# Patient Record
Sex: MALE | Race: BLACK OR AFRICAN AMERICAN | NOT HISPANIC OR LATINO | Employment: OTHER | URBAN - METROPOLITAN AREA
[De-identification: names, ages, dates, MRNs, and addresses within clinical notes are randomized per-mention and may not be internally consistent; named-entity substitution may affect disease eponyms.]

---

## 2017-01-13 DIAGNOSIS — E11.59 HYPERTENSION ASSOCIATED WITH DIABETES: ICD-10-CM

## 2017-01-13 DIAGNOSIS — I15.2 HYPERTENSION ASSOCIATED WITH DIABETES: ICD-10-CM

## 2017-01-13 RX ORDER — ENALAPRIL MALEATE AND HYDROCHLOROTHIAZIDE 10; 25 MG/1; MG/1
TABLET ORAL
Qty: 30 TABLET | Refills: 4 | Status: SHIPPED | OUTPATIENT
Start: 2017-01-13 | End: 2017-06-10 | Stop reason: SDUPTHER

## 2017-03-23 DIAGNOSIS — E11.9 TYPE 2 DIABETES MELLITUS WITHOUT COMPLICATION: ICD-10-CM

## 2017-04-24 RX ORDER — POTASSIUM CHLORIDE 750 MG/1
TABLET, FILM COATED, EXTENDED RELEASE ORAL
Qty: 40 TABLET | Refills: 3 | Status: SHIPPED | OUTPATIENT
Start: 2017-04-24 | End: 2017-08-18 | Stop reason: SDUPTHER

## 2017-05-24 ENCOUNTER — TELEPHONE (OUTPATIENT)
Dept: INTERNAL MEDICINE | Facility: CLINIC | Age: 77
End: 2017-05-24

## 2017-05-24 DIAGNOSIS — I15.2 HYPERTENSION ASSOCIATED WITH DIABETES: ICD-10-CM

## 2017-05-24 DIAGNOSIS — E11.59 HYPERTENSION ASSOCIATED WITH DIABETES: ICD-10-CM

## 2017-05-24 RX ORDER — METOPROLOL SUCCINATE 25 MG/1
TABLET, EXTENDED RELEASE ORAL
Qty: 30 TABLET | Refills: 1 | Status: SHIPPED | OUTPATIENT
Start: 2017-05-24 | End: 2017-07-22 | Stop reason: SDUPTHER

## 2017-05-24 NOTE — TELEPHONE ENCOUNTER
Spoke with patient regarding his medication sent to his pharmacy, also reminded his f/u appt on the 20th of June @ 11:00. Patient voices understanding.

## 2017-06-10 DIAGNOSIS — I15.2 HYPERTENSION ASSOCIATED WITH DIABETES: ICD-10-CM

## 2017-06-10 DIAGNOSIS — E11.59 HYPERTENSION ASSOCIATED WITH DIABETES: ICD-10-CM

## 2017-06-12 RX ORDER — ENALAPRIL MALEATE AND HYDROCHLOROTHIAZIDE 10; 25 MG/1; MG/1
TABLET ORAL
Qty: 30 TABLET | Refills: 2 | Status: SHIPPED | OUTPATIENT
Start: 2017-06-12 | End: 2017-09-12 | Stop reason: SDUPTHER

## 2017-06-20 ENCOUNTER — LAB VISIT (OUTPATIENT)
Dept: LAB | Facility: HOSPITAL | Age: 77
End: 2017-06-20
Attending: NURSE PRACTITIONER
Payer: MEDICARE

## 2017-06-20 ENCOUNTER — OFFICE VISIT (OUTPATIENT)
Dept: INTERNAL MEDICINE | Facility: CLINIC | Age: 77
End: 2017-06-20
Payer: MEDICARE

## 2017-06-20 ENCOUNTER — OFFICE VISIT (OUTPATIENT)
Dept: FAMILY MEDICINE | Facility: CLINIC | Age: 77
End: 2017-06-20
Payer: MEDICARE

## 2017-06-20 VITALS
SYSTOLIC BLOOD PRESSURE: 120 MMHG | HEART RATE: 64 BPM | BODY MASS INDEX: 23.63 KG/M2 | DIASTOLIC BLOOD PRESSURE: 72 MMHG | OXYGEN SATURATION: 96 % | WEIGHT: 147 LBS | HEIGHT: 66 IN

## 2017-06-20 VITALS
HEIGHT: 66 IN | DIASTOLIC BLOOD PRESSURE: 72 MMHG | OXYGEN SATURATION: 96 % | WEIGHT: 147.5 LBS | SYSTOLIC BLOOD PRESSURE: 120 MMHG | HEART RATE: 64 BPM | BODY MASS INDEX: 23.7 KG/M2

## 2017-06-20 DIAGNOSIS — Z23 NEED FOR VACCINATION AGAINST STREPTOCOCCUS PNEUMONIAE: ICD-10-CM

## 2017-06-20 DIAGNOSIS — Z13.6 SCREENING FOR ISCHEMIC HEART DISEASE: ICD-10-CM

## 2017-06-20 DIAGNOSIS — R41.3 IMPAIRED MEMORY: ICD-10-CM

## 2017-06-20 DIAGNOSIS — E78.1 HYPERTRIGLYCERIDEMIA: ICD-10-CM

## 2017-06-20 DIAGNOSIS — E11.9 TYPE 2 DIABETES MELLITUS WITHOUT COMPLICATION, WITHOUT LONG-TERM CURRENT USE OF INSULIN: Primary | ICD-10-CM

## 2017-06-20 DIAGNOSIS — Z00.00 PREVENTATIVE HEALTH CARE: ICD-10-CM

## 2017-06-20 DIAGNOSIS — Z00.00 ENCOUNTER FOR PREVENTIVE HEALTH EXAMINATION: Primary | ICD-10-CM

## 2017-06-20 DIAGNOSIS — I15.2 HYPERTENSION ASSOCIATED WITH DIABETES: ICD-10-CM

## 2017-06-20 DIAGNOSIS — N18.30 CKD (CHRONIC KIDNEY DISEASE) STAGE 3, GFR 30-59 ML/MIN: ICD-10-CM

## 2017-06-20 DIAGNOSIS — I10 ESSENTIAL HYPERTENSION: ICD-10-CM

## 2017-06-20 DIAGNOSIS — R73.03 PREDIABETES: ICD-10-CM

## 2017-06-20 DIAGNOSIS — R79.9 ABNORMAL FINDING OF BLOOD CHEMISTRY: ICD-10-CM

## 2017-06-20 DIAGNOSIS — Z12.5 SCREENING FOR PROSTATE CANCER: ICD-10-CM

## 2017-06-20 DIAGNOSIS — E11.59 HYPERTENSION ASSOCIATED WITH DIABETES: ICD-10-CM

## 2017-06-20 DIAGNOSIS — Z12.5 PROSTATE CANCER SCREENING: ICD-10-CM

## 2017-06-20 LAB
CHOLEST/HDLC SERPL: 5.4 {RATIO}
COMPLEXED PSA SERPL-MCNC: 2.4 NG/ML
HDL/CHOLESTEROL RATIO: 18.5 %
HDLC SERPL-MCNC: 189 MG/DL
HDLC SERPL-MCNC: 35 MG/DL
LDLC SERPL CALC-MCNC: 116 MG/DL
NONHDLC SERPL-MCNC: 154 MG/DL
TRIGL SERPL-MCNC: 190 MG/DL

## 2017-06-20 PROCEDURE — 99499 UNLISTED E&M SERVICE: CPT | Mod: S$GLB,,, | Performed by: INTERNAL MEDICINE

## 2017-06-20 PROCEDURE — G0009 ADMIN PNEUMOCOCCAL VACCINE: HCPCS | Mod: S$GLB,,, | Performed by: NURSE PRACTITIONER

## 2017-06-20 PROCEDURE — 99499 UNLISTED E&M SERVICE: CPT | Mod: S$GLB,,, | Performed by: NURSE PRACTITIONER

## 2017-06-20 PROCEDURE — 90670 PCV13 VACCINE IM: CPT | Mod: S$GLB,,, | Performed by: NURSE PRACTITIONER

## 2017-06-20 PROCEDURE — 99999 PR PBB SHADOW E&M-EST. PATIENT-LVL III: CPT | Mod: PBBFAC,,, | Performed by: NURSE PRACTITIONER

## 2017-06-20 PROCEDURE — 99397 PER PM REEVAL EST PAT 65+ YR: CPT | Mod: S$GLB,,, | Performed by: INTERNAL MEDICINE

## 2017-06-20 PROCEDURE — G0439 PPPS, SUBSEQ VISIT: HCPCS | Mod: S$GLB,,, | Performed by: NURSE PRACTITIONER

## 2017-06-20 PROCEDURE — 99999 PR PBB SHADOW E&M-EST. PATIENT-LVL III: CPT | Mod: PBBFAC,,, | Performed by: INTERNAL MEDICINE

## 2017-06-20 RX ORDER — ASPIRIN 81 MG/1
81 TABLET ORAL DAILY
COMMUNITY
End: 2020-05-11

## 2017-06-20 RX ORDER — AMOXICILLIN 500 MG
2 CAPSULE ORAL DAILY
COMMUNITY
End: 2023-06-22 | Stop reason: SDUPTHER

## 2017-06-20 NOTE — PROGRESS NOTES
"Dean Hahn presented for a  Medicare AWV and comprehensive Health Risk Assessment today. The following components were reviewed and updated:    · Medical history  · Family History  · Social history  · Allergies and Current Medications  · Health Risk Assessment  · Health Maintenance  · Care Team     ** See Completed Assessments for Annual Wellness Visit within the encounter summary.**       The following assessments were completed:  · Living Situation  · CAGE  · Depression Screening  · Timed Get Up and Go  · Whisper Test  · Cognitive Function Screening  · Nutrition Screening  · ADL Screening  · PAQ Screening    Vitals:    06/20/17 0855   BP: 120/72   Pulse: 64   SpO2: 96%   Weight: 66.9 kg (147 lb 7.8 oz)   Height: 5' 6" (1.676 m)     Body mass index is 23.81 kg/m².     Physical Exam   Constitutional: He is oriented to person, place, and time. He appears well-developed and well-nourished. No distress.   HENT:   Head: Normocephalic and atraumatic.   Eyes: EOM are normal. Pupils are equal, round, and reactive to light.   Neck: Neck supple. No JVD present. No tracheal deviation present.   Cardiovascular: Normal rate, regular rhythm, normal heart sounds and intact distal pulses.    No murmur heard.  Pulmonary/Chest: Effort normal and breath sounds normal. No respiratory distress. He has no wheezes. He has no rales.   Abdominal: Soft. Bowel sounds are normal. He exhibits no distension and no mass. There is no tenderness.   Musculoskeletal: Normal range of motion. He exhibits no edema or tenderness.   Neurological: He is alert and oriented to person, place, and time. Coordination normal.   Skin: Skin is warm and dry. No erythema. No pallor.   Psychiatric: He has a normal mood and affect. His behavior is normal. Judgment and thought content normal. Cognition and memory are normal. He expresses no homicidal and no suicidal ideation.   Nursing note and vitals reviewed.        Diagnoses and health risks identified today " and associated recommendations/orders:    1. Encounter for preventive health examination    2. Essential hypertension  Chronic; stable on medication.  Followed by PCP.    3. Hypertriglyceridemia  Chronic; stable on medication.  Followed by PCP.  - Lipid panel; Future    4. CKD (chronic kidney disease) stage 3, GFR 30-59 ml/min  Chronic; stable.  Followed by PCP.    5. Prediabetes  Chronic; stable.  Followed by PCP.  - Hemoglobin A1c; Future    6. Impaired memory  Memory deficit on screening; Mini Cognitive score: 3    Refused referral to Neurology for dementia evaluation.    7. Screening for ischemic heart disease  - Lipid panel; Future    8. Screening for prostate cancer  - PSA, Screening; Future    9. Need for vaccination against Streptococcus pneumoniae  Prevnar 13 vaccination administered today in clinic.  - PNEUMOCOCCAL CONJUGATE VACCINE 13-VALENT LESS THAN 4YO & GREATER THAN 51YO IM    10. Body mass index (BMI) 23.0-23.9, adult      Provided Dean with a 5-10 year written screening schedule and personal prevention plan. Recommendations were developed using the USPSTF age appropriate recommendations. Education, counseling, and referrals were provided as needed. After Visit Summary printed and given to patient which includes a list of additional screenings\tests needed.    Return in about 6 months (around 12/20/2017) for follow-up with PCP, HRA visit in 1 year.    Luann Sullivan NP

## 2017-06-20 NOTE — PATIENT INSTRUCTIONS
Counseling and Referral of Other Preventative  (Italic type indicates deductible and co-insurance are waived)    Patient Name: Dean Hahn  Today's Date: 6/20/2017      SERVICE LIMITATIONS RECOMMENDATION    Vaccines    · Pneumococcal (once after 65)    · Influenza (annually)    · Hepatitis B (if medium/high risk)    · Prevnar 13      Hepatitis B medium/high risk factors:       - End-stage renal disease       - Hemophiliacs who received Factor VII or         IX concentrates       - Clients of institutions for the mentally             retarded       - Persons who live in the same house as          a HepB carrier       - Homosexual men       - Illicit injectable drug abusers     Pneumococcal: N/A     Influenza: N/A     Hepatitis B: N/A     Prevnar 13: Scheduled - see appointments    Prostate cancer screening (annually to age 75)     Prostate specific antigen (PSA) Shared decision making with Provider. Sometimes a co-pay may be required if the patient decides to have this test. The USPSTF no longer recommends prostate cancer screening routinely in medicine: every 1 year    Colorectal cancer screening (to age 75)    · Fecal occult blood test (annual)  · Flexible sigmoidoscopy (5y)  · Screening colonoscopy (10y)  · Barium enema   Last done 04/11/2013, recommend to repeat every 10  years    Diabetes self-management training (no USPSTF recommendations)  Requires referral by treating physician for patient with diabetes or renal disease. 10 hours of initial DSMT sessions of no less than 30 minutes each in a continuous 12-month period. 2 hours of follow-up DSMT in subsequent years.  N/A    Glaucoma screening (no USPSTF recommendation)  Diabetes mellitus, family history   , age 50 or over    American, age 65 or over  Done this year, repeat every year    Medical nutrition therapy for diabetes or renal disease (no recommended schedule)  Requires referral by treating physician for patient with  diabetes or renal disease or kidney transplant within the past 3 years.  Can be provided in same year as diabetes self-management training (DSMT), and CMS recommends medical nutrition therapy take place after DSMT. Up to 3 hours for initial year and 2 hours in subsequent years.  N/A    Cardiovascular screening blood tests (every 5 years)  · Fasting lipid panel  Order as a panel if possible  Scheduled, see appointments    Diabetes screening tests (at least every 3 years, Medicare covers annually or at 6-month intervals for prediabetic patients)  · Fasting blood sugar (FBS) or glucose tolerance test (GTT)  Patient must be diagnosed with one of the following:       - Hypertension       - Dyslipidemia       - Obesity (BMI 30kg/m2)       - Previous elevated impaired FBS or GTT       ... or any two of the following:       - Overweight (BMI 25 but <30)       - Family history of diabetes       - Age 65 or older       - History of gestational diabetes or birth of baby weighing more than 9 pounds  Scheduled, see appointments    Abdominal aortic aneurysm screening (once)  · Sonogram   Limited to patients who meet one of the following criteria:       - Men who are 65-75 years old and have smoked more than 100 cigarette in their lifetime       - Anyone with a family history of abdominal aortic aneurysm       - Anyone recommended for screening by the USPSTF  N/A    HIV screening (annually for increased risk patients)  · HIV-1 and HIV-2 by EIA, or GUSTAVO, rapid antibody test or oral mucosa transudate  Patients must be at increased risk for HIV infection per USPSTF guidelines or pregnant. Tests covered annually for patient at increased risk or as requested by the patient. Pregnant patients may receive up to 3 tests during pregnancy.  Risks discussed, screening is not recommended    Smoking cessation counseling (up to 8 sessions per year)  Patients must be asymptomatic of tobacco-related conditions to receive as a preventative  service.  Non-smoker    Subsequent annual wellness visit  At least 12 months since last AWV  Return in one year     The following information is provided to all patients.  This information is to help you find resources for any of the problems found today that may be affecting your health:                Living healthy guide: www.[facility].[place].[facility]      Understanding Diabetes: www.diabetes.org      Eating healthy: www.cdc.gov/healthyweight      CDC home safety checklist: www.cdc.gov/steadi/patient.html      Agency on Aging: www.goea.[place].[facility]      Alcoholics anonymous (AA): www.aa.org      Physical Activity: www.erickson.nih.gov/ce6mtiu      Tobacco use: www.quitwithusla.org

## 2017-06-20 NOTE — PROGRESS NOTES
Subjective:       Patient ID: Dean Hahn is a 76 y.o. male.    Chief Complaint: Hypertension (follow up)    HPI Pt. Here for f/u for HTN and DM; he is compliant with meds; he had labs today and are in process; last HGBA1C dated 9/8/16 was 5.3  Review of Systems   Constitutional: Negative for chills, fatigue and fever.   HENT: Negative for congestion, rhinorrhea and sore throat.    Respiratory: Negative for cough, shortness of breath and wheezing.    Cardiovascular: Negative for chest pain.   Gastrointestinal: Negative for abdominal pain, nausea and vomiting.   Genitourinary: Negative for dysuria.   Musculoskeletal: Negative for arthralgias.   Skin: Negative for rash.   Neurological: Negative for dizziness and headaches.   Psychiatric/Behavioral: Negative for sleep disturbance. The patient is not nervous/anxious.        Objective:      Physical Exam   Constitutional: He is oriented to person, place, and time. He appears well-developed.   Eyes: EOM are normal.   Neck: Normal range of motion.   Cardiovascular: Normal rate, regular rhythm and normal heart sounds.    Pulmonary/Chest: Effort normal and breath sounds normal.   Abdominal: Soft. There is no tenderness. There is no rebound and no guarding.   Musculoskeletal: Normal range of motion.   Neurological: He is alert and oriented to person, place, and time.   Skin: No rash noted.       Assessment:       1. Type 2 diabetes mellitus without complication, without long-term current use of insulin Well controlled   2. Hypertension associated with diabetes Well controlled   3. Preventative health care Active   4. Prostate cancer screening Active   5. Abnormal finding of blood chemistry  Active       Plan:         Type 2 diabetes mellitus without complication, without long-term current use of insulin  Comments:  encouraged ADA diet   Orders:  -     CBC auto differential; Future; Expected date: 11/20/2017  -     Comprehensive metabolic panel; Future; Expected  date: 11/20/2017  -     Hemoglobin A1c; Future; Expected date: 11/20/2017  -     Urinalysis; Future; Expected date: 11/20/2017  -     Microalbumin/creatinine urine ratio; Future; Expected date: 11/20/2017    Hypertension associated with diabetes  Comments:  continue current regimen and encouraged low Na diet   Orders:  -     CBC auto differential; Future; Expected date: 11/20/2017  -     Comprehensive metabolic panel; Future; Expected date: 11/20/2017  -     Urinalysis; Future; Expected date: 11/20/2017    Preventative health care  -     CBC auto differential; Future; Expected date: 11/20/2017  -     Comprehensive metabolic panel; Future; Expected date: 11/20/2017  -     Lipid panel; Future; Expected date: 11/20/2017  -     Urinalysis; Future; Expected date: 11/20/2017    Prostate cancer screening  -     PSA, Screening; Future; Expected date: 11/20/2017    Abnormal finding of blood chemistry   -     Lipid panel; Future; Expected date: 11/20/2017

## 2017-06-20 NOTE — Clinical Note
Primary Care Providers: Herman Padilla MD, MD (General)  Your patient was seen today for a HRA visit. Gap(s) in care (HEDIS gaps) have been identified during this visit that require additional testing and possible follow up.  Orders Placed This Encounter     PNEUMOCOCCAL CONJUGATE VACCINE 13-VALENT LESS THAN 4YO & GREATER THAN 51YO IM     Lipid panel         Standing Status: Future         Number of Occurrences: 1         Standing Expiration Date: 8/19/2018     PSA, Screening         Standing Status: Future         Number of Occurrences: 1         Standing Expiration Date: 8/19/2018     Hemoglobin A1c         Standing Status: Future         Number of Occurrences: 1         Standing Expiration Date: 8/19/2018   These orders were placed using [facility] approved protocol and any results will be forwarded to your office for appropriate follow up. I have included a copy of my visit note; please review the note and feel free to contact me with any questions.   Thank you for allowing me to partici

## 2017-06-21 LAB
ESTIMATED AVG GLUCOSE: 123 MG/DL
HBA1C MFR BLD HPLC: 5.9 %

## 2017-07-22 DIAGNOSIS — I15.2 HYPERTENSION ASSOCIATED WITH DIABETES: ICD-10-CM

## 2017-07-22 DIAGNOSIS — E11.59 HYPERTENSION ASSOCIATED WITH DIABETES: ICD-10-CM

## 2017-07-22 RX ORDER — METOPROLOL SUCCINATE 25 MG/1
TABLET, EXTENDED RELEASE ORAL
Qty: 30 TABLET | Refills: 6 | Status: SHIPPED | OUTPATIENT
Start: 2017-07-22 | End: 2017-11-08 | Stop reason: SDUPTHER

## 2017-08-18 RX ORDER — POTASSIUM CHLORIDE 750 MG/1
TABLET, FILM COATED, EXTENDED RELEASE ORAL
Qty: 40 TABLET | Refills: 3 | Status: SHIPPED | OUTPATIENT
Start: 2017-08-18 | End: 2017-11-08 | Stop reason: SDUPTHER

## 2017-09-12 DIAGNOSIS — I15.2 HYPERTENSION ASSOCIATED WITH DIABETES: ICD-10-CM

## 2017-09-12 DIAGNOSIS — E11.59 HYPERTENSION ASSOCIATED WITH DIABETES: ICD-10-CM

## 2017-09-12 RX ORDER — ENALAPRIL MALEATE AND HYDROCHLOROTHIAZIDE 10; 25 MG/1; MG/1
TABLET ORAL
Qty: 30 TABLET | Refills: 2 | Status: SHIPPED | OUTPATIENT
Start: 2017-09-12 | End: 2017-11-08 | Stop reason: SDUPTHER

## 2017-09-25 PROBLEM — Z00.00 PREVENTATIVE HEALTH CARE: Status: RESOLVED | Noted: 2017-06-20 | Resolved: 2017-09-25

## 2017-11-08 DIAGNOSIS — E11.59 HYPERTENSION ASSOCIATED WITH DIABETES: ICD-10-CM

## 2017-11-08 DIAGNOSIS — I15.2 HYPERTENSION ASSOCIATED WITH DIABETES: ICD-10-CM

## 2017-11-08 RX ORDER — ENALAPRIL MALEATE AND HYDROCHLOROTHIAZIDE 10; 25 MG/1; MG/1
1 TABLET ORAL DAILY
Qty: 30 TABLET | Refills: 2 | Status: SHIPPED | OUTPATIENT
Start: 2017-11-08 | End: 2018-02-20 | Stop reason: SDUPTHER

## 2017-11-08 RX ORDER — METOPROLOL SUCCINATE 25 MG/1
25 TABLET, EXTENDED RELEASE ORAL DAILY
Qty: 30 TABLET | Refills: 2 | Status: SHIPPED | OUTPATIENT
Start: 2017-11-08 | End: 2019-03-26 | Stop reason: SDUPTHER

## 2017-11-08 RX ORDER — POTASSIUM CHLORIDE 750 MG/1
TABLET, EXTENDED RELEASE ORAL
Qty: 40 TABLET | Refills: 2 | Status: SHIPPED | OUTPATIENT
Start: 2017-11-08 | End: 2018-02-06 | Stop reason: SDUPTHER

## 2017-11-08 NOTE — TELEPHONE ENCOUNTER
----- Message from Bruno Brown sent at 11/8/2017  1:47 PM CST -----  Contact: [facility]  [phone]  Refill request for   1. enalapril-hydrochlorothiazide (VASERETIC) 10-25 mg per tablet  2. KLOR-CON 10 10 mEq TbSR  3. metoprolol succinate (TOPROL-XL) 25 MG 24 hr tablet  Please advise

## 2017-12-11 ENCOUNTER — LAB VISIT (OUTPATIENT)
Dept: LAB | Facility: HOSPITAL | Age: 77
End: 2017-12-11
Attending: INTERNAL MEDICINE
Payer: MEDICARE

## 2017-12-11 DIAGNOSIS — I15.2 HYPERTENSION ASSOCIATED WITH DIABETES: ICD-10-CM

## 2017-12-11 DIAGNOSIS — E11.9 TYPE 2 DIABETES MELLITUS WITHOUT COMPLICATION, WITHOUT LONG-TERM CURRENT USE OF INSULIN: ICD-10-CM

## 2017-12-11 DIAGNOSIS — E11.59 HYPERTENSION ASSOCIATED WITH DIABETES: ICD-10-CM

## 2017-12-11 DIAGNOSIS — Z00.00 PREVENTATIVE HEALTH CARE: ICD-10-CM

## 2017-12-11 LAB
BILIRUB UR QL STRIP: NEGATIVE
CLARITY UR REFRACT.AUTO: CLEAR
COLOR UR AUTO: YELLOW
GLUCOSE UR QL STRIP: NEGATIVE
HGB UR QL STRIP: NEGATIVE
KETONES UR QL STRIP: NEGATIVE
LEUKOCYTE ESTERASE UR QL STRIP: NEGATIVE
NITRITE UR QL STRIP: NEGATIVE
PH UR STRIP: 5 [PH] (ref 5–8)
PROT UR QL STRIP: NEGATIVE
SP GR UR STRIP: 1.01 (ref 1–1.03)
URN SPEC COLLECT METH UR: NORMAL
UROBILINOGEN UR STRIP-ACNC: 4 EU/DL

## 2017-12-11 PROCEDURE — 81003 URINALYSIS AUTO W/O SCOPE: CPT

## 2017-12-20 ENCOUNTER — OFFICE VISIT (OUTPATIENT)
Dept: INTERNAL MEDICINE | Facility: CLINIC | Age: 77
End: 2017-12-20
Payer: MEDICARE

## 2017-12-20 VITALS
HEIGHT: 66 IN | WEIGHT: 148.38 LBS | SYSTOLIC BLOOD PRESSURE: 124 MMHG | DIASTOLIC BLOOD PRESSURE: 78 MMHG | BODY MASS INDEX: 23.84 KG/M2 | HEART RATE: 68 BPM

## 2017-12-20 DIAGNOSIS — N18.30 CKD (CHRONIC KIDNEY DISEASE) STAGE 3, GFR 30-59 ML/MIN: ICD-10-CM

## 2017-12-20 DIAGNOSIS — R17 TOTAL BILIRUBIN, ELEVATED: ICD-10-CM

## 2017-12-20 DIAGNOSIS — E11.22 TYPE 2 DIABETES MELLITUS WITH STAGE 3 CHRONIC KIDNEY DISEASE, WITHOUT LONG-TERM CURRENT USE OF INSULIN: Primary | ICD-10-CM

## 2017-12-20 DIAGNOSIS — E78.1 HYPERTRIGLYCERIDEMIA: ICD-10-CM

## 2017-12-20 DIAGNOSIS — E11.9 ENCOUNTER FOR DIABETIC FOOT EXAM: ICD-10-CM

## 2017-12-20 DIAGNOSIS — R79.89 LFT ELEVATION: ICD-10-CM

## 2017-12-20 DIAGNOSIS — E11.59 HYPERTENSION ASSOCIATED WITH DIABETES: ICD-10-CM

## 2017-12-20 DIAGNOSIS — R74.8 LOW SERUM HDL: ICD-10-CM

## 2017-12-20 DIAGNOSIS — Z23 NEEDS FLU SHOT: ICD-10-CM

## 2017-12-20 DIAGNOSIS — Z00.00 PREVENTATIVE HEALTH CARE: ICD-10-CM

## 2017-12-20 DIAGNOSIS — N18.30 TYPE 2 DIABETES MELLITUS WITH STAGE 3 CHRONIC KIDNEY DISEASE, WITHOUT LONG-TERM CURRENT USE OF INSULIN: Primary | ICD-10-CM

## 2017-12-20 DIAGNOSIS — I15.2 HYPERTENSION ASSOCIATED WITH DIABETES: ICD-10-CM

## 2017-12-20 PROCEDURE — G0008 ADMIN INFLUENZA VIRUS VAC: HCPCS | Mod: S$GLB,,, | Performed by: INTERNAL MEDICINE

## 2017-12-20 PROCEDURE — 90662 IIV NO PRSV INCREASED AG IM: CPT | Mod: S$GLB,,, | Performed by: INTERNAL MEDICINE

## 2017-12-20 PROCEDURE — 99999 PR PBB SHADOW E&M-EST. PATIENT-LVL III: CPT | Mod: PBBFAC,,, | Performed by: INTERNAL MEDICINE

## 2017-12-20 PROCEDURE — 99499 UNLISTED E&M SERVICE: CPT | Mod: S$GLB,,, | Performed by: INTERNAL MEDICINE

## 2017-12-20 PROCEDURE — 99214 OFFICE O/P EST MOD 30 MIN: CPT | Mod: S$GLB,,, | Performed by: INTERNAL MEDICINE

## 2017-12-20 NOTE — PROGRESS NOTES
Pt. ID: Dean Hahn is a 77 y.o. male      Chief complaint: No chief complaint on file.      HPI: Pt. Here for f/u for DM and HTN; I reviewed labs dated 12/11/17; kidney fxn was elevated but stable; LFT's have normalized; total bilirubin was elevated but stable; HGBA1C was 6.3; triglycerides were elevated and HDL was low; he takes fish oil tabs QD and I advised him to take them BID; he would like flu shot; he would like podiatry referral for nail care; he will get tetanus and shingles vaccine at local pharmacy; Rx given to pt. For both; he takes asa 81 mg QD      Review of Systems   Constitutional: Negative for chills and fever.   Respiratory: Negative for cough and shortness of breath.    Cardiovascular: Negative for chest pain.   Gastrointestinal: Negative for abdominal pain, nausea and vomiting.   Genitourinary: Negative for dysuria.         Objective:    Physical Exam   Constitutional: He is oriented to person, place, and time. He appears well-developed.   Eyes: EOM are normal.   Neck: Normal range of motion.   Cardiovascular: Normal rate, regular rhythm and normal heart sounds.    Pulmonary/Chest: Effort normal and breath sounds normal.   Abdominal: Soft. There is no tenderness. There is no rebound and no guarding.   Musculoskeletal: Normal range of motion.   Neurological: He is alert and oriented to person, place, and time.   Skin: No rash noted.   Diabetic foot exam: sensation intact and no ulcers B/L ; very poor nail care noted B/L toenails         Health Maintenance   Topic Date Due    Hemoglobin A1c  06/11/2018    Pneumococcal (65+) (2 of 2 - PPSV23) 06/20/2018    Eye Exam  08/07/2018    Lipid Panel  12/11/2018    Foot Exam  12/20/2018    Colonoscopy  04/11/2023    TETANUS VACCINE  12/20/2027    Zoster Vaccine  Addressed    Influenza Vaccine  Completed         Assessment:     1. Type 2 diabetes mellitus with stage 3 chronic kidney disease, without long-term current use of insulin  Well controlled   2. Hypertension associated with diabetes Well controlled   3. Hypertriglyceridemia Sub-optimally controlled   4. LFT elevation Well controlled   5. CKD (chronic kidney disease) stage 3, GFR 30-59 ml/min Stable   6. Total bilirubin, elevated Stable   7. Low serum HDL Active   8. Needs flu shot Active   9. Preventative health care Active   10. Encounter for diabetic foot exam Active         Plan: Type 2 diabetes mellitus with stage 3 chronic kidney disease, without long-term current use of insulin  Comments:  encouraged ADA diet   Orders:  -     CBC auto differential; Future; Expected date: 12/20/2017  -     Comprehensive metabolic panel; Future; Expected date: 12/20/2017  -     Hemoglobin A1c; Future; Expected date: 12/20/2017  -     Urinalysis; Future; Expected date: 12/20/2017  -     Microalbumin/creatinine urine ratio; Future; Expected date: 12/20/2017    Hypertension associated with diabetes  Comments:  continue current regimen and encouraged low Na diet   Orders:  -     CBC auto differential; Future; Expected date: 12/20/2017  -     Comprehensive metabolic panel; Future; Expected date: 12/20/2017  -     Urinalysis; Future; Expected date: 12/20/2017    Hypertriglyceridemia  Comments:  encouraged low triglyceride diet and change fish oil tabs to BID  Orders:  -     Lipid panel; Future; Expected date: 12/20/2017    LFT elevation  Comments:  normalized; monitor  Orders:  -     Comprehensive metabolic panel; Future; Expected date: 12/20/2017    CKD (chronic kidney disease) stage 3, GFR 30-59 ml/min  Comments:  monitor; avoid NSAIDs; encouraged fluid intake  Orders:  -     Comprehensive metabolic panel; Future; Expected date: 12/20/2017    Total bilirubin, elevated  Comments:  monitor  Orders:  -     Comprehensive metabolic panel; Future; Expected date: 12/20/2017    Low serum HDL  Comments:  asked pt. to exercise to attempt to improve HDL  Orders:  -     Lipid panel; Future; Expected date:  12/20/2017    Needs flu shot  -     Influenza - High Dose (65+) (PF) (IM)    Preventative health care  -     CBC auto differential; Future; Expected date: 12/20/2017  -     Comprehensive metabolic panel; Future; Expected date: 12/20/2017  -     Lipid panel; Future; Expected date: 12/20/2017  -     Urinalysis; Future; Expected date: 12/20/2017    Encounter for diabetic foot exam  -     Ambulatory Referral to Podiatry        Problem List Items Addressed This Visit        Cardiac/Vascular    Hypertriglyceridemia    Relevant Orders    Lipid panel    Hypertension associated with diabetes    Relevant Orders    CBC auto differential    Comprehensive metabolic panel    Urinalysis       Renal/    CKD (chronic kidney disease) stage 3, GFR 30-59 ml/min    Relevant Orders    Comprehensive metabolic panel       ID    Needs flu shot    Relevant Orders    Influenza - High Dose (65+) (PF) (IM)       Endocrine    Type 2 diabetes mellitus without complication, without long-term current use of insulin - Primary       GI    Total bilirubin, elevated    Relevant Orders    Comprehensive metabolic panel    LFT elevation    Relevant Orders    Comprehensive metabolic panel       Other    Low serum HDL    Relevant Orders    Lipid panel      Other Visit Diagnoses     Preventative health care   (Active)      Relevant Orders    CBC auto differential    Comprehensive metabolic panel    Lipid panel    Urinalysis    Encounter for diabetic foot exam   (Active)      Relevant Orders    Ambulatory Referral to Podiatry

## 2018-02-06 RX ORDER — POTASSIUM CHLORIDE 750 MG/1
TABLET, FILM COATED, EXTENDED RELEASE ORAL
Qty: 40 TABLET | Refills: 2 | Status: SHIPPED | OUTPATIENT
Start: 2018-02-06 | End: 2018-05-02 | Stop reason: SDUPTHER

## 2018-02-20 DIAGNOSIS — I15.2 HYPERTENSION ASSOCIATED WITH DIABETES: ICD-10-CM

## 2018-02-20 DIAGNOSIS — E11.59 HYPERTENSION ASSOCIATED WITH DIABETES: ICD-10-CM

## 2018-02-20 RX ORDER — ENALAPRIL MALEATE AND HYDROCHLOROTHIAZIDE 10; 25 MG/1; MG/1
1 TABLET ORAL DAILY
Qty: 30 TABLET | Refills: 4 | Status: SHIPPED | OUTPATIENT
Start: 2018-02-20 | End: 2018-07-17 | Stop reason: SDUPTHER

## 2018-03-26 ENCOUNTER — PES CALL (OUTPATIENT)
Dept: ADMINISTRATIVE | Facility: CLINIC | Age: 78
End: 2018-03-26

## 2018-05-02 RX ORDER — POTASSIUM CHLORIDE 750 MG/1
TABLET, FILM COATED, EXTENDED RELEASE ORAL
Qty: 40 TABLET | Refills: 3 | Status: SHIPPED | OUTPATIENT
Start: 2018-05-02 | End: 2018-08-21 | Stop reason: SDUPTHER

## 2018-06-11 ENCOUNTER — LAB VISIT (OUTPATIENT)
Dept: LAB | Facility: HOSPITAL | Age: 78
End: 2018-06-11
Attending: INTERNAL MEDICINE
Payer: MEDICARE

## 2018-06-11 ENCOUNTER — TELEPHONE (OUTPATIENT)
Dept: INTERNAL MEDICINE | Facility: CLINIC | Age: 78
End: 2018-06-11

## 2018-06-11 DIAGNOSIS — R79.89 LFT ELEVATION: ICD-10-CM

## 2018-06-11 DIAGNOSIS — E11.59 HYPERTENSION ASSOCIATED WITH DIABETES: ICD-10-CM

## 2018-06-11 DIAGNOSIS — N18.30 TYPE 2 DIABETES MELLITUS WITH STAGE 3 CHRONIC KIDNEY DISEASE, WITHOUT LONG-TERM CURRENT USE OF INSULIN: ICD-10-CM

## 2018-06-11 DIAGNOSIS — N18.30 CKD (CHRONIC KIDNEY DISEASE) STAGE 3, GFR 30-59 ML/MIN: ICD-10-CM

## 2018-06-11 DIAGNOSIS — I15.2 HYPERTENSION ASSOCIATED WITH DIABETES: ICD-10-CM

## 2018-06-11 DIAGNOSIS — Z00.00 PREVENTATIVE HEALTH CARE: ICD-10-CM

## 2018-06-11 DIAGNOSIS — R17 TOTAL BILIRUBIN, ELEVATED: ICD-10-CM

## 2018-06-11 DIAGNOSIS — R74.8 LOW SERUM HDL: ICD-10-CM

## 2018-06-11 DIAGNOSIS — E11.22 TYPE 2 DIABETES MELLITUS WITH STAGE 3 CHRONIC KIDNEY DISEASE, WITHOUT LONG-TERM CURRENT USE OF INSULIN: ICD-10-CM

## 2018-06-11 DIAGNOSIS — E78.1 HYPERTRIGLYCERIDEMIA: ICD-10-CM

## 2018-06-11 LAB
ALBUMIN SERPL BCP-MCNC: 3.2 G/DL
ALP SERPL-CCNC: 96 U/L
ALT SERPL W/O P-5'-P-CCNC: 177 U/L
ANION GAP SERPL CALC-SCNC: 10 MMOL/L
AST SERPL-CCNC: 155 U/L
BASOPHILS # BLD AUTO: 0.06 K/UL
BASOPHILS NFR BLD: 0.6 %
BILIRUB SERPL-MCNC: 2.2 MG/DL
BUN SERPL-MCNC: 13 MG/DL
CALCIUM SERPL-MCNC: 9.7 MG/DL
CHLORIDE SERPL-SCNC: 105 MMOL/L
CHOLEST SERPL-MCNC: 182 MG/DL
CHOLEST/HDLC SERPL: 5.1 {RATIO}
CO2 SERPL-SCNC: 21 MMOL/L
CREAT SERPL-MCNC: 1.1 MG/DL
DIFFERENTIAL METHOD: ABNORMAL
EOSINOPHIL # BLD AUTO: 0.3 K/UL
EOSINOPHIL NFR BLD: 3.4 %
ERYTHROCYTE [DISTWIDTH] IN BLOOD BY AUTOMATED COUNT: 12.5 %
EST. GFR  (AFRICAN AMERICAN): >60 ML/MIN/1.73 M^2
EST. GFR  (NON AFRICAN AMERICAN): >60 ML/MIN/1.73 M^2
ESTIMATED AVG GLUCOSE: 131 MG/DL
GLUCOSE SERPL-MCNC: 113 MG/DL
HBA1C MFR BLD HPLC: 6.2 %
HCT VFR BLD AUTO: 43.9 %
HDLC SERPL-MCNC: 36 MG/DL
HDLC SERPL: 19.8 %
HGB BLD-MCNC: 15 G/DL
IMM GRANULOCYTES # BLD AUTO: 0.02 K/UL
IMM GRANULOCYTES NFR BLD AUTO: 0.2 %
LDLC SERPL CALC-MCNC: 115.4 MG/DL
LYMPHOCYTES # BLD AUTO: 2.9 K/UL
LYMPHOCYTES NFR BLD: 31.2 %
MCH RBC QN AUTO: 32.9 PG
MCHC RBC AUTO-ENTMCNC: 34.2 G/DL
MCV RBC AUTO: 96 FL
MONOCYTES # BLD AUTO: 1.1 K/UL
MONOCYTES NFR BLD: 11.8 %
NEUTROPHILS # BLD AUTO: 4.9 K/UL
NEUTROPHILS NFR BLD: 52.8 %
NONHDLC SERPL-MCNC: 146 MG/DL
NRBC BLD-RTO: 0 /100 WBC
PLATELET # BLD AUTO: 177 K/UL
PMV BLD AUTO: 9.5 FL
POTASSIUM SERPL-SCNC: 4 MMOL/L
PROT SERPL-MCNC: 8 G/DL
RBC # BLD AUTO: 4.56 M/UL
SODIUM SERPL-SCNC: 136 MMOL/L
TRIGL SERPL-MCNC: 153 MG/DL
WBC # BLD AUTO: 9.37 K/UL

## 2018-06-11 PROCEDURE — 36415 COLL VENOUS BLD VENIPUNCTURE: CPT | Mod: PO

## 2018-06-11 PROCEDURE — 83036 HEMOGLOBIN GLYCOSYLATED A1C: CPT

## 2018-06-11 PROCEDURE — 80053 COMPREHEN METABOLIC PANEL: CPT

## 2018-06-11 PROCEDURE — 80061 LIPID PANEL: CPT

## 2018-06-11 PROCEDURE — 85025 COMPLETE CBC W/AUTO DIFF WBC: CPT

## 2018-06-20 ENCOUNTER — OFFICE VISIT (OUTPATIENT)
Dept: INTERNAL MEDICINE | Facility: CLINIC | Age: 78
End: 2018-06-20
Payer: MEDICARE

## 2018-06-20 VITALS
BODY MASS INDEX: 23.7 KG/M2 | DIASTOLIC BLOOD PRESSURE: 72 MMHG | HEIGHT: 66 IN | OXYGEN SATURATION: 97 % | HEART RATE: 82 BPM | SYSTOLIC BLOOD PRESSURE: 110 MMHG | WEIGHT: 147.5 LBS

## 2018-06-20 DIAGNOSIS — R17 TOTAL BILIRUBIN, ELEVATED: ICD-10-CM

## 2018-06-20 DIAGNOSIS — E78.5 HYPERLIPIDEMIA, UNSPECIFIED HYPERLIPIDEMIA TYPE: ICD-10-CM

## 2018-06-20 DIAGNOSIS — E11.9 TYPE 2 DIABETES MELLITUS WITHOUT COMPLICATION, WITHOUT LONG-TERM CURRENT USE OF INSULIN: Primary | ICD-10-CM

## 2018-06-20 DIAGNOSIS — R79.89 LFT ELEVATION: ICD-10-CM

## 2018-06-20 DIAGNOSIS — E11.59 HYPERTENSION ASSOCIATED WITH DIABETES: ICD-10-CM

## 2018-06-20 DIAGNOSIS — I15.2 HYPERTENSION ASSOCIATED WITH DIABETES: ICD-10-CM

## 2018-06-20 DIAGNOSIS — R74.8 LOW SERUM HDL: ICD-10-CM

## 2018-06-20 PROCEDURE — 3044F HG A1C LEVEL LT 7.0%: CPT | Mod: CPTII,S$GLB,, | Performed by: INTERNAL MEDICINE

## 2018-06-20 PROCEDURE — 3074F SYST BP LT 130 MM HG: CPT | Mod: CPTII,S$GLB,, | Performed by: INTERNAL MEDICINE

## 2018-06-20 PROCEDURE — 99499 UNLISTED E&M SERVICE: CPT | Mod: HCNC,S$GLB,, | Performed by: INTERNAL MEDICINE

## 2018-06-20 PROCEDURE — 99999 PR PBB SHADOW E&M-EST. PATIENT-LVL III: CPT | Mod: PBBFAC,,, | Performed by: INTERNAL MEDICINE

## 2018-06-20 PROCEDURE — 99214 OFFICE O/P EST MOD 30 MIN: CPT | Mod: S$GLB,,, | Performed by: INTERNAL MEDICINE

## 2018-06-20 PROCEDURE — 3078F DIAST BP <80 MM HG: CPT | Mod: CPTII,S$GLB,, | Performed by: INTERNAL MEDICINE

## 2018-06-20 RX ORDER — DORZOLAMIDE HYDROCHLORIDE AND TIMOLOL MALEATE 20; 5 MG/ML; MG/ML
1 SOLUTION/ DROPS OPHTHALMIC 2 TIMES DAILY
Refills: 6 | COMMUNITY
Start: 2018-05-09 | End: 2021-11-18 | Stop reason: SDUPTHER

## 2018-06-20 NOTE — PROGRESS NOTES
Pt. ID: Dean Hahn is a 77 y.o. male      Chief complaint:   Chief Complaint   Patient presents with    Follow-up     6 mos    Diabetes    Hypertension       HPI: Pt. Here for f/u for DM and HTN; I reviewed labs dated 6/11/18; LFT's are elevated; he drinks 3-4 beers a week; he denies stomach pain; hepatitis panel in 2016 was negative; HGBA1C is 6.2; cholesterol and triglycerides are mildly elevated; he is on fish oil tab daily; HDL is low; he is compliant with meds      Review of Systems   Constitutional: Negative for chills and fever.   Respiratory: Negative for cough and shortness of breath.    Cardiovascular: Negative for chest pain.   Gastrointestinal: Negative for abdominal pain, nausea and vomiting.   Genitourinary: Negative for dysuria.         Objective:    Physical Exam   Constitutional: He is oriented to person, place, and time. He appears well-developed.   Eyes: EOM are normal.   Neck: Normal range of motion.   Cardiovascular: Normal rate, regular rhythm and normal heart sounds.    Pulmonary/Chest: Effort normal and breath sounds normal. No respiratory distress. He has no wheezes.   Abdominal: Soft. There is no tenderness. There is no rebound and no guarding.   Musculoskeletal: Normal range of motion.   Neurological: He is alert and oriented to person, place, and time.   Skin: No rash noted.         Health Maintenance   Topic Date Due    Pneumococcal (65+) (2 of 2 - PPSV23) 06/20/2018    Influenza Vaccine  08/01/2018    Eye Exam  11/06/2018    Hemoglobin A1c  12/11/2018    Foot Exam  12/20/2018    Lipid Panel  06/11/2019    Colonoscopy  04/11/2023    TETANUS VACCINE  12/20/2027    Zoster Vaccine  Addressed         Assessment:     1. Type 2 diabetes mellitus without complication, without long-term current use of insulin Well controlled   2. Hypertension associated with diabetes Well controlled   3. LFT elevation Active   4. Hyperlipidemia, unspecified hyperlipidemia type  Sub-optimally controlled   5. Low serum HDL Active   6. Total bilirubin, elevated Active         Plan: Type 2 diabetes mellitus without complication, without long-term current use of insulin  Comments:  encouraged ADA diet     Hypertension associated with diabetes  Comments:  continue current regimen and encouraged low Na diet     LFT elevation  Comments:  repeat LFT's in 1 month and get abdominal US; abdominal exam is WNL; avoid tylenol and ETOH use  Orders:  -     Hepatic function panel; Future; Expected date: 06/20/2018  -     Hepatitis panel, acute; Future; Expected date: 06/20/2018  -     US Abdomen Complete; Future; Expected date: 06/20/2018    Hyperlipidemia, unspecified hyperlipidemia type  Comments:  encouraged stricter diet modification and will avoid statin dur to elevated LFT's    Low serum HDL  Comments:  asked pt. to exercise to attempt to improve HDL    Total bilirubin, elevated  Comments:  repeat LFT's in 1 month and get liver US  Orders:  -     US Abdomen Complete; Future; Expected date: 06/20/2018        Problem List Items Addressed This Visit        Cardiac/Vascular    Hypertension associated with diabetes    Hyperlipidemia       Endocrine    Type 2 diabetes mellitus without complication, without long-term current use of insulin - Primary       GI    Total bilirubin, elevated    Relevant Orders    US Abdomen Complete    LFT elevation    Relevant Orders    Hepatic function panel    Hepatitis panel, acute    US Abdomen Complete       Other    Low serum HDL

## 2018-06-22 ENCOUNTER — TELEPHONE (OUTPATIENT)
Dept: RADIOLOGY | Facility: HOSPITAL | Age: 78
End: 2018-06-22

## 2018-06-26 ENCOUNTER — HOSPITAL ENCOUNTER (OUTPATIENT)
Dept: RADIOLOGY | Facility: HOSPITAL | Age: 78
Discharge: HOME | End: 2018-06-26
Attending: INTERNAL MEDICINE
Payer: MEDICARE

## 2018-06-26 DIAGNOSIS — R79.89 LFT ELEVATION: ICD-10-CM

## 2018-06-26 DIAGNOSIS — R17 TOTAL BILIRUBIN, ELEVATED: ICD-10-CM

## 2018-06-26 PROCEDURE — 76700 US EXAM ABDOM COMPLETE: CPT | Mod: TC

## 2018-06-26 PROCEDURE — 76700 US EXAM ABDOM COMPLETE: CPT | Mod: 26,,, | Performed by: RADIOLOGY

## 2018-07-17 DIAGNOSIS — E11.59 HYPERTENSION ASSOCIATED WITH DIABETES: ICD-10-CM

## 2018-07-17 DIAGNOSIS — I15.2 HYPERTENSION ASSOCIATED WITH DIABETES: ICD-10-CM

## 2018-07-17 RX ORDER — ENALAPRIL MALEATE AND HYDROCHLOROTHIAZIDE 10; 25 MG/1; MG/1
1 TABLET ORAL DAILY
Qty: 30 TABLET | Refills: 4 | Status: SHIPPED | OUTPATIENT
Start: 2018-07-17 | End: 2018-12-06 | Stop reason: SDUPTHER

## 2018-08-20 ENCOUNTER — OFFICE VISIT (OUTPATIENT)
Dept: INTERNAL MEDICINE | Facility: CLINIC | Age: 78
End: 2018-08-20
Payer: MEDICARE

## 2018-08-20 VITALS
SYSTOLIC BLOOD PRESSURE: 115 MMHG | DIASTOLIC BLOOD PRESSURE: 64 MMHG | OXYGEN SATURATION: 97 % | WEIGHT: 142 LBS | HEART RATE: 63 BPM | BODY MASS INDEX: 22.82 KG/M2 | HEIGHT: 66 IN

## 2018-08-20 DIAGNOSIS — F10.10 ENGAGES IN BINGE CONSUMPTION OF ALCOHOL: ICD-10-CM

## 2018-08-20 DIAGNOSIS — Z12.5 PROSTATE CANCER SCREENING: ICD-10-CM

## 2018-08-20 DIAGNOSIS — I10 HYPERTENSION, UNSPECIFIED TYPE: Primary | ICD-10-CM

## 2018-08-20 DIAGNOSIS — R79.9 ABNORMAL FINDING OF BLOOD CHEMISTRY: ICD-10-CM

## 2018-08-20 DIAGNOSIS — R79.89 LFT ELEVATION: ICD-10-CM

## 2018-08-20 DIAGNOSIS — R74.8 LOW SERUM HDL: ICD-10-CM

## 2018-08-20 DIAGNOSIS — Z00.00 PREVENTATIVE HEALTH CARE: ICD-10-CM

## 2018-08-20 PROCEDURE — 99499 UNLISTED E&M SERVICE: CPT | Mod: HCNC,S$GLB,, | Performed by: INTERNAL MEDICINE

## 2018-08-20 PROCEDURE — 99214 OFFICE O/P EST MOD 30 MIN: CPT | Mod: S$GLB,,, | Performed by: INTERNAL MEDICINE

## 2018-08-20 PROCEDURE — 3078F DIAST BP <80 MM HG: CPT | Mod: CPTII,S$GLB,, | Performed by: INTERNAL MEDICINE

## 2018-08-20 PROCEDURE — 99999 PR PBB SHADOW E&M-EST. PATIENT-LVL III: CPT | Mod: PBBFAC,,, | Performed by: INTERNAL MEDICINE

## 2018-08-20 PROCEDURE — 3074F SYST BP LT 130 MM HG: CPT | Mod: CPTII,S$GLB,, | Performed by: INTERNAL MEDICINE

## 2018-08-20 NOTE — PROGRESS NOTES
Pt. ID: Dean Hahn is a 77 y.o. male      Chief complaint:   Chief Complaint   Patient presents with    Diabetes     follow up       HPI: Pt. Here for f/u for DM and HTN; I reviewed echo which showed fatty liver; I reviewed labs dated 6/11/18; LFT's went up; he has not repeated LFT's with hepatitis profile as instructed and I explained risks of non-compliance; he was binge drinking 1-2 x week and he states he has cut down; he denies tylenol use; HGBA1C is 6.2; HDL is low      Review of Systems   Constitutional: Negative for chills and fever.   Respiratory: Negative for cough and shortness of breath.    Cardiovascular: Negative for chest pain.   Gastrointestinal: Negative for abdominal pain, nausea and vomiting.   Genitourinary: Negative for dysuria.         Objective:    Physical Exam   Constitutional: He is oriented to person, place, and time.   Eyes: EOM are normal.   Neck: Normal range of motion.   Cardiovascular: Normal rate, regular rhythm and normal heart sounds.   Pulmonary/Chest: Effort normal and breath sounds normal.   Abdominal: Soft. There is no tenderness. There is no rebound and no guarding.   Musculoskeletal: Normal range of motion.   Neurological: He is alert and oriented to person, place, and time.   Skin: No rash noted.   Vitals reviewed.        Health Maintenance   Topic Date Due    Pneumococcal (65+) (2 of 2 - PPSV23) 06/20/2018    Influenza Vaccine  08/01/2018    Hemoglobin A1c  12/11/2018    Foot Exam  12/20/2018    Eye Exam  05/09/2019    Lipid Panel  06/11/2019    Colonoscopy  04/11/2023    TETANUS VACCINE  12/20/2027    Zoster Vaccine  Addressed         Assessment:     1. Hypertension, unspecified type Well controlled   2. LFT elevation Active   3. Engages in binge consumption of alcohol Active   4. Low serum HDL Active   5. Preventative health care Active   6. Prostate cancer screening Active   7. Abnormal finding of blood chemistry  Active         Plan:  Hypertension, unspecified type  Comments:  continue current regimen and encouraged low Na diet  Orders:  -     CBC auto differential; Future; Expected date: 01/20/2019  -     Comprehensive metabolic panel; Future; Expected date: 01/20/2019  -     Urinalysis; Future; Expected date: 01/20/2019    LFT elevation  Comments:  repeat LFT's with hepatitis profile with reduction in ETOH intake  Orders:  -     Comprehensive metabolic panel; Future; Expected date: 01/20/2019    Engages in binge consumption of alcohol  Comments:  pt. states he has cut down significantly     Low serum HDL  Comments:  asked pt. to exercise to attempt to improve HDL  Orders:  -     Comprehensive metabolic panel; Future; Expected date: 01/20/2019    Preventative health care  -     CBC auto differential; Future; Expected date: 01/20/2019  -     Lipid panel; Future; Expected date: 01/20/2019  -     Comprehensive metabolic panel; Future; Expected date: 01/20/2019  -     Urinalysis; Future; Expected date: 01/20/2019    Prostate cancer screening  -     PSA, Screening; Future; Expected date: 01/20/2019    Abnormal finding of blood chemistry   -     Lipid panel; Future; Expected date: 01/20/2019        Problem List Items Addressed This Visit        Psychiatric    Engages in binge consumption of alcohol       Cardiac/Vascular    Hypertension - Primary    Relevant Orders    CBC auto differential    Comprehensive metabolic panel    Urinalysis       Renal/    Prostate cancer screening    Relevant Orders    PSA, Screening       GI    LFT elevation    Relevant Orders    Comprehensive metabolic panel       Other    Abnormal finding of blood chemistry     Relevant Orders    Lipid panel    Low serum HDL    Relevant Orders    Comprehensive metabolic panel    Preventative health care    Relevant Orders    CBC auto differential    Lipid panel    Comprehensive metabolic panel    Urinalysis

## 2018-08-21 RX ORDER — POTASSIUM CHLORIDE 750 MG/1
TABLET, FILM COATED, EXTENDED RELEASE ORAL
Qty: 40 TABLET | Refills: 3 | Status: SHIPPED | OUTPATIENT
Start: 2018-08-21 | End: 2018-12-15 | Stop reason: SDUPTHER

## 2018-11-19 PROBLEM — Z00.00 PREVENTATIVE HEALTH CARE: Status: RESOLVED | Noted: 2017-06-20 | Resolved: 2018-11-19

## 2018-12-06 DIAGNOSIS — E11.59 HYPERTENSION ASSOCIATED WITH DIABETES: ICD-10-CM

## 2018-12-06 DIAGNOSIS — I15.2 HYPERTENSION ASSOCIATED WITH DIABETES: ICD-10-CM

## 2018-12-06 RX ORDER — ENALAPRIL MALEATE AND HYDROCHLOROTHIAZIDE 10; 25 MG/1; MG/1
1 TABLET ORAL DAILY
Qty: 30 TABLET | Refills: 2 | Status: SHIPPED | OUTPATIENT
Start: 2018-12-06 | End: 2019-02-22 | Stop reason: ALTCHOICE

## 2018-12-17 RX ORDER — POTASSIUM CHLORIDE 750 MG/1
TABLET, FILM COATED, EXTENDED RELEASE ORAL
Qty: 40 TABLET | Refills: 2 | Status: SHIPPED | OUTPATIENT
Start: 2018-12-17 | End: 2019-03-08 | Stop reason: SDUPTHER

## 2019-01-21 ENCOUNTER — PES CALL (OUTPATIENT)
Dept: ADMINISTRATIVE | Facility: CLINIC | Age: 79
End: 2019-01-21

## 2019-02-19 ENCOUNTER — LAB VISIT (OUTPATIENT)
Dept: LAB | Facility: HOSPITAL | Age: 79
End: 2019-02-19
Attending: INTERNAL MEDICINE
Payer: MEDICARE

## 2019-02-19 DIAGNOSIS — Z00.00 PREVENTATIVE HEALTH CARE: ICD-10-CM

## 2019-02-19 DIAGNOSIS — R79.89 LFT ELEVATION: ICD-10-CM

## 2019-02-19 DIAGNOSIS — R74.8 LOW SERUM HDL: ICD-10-CM

## 2019-02-19 DIAGNOSIS — R79.9 ABNORMAL FINDING OF BLOOD CHEMISTRY: ICD-10-CM

## 2019-02-19 DIAGNOSIS — I10 HYPERTENSION, UNSPECIFIED TYPE: ICD-10-CM

## 2019-02-19 DIAGNOSIS — Z12.5 PROSTATE CANCER SCREENING: ICD-10-CM

## 2019-02-19 LAB
ALBUMIN SERPL BCP-MCNC: 3.1 G/DL
ALBUMIN SERPL BCP-MCNC: 3.1 G/DL
ALP SERPL-CCNC: 89 U/L
ALP SERPL-CCNC: 89 U/L
ALT SERPL W/O P-5'-P-CCNC: 29 U/L
ALT SERPL W/O P-5'-P-CCNC: 29 U/L
ANION GAP SERPL CALC-SCNC: 7 MMOL/L
AST SERPL-CCNC: 38 U/L
AST SERPL-CCNC: 38 U/L
BASOPHILS # BLD AUTO: 0.04 K/UL
BASOPHILS NFR BLD: 0.4 %
BILIRUB DIRECT SERPL-MCNC: 0.5 MG/DL
BILIRUB SERPL-MCNC: 1.3 MG/DL
BILIRUB SERPL-MCNC: 1.3 MG/DL
BUN SERPL-MCNC: 19 MG/DL
CALCIUM SERPL-MCNC: 9.7 MG/DL
CHLORIDE SERPL-SCNC: 106 MMOL/L
CHOLEST SERPL-MCNC: 179 MG/DL
CHOLEST/HDLC SERPL: 4.8 {RATIO}
CO2 SERPL-SCNC: 25 MMOL/L
COMPLEXED PSA SERPL-MCNC: 2.5 NG/ML
CREAT SERPL-MCNC: 1.2 MG/DL
DIFFERENTIAL METHOD: ABNORMAL
EOSINOPHIL # BLD AUTO: 0.3 K/UL
EOSINOPHIL NFR BLD: 3 %
ERYTHROCYTE [DISTWIDTH] IN BLOOD BY AUTOMATED COUNT: 12.5 %
EST. GFR  (AFRICAN AMERICAN): >60 ML/MIN/1.73 M^2
EST. GFR  (NON AFRICAN AMERICAN): 58.4 ML/MIN/1.73 M^2
GLUCOSE SERPL-MCNC: 144 MG/DL
HCT VFR BLD AUTO: 43.4 %
HDLC SERPL-MCNC: 37 MG/DL
HDLC SERPL: 20.7 %
HGB BLD-MCNC: 14.9 G/DL
IMM GRANULOCYTES # BLD AUTO: 0.04 K/UL
IMM GRANULOCYTES NFR BLD AUTO: 0.4 %
LDLC SERPL CALC-MCNC: 109.4 MG/DL
LYMPHOCYTES # BLD AUTO: 2.9 K/UL
LYMPHOCYTES NFR BLD: 29 %
MCH RBC QN AUTO: 33.3 PG
MCHC RBC AUTO-ENTMCNC: 34.3 G/DL
MCV RBC AUTO: 97 FL
MONOCYTES # BLD AUTO: 1.1 K/UL
MONOCYTES NFR BLD: 10.7 %
NEUTROPHILS # BLD AUTO: 5.6 K/UL
NEUTROPHILS NFR BLD: 56.5 %
NONHDLC SERPL-MCNC: 142 MG/DL
NRBC BLD-RTO: 0 /100 WBC
PLATELET # BLD AUTO: 202 K/UL
PMV BLD AUTO: 9.5 FL
POTASSIUM SERPL-SCNC: 4.1 MMOL/L
PROT SERPL-MCNC: 8 G/DL
PROT SERPL-MCNC: 8 G/DL
RBC # BLD AUTO: 4.47 M/UL
SODIUM SERPL-SCNC: 138 MMOL/L
TRIGL SERPL-MCNC: 163 MG/DL
WBC # BLD AUTO: 9.92 K/UL

## 2019-02-19 PROCEDURE — 84153 ASSAY OF PSA TOTAL: CPT | Mod: HCNC

## 2019-02-19 PROCEDURE — 80074 ACUTE HEPATITIS PANEL: CPT | Mod: HCNC

## 2019-02-19 PROCEDURE — 36415 COLL VENOUS BLD VENIPUNCTURE: CPT | Mod: HCNC,PO

## 2019-02-19 PROCEDURE — 80053 COMPREHEN METABOLIC PANEL: CPT | Mod: HCNC

## 2019-02-19 PROCEDURE — 85025 COMPLETE CBC W/AUTO DIFF WBC: CPT | Mod: HCNC

## 2019-02-19 PROCEDURE — 80061 LIPID PANEL: CPT | Mod: HCNC

## 2019-02-20 LAB
HAV IGM SERPL QL IA: NEGATIVE
HBV CORE IGM SERPL QL IA: NEGATIVE
HBV SURFACE AG SERPL QL IA: NEGATIVE
HCV AB SERPL QL IA: NEGATIVE

## 2019-02-22 ENCOUNTER — OFFICE VISIT (OUTPATIENT)
Dept: INTERNAL MEDICINE | Facility: CLINIC | Age: 79
End: 2019-02-22
Payer: MEDICARE

## 2019-02-22 VITALS
HEART RATE: 68 BPM | OXYGEN SATURATION: 96 % | SYSTOLIC BLOOD PRESSURE: 122 MMHG | DIASTOLIC BLOOD PRESSURE: 68 MMHG | HEIGHT: 66 IN | WEIGHT: 147.69 LBS | BODY MASS INDEX: 23.74 KG/M2

## 2019-02-22 DIAGNOSIS — I10 ESSENTIAL HYPERTENSION: Primary | ICD-10-CM

## 2019-02-22 DIAGNOSIS — Z23 NEED FOR PROPHYLACTIC VACCINATION WITH STREPTOCOCCUS PNEUMONIAE (PNEUMOCOCCUS) AND INFLUENZA VACCINES: ICD-10-CM

## 2019-02-22 DIAGNOSIS — R17 TOTAL BILIRUBIN, ELEVATED: ICD-10-CM

## 2019-02-22 DIAGNOSIS — R74.8 LOW SERUM HDL: ICD-10-CM

## 2019-02-22 DIAGNOSIS — E74.39 GLUCOSE INTOLERANCE: ICD-10-CM

## 2019-02-22 DIAGNOSIS — R79.89 LFT ELEVATION: ICD-10-CM

## 2019-02-22 DIAGNOSIS — E78.1 HYPERTRIGLYCERIDEMIA: ICD-10-CM

## 2019-02-22 DIAGNOSIS — R79.9 ABNORMAL FINDING OF BLOOD CHEMISTRY: ICD-10-CM

## 2019-02-22 PROCEDURE — 3078F DIAST BP <80 MM HG: CPT | Mod: HCNC,CPTII,S$GLB, | Performed by: INTERNAL MEDICINE

## 2019-02-22 PROCEDURE — G0009 PNEUMOCOCCAL POLYSACCHARIDE VACCINE 23-VALENT =>2YO SQ IM: ICD-10-PCS | Mod: HCNC,S$GLB,, | Performed by: INTERNAL MEDICINE

## 2019-02-22 PROCEDURE — 3074F SYST BP LT 130 MM HG: CPT | Mod: HCNC,CPTII,S$GLB, | Performed by: INTERNAL MEDICINE

## 2019-02-22 PROCEDURE — 99999 PR PBB SHADOW E&M-EST. PATIENT-LVL III: CPT | Mod: PBBFAC,HCNC,, | Performed by: INTERNAL MEDICINE

## 2019-02-22 PROCEDURE — G0009 ADMIN PNEUMOCOCCAL VACCINE: HCPCS | Mod: HCNC,S$GLB,, | Performed by: INTERNAL MEDICINE

## 2019-02-22 PROCEDURE — 99999 PR PBB SHADOW E&M-EST. PATIENT-LVL III: ICD-10-PCS | Mod: PBBFAC,HCNC,, | Performed by: INTERNAL MEDICINE

## 2019-02-22 PROCEDURE — 99214 PR OFFICE/OUTPT VISIT, EST, LEVL IV, 30-39 MIN: ICD-10-PCS | Mod: 25,HCNC,S$GLB, | Performed by: INTERNAL MEDICINE

## 2019-02-22 PROCEDURE — 1101F PT FALLS ASSESS-DOCD LE1/YR: CPT | Mod: HCNC,CPTII,S$GLB, | Performed by: INTERNAL MEDICINE

## 2019-02-22 PROCEDURE — 99214 OFFICE O/P EST MOD 30 MIN: CPT | Mod: 25,HCNC,S$GLB, | Performed by: INTERNAL MEDICINE

## 2019-02-22 PROCEDURE — 90732 PPSV23 VACC 2 YRS+ SUBQ/IM: CPT | Mod: HCNC,S$GLB,, | Performed by: INTERNAL MEDICINE

## 2019-02-22 PROCEDURE — 3074F PR MOST RECENT SYSTOLIC BLOOD PRESSURE < 130 MM HG: ICD-10-PCS | Mod: HCNC,CPTII,S$GLB, | Performed by: INTERNAL MEDICINE

## 2019-02-22 PROCEDURE — 1101F PR PT FALLS ASSESS DOC 0-1 FALLS W/OUT INJ PAST YR: ICD-10-PCS | Mod: HCNC,CPTII,S$GLB, | Performed by: INTERNAL MEDICINE

## 2019-02-22 PROCEDURE — 90732 PNEUMOCOCCAL POLYSACCHARIDE VACCINE 23-VALENT =>2YO SQ IM: ICD-10-PCS | Mod: HCNC,S$GLB,, | Performed by: INTERNAL MEDICINE

## 2019-02-22 PROCEDURE — 3078F PR MOST RECENT DIASTOLIC BLOOD PRESSURE < 80 MM HG: ICD-10-PCS | Mod: HCNC,CPTII,S$GLB, | Performed by: INTERNAL MEDICINE

## 2019-02-22 RX ORDER — OLMESARTAN MEDOXOMIL AND HYDROCHLOROTHIAZIDE 40/12.5 40; 12.5 MG/1; MG/1
1 TABLET ORAL DAILY
Qty: 30 TABLET | Refills: 1 | Status: SHIPPED | OUTPATIENT
Start: 2019-02-22 | End: 2019-03-26 | Stop reason: SDUPTHER

## 2019-02-22 NOTE — PROGRESS NOTES
Pt. ID: Dean Hahn is a 78 y.o. male      Chief complaint:   Chief Complaint   Patient presents with    Follow-up     HTN       HPI: Pt. Here for f/u for HTN; he would like to change enalapril HCTZ to benicar HCTZ; I reviewed labs dated 2/19/19; LFT's have normalized and total bilirubin is mildly elevated but stable; triglycerides are mildly elevated and HDL is low; he takes asa 81 mg QD; he had flu shot 10/18; he would like pneumovax; HGBA1C dated 6/11/18 was 6.2 and I will repeat       Review of Systems   Constitutional: Negative for chills and fever.   Respiratory: Negative for cough and shortness of breath.    Cardiovascular: Negative for chest pain.   Gastrointestinal: Negative for abdominal pain, nausea and vomiting.   Genitourinary: Negative for dysuria.         Objective:    Physical Exam   Constitutional: He is oriented to person, place, and time. He appears well-developed.   Eyes: EOM are normal.   Neck: Normal range of motion.   Cardiovascular: Normal rate, regular rhythm and normal heart sounds.   Pulmonary/Chest: Effort normal and breath sounds normal. No respiratory distress. He has no wheezes. He has no rales.   Abdominal: Soft. There is no tenderness. There is no rebound and no guarding.   Musculoskeletal: Normal range of motion.   Neurological: He is alert and oriented to person, place, and time.   Skin: No rash noted.         Health Maintenance   Topic Date Due    Pneumococcal Vaccine (65+ Low/Medium Risk) (2 of 2 - PPSV23) 06/20/2018    Hemoglobin A1c  12/20/2018    Foot Exam  02/19/2020 (Originally 12/20/2018)    Eye Exam  08/16/2019    Lipid Panel  02/19/2020    TETANUS VACCINE  12/20/2027    Influenza Vaccine  Completed    Zoster Vaccine  Addressed         Assessment:     1. Essential hypertension Well controlled   2. Hypertriglyceridemia Sub-optimally controlled   3. Glucose intolerance Well controlled   4. LFT elevation Well controlled   5. Low serum HDL Active   6.  Total bilirubin, elevated Stable   7. Need for prophylactic vaccination with Streptococcus pneumoniae (Pneumococcus) and Influenza vaccines Active   8. Abnormal finding of blood chemistry  Active         Plan: Essential hypertension  Comments:  change enalapril HCTZ to benicar HCTZ and encouraged low Na diet; repeat BP on f/u in 1 month   Orders:  -     olmesartan-hydrochlorothiazide (BENICAR HCT) 40-12.5 mg Tab; Take 1 tablet by mouth once daily.  Dispense: 30 tablet; Refill: 1  -     Comprehensive metabolic panel; Future; Expected date: 03/15/2019    Hypertriglyceridemia  Comments:  encouraged low triglyceride diet     Glucose intolerance  Comments:  encouraged low sugar diet and [id number] prior to 1 month   Orders:  -     Hemoglobin A1c; Future; Expected date: 03/15/2019    LFT elevation  Comments:  normalized   Orders:  -     Comprehensive metabolic panel; Future; Expected date: 03/15/2019    Low serum HDL  Comments:  encouraged exercise to attempt to improve HDL     Total bilirubin, elevated  Comments:  monitor     Need for prophylactic vaccination with Streptococcus pneumoniae (Pneumococcus) and Influenza vaccines  -     Pneumococcal Polysaccharide Vaccine (23 Valent) (SQ/IM)    Abnormal finding of blood chemistry   -     Hemoglobin A1c; Future; Expected date: 03/15/2019        Problem List Items Addressed This Visit        Cardiac/Vascular    Hypertension - Primary    Relevant Medications    olmesartan-hydrochlorothiazide (BENICAR HCT) 40-12.5 mg Tab    Other Relevant Orders    Comprehensive metabolic panel       ID    Need for prophylactic vaccination with Streptococcus pneumoniae (Pneumococcus) and Influenza vaccines    Relevant Orders    Pneumococcal Polysaccharide Vaccine (23 Valent) (SQ/IM)       GI    Total bilirubin, elevated    LFT elevation    Relevant Orders    Comprehensive metabolic panel       Other    Abnormal finding of blood chemistry     Relevant Orders    Hemoglobin A1c    Low serum HDL       Other Visit Diagnoses     Hypertriglyceridemia   (Sub-optimally controlled)      encouraged low triglyceride diet     Glucose intolerance   (Well controlled)      encouraged low sugar diet and [id number] prior to 1 month     Relevant Orders    Hemoglobin A1c

## 2019-03-08 RX ORDER — POTASSIUM CHLORIDE 750 MG/1
TABLET, FILM COATED, EXTENDED RELEASE ORAL
Qty: 40 TABLET | Refills: 0 | Status: SHIPPED | OUTPATIENT
Start: 2019-03-08 | End: 2019-04-04 | Stop reason: SDUPTHER

## 2019-03-15 RX ORDER — ENALAPRIL MALEATE AND HYDROCHLOROTHIAZIDE 10; 25 MG/1; MG/1
TABLET ORAL
Qty: 30 TABLET | Refills: 0 | OUTPATIENT
Start: 2019-03-15

## 2019-03-21 ENCOUNTER — LAB VISIT (OUTPATIENT)
Dept: LAB | Facility: HOSPITAL | Age: 79
End: 2019-03-21
Attending: INTERNAL MEDICINE
Payer: MEDICARE

## 2019-03-21 DIAGNOSIS — R79.9 ABNORMAL FINDING OF BLOOD CHEMISTRY: ICD-10-CM

## 2019-03-21 DIAGNOSIS — E74.39 GLUCOSE INTOLERANCE: ICD-10-CM

## 2019-03-21 DIAGNOSIS — I10 ESSENTIAL HYPERTENSION: ICD-10-CM

## 2019-03-21 DIAGNOSIS — R79.89 LFT ELEVATION: ICD-10-CM

## 2019-03-21 LAB
ALBUMIN SERPL BCP-MCNC: 3.2 G/DL
ALP SERPL-CCNC: 71 U/L
ALT SERPL W/O P-5'-P-CCNC: 28 U/L
ANION GAP SERPL CALC-SCNC: 7 MMOL/L
AST SERPL-CCNC: 39 U/L
BILIRUB SERPL-MCNC: 2.5 MG/DL
BUN SERPL-MCNC: 14 MG/DL
CALCIUM SERPL-MCNC: 10 MG/DL
CHLORIDE SERPL-SCNC: 106 MMOL/L
CO2 SERPL-SCNC: 25 MMOL/L
CREAT SERPL-MCNC: 1.2 MG/DL
EST. GFR  (AFRICAN AMERICAN): >60 ML/MIN/1.73 M^2
EST. GFR  (NON AFRICAN AMERICAN): 58.4 ML/MIN/1.73 M^2
ESTIMATED AVG GLUCOSE: 137 MG/DL
GLUCOSE SERPL-MCNC: 128 MG/DL
HBA1C MFR BLD HPLC: 6.4 %
POTASSIUM SERPL-SCNC: 4.2 MMOL/L
PROT SERPL-MCNC: 8 G/DL
SODIUM SERPL-SCNC: 138 MMOL/L

## 2019-03-21 PROCEDURE — 80053 COMPREHEN METABOLIC PANEL: CPT | Mod: HCNC

## 2019-03-21 PROCEDURE — 83036 HEMOGLOBIN GLYCOSYLATED A1C: CPT | Mod: HCNC

## 2019-03-21 PROCEDURE — 36415 COLL VENOUS BLD VENIPUNCTURE: CPT | Mod: HCNC,PO

## 2019-03-26 ENCOUNTER — OFFICE VISIT (OUTPATIENT)
Dept: INTERNAL MEDICINE | Facility: CLINIC | Age: 79
End: 2019-03-26
Payer: MEDICARE

## 2019-03-26 VITALS
SYSTOLIC BLOOD PRESSURE: 136 MMHG | DIASTOLIC BLOOD PRESSURE: 80 MMHG | HEART RATE: 72 BPM | WEIGHT: 147.5 LBS | BODY MASS INDEX: 23.7 KG/M2 | OXYGEN SATURATION: 97 % | HEIGHT: 66 IN

## 2019-03-26 DIAGNOSIS — R17 TOTAL BILIRUBIN, ELEVATED: ICD-10-CM

## 2019-03-26 DIAGNOSIS — Z00.00 ROUTINE GENERAL MEDICAL EXAMINATION AT A HEALTH CARE FACILITY: ICD-10-CM

## 2019-03-26 DIAGNOSIS — Z00.00 PREVENTATIVE HEALTH CARE: ICD-10-CM

## 2019-03-26 DIAGNOSIS — F10.10 ENGAGES IN BINGE CONSUMPTION OF ALCOHOL: ICD-10-CM

## 2019-03-26 DIAGNOSIS — I10 ESSENTIAL HYPERTENSION: Primary | ICD-10-CM

## 2019-03-26 DIAGNOSIS — R73.02 GLUCOSE INTOLERANCE (IMPAIRED GLUCOSE TOLERANCE): ICD-10-CM

## 2019-03-26 PROCEDURE — 99999 PR PBB SHADOW E&M-EST. PATIENT-LVL III: ICD-10-PCS | Mod: PBBFAC,HCNC,, | Performed by: INTERNAL MEDICINE

## 2019-03-26 PROCEDURE — 99999 PR PBB SHADOW E&M-EST. PATIENT-LVL III: CPT | Mod: PBBFAC,HCNC,, | Performed by: INTERNAL MEDICINE

## 2019-03-26 PROCEDURE — 99499 RISK ADDL DX/OHS AUDIT: ICD-10-PCS | Mod: HCNC,S$GLB,, | Performed by: INTERNAL MEDICINE

## 2019-03-26 PROCEDURE — 99397 PER PM REEVAL EST PAT 65+ YR: CPT | Mod: HCNC,S$GLB,, | Performed by: INTERNAL MEDICINE

## 2019-03-26 PROCEDURE — 3079F PR MOST RECENT DIASTOLIC BLOOD PRESSURE 80-89 MM HG: ICD-10-PCS | Mod: HCNC,CPTII,S$GLB, | Performed by: INTERNAL MEDICINE

## 2019-03-26 PROCEDURE — 3075F PR MOST RECENT SYSTOLIC BLOOD PRESS GE 130-139MM HG: ICD-10-PCS | Mod: HCNC,CPTII,S$GLB, | Performed by: INTERNAL MEDICINE

## 2019-03-26 PROCEDURE — 3079F DIAST BP 80-89 MM HG: CPT | Mod: HCNC,CPTII,S$GLB, | Performed by: INTERNAL MEDICINE

## 2019-03-26 PROCEDURE — 99397 PR PREVENTIVE VISIT,EST,65 & OVER: ICD-10-PCS | Mod: HCNC,S$GLB,, | Performed by: INTERNAL MEDICINE

## 2019-03-26 PROCEDURE — 3075F SYST BP GE 130 - 139MM HG: CPT | Mod: HCNC,CPTII,S$GLB, | Performed by: INTERNAL MEDICINE

## 2019-03-26 PROCEDURE — 99499 UNLISTED E&M SERVICE: CPT | Mod: HCNC,S$GLB,, | Performed by: INTERNAL MEDICINE

## 2019-03-26 RX ORDER — OLMESARTAN MEDOXOMIL AND HYDROCHLOROTHIAZIDE 40/12.5 40; 12.5 MG/1; MG/1
1 TABLET ORAL DAILY
Qty: 90 TABLET | Refills: 1 | Status: SHIPPED | OUTPATIENT
Start: 2019-03-26 | End: 2019-09-24 | Stop reason: SDUPTHER

## 2019-03-26 RX ORDER — METOPROLOL SUCCINATE 25 MG/1
25 TABLET, EXTENDED RELEASE ORAL NIGHTLY
Qty: 90 TABLET | Refills: 1 | Status: SHIPPED | OUTPATIENT
Start: 2019-03-26 | End: 2019-09-24 | Stop reason: SDUPTHER

## 2019-03-26 NOTE — PROGRESS NOTES
Pt. ID: Dean Hahn is a 78 y.o. male      Chief complaint:   Chief Complaint   Patient presents with    Follow-up     HTN       HPI: Pt. Here for well visit and f/u for HTN; pt. Has changed ACE to benicar HCTZ; BP is WNL; I reviewed 3/21/19; total bilirubin was elevated; HGBA1C was 6.4; he states he eats a lot of sweets and I explained risks of non-compliance; he states he is no longer binge drinking; he would like digital HTN program      Review of Systems   Constitutional: Negative for chills and fever.   Respiratory: Negative for cough and shortness of breath.    Cardiovascular: Negative for chest pain.   Gastrointestinal: Negative for abdominal pain, nausea and vomiting.   Genitourinary: Negative for dysuria.         Objective:    Physical Exam   Constitutional: He is oriented to person, place, and time.   Eyes: EOM are normal.   Neck: Normal range of motion.   Cardiovascular: Normal rate, regular rhythm and normal heart sounds.   Pulmonary/Chest: Effort normal and breath sounds normal. No respiratory distress. He has no wheezes. He has no rales.   Abdominal: Soft. There is no tenderness. There is no rebound and no guarding.   Musculoskeletal: Normal range of motion.   Neurological: He is alert and oriented to person, place, and time.   Skin: No rash noted.         Health Maintenance   Topic Date Due    Eye Exam  08/16/2019    Hemoglobin A1c  09/21/2019    Lipid Panel  02/19/2020    TETANUS VACCINE  12/20/2027    Pneumococcal Vaccine (65+ Low/Medium Risk)  Completed    Influenza Vaccine  Completed    Zoster Vaccine  Addressed    Foot Exam  Discontinued         Assessment:     1. Essential hypertension Well controlled   2. Glucose intolerance (impaired glucose tolerance) Active   3. Total bilirubin, elevated Active   4. Engages in binge consumption of alcohol Well controlled   5. Preventative health care Active   6. Routine general medical examination at a health care facility Active          Plan: Essential hypertension  Comments:  continue current regimen and encouraged low sugar diet   Orders:  -     Diabetes Digital Medicine (DDMP) Enrollment Order  -     Diabetes Digital Medicine (DDMP): Assign Onboarding Questionnaires  -     NURSING COMMUNICATION: Create [facility]  -     CBC auto differential; Future; Expected date: 08/26/2019  -     Comprehensive metabolic panel; Future; Expected date: 08/26/2019  -     olmesartan-hydrochlorothiazide (BENICAR HCT) 40-12.5 mg Tab; Take 1 tablet by mouth once daily.  Dispense: 90 tablet; Refill: 1  -     metoprolol succinate (TOPROL-XL) 25 MG 24 hr tablet; Take 1 tablet (25 mg total) by mouth every evening.  Dispense: 90 tablet; Refill: 1    Glucose intolerance (impaired glucose tolerance)  Comments:  encouraged low sugar diet which is an issue and repeat HGBA1C on f/u in 6 months   Orders:  -     Comprehensive metabolic panel; Future; Expected date: 08/26/2019  -     Hemoglobin A1c; Future; Expected date: 08/26/2019    Total bilirubin, elevated  Comments:  monitor   Orders:  -     Comprehensive metabolic panel; Future; Expected date: 08/26/2019    Engages in binge consumption of alcohol  Comments:  pt. has stopped binge drinking     Preventative health care  -     CBC auto differential; Future; Expected date: 08/26/2019  -     Comprehensive metabolic panel; Future; Expected date: 08/26/2019    Routine general medical examination at a health care facility        Problem List Items Addressed This Visit        Psychiatric    Engages in binge consumption of alcohol       Cardiac/Vascular    Hypertension - Primary    Relevant Medications    olmesartan-hydrochlorothiazide (BENICAR HCT) 40-12.5 mg Tab    metoprolol succinate (TOPROL-XL) 25 MG 24 hr tablet    Other Relevant Orders    Diabetes Digital Medicine (DDMP) Enrollment Order (Completed)    Diabetes Digital Medicine (DDMP): Assign Onboarding Questionnaires (Completed)    NURSING COMMUNICATION:  Create [facility] Account    CBC auto differential    Comprehensive metabolic panel       Endocrine    Glucose intolerance (impaired glucose tolerance)    Relevant Orders    Comprehensive metabolic panel    Hemoglobin A1c       GI    Total bilirubin, elevated    Relevant Orders    Comprehensive metabolic panel       Other    Preventative health care    Relevant Orders    CBC auto differential    Comprehensive metabolic panel    Routine general medical examination at a health care facility

## 2019-04-04 RX ORDER — POTASSIUM CHLORIDE 750 MG/1
TABLET, FILM COATED, EXTENDED RELEASE ORAL
Qty: 40 TABLET | Refills: 2 | Status: SHIPPED | OUTPATIENT
Start: 2019-04-04 | End: 2019-05-01 | Stop reason: SDUPTHER

## 2019-04-10 RX ORDER — ENALAPRIL MALEATE AND HYDROCHLOROTHIAZIDE 10; 25 MG/1; MG/1
TABLET ORAL
Qty: 30 TABLET | Refills: 0 | OUTPATIENT
Start: 2019-04-10

## 2019-04-29 ENCOUNTER — OFFICE VISIT (OUTPATIENT)
Dept: INTERNAL MEDICINE | Facility: CLINIC | Age: 79
End: 2019-04-29
Payer: MEDICARE

## 2019-04-29 VITALS
WEIGHT: 142.19 LBS | BODY MASS INDEX: 22.85 KG/M2 | HEART RATE: 73 BPM | DIASTOLIC BLOOD PRESSURE: 70 MMHG | SYSTOLIC BLOOD PRESSURE: 130 MMHG | HEIGHT: 66 IN | OXYGEN SATURATION: 98 %

## 2019-04-29 DIAGNOSIS — I10 ESSENTIAL HYPERTENSION: Primary | ICD-10-CM

## 2019-04-29 DIAGNOSIS — N18.30 CKD (CHRONIC KIDNEY DISEASE) STAGE 3, GFR 30-59 ML/MIN: ICD-10-CM

## 2019-04-29 DIAGNOSIS — R73.02 GLUCOSE INTOLERANCE (IMPAIRED GLUCOSE TOLERANCE): ICD-10-CM

## 2019-04-29 DIAGNOSIS — Z01.818 PRE-OP EXAMINATION: ICD-10-CM

## 2019-04-29 DIAGNOSIS — H26.9 CATARACT, UNSPECIFIED CATARACT TYPE, UNSPECIFIED LATERALITY: ICD-10-CM

## 2019-04-29 DIAGNOSIS — R17 TOTAL BILIRUBIN, ELEVATED: ICD-10-CM

## 2019-04-29 PROCEDURE — 1101F PT FALLS ASSESS-DOCD LE1/YR: CPT | Mod: HCNC,CPTII,S$GLB, | Performed by: INTERNAL MEDICINE

## 2019-04-29 PROCEDURE — 3078F DIAST BP <80 MM HG: CPT | Mod: HCNC,CPTII,S$GLB, | Performed by: INTERNAL MEDICINE

## 2019-04-29 PROCEDURE — 99999 PR PBB SHADOW E&M-EST. PATIENT-LVL III: ICD-10-PCS | Mod: PBBFAC,HCNC,, | Performed by: INTERNAL MEDICINE

## 2019-04-29 PROCEDURE — 3078F PR MOST RECENT DIASTOLIC BLOOD PRESSURE < 80 MM HG: ICD-10-PCS | Mod: HCNC,CPTII,S$GLB, | Performed by: INTERNAL MEDICINE

## 2019-04-29 PROCEDURE — 3075F PR MOST RECENT SYSTOLIC BLOOD PRESS GE 130-139MM HG: ICD-10-PCS | Mod: HCNC,CPTII,S$GLB, | Performed by: INTERNAL MEDICINE

## 2019-04-29 PROCEDURE — 99999 PR PBB SHADOW E&M-EST. PATIENT-LVL III: CPT | Mod: PBBFAC,HCNC,, | Performed by: INTERNAL MEDICINE

## 2019-04-29 PROCEDURE — 93005 EKG 12-LEAD: ICD-10-PCS | Mod: HCNC,S$GLB,, | Performed by: INTERNAL MEDICINE

## 2019-04-29 PROCEDURE — 93005 ELECTROCARDIOGRAM TRACING: CPT | Mod: HCNC,S$GLB,, | Performed by: INTERNAL MEDICINE

## 2019-04-29 PROCEDURE — 3075F SYST BP GE 130 - 139MM HG: CPT | Mod: HCNC,CPTII,S$GLB, | Performed by: INTERNAL MEDICINE

## 2019-04-29 PROCEDURE — 99214 OFFICE O/P EST MOD 30 MIN: CPT | Mod: HCNC,S$GLB,, | Performed by: INTERNAL MEDICINE

## 2019-04-29 PROCEDURE — 93010 ELECTROCARDIOGRAM REPORT: CPT | Mod: HCNC,S$GLB,, | Performed by: INTERNAL MEDICINE

## 2019-04-29 PROCEDURE — 99214 PR OFFICE/OUTPT VISIT, EST, LEVL IV, 30-39 MIN: ICD-10-PCS | Mod: HCNC,S$GLB,, | Performed by: INTERNAL MEDICINE

## 2019-04-29 PROCEDURE — 1101F PR PT FALLS ASSESS DOC 0-1 FALLS W/OUT INJ PAST YR: ICD-10-PCS | Mod: HCNC,CPTII,S$GLB, | Performed by: INTERNAL MEDICINE

## 2019-04-29 PROCEDURE — 93010 EKG 12-LEAD: ICD-10-PCS | Mod: HCNC,S$GLB,, | Performed by: INTERNAL MEDICINE

## 2019-04-29 NOTE — PROGRESS NOTES
Pt. ID: Dean Hahn is a 78 y.o. male      Chief complaint:   Chief Complaint   Patient presents with    Pre-op Exam     medical clearance/catarac sx       HPI: pt. Here for pre-op for cataract; he is compliant with meds and BP is WNL; I reviewed labs dated 3/21/19; total bilirubin is elevated and kidney fxn is mildly elevated but stable; HGBA1C is 6.4; he has cut down the sugar in his diet ; he states he feels fine with normal activity and no chest pain       Review of Systems   Constitutional: Negative for chills and fever.   Eyes:        Cataract   Respiratory: Negative for cough.    Cardiovascular: Negative for chest pain.   Gastrointestinal: Negative for abdominal pain, nausea and vomiting.   Genitourinary: Negative for dysuria.         Objective:    Physical Exam   Constitutional: He is oriented to person, place, and time.   Eyes: EOM are normal.   Neck: Normal range of motion.   Cardiovascular: Normal rate, regular rhythm and normal heart sounds.   Pulmonary/Chest: Effort normal and breath sounds normal. No respiratory distress. He has no wheezes. He has no rales.   Abdominal: Soft. There is no tenderness. There is no rebound and no guarding.   Musculoskeletal: Normal range of motion.   Neurological: He is alert and oriented to person, place, and time.   Skin: No rash noted.   Vitals reviewed.        Health Maintenance   Topic Date Due    Eye Exam  08/16/2019    Hemoglobin A1c  09/21/2019    Lipid Panel  02/19/2020    TETANUS VACCINE  12/20/2027    Pneumococcal Vaccine (65+ Low/Medium Risk)  Completed    Influenza Vaccine  Completed    Zoster Vaccine  Addressed    Foot Exam  Discontinued         Assessment:     1. Essential hypertension Well controlled   2. Pre-op examination Active   3. CKD (chronic kidney disease) stage 3, GFR 30-59 ml/min Stable   4. Cataract, unspecified cataract type, unspecified laterality Active   5. Glucose intolerance (impaired glucose tolerance) Well  controlled   6. Total bilirubin, elevated Active         Plan: Essential hypertension  Comments:  continue current regimen and encouraged low Na diet     Pre-op examination  Comments:  get EKG; pt. is considered low risks for cataract surgery   Orders:  -     IN OFFICE EKG 12-LEAD (to [facility])    CKD (chronic kidney disease) stage 3, GFR 30-59 ml/min  Comments:  monitor and avoid NSAIDs     Cataract, unspecified cataract type, unspecified laterality  Comments:  f/u opthamology for surgery     Glucose intolerance (impaired glucose tolerance)  Comments:  encouraged low sugar diet and repeat HGBA1C on f/u in 6 months     Total bilirubin, elevated  Comments:  monitor         Problem List Items Addressed This Visit        Ophtho    Cataract       Cardiac/Vascular    Hypertension - Primary       Renal/    CKD (chronic kidney disease) stage 3, GFR 30-59 ml/min       Endocrine    Glucose intolerance (impaired glucose tolerance)       GI    Total bilirubin, elevated       Other    Pre-op examination    Relevant Orders    IN OFFICE EKG 12-LEAD (to [place])

## 2019-05-01 RX ORDER — POTASSIUM CHLORIDE 750 MG/1
TABLET, FILM COATED, EXTENDED RELEASE ORAL
Qty: 40 TABLET | Refills: 2 | Status: SHIPPED | OUTPATIENT
Start: 2019-05-01 | End: 2019-06-29 | Stop reason: SDUPTHER

## 2019-05-02 ENCOUNTER — PES CALL (OUTPATIENT)
Dept: ADMINISTRATIVE | Facility: CLINIC | Age: 79
End: 2019-05-02

## 2019-07-01 PROBLEM — Z00.00 ROUTINE GENERAL MEDICAL EXAMINATION AT A HEALTH CARE FACILITY: Status: RESOLVED | Noted: 2017-06-20 | Resolved: 2019-07-01

## 2019-07-01 PROBLEM — Z00.00 PREVENTATIVE HEALTH CARE: Status: RESOLVED | Noted: 2017-06-20 | Resolved: 2019-07-01

## 2019-07-01 RX ORDER — POTASSIUM CHLORIDE 750 MG/1
TABLET, FILM COATED, EXTENDED RELEASE ORAL
Qty: 40 TABLET | Refills: 2 | Status: SHIPPED | OUTPATIENT
Start: 2019-07-01 | End: 2019-07-29 | Stop reason: SDUPTHER

## 2019-07-29 RX ORDER — POTASSIUM CHLORIDE 750 MG/1
TABLET, FILM COATED, EXTENDED RELEASE ORAL
Qty: 40 TABLET | Refills: 2 | Status: SHIPPED | OUTPATIENT
Start: 2019-07-29 | End: 2019-10-28 | Stop reason: SDUPTHER

## 2019-09-10 ENCOUNTER — TELEPHONE (OUTPATIENT)
Dept: ADMINISTRATIVE | Facility: HOSPITAL | Age: 79
End: 2019-09-10

## 2019-09-10 PROBLEM — E11.9 TYPE 2 DIABETES MELLITUS WITHOUT COMPLICATION, WITHOUT LONG-TERM CURRENT USE OF INSULIN: Status: RESOLVED | Noted: 2017-06-20 | Resolved: 2019-09-10

## 2019-09-17 ENCOUNTER — LAB VISIT (OUTPATIENT)
Dept: LAB | Facility: HOSPITAL | Age: 79
End: 2019-09-17
Attending: INTERNAL MEDICINE
Payer: MEDICARE

## 2019-09-17 DIAGNOSIS — Z00.00 PREVENTATIVE HEALTH CARE: ICD-10-CM

## 2019-09-17 DIAGNOSIS — I10 ESSENTIAL HYPERTENSION: ICD-10-CM

## 2019-09-17 DIAGNOSIS — R73.02 GLUCOSE INTOLERANCE (IMPAIRED GLUCOSE TOLERANCE): ICD-10-CM

## 2019-09-17 DIAGNOSIS — R17 TOTAL BILIRUBIN, ELEVATED: ICD-10-CM

## 2019-09-17 LAB
ALBUMIN SERPL BCP-MCNC: 3.3 G/DL (ref 3.5–5.2)
ALP SERPL-CCNC: 75 U/L (ref 55–135)
ALT SERPL W/O P-5'-P-CCNC: 20 U/L (ref 10–44)
ANION GAP SERPL CALC-SCNC: 7 MMOL/L (ref 8–16)
AST SERPL-CCNC: 30 U/L (ref 10–40)
BASOPHILS # BLD AUTO: 0.03 K/UL (ref 0–0.2)
BASOPHILS NFR BLD: 0.3 % (ref 0–1.9)
BILIRUB SERPL-MCNC: 1.9 MG/DL (ref 0.1–1)
BUN SERPL-MCNC: 17 MG/DL (ref 8–23)
CALCIUM SERPL-MCNC: 9.6 MG/DL (ref 8.7–10.5)
CHLORIDE SERPL-SCNC: 109 MMOL/L (ref 95–110)
CO2 SERPL-SCNC: 23 MMOL/L (ref 23–29)
CREAT SERPL-MCNC: 1.2 MG/DL (ref 0.5–1.4)
DIFFERENTIAL METHOD: ABNORMAL
EOSINOPHIL # BLD AUTO: 0.3 K/UL (ref 0–0.5)
EOSINOPHIL NFR BLD: 2.8 % (ref 0–8)
ERYTHROCYTE [DISTWIDTH] IN BLOOD BY AUTOMATED COUNT: 12.6 % (ref 11.5–14.5)
EST. GFR  (AFRICAN AMERICAN): >60 ML/MIN/1.73 M^2
EST. GFR  (NON AFRICAN AMERICAN): 58.4 ML/MIN/1.73 M^2
ESTIMATED AVG GLUCOSE: 111 MG/DL (ref 68–131)
GLUCOSE SERPL-MCNC: 118 MG/DL (ref 70–110)
HBA1C MFR BLD HPLC: 5.5 % (ref 4–5.6)
HCT VFR BLD AUTO: 42.3 % (ref 40–54)
HGB BLD-MCNC: 14.8 G/DL (ref 14–18)
IMM GRANULOCYTES # BLD AUTO: 0.04 K/UL (ref 0–0.04)
IMM GRANULOCYTES NFR BLD AUTO: 0.4 % (ref 0–0.5)
LYMPHOCYTES # BLD AUTO: 3.4 K/UL (ref 1–4.8)
LYMPHOCYTES NFR BLD: 32.4 % (ref 18–48)
MCH RBC QN AUTO: 33.4 PG (ref 27–31)
MCHC RBC AUTO-ENTMCNC: 35 G/DL (ref 32–36)
MCV RBC AUTO: 96 FL (ref 82–98)
MONOCYTES # BLD AUTO: 1.3 K/UL (ref 0.3–1)
MONOCYTES NFR BLD: 12.4 % (ref 4–15)
NEUTROPHILS # BLD AUTO: 5.4 K/UL (ref 1.8–7.7)
NEUTROPHILS NFR BLD: 51.7 % (ref 38–73)
NRBC BLD-RTO: 0 /100 WBC
PLATELET # BLD AUTO: 168 K/UL (ref 150–350)
PMV BLD AUTO: 9.6 FL (ref 9.2–12.9)
POTASSIUM SERPL-SCNC: 4.1 MMOL/L (ref 3.5–5.1)
PROT SERPL-MCNC: 7.7 G/DL (ref 6–8.4)
RBC # BLD AUTO: 4.43 M/UL (ref 4.6–6.2)
SODIUM SERPL-SCNC: 139 MMOL/L (ref 136–145)
WBC # BLD AUTO: 10.5 K/UL (ref 3.9–12.7)

## 2019-09-17 PROCEDURE — 80053 COMPREHEN METABOLIC PANEL: CPT | Mod: HCNC

## 2019-09-17 PROCEDURE — 36415 COLL VENOUS BLD VENIPUNCTURE: CPT | Mod: HCNC,PO

## 2019-09-17 PROCEDURE — 83036 HEMOGLOBIN GLYCOSYLATED A1C: CPT | Mod: HCNC

## 2019-09-17 PROCEDURE — 85025 COMPLETE CBC W/AUTO DIFF WBC: CPT | Mod: HCNC

## 2019-09-24 ENCOUNTER — OFFICE VISIT (OUTPATIENT)
Dept: INTERNAL MEDICINE | Facility: CLINIC | Age: 79
End: 2019-09-24
Payer: MEDICARE

## 2019-09-24 VITALS
BODY MASS INDEX: 22.82 KG/M2 | DIASTOLIC BLOOD PRESSURE: 84 MMHG | WEIGHT: 142 LBS | HEART RATE: 77 BPM | OXYGEN SATURATION: 98 % | HEIGHT: 66 IN | SYSTOLIC BLOOD PRESSURE: 126 MMHG

## 2019-09-24 DIAGNOSIS — I10 ESSENTIAL HYPERTENSION: Primary | ICD-10-CM

## 2019-09-24 DIAGNOSIS — Z12.5 PROSTATE CANCER SCREENING: ICD-10-CM

## 2019-09-24 DIAGNOSIS — R17 TOTAL BILIRUBIN, ELEVATED: ICD-10-CM

## 2019-09-24 DIAGNOSIS — Z23 NEEDS FLU SHOT: ICD-10-CM

## 2019-09-24 DIAGNOSIS — Z00.00 PREVENTATIVE HEALTH CARE: ICD-10-CM

## 2019-09-24 DIAGNOSIS — R73.02 GLUCOSE INTOLERANCE (IMPAIRED GLUCOSE TOLERANCE): ICD-10-CM

## 2019-09-24 DIAGNOSIS — R79.9 ABNORMAL FINDING OF BLOOD CHEMISTRY: ICD-10-CM

## 2019-09-24 PROCEDURE — 99214 OFFICE O/P EST MOD 30 MIN: CPT | Mod: 25,HCNC,S$GLB, | Performed by: INTERNAL MEDICINE

## 2019-09-24 PROCEDURE — 1101F PT FALLS ASSESS-DOCD LE1/YR: CPT | Mod: HCNC,CPTII,S$GLB, | Performed by: INTERNAL MEDICINE

## 2019-09-24 PROCEDURE — 3079F DIAST BP 80-89 MM HG: CPT | Mod: HCNC,CPTII,S$GLB, | Performed by: INTERNAL MEDICINE

## 2019-09-24 PROCEDURE — 99499 RISK ADDL DX/OHS AUDIT: ICD-10-PCS | Mod: S$GLB,,, | Performed by: INTERNAL MEDICINE

## 2019-09-24 PROCEDURE — 99499 UNLISTED E&M SERVICE: CPT | Mod: S$GLB,,, | Performed by: INTERNAL MEDICINE

## 2019-09-24 PROCEDURE — 3074F SYST BP LT 130 MM HG: CPT | Mod: HCNC,CPTII,S$GLB, | Performed by: INTERNAL MEDICINE

## 2019-09-24 PROCEDURE — 90662 FLU VACCINE - HIGH DOSE (65+) PRESERVATIVE FREE IM: ICD-10-PCS | Mod: HCNC,S$GLB,, | Performed by: INTERNAL MEDICINE

## 2019-09-24 PROCEDURE — G0008 FLU VACCINE - HIGH DOSE (65+) PRESERVATIVE FREE IM: ICD-10-PCS | Mod: HCNC,S$GLB,, | Performed by: INTERNAL MEDICINE

## 2019-09-24 PROCEDURE — 3074F PR MOST RECENT SYSTOLIC BLOOD PRESSURE < 130 MM HG: ICD-10-PCS | Mod: HCNC,CPTII,S$GLB, | Performed by: INTERNAL MEDICINE

## 2019-09-24 PROCEDURE — G0008 ADMIN INFLUENZA VIRUS VAC: HCPCS | Mod: HCNC,S$GLB,, | Performed by: INTERNAL MEDICINE

## 2019-09-24 PROCEDURE — 99999 PR PBB SHADOW E&M-EST. PATIENT-LVL III: CPT | Mod: PBBFAC,HCNC,, | Performed by: INTERNAL MEDICINE

## 2019-09-24 PROCEDURE — 99214 PR OFFICE/OUTPT VISIT, EST, LEVL IV, 30-39 MIN: ICD-10-PCS | Mod: 25,HCNC,S$GLB, | Performed by: INTERNAL MEDICINE

## 2019-09-24 PROCEDURE — 3079F PR MOST RECENT DIASTOLIC BLOOD PRESSURE 80-89 MM HG: ICD-10-PCS | Mod: HCNC,CPTII,S$GLB, | Performed by: INTERNAL MEDICINE

## 2019-09-24 PROCEDURE — 90662 IIV NO PRSV INCREASED AG IM: CPT | Mod: HCNC,S$GLB,, | Performed by: INTERNAL MEDICINE

## 2019-09-24 PROCEDURE — 1101F PR PT FALLS ASSESS DOC 0-1 FALLS W/OUT INJ PAST YR: ICD-10-PCS | Mod: HCNC,CPTII,S$GLB, | Performed by: INTERNAL MEDICINE

## 2019-09-24 PROCEDURE — 99999 PR PBB SHADOW E&M-EST. PATIENT-LVL III: ICD-10-PCS | Mod: PBBFAC,HCNC,, | Performed by: INTERNAL MEDICINE

## 2019-09-24 RX ORDER — OLMESARTAN MEDOXOMIL AND HYDROCHLOROTHIAZIDE 40/12.5 40; 12.5 MG/1; MG/1
1 TABLET ORAL DAILY
Qty: 90 TABLET | Refills: 1 | Status: SHIPPED | OUTPATIENT
Start: 2019-09-24 | End: 2020-04-23

## 2019-09-24 RX ORDER — METOPROLOL SUCCINATE 25 MG/1
25 TABLET, EXTENDED RELEASE ORAL NIGHTLY
Qty: 90 TABLET | Refills: 1 | Status: SHIPPED | OUTPATIENT
Start: 2019-09-24 | End: 2020-04-08

## 2019-09-24 NOTE — PROGRESS NOTES
Pt. ID: Dean Hahn is a 78 y.o. male      Chief complaint:   Chief Complaint   Patient presents with    Follow-up       HPI: pt. Here for f/u for HTN; he is compliant with meds and BP is close to goal; I reviewed labs dated 9/17/19; total bilirubin is elevated but stable; HGBA1C is 5.5; he would like a flu shot; he will get shingles and tetanus shot at a local pharmacy; he would like refills on meds      Review of Systems   Constitutional: Negative for chills and fever.   Respiratory: Negative for cough and shortness of breath.    Cardiovascular: Negative for chest pain.   Gastrointestinal: Negative for abdominal pain, nausea and vomiting.   Genitourinary: Negative for dysuria.         Objective:    Physical Exam   Constitutional: He is oriented to person, place, and time. He appears well-developed.   Eyes: EOM are normal.   Neck: Normal range of motion.   Cardiovascular: Normal rate, regular rhythm and normal heart sounds.   Pulmonary/Chest: Effort normal and breath sounds normal.   Abdominal: Soft. There is no tenderness. There is no rebound and no guarding.   Musculoskeletal: Normal range of motion.   Neurological: He is alert and oriented to person, place, and time.   Skin: No rash noted.         Health Maintenance   Topic Date Due    TETANUS VACCINE  09/24/2020 (Originally 10/16/1958)    Eye Exam  01/17/2020    Lipid Panel  02/19/2020    Hemoglobin A1c  03/17/2020    Pneumococcal Vaccine (65+ High/Highest Risk)  Completed    Foot Exam  Discontinued         Assessment:     1. Essential hypertension Well controlled   2. Glucose intolerance (impaired glucose tolerance) Well controlled   3. Total bilirubin, elevated Stable   4. Needs flu shot Active   5. Preventative health care Active   6. Prostate cancer screening Active   7. Abnormal finding of blood chemistry  Active         Plan: Dean was seen today for follow-up.    Diagnoses and all orders for this visit:    Essential  hypertension  Comments:  continue curent regimen and encouraged low Na diet  Orders:  -     metoprolol succinate (TOPROL-XL) 25 MG 24 hr tablet; Take 1 tablet (25 mg total) by mouth every evening.  -     olmesartan-hydrochlorothiazide (BENICAR HCT) 40-12.5 mg Tab; Take 1 tablet by mouth once daily.  -     CBC auto differential; Future  -     Comprehensive metabolic panel; Future  -     Urinalysis; Future    Glucose intolerance (impaired glucose tolerance)  Comments:  encouraged low sugar diet   Orders:  -     Comprehensive metabolic panel; Future  -     Hemoglobin A1c; Future    Total bilirubin, elevated  Comments:  monitor   Orders:  -     Comprehensive metabolic panel; Future    Needs flu shot  -     Influenza - High Dose (65+) (PF) (IM)    Preventative health care  -     CBC auto differential; Future  -     Comprehensive metabolic panel; Future  -     Hemoglobin A1c; Future  -     Lipid panel; Future  -     Urinalysis; Future    Prostate cancer screening  -     PSA, Screening; Future    Abnormal finding of blood chemistry   -     Lipid panel; Future        Problem List Items Addressed This Visit        Cardiac/Vascular    Hypertension - Primary    Relevant Medications    metoprolol succinate (TOPROL-XL) 25 MG 24 hr tablet    olmesartan-hydrochlorothiazide (BENICAR HCT) 40-12.5 mg Tab    Other Relevant Orders    CBC auto differential    Comprehensive metabolic panel    Urinalysis       Renal/    Prostate cancer screening    Relevant Orders    PSA, Screening       ID    Needs flu shot    Relevant Orders    Influenza - High Dose (65+) (PF) (IM)       Endocrine    Glucose intolerance (impaired glucose tolerance)    Relevant Orders    Comprehensive metabolic panel    Hemoglobin A1c       GI    Total bilirubin, elevated    Relevant Orders    Comprehensive metabolic panel       Other    Abnormal finding of blood chemistry     Relevant Orders    Lipid panel    Preventative health care    Relevant Orders    CBC auto  differential    Comprehensive metabolic panel    Hemoglobin A1c    Lipid panel    Urinalysis

## 2019-10-29 RX ORDER — POTASSIUM CHLORIDE 750 MG/1
TABLET, EXTENDED RELEASE ORAL
Qty: 40 TABLET | Refills: 2 | Status: SHIPPED | OUTPATIENT
Start: 2019-10-29 | End: 2020-01-27

## 2019-11-09 ENCOUNTER — TELEPHONE (OUTPATIENT)
Dept: FAMILY MEDICINE | Facility: HOSPITAL | Age: 79
End: 2019-11-09

## 2019-11-20 ENCOUNTER — OFFICE VISIT (OUTPATIENT)
Dept: PODIATRY | Facility: CLINIC | Age: 79
End: 2019-11-20
Payer: MEDICARE

## 2019-11-20 VITALS
DIASTOLIC BLOOD PRESSURE: 83 MMHG | HEIGHT: 66 IN | WEIGHT: 141 LBS | HEART RATE: 67 BPM | SYSTOLIC BLOOD PRESSURE: 135 MMHG | BODY MASS INDEX: 22.66 KG/M2

## 2019-11-20 DIAGNOSIS — B35.1 ONYCHOMYCOSIS DUE TO DERMATOPHYTE: Primary | ICD-10-CM

## 2019-11-20 PROCEDURE — 99203 PR OFFICE/OUTPT VISIT, NEW, LEVL III, 30-44 MIN: ICD-10-PCS | Mod: HCNC,S$GLB,, | Performed by: PODIATRIST

## 2019-11-20 PROCEDURE — 3075F PR MOST RECENT SYSTOLIC BLOOD PRESS GE 130-139MM HG: ICD-10-PCS | Mod: HCNC,CPTII,S$GLB, | Performed by: PODIATRIST

## 2019-11-20 PROCEDURE — 1126F PR PAIN SEVERITY QUANTIFIED, NO PAIN PRESENT: ICD-10-PCS | Mod: HCNC,S$GLB,, | Performed by: PODIATRIST

## 2019-11-20 PROCEDURE — 99203 OFFICE O/P NEW LOW 30 MIN: CPT | Mod: HCNC,S$GLB,, | Performed by: PODIATRIST

## 2019-11-20 PROCEDURE — 1101F PT FALLS ASSESS-DOCD LE1/YR: CPT | Mod: HCNC,CPTII,S$GLB, | Performed by: PODIATRIST

## 2019-11-20 PROCEDURE — 3079F PR MOST RECENT DIASTOLIC BLOOD PRESSURE 80-89 MM HG: ICD-10-PCS | Mod: HCNC,CPTII,S$GLB, | Performed by: PODIATRIST

## 2019-11-20 PROCEDURE — 3075F SYST BP GE 130 - 139MM HG: CPT | Mod: HCNC,CPTII,S$GLB, | Performed by: PODIATRIST

## 2019-11-20 PROCEDURE — 1159F MED LIST DOCD IN RCRD: CPT | Mod: HCNC,S$GLB,, | Performed by: PODIATRIST

## 2019-11-20 PROCEDURE — 99999 PR PBB SHADOW E&M-EST. PATIENT-LVL III: CPT | Mod: PBBFAC,HCNC,, | Performed by: PODIATRIST

## 2019-11-20 PROCEDURE — 99999 PR PBB SHADOW E&M-EST. PATIENT-LVL III: ICD-10-PCS | Mod: PBBFAC,HCNC,, | Performed by: PODIATRIST

## 2019-11-20 PROCEDURE — 1101F PR PT FALLS ASSESS DOC 0-1 FALLS W/OUT INJ PAST YR: ICD-10-PCS | Mod: HCNC,CPTII,S$GLB, | Performed by: PODIATRIST

## 2019-11-20 PROCEDURE — 1159F PR MEDICATION LIST DOCUMENTED IN MEDICAL RECORD: ICD-10-PCS | Mod: HCNC,S$GLB,, | Performed by: PODIATRIST

## 2019-11-20 PROCEDURE — 3079F DIAST BP 80-89 MM HG: CPT | Mod: HCNC,CPTII,S$GLB, | Performed by: PODIATRIST

## 2019-11-20 PROCEDURE — 1126F AMNT PAIN NOTED NONE PRSNT: CPT | Mod: HCNC,S$GLB,, | Performed by: PODIATRIST

## 2019-11-20 RX ORDER — CICLOPIROX 80 MG/ML
SOLUTION TOPICAL NIGHTLY
Qty: 6.6 ML | Refills: 3 | Status: SHIPPED | OUTPATIENT
Start: 2019-11-20 | End: 2020-05-11

## 2019-11-20 NOTE — PROGRESS NOTES
Subjective:      Patient ID: Dean Hahn is a 79 y.o. male.    Chief Complaint: Diabetic Foot Exam (Dr. Padilla 9/24/19) and Nail Care    Dean is a 79 y.o. male who presents to the clinic for evaluation for elongated, discolored and thickened nails times 10. Dean has a past medical history of Hypertension. The patient's chief complaint is long, thick toenails.  No previous treatments for onychomycosis.    PCP: Herman Padilla MD    Date Last Seen by PCP: in epic     Current shoe gear:  Affected Foot: Tennis shoes     Unaffected Foot: Tennis shoes    Hemoglobin A1C   Date Value Ref Range Status   09/17/2019 5.5 4.0 - 5.6 % Final     Comment:     ADA Screening Guidelines:  5.7-6.4%  Consistent with prediabetes  >or=6.5%  Consistent with diabetes  High levels of fetal hemoglobin interfere with the HbA1C  assay. Heterozygous hemoglobin variants (HbS, HgC, etc)do  not significantly interfere with this assay.   However, presence of multiple variants may affect accuracy.     03/21/2019 6.4 (H) 4.0 - 5.6 % Final     Comment:     ADA Screening Guidelines:  5.7-6.4%  Consistent with prediabetes  >or=6.5%  Consistent with diabetes  High levels of fetal hemoglobin interfere with the HbA1C  assay. Heterozygous hemoglobin variants (HbS, HgC, etc)do  not significantly interfere with this assay.   However, presence of multiple variants may affect accuracy.     06/11/2018 6.2 (H) 4.0 - 5.6 % Final     Comment:     ADA Screening Guidelines:  5.7-6.4%  Consistent with prediabetes  >or=6.5%  Consistent with diabetes  High levels of fetal hemoglobin interfere with the HbA1C  assay. Heterozygous hemoglobin variants (HbS, HgC, etc)do  not significantly interfere with this assay.   However, presence of multiple variants may affect accuracy.         Review of Systems   Constitution: Negative for chills, decreased appetite, fever and malaise/fatigue.   HENT: Negative for congestion, ear discharge and sore throat.    Eyes:  Negative for discharge and pain.   Cardiovascular: Negative for chest pain, claudication and leg swelling.   Respiratory: Negative for cough and shortness of breath.    Skin: Positive for color change. Negative for nail changes and rash.   Musculoskeletal: Negative for arthritis, joint pain, joint swelling and muscle weakness.   Gastrointestinal: Negative for bloating, abdominal pain, diarrhea, nausea and vomiting.   Genitourinary: Negative for flank pain and hematuria.   Neurological: Negative for headaches, numbness and weakness.   Psychiatric/Behavioral: Negative for altered mental status.             Past Medical History:   Diagnosis Date    Hypertension        Past Surgical History:   Procedure Laterality Date    CHOLECYSTECTOMY         Family History   Problem Relation Age of Onset    Hypertension Mother     Heart attack Father     No Known Problems Sister     Hypertension Brother     No Known Problems Daughter     No Known Problems Son        Social History     Socioeconomic History    Marital status:      Spouse name: Not on file    Number of children: Not on file    Years of education: Not on file    Highest education level: Not on file   Occupational History    Not on file   Social Needs    Financial resource strain: Not on file    Food insecurity:     Worry: Not on file     Inability: Not on file    Transportation needs:     Medical: Not on file     Non-medical: Not on file   Tobacco Use    Smoking status: Never Smoker    Smokeless tobacco: Never Used   Substance and Sexual Activity    Alcohol use: Yes     Comment: socially; 2-3 beers on some weekends    Drug use: No    Sexual activity: Yes     Partners: Female   Lifestyle    Physical activity:     Days per week: Not on file     Minutes per session: Not on file    Stress: Not on file   Relationships    Social connections:     Talks on phone: Not on file     Gets together: Not on file     Attends [employer] service: Not on  "file     Active member of club or organization: Not on file     Attends meetings of clubs or organizations: Not on file     Relationship status: Not on file   Other Topics Concern    Not on file   Social History Narrative    Not on file       Current Outpatient Medications   Medication Sig Dispense Refill    aspirin (ECOTRIN) 81 MG EC tablet Take 81 mg by mouth once daily.      dorzolamide-timolol 2-0.5% (COSOPT) 22.3-6.8 mg/mL ophthalmic solution Place 1 drop into both eyes 2 (two) times daily.  6    fish oil-omega-3 fatty acids 300-1,000 mg capsule Take 2 g by mouth once daily.      metoprolol succinate (TOPROL-XL) 25 MG 24 hr tablet Take 1 tablet (25 mg total) by mouth every evening. 90 tablet 1    naproxen (NAPROSYN) 500 MG tablet Take 1 tablet (500 mg total) by mouth 2 (two) times daily with meals. AS NEEDED FOR PAIN 60 tablet 0    olmesartan-hydrochlorothiazide (BENICAR HCT) 40-12.5 mg Tab Take 1 tablet by mouth once daily. 90 tablet 1    potassium chloride (KLOR-CON 10) 10 MEQ TbSR TAKE 1 TAB DAILY EXCEPT TAKE 2 TABS ON MON, WED AND FRIDAY 40 tablet 2    ciclopirox (PENLAC) 8 % Soln Apply topically nightly. 6.6 mL 3     No current facility-administered medications for this visit.        Review of patient's allergies indicates:  No Known Allergies    Vitals:    11/20/19 0948   BP: 135/83   Pulse: 67   Weight: 64 kg (141 lb)   Height: 5' 6" (1.676 m)   PainSc: 0-No pain       Objective:      Physical Exam   Constitutional: He is oriented to person, place, and time. He appears well-developed and well-nourished. No distress.   HENT:   Nose: Nose normal.   Eyes: Conjunctivae are normal.   Neck: Normal range of motion.   Pulmonary/Chest: Effort normal. He exhibits no tenderness.   Abdominal: There is no tenderness.   Neurological: He is alert and oriented to person, place, and time.   Psychiatric: He has a normal mood and affect. His behavior is normal.       Vascular: Distal DP/PT pulses palpable 1/4. " CRT < 3 sec to tips of toes. No vericosities noted to LEs. warm to touch LE, No edema noted to LE.    Dermatologic: No open lesions, lacerations or wounds. Interdigital spaces clean, dry and intact. No erythema, rubor, calor noted LE  Toenails 1-5 bilaterally are elongated by 2-3 mm, thickened by 2-3 mm, discolored/yellowed, dystrophic, brittle with subungual debris. No incurvation. Mild xerosis noted, bilat.     Musculoskeletal: MMT 5/5 in DF/PF/Inv/Ev resistance with no reproduction of pain in any direction. Passive range of motion of ankle and pedal joints is painless. No calf tenderness LE, Compartments soft/compressible.     Neurological: Light touch, proprioception, and sharp/dull sensation are all intact. Protective threshold with the [place]-Wienstein monofilament is intact. Vibratory sensation intact.         Assessment:       Encounter Diagnosis   Name Primary?    Onychomycosis due to dermatophyte Yes         Plan:       Dean was seen today for diabetic foot exam and nail care.    Diagnoses and all orders for this visit:    Onychomycosis due to dermatophyte    Other orders  -     ciclopirox (PENLAC) 8 % Soln; Apply topically nightly.      I counseled the patient on his conditions, their implications and medical management.    79 y.o. male with onychomycosis.    -rx. penalc  -nails x 10 sharply debrided with nail nipper. Tolerated well. (courtesy)  -Instructed patient on the importance of keeping feet dry. Patient instructed to use absorbent cotton socks and change them if they become sweaty; or wear an open-toe shoe or sandal. Wash the feet at least once a day with soap and water. Patient instructed to use lysol or over-the-counter antifungal powders or sprays to shoes daily and allow them to air dry, switching shoes from every other day would be optimal. Patient is to avoid barefoot walking in high-risk environments (public showers, gyms and locker rooms) may prevent future infections.   -The nature of  the condition, options for management, as well as potential risks and complications were discussed in detail with patient. Patient was amenable to my recommendations and left my office fully informed and will follow up as instructed or sooner if necessary.    -f/u prn.  -proc B if here for nail trimming.    Note dictated with voice recognition software, please excuse any grammatical errors.

## 2019-12-07 ENCOUNTER — TELEPHONE (OUTPATIENT)
Dept: PRIMARY CARE CLINIC | Facility: CLINIC | Age: 79
End: 2019-12-07

## 2019-12-30 ENCOUNTER — DOCUMENTATION ONLY (OUTPATIENT)
Dept: INTERNAL MEDICINE | Facility: CLINIC | Age: 79
End: 2019-12-30

## 2019-12-30 PROBLEM — Z00.00 PREVENTATIVE HEALTH CARE: Status: RESOLVED | Noted: 2017-06-20 | Resolved: 2019-12-30

## 2020-01-27 RX ORDER — POTASSIUM CHLORIDE 750 MG/1
TABLET, EXTENDED RELEASE ORAL
Qty: 120 TABLET | Refills: 0 | Status: SHIPPED | OUTPATIENT
Start: 2020-01-27 | End: 2020-02-16

## 2020-02-16 RX ORDER — POTASSIUM CHLORIDE 750 MG/1
TABLET, EXTENDED RELEASE ORAL
Qty: 48 TABLET | Refills: 0 | Status: SHIPPED | OUTPATIENT
Start: 2020-02-16 | End: 2020-03-25

## 2020-02-17 ENCOUNTER — PES CALL (OUTPATIENT)
Dept: ADMINISTRATIVE | Facility: CLINIC | Age: 80
End: 2020-02-17

## 2020-03-16 ENCOUNTER — PES CALL (OUTPATIENT)
Dept: ADMINISTRATIVE | Facility: CLINIC | Age: 80
End: 2020-03-16

## 2020-03-25 RX ORDER — POTASSIUM CHLORIDE 750 MG/1
TABLET, EXTENDED RELEASE ORAL
Qty: 48 TABLET | Refills: 0 | Status: SHIPPED | OUTPATIENT
Start: 2020-03-25 | End: 2020-04-20

## 2020-03-31 ENCOUNTER — TELEPHONE (OUTPATIENT)
Dept: INTERNAL MEDICINE | Facility: CLINIC | Age: 80
End: 2020-03-31

## 2020-03-31 NOTE — TELEPHONE ENCOUNTER
----- Message from Carroll Enriquez sent at 3/30/2020  3:24 PM CDT -----  Contact: [phone] / self   Patient would like a call back from your office regarding built up earwax in his ear. Please Advise.

## 2020-04-08 DIAGNOSIS — I10 ESSENTIAL HYPERTENSION: ICD-10-CM

## 2020-04-08 RX ORDER — METOPROLOL SUCCINATE 25 MG/1
TABLET, EXTENDED RELEASE ORAL
Qty: 90 TABLET | Refills: 0 | Status: SHIPPED | OUTPATIENT
Start: 2020-04-08 | End: 2020-06-30

## 2020-04-20 RX ORDER — POTASSIUM CHLORIDE 750 MG/1
TABLET, EXTENDED RELEASE ORAL
Qty: 48 TABLET | Refills: 0 | Status: SHIPPED | OUTPATIENT
Start: 2020-04-20 | End: 2020-06-15 | Stop reason: SDUPTHER

## 2020-04-22 DIAGNOSIS — I10 ESSENTIAL HYPERTENSION: ICD-10-CM

## 2020-04-23 RX ORDER — OLMESARTAN MEDOXOMIL AND HYDROCHLOROTHIAZIDE 40/12.5 40; 12.5 MG/1; MG/1
TABLET ORAL
Qty: 90 TABLET | Refills: 0 | Status: SHIPPED | OUTPATIENT
Start: 2020-04-23 | End: 2020-05-11

## 2020-05-08 RX ORDER — NAPROXEN SODIUM 220 MG/1
81 TABLET, FILM COATED ORAL DAILY
Refills: 0 | Status: CANCELLED | OUTPATIENT
Start: 2020-05-08 | End: 2021-05-08

## 2020-05-08 NOTE — PROGRESS NOTES
"Subjective:       Patient ID: Dean Hahn is a 79 y.o. male.  This patient is new to me     Chief Complaint:   Establish Care      HPI    #Last visit/Previous Provider  This patient is new to me  Previously seen by Jeremy Tobias III, MD  Last visit was 9/2019   Last CPE was >1 year       #Interim Hx  11/2019 - podiatry for onychomycosis    #Concerns Today    none     #Chronic Problems      HTN  Complication: no CVA/CKD/CAD  Last labs : 9/2019 - wnl  UA, EKG, echo, lipids, hba1c  Medication: adherent to   - metoprolol 25mg daily  - olmesartan 40mg daily  - HCTZ12.5 mg daily   Home Bps; eliqibe for digital med  Symptoms: denies CP, SOB, changes in urination, sudden weakness, numbness      HLD - not on statin  PreDm - Last check 9/2019 - 5.5      Health Maintenance   Topic Date Due    TETANUS VACCINE  09/24/2020 (Originally 10/16/1958)    Lipid Panel  02/19/2024    Pneumococcal Vaccine (65+ Low/Medium Risk)  Completed                 Review of Systems   Constitutional: Negative for activity change, appetite change, fatigue and fever.   Respiratory: Negative for shortness of breath.    Cardiovascular: Negative for chest pain.   Gastrointestinal: Negative for abdominal pain.   Genitourinary: Negative for difficulty urinating.   Neurological: Negative for syncope and headaches.       Objective:   /82 (BP Location: Right arm, Patient Position: Sitting, BP Method: Medium (Manual))   Pulse 60   Ht 5' 6" (1.676 m)   Wt 65 kg (143 lb 4.8 oz)   SpO2 98%   BMI 23.13 kg/m²            Physical Exam   Constitutional: He appears well-developed and well-nourished. No distress.   HENT:   Head: Normocephalic.   Mouth/Throat: Oropharynx is clear and moist. No oropharyngeal exudate.   Poor dentition    Eyes: Pupils are equal, round, and reactive to light. Conjunctivae are normal. Right eye exhibits no discharge. Left eye exhibits no discharge.   Cardiovascular: Normal rate, regular rhythm and normal heart sounds. " Exam reveals no gallop and no friction rub.   No murmur heard.  Pulmonary/Chest: Effort normal. No stridor. No respiratory distress. He has no wheezes. He has no rales.   Abdominal: Soft. He exhibits no distension. There is no tenderness. There is no guarding.   Neurological: He is alert.   Skin: Skin is warm. He is not diaphoretic.   Psychiatric: He has a normal mood and affect.   Nursing note and vitals reviewed.          ASCVD  The 10-year ASCVD risk score ([place] CHAMBERS Jr., et al., 2013) is: 34.9%    Values used to calculate the score:      Age: 79 years      Sex: Male      Is Non- : No      Diabetic: No      Tobacco smoker: No      Systolic Blood Pressure: 132 mmHg      Is BP treated: Yes      HDL Cholesterol: 37 mg/dL      Total Cholesterol: 179 mg/dL    Basic Labs  BMP  Lab Results   Component Value Date     09/17/2019    K 4.1 09/17/2019     09/17/2019    CO2 23 09/17/2019    BUN 17 09/17/2019    CREATININE 1.2 09/17/2019    CALCIUM 9.6 09/17/2019    ANIONGAP 7 (L) 09/17/2019    ESTGFRAFRICA >60.0 09/17/2019    EGFRNONAA 58.4 (A) 09/17/2019     Lab Results   Component Value Date    ALT 20 09/17/2019    AST 30 09/17/2019    ALKPHOS 75 09/17/2019    BILITOT 1.9 (H) 09/17/2019         STD    Lab Results   Component Value Date    HEPAIGM Negative 02/19/2019    HEPBIGM Negative 02/19/2019    HEPCAB Negative 02/19/2019         Lipids  Lab Results   Component Value Date    CHOL 179 02/19/2019     Lab Results   Component Value Date    HDL 37 (L) 02/19/2019     Lab Results   Component Value Date    LDLCALC 109.4 02/19/2019     Lab Results   Component Value Date    TRIG 163 (H) 02/19/2019     Lab Results   Component Value Date    CHOLHDL 20.7 02/19/2019       DM  Lab Results   Component Value Date    HGBA1C 5.5 09/17/2019       PSA  PSA, SCREEN (ng/mL)   Date Value   02/19/2019 2.5       Assessment:       1. Essential hypertension    2. Screening for prostate cancer    3. Glucose  intolerance (impaired glucose tolerance)              Plan:             Dean was seen today for establish care.    Diagnoses and all orders for this visit:    Essential hypertension; New: stable  -- Patient is at goal today  -- Labs are reviewed and wnl  -- antihypertensive regimen will cont with   - metoprolol 25mg daily   - olmesartan 40mg daily   - HCTZ12.5 mg daily   --  return for BP follow up 3 months  -     Basic metabolic panel; Future    Screening for prostate cancer  -     PSA, Screening; Future    Glucose intolerance (impaired glucose tolerance); New: stable  -     Hemoglobin A1C; Future    Other orders  -     Cancel: aspirin 81 MG Chew; Take 1 tablet (81 mg total) by mouth once daily.            Medication List with Changes/Refills   Current Medications    DORZOLAMIDE-TIMOLOL 2-0.5% (COSOPT) 22.3-6.8 MG/ML OPHTHALMIC SOLUTION    Place 1 drop into both eyes 2 (two) times daily.    FISH OIL-OMEGA-3 FATTY ACIDS 300-1,000 MG CAPSULE    Take 2 g by mouth once daily.    METOPROLOL SUCCINATE (TOPROL-XL) 25 MG 24 HR TABLET    TAKE 1 TABLET BY MOUTH EVERY EVENING    POTASSIUM CHLORIDE (KLOR-CON 10) 10 MEQ TBSR    TAKE 1 TABLET BY MOUTH DAILY EXCEPT TAKE 2 TABS ON MON, WED AND FRIDAY   Discontinued Medications    ASPIRIN (ECOTRIN) 81 MG EC TABLET    Take 81 mg by mouth once daily.    CICLOPIROX (PENLAC) 8 % SOLN    Apply topically nightly.    NAPROXEN (NAPROSYN) 500 MG TABLET    Take 1 tablet (500 mg total) by mouth 2 (two) times daily with meals. AS NEEDED FOR PAIN    OLMESARTAN-HYDROCHLOROTHIAZIDE (BENICAR HCT) 40-12.5 MG TAB    TAKE 1 TABLET BY MOUTH EVERY DAY     Disclaimer:  This note has been generated using voice-recognition software. There may be grammatical or spelling errors that have been missed during proof-reading

## 2020-05-11 ENCOUNTER — LAB VISIT (OUTPATIENT)
Dept: LAB | Facility: HOSPITAL | Age: 80
End: 2020-05-11
Attending: INTERNAL MEDICINE
Payer: MEDICARE

## 2020-05-11 ENCOUNTER — OFFICE VISIT (OUTPATIENT)
Dept: INTERNAL MEDICINE | Facility: CLINIC | Age: 80
End: 2020-05-11
Payer: MEDICARE

## 2020-05-11 VITALS
SYSTOLIC BLOOD PRESSURE: 132 MMHG | OXYGEN SATURATION: 98 % | BODY MASS INDEX: 23.03 KG/M2 | HEIGHT: 66 IN | DIASTOLIC BLOOD PRESSURE: 82 MMHG | HEART RATE: 60 BPM | WEIGHT: 143.31 LBS

## 2020-05-11 DIAGNOSIS — R73.02 GLUCOSE INTOLERANCE (IMPAIRED GLUCOSE TOLERANCE): ICD-10-CM

## 2020-05-11 DIAGNOSIS — I10 ESSENTIAL HYPERTENSION: ICD-10-CM

## 2020-05-11 DIAGNOSIS — I10 ESSENTIAL HYPERTENSION: Primary | ICD-10-CM

## 2020-05-11 DIAGNOSIS — Z12.5 SCREENING FOR PROSTATE CANCER: ICD-10-CM

## 2020-05-11 LAB
ANION GAP SERPL CALC-SCNC: 7 MMOL/L (ref 8–16)
BUN SERPL-MCNC: 16 MG/DL (ref 8–23)
CALCIUM SERPL-MCNC: 9.6 MG/DL (ref 8.7–10.5)
CHLORIDE SERPL-SCNC: 108 MMOL/L (ref 95–110)
CO2 SERPL-SCNC: 24 MMOL/L (ref 23–29)
COMPLEXED PSA SERPL-MCNC: 2.8 NG/ML (ref 0–4)
CREAT SERPL-MCNC: 1.2 MG/DL (ref 0.5–1.4)
EST. GFR  (AFRICAN AMERICAN): >60 ML/MIN/1.73 M^2
EST. GFR  (NON AFRICAN AMERICAN): 58 ML/MIN/1.73 M^2
ESTIMATED AVG GLUCOSE: 126 MG/DL (ref 68–131)
GLUCOSE SERPL-MCNC: 132 MG/DL (ref 70–110)
HBA1C MFR BLD HPLC: 6 % (ref 4–5.6)
POTASSIUM SERPL-SCNC: 4.2 MMOL/L (ref 3.5–5.1)
SODIUM SERPL-SCNC: 139 MMOL/L (ref 136–145)

## 2020-05-11 PROCEDURE — 99499 UNLISTED E&M SERVICE: CPT | Mod: HCNC,,, | Performed by: INTERNAL MEDICINE

## 2020-05-11 PROCEDURE — 1159F MED LIST DOCD IN RCRD: CPT | Mod: HCNC,S$GLB,, | Performed by: INTERNAL MEDICINE

## 2020-05-11 PROCEDURE — 3075F PR MOST RECENT SYSTOLIC BLOOD PRESS GE 130-139MM HG: ICD-10-PCS | Mod: HCNC,CPTII,S$GLB, | Performed by: INTERNAL MEDICINE

## 2020-05-11 PROCEDURE — 3079F DIAST BP 80-89 MM HG: CPT | Mod: HCNC,CPTII,S$GLB, | Performed by: INTERNAL MEDICINE

## 2020-05-11 PROCEDURE — 1126F AMNT PAIN NOTED NONE PRSNT: CPT | Mod: HCNC,S$GLB,, | Performed by: INTERNAL MEDICINE

## 2020-05-11 PROCEDURE — 83036 HEMOGLOBIN GLYCOSYLATED A1C: CPT | Mod: HCNC

## 2020-05-11 PROCEDURE — 1101F PR PT FALLS ASSESS DOC 0-1 FALLS W/OUT INJ PAST YR: ICD-10-PCS | Mod: HCNC,CPTII,S$GLB, | Performed by: INTERNAL MEDICINE

## 2020-05-11 PROCEDURE — 99999 PR PBB SHADOW E&M-EST. PATIENT-LVL IV: ICD-10-PCS | Mod: PBBFAC,HCNC,, | Performed by: INTERNAL MEDICINE

## 2020-05-11 PROCEDURE — 84153 ASSAY OF PSA TOTAL: CPT | Mod: HCNC

## 2020-05-11 PROCEDURE — 1126F PR PAIN SEVERITY QUANTIFIED, NO PAIN PRESENT: ICD-10-PCS | Mod: HCNC,S$GLB,, | Performed by: INTERNAL MEDICINE

## 2020-05-11 PROCEDURE — 99214 OFFICE O/P EST MOD 30 MIN: CPT | Mod: HCNC,S$GLB,, | Performed by: INTERNAL MEDICINE

## 2020-05-11 PROCEDURE — 99999 PR PBB SHADOW E&M-EST. PATIENT-LVL IV: CPT | Mod: PBBFAC,HCNC,, | Performed by: INTERNAL MEDICINE

## 2020-05-11 PROCEDURE — 1101F PT FALLS ASSESS-DOCD LE1/YR: CPT | Mod: HCNC,CPTII,S$GLB, | Performed by: INTERNAL MEDICINE

## 2020-05-11 PROCEDURE — 80048 BASIC METABOLIC PNL TOTAL CA: CPT | Mod: HCNC

## 2020-05-11 PROCEDURE — 99499 UNLISTED E&M SERVICE: CPT | Mod: HCNC,S$GLB,, | Performed by: INTERNAL MEDICINE

## 2020-05-11 PROCEDURE — 99499 RISK ADDL DX/OHS AUDIT: ICD-10-PCS | Mod: HCNC,,, | Performed by: INTERNAL MEDICINE

## 2020-05-11 PROCEDURE — 99499 RISK ADDL DX/OHS AUDIT: ICD-10-PCS | Mod: HCNC,S$GLB,, | Performed by: INTERNAL MEDICINE

## 2020-05-11 PROCEDURE — 1159F PR MEDICATION LIST DOCUMENTED IN MEDICAL RECORD: ICD-10-PCS | Mod: HCNC,S$GLB,, | Performed by: INTERNAL MEDICINE

## 2020-05-11 PROCEDURE — 3079F PR MOST RECENT DIASTOLIC BLOOD PRESSURE 80-89 MM HG: ICD-10-PCS | Mod: HCNC,CPTII,S$GLB, | Performed by: INTERNAL MEDICINE

## 2020-05-11 PROCEDURE — 99214 PR OFFICE/OUTPT VISIT, EST, LEVL IV, 30-39 MIN: ICD-10-PCS | Mod: HCNC,S$GLB,, | Performed by: INTERNAL MEDICINE

## 2020-05-11 PROCEDURE — 3075F SYST BP GE 130 - 139MM HG: CPT | Mod: HCNC,CPTII,S$GLB, | Performed by: INTERNAL MEDICINE

## 2020-05-11 PROCEDURE — 36415 COLL VENOUS BLD VENIPUNCTURE: CPT | Mod: HCNC,PO

## 2020-05-11 NOTE — PATIENT INSTRUCTIONS
We were happy to see you today    For your Testing  Please have your labs and imaging test done at your earliest convience      For your Medication   You can stop your baby aspirin medicaitons  For more information about side effects please visit medlineplus.gov      Please return to clinic in    3 months

## 2020-05-12 DIAGNOSIS — I10 ESSENTIAL HYPERTENSION: ICD-10-CM

## 2020-05-12 RX ORDER — OLMESARTAN MEDOXOMIL AND HYDROCHLOROTHIAZIDE 40/12.5 40; 12.5 MG/1; MG/1
1 TABLET ORAL DAILY
Qty: 90 TABLET | Refills: 0 | Status: SHIPPED | OUTPATIENT
Start: 2020-05-12 | End: 2020-10-15 | Stop reason: SDUPTHER

## 2020-08-03 RX ORDER — POTASSIUM CHLORIDE 750 MG/1
TABLET, EXTENDED RELEASE ORAL
Qty: 40 TABLET | Refills: 1 | Status: SHIPPED | OUTPATIENT
Start: 2020-08-03 | End: 2020-08-23

## 2020-08-03 NOTE — TELEPHONE ENCOUNTER
Future Appointments   Date Time Provider Department Center   8/10/2020  8:40 AM Angel Luis Tobias III, MD [facility] [place]

## 2020-08-10 ENCOUNTER — OFFICE VISIT (OUTPATIENT)
Dept: INTERNAL MEDICINE | Facility: CLINIC | Age: 80
End: 2020-08-10
Payer: MEDICARE

## 2020-08-10 VITALS
TEMPERATURE: 98 F | WEIGHT: 143.06 LBS | HEART RATE: 58 BPM | SYSTOLIC BLOOD PRESSURE: 132 MMHG | OXYGEN SATURATION: 98 % | DIASTOLIC BLOOD PRESSURE: 74 MMHG | BODY MASS INDEX: 23.09 KG/M2

## 2020-08-10 DIAGNOSIS — R73.03 PREDIABETES: ICD-10-CM

## 2020-08-10 DIAGNOSIS — Z71.89 ADVANCE CARE PLANNING: ICD-10-CM

## 2020-08-10 DIAGNOSIS — R17 TOTAL BILIRUBIN, ELEVATED: ICD-10-CM

## 2020-08-10 DIAGNOSIS — I10 ESSENTIAL HYPERTENSION: Primary | ICD-10-CM

## 2020-08-10 PROCEDURE — 1126F PR PAIN SEVERITY QUANTIFIED, NO PAIN PRESENT: ICD-10-PCS | Mod: HCNC,S$GLB,, | Performed by: INTERNAL MEDICINE

## 2020-08-10 PROCEDURE — 99214 PR OFFICE/OUTPT VISIT, EST, LEVL IV, 30-39 MIN: ICD-10-PCS | Mod: HCNC,S$GLB,, | Performed by: INTERNAL MEDICINE

## 2020-08-10 PROCEDURE — 99499 RISK ADDL DX/OHS AUDIT: ICD-10-PCS | Mod: S$GLB,,, | Performed by: INTERNAL MEDICINE

## 2020-08-10 PROCEDURE — 1159F PR MEDICATION LIST DOCUMENTED IN MEDICAL RECORD: ICD-10-PCS | Mod: HCNC,S$GLB,, | Performed by: INTERNAL MEDICINE

## 2020-08-10 PROCEDURE — 99214 OFFICE O/P EST MOD 30 MIN: CPT | Mod: HCNC,S$GLB,, | Performed by: INTERNAL MEDICINE

## 2020-08-10 PROCEDURE — 1159F MED LIST DOCD IN RCRD: CPT | Mod: HCNC,S$GLB,, | Performed by: INTERNAL MEDICINE

## 2020-08-10 PROCEDURE — 99999 PR PBB SHADOW E&M-EST. PATIENT-LVL IV: ICD-10-PCS | Mod: PBBFAC,HCNC,, | Performed by: INTERNAL MEDICINE

## 2020-08-10 PROCEDURE — 99999 PR PBB SHADOW E&M-EST. PATIENT-LVL IV: CPT | Mod: PBBFAC,HCNC,, | Performed by: INTERNAL MEDICINE

## 2020-08-10 PROCEDURE — 1101F PR PT FALLS ASSESS DOC 0-1 FALLS W/OUT INJ PAST YR: ICD-10-PCS | Mod: HCNC,CPTII,S$GLB, | Performed by: INTERNAL MEDICINE

## 2020-08-10 PROCEDURE — 3078F PR MOST RECENT DIASTOLIC BLOOD PRESSURE < 80 MM HG: ICD-10-PCS | Mod: HCNC,CPTII,S$GLB, | Performed by: INTERNAL MEDICINE

## 2020-08-10 PROCEDURE — 3078F DIAST BP <80 MM HG: CPT | Mod: HCNC,CPTII,S$GLB, | Performed by: INTERNAL MEDICINE

## 2020-08-10 PROCEDURE — 99499 UNLISTED E&M SERVICE: CPT | Mod: S$GLB,,, | Performed by: INTERNAL MEDICINE

## 2020-08-10 PROCEDURE — 3075F PR MOST RECENT SYSTOLIC BLOOD PRESS GE 130-139MM HG: ICD-10-PCS | Mod: HCNC,CPTII,S$GLB, | Performed by: INTERNAL MEDICINE

## 2020-08-10 PROCEDURE — 1126F AMNT PAIN NOTED NONE PRSNT: CPT | Mod: HCNC,S$GLB,, | Performed by: INTERNAL MEDICINE

## 2020-08-10 PROCEDURE — 3075F SYST BP GE 130 - 139MM HG: CPT | Mod: HCNC,CPTII,S$GLB, | Performed by: INTERNAL MEDICINE

## 2020-08-10 PROCEDURE — 1101F PT FALLS ASSESS-DOCD LE1/YR: CPT | Mod: HCNC,CPTII,S$GLB, | Performed by: INTERNAL MEDICINE

## 2020-08-10 NOTE — PROGRESS NOTES
Subjective:       Patient ID: Dean Hahn is a 79 y.o. male.  This patient is new to me     Chief Complaint:   Hypertension      HPI        #Interim Hx  none  #Concerns Today    none     #Chronic Problems      HTN  Complication: no CVA/CKD/CAD  Last labs : 9/2019 - wnl  Medication: adherent to   - metoprolol 25mg daily  - olmesartan 40mg daily  - HCTZ12.5 mg daily   Home Bps; home bp 117/60s  Symptoms: denies CP, SOB, changes in urination, sudden weakness, numbness      HLD - not on statin  PreDm - Last check 9/2019 - 5.5    There are no preventive care reminders to display for this patient.    Health Maintenance Topics with due status: Not Due       Topic Last Completion Date    Lipid Panel 02/19/2019    Influenza Vaccine 09/24/2019                 Review of Systems   Constitutional: Negative for activity change, appetite change, fatigue and fever.   Respiratory: Negative for shortness of breath.    Cardiovascular: Negative for chest pain.   Gastrointestinal: Negative for abdominal pain.   Genitourinary: Negative for difficulty urinating.   Neurological: Negative for syncope and headaches.       Objective:   /74 (BP Location: Right arm, Patient Position: Sitting, BP Method: Small (Manual))   Pulse (!) 58   Temp 98.3 °F (36.8 °C) (Temporal)   Wt 64.9 kg (143 lb 1.3 oz)   SpO2 98%   BMI 23.09 kg/m²            Physical Exam  Vitals signs and nursing note reviewed.   Constitutional:       General: He is not in acute distress.     Appearance: He is well-developed. He is not diaphoretic.      Comments: Elderly thing male   HENT:      Head: Normocephalic.      Mouth/Throat:      Pharynx: No oropharyngeal exudate.   Eyes:      General:         Right eye: No discharge.         Left eye: No discharge.      Conjunctiva/sclera: Conjunctivae normal.      Pupils: Pupils are equal, round, and reactive to light.   Cardiovascular:      Rate and Rhythm: Normal rate and regular rhythm.      Heart sounds:  Normal heart sounds. No murmur. No friction rub. No gallop.    Pulmonary:      Effort: Pulmonary effort is normal. No respiratory distress.      Breath sounds: No stridor. No wheezing or rales.   Abdominal:      General: There is no distension.      Palpations: Abdomen is soft.      Tenderness: There is no abdominal tenderness. There is no guarding.   Musculoskeletal:      Comments: sarcopenia in the LE   Skin:     General: Skin is warm.   Neurological:      Mental Status: He is alert.             ASCVD  The 10-year ASCVD risk score ([place] CHAMBERS Jr., et al., 2013) is: 34.9%    Values used to calculate the score:      Age: 79 years      Sex: Male      Is Non- : No      Diabetic: No      Tobacco smoker: No      Systolic Blood Pressure: 132 mmHg      Is BP treated: Yes      HDL Cholesterol: 37 mg/dL      Total Cholesterol: 179 mg/dL    Basic Labs  BMP  Lab Results   Component Value Date     05/11/2020    K 4.2 05/11/2020     05/11/2020    CO2 24 05/11/2020    BUN 16 05/11/2020    CREATININE 1.2 05/11/2020    CALCIUM 9.6 05/11/2020    ANIONGAP 7 (L) 05/11/2020    ESTGFRAFRICA >60.0 05/11/2020    EGFRNONAA 58.0 (A) 05/11/2020     Lab Results   Component Value Date    ALT 20 09/17/2019    AST 30 09/17/2019    ALKPHOS 75 09/17/2019    BILITOT 1.9 (H) 09/17/2019         STD    Lab Results   Component Value Date    HEPAIGM Negative 02/19/2019    HEPBIGM Negative 02/19/2019    HEPCAB Negative 02/19/2019         Lipids  Lab Results   Component Value Date    CHOL 179 02/19/2019     Lab Results   Component Value Date    HDL 37 (L) 02/19/2019     Lab Results   Component Value Date    LDLCALC 109.4 02/19/2019     Lab Results   Component Value Date    TRIG 163 (H) 02/19/2019     Lab Results   Component Value Date    CHOLHDL 20.7 02/19/2019       DM  Lab Results   Component Value Date    HGBA1C 6.0 (H) 05/11/2020       PSA  PSA, SCREEN (ng/mL)   Date Value   05/11/2020 2.8       Assessment:       1.  Essential hypertension    2. Total bilirubin, elevated    3. Glucose intolerance    4. Advance care planning    5. Prediabetes              Plan:             Dean was seen today for hypertension.    Diagnoses and all orders for this visit:    Essential hypertension  Chronic; controlled  -- Patient is at goal today  -- Labs are reviewed and wnl  -- antihypertensive regimen will cont as belwo  --  return for BP follow up 6 months    -     Basic metabolic panel; Future    Total bilirubin, elevated  -     Hepatic function panel; Future    Advance Care Planning     Advance care planning  I initiated the process of advance care planning today  including the concept of advance directives to the patient as well as designating a Health Care Power of  (HCPOA). The Patient was also instructed to communicate with this person about their wishes for future healthcare, should he become sick and lose decision-making capacity. The patient has  previously appointed a HCPOA his wife Pati Hahn Spouse [phone]    Prediabetes  -     Hemoglobin A1C; Future          Future Appointments   Date Time Provider Department Center   8/10/2020  8:40 AM Angel Luis Tobias III, MD [facility]         Medication List with Changes/Refills   Current Medications    DORZOLAMIDE-TIMOLOL 2-0.5% (COSOPT) 22.3-6.8 MG/ML OPHTHALMIC SOLUTION    Place 1 drop into both eyes 2 (two) times daily.    FISH OIL-OMEGA-3 FATTY ACIDS 300-1,000 MG CAPSULE    Take 2 g by mouth once daily.    METOPROLOL SUCCINATE (TOPROL-XL) 25 MG 24 HR TABLET    TAKE 1 TABLET BY MOUTH EVERY DAY IN THE EVENING    OLMESARTAN-HYDROCHLOROTHIAZIDE (BENICAR HCT) 40-12.5 MG TAB    Take 1 tablet by mouth once daily.    POTASSIUM CHLORIDE (KLOR-CON 10) 10 MEQ TBSR    TAKE 1 TABLET BY MOUTH DAILY EXCEPT TAKE 2 TABS ON MON, WED AND FRIDAY     Disclaimer:  This note has been generated using voice-recognition software. There may be grammatical or spelling errors that have  been missed during proof-reading

## 2020-08-10 NOTE — PATIENT INSTRUCTIONS
We were happy to see you today    For your Testing  No labs today  Please have your labs and imaging test done in 6months prior to your next visit            Please return to clinic in    6months

## 2020-11-09 RX ORDER — POTASSIUM CHLORIDE 750 MG/1
TABLET, EXTENDED RELEASE ORAL
Qty: 120 TABLET | Refills: 1 | Status: SHIPPED | OUTPATIENT
Start: 2020-11-09 | End: 2021-04-29

## 2020-11-09 NOTE — PROGRESS NOTES
Refill Authorization Note   Dean Hahn is requesting a refill authorization.  Brief assessment and rationale for refill: Approve     Medication Therapy Plan: [facility]    Medication reconciliation completed: No   Comments:   Orders Placed This Encounter    potassium chloride (KLOR-CON 10) 10 MEQ TbSR      Requested Prescriptions   Signed Prescriptions Disp Refills    potassium chloride (KLOR-CON 10) 10 MEQ TbSR 120 tablet 1     Sig: TAKE 1 TABLET BY MOUTH DAILY EXCEPT TAKE 2 TABS ON MON, WED AND FRIDAY       Endocrinology:  Minerals - Potassium Supplementation Passed - 11/8/2020  5:40 PM        Passed - Patient is at least 18 years old        Passed - Office visit in past 12 months or future 90 days     Recent Outpatient Visits            3 months ago Essential hypertension    [facility] Angel Luis Tobias III, MD    6 months ago Essential hypertension    [facility] Angel Luis Tobias III, MD    11 months ago Onychomycosis due to dermatophyte    [facility] Marysol Molina DPM    1 year ago Essential hypertension    [facility] Herman Padilla MD    1 year ago Essential hypertension    [facility] Herman Padilla MD          Future Appointments              In 3 months [facility] [place]    In 3 months Angel Luis Tobias III, MD [facility]                Passed - K in normal range and within 360 days     Potassium   Date Value Ref Range Status   05/11/2020 4.2 3.5 - 5.1 mmol/L Final   09/17/2019 4.1 3.5 - 5.1 mmol/L Final   03/21/2019 4.2 3.5 - 5.1 mmol/L Final              Passed - Cr is 1.4 or below and within 360 days     Creatinine   Date Value Ref Range Status   05/11/2020 1.2 0.5 - 1.4 mg/dL Final   09/17/2019 1.2 0.5 - 1.4 mg/dL Final   03/21/2019 1.2 0.5 - 1.4 mg/dL Final              Passed - eGFR within 360 days     eGFR if non    Date Value Ref Range Status    05/11/2020 58.0 (A) >60 mL/min/1.73 m^2 Final     Comment:     Calculation used to obtain the estimated glomerular filtration  rate (eGFR) is the CKD-EPI equation.      09/17/2019 58.4 (A) >60 mL/min/1.73 m^2 Final     Comment:     Calculation used to obtain the estimated glomerular filtration  rate (eGFR) is the CKD-EPI equation.      03/21/2019 58.4 (A) >60 mL/min/1.73 m^2 Final     Comment:     Calculation used to obtain the estimated glomerular filtration  rate (eGFR) is the CKD-EPI equation.        eGFR if    Date Value Ref Range Status   05/11/2020 >60.0 >60 mL/min/1.73 m^2 Final   09/17/2019 >60.0 >60 mL/min/1.73 m^2 Final   03/21/2019 >60.0 >60 mL/min/1.73 m^2 Final                  Appointments  past 12m or future 3m with PCP    Date Provider   Last Visit   8/10/2020 Angel Luis Tobias III, MD   Next Visit   2/11/2021 Angel Luis Tobias III, MD   ED visits in past 90 days: 0     Note composed:11:07 AM 11/09/2020

## 2020-11-18 ENCOUNTER — PES CALL (OUTPATIENT)
Dept: ADMINISTRATIVE | Facility: CLINIC | Age: 80
End: 2020-11-18

## 2020-12-07 ENCOUNTER — OFFICE VISIT (OUTPATIENT)
Dept: FAMILY MEDICINE | Facility: CLINIC | Age: 80
End: 2020-12-07
Payer: MEDICARE

## 2020-12-07 VITALS
HEART RATE: 80 BPM | DIASTOLIC BLOOD PRESSURE: 70 MMHG | HEIGHT: 66 IN | TEMPERATURE: 97 F | WEIGHT: 142.19 LBS | SYSTOLIC BLOOD PRESSURE: 124 MMHG | OXYGEN SATURATION: 98 % | BODY MASS INDEX: 22.85 KG/M2

## 2020-12-07 DIAGNOSIS — R73.03 PREDIABETES: ICD-10-CM

## 2020-12-07 DIAGNOSIS — Z23 NEED FOR INFLUENZA VACCINATION: ICD-10-CM

## 2020-12-07 DIAGNOSIS — I10 ESSENTIAL HYPERTENSION: ICD-10-CM

## 2020-12-07 DIAGNOSIS — E78.5 HYPERLIPIDEMIA, UNSPECIFIED HYPERLIPIDEMIA TYPE: ICD-10-CM

## 2020-12-07 DIAGNOSIS — Z00.00 ENCOUNTER FOR PREVENTIVE HEALTH EXAMINATION: Primary | ICD-10-CM

## 2020-12-07 PROBLEM — Z01.818 PRE-OP EXAMINATION: Status: RESOLVED | Noted: 2019-04-29 | Resolved: 2020-12-07

## 2020-12-07 PROBLEM — R79.9 ABNORMAL FINDING OF BLOOD CHEMISTRY: Status: RESOLVED | Noted: 2017-06-20 | Resolved: 2020-12-07

## 2020-12-07 PROBLEM — N18.30 STAGE 3 CHRONIC KIDNEY DISEASE: Status: RESOLVED | Noted: 2017-06-20 | Resolved: 2020-12-07

## 2020-12-07 PROBLEM — Z12.5 PROSTATE CANCER SCREENING: Status: RESOLVED | Noted: 2017-06-20 | Resolved: 2020-12-07

## 2020-12-07 PROCEDURE — 1126F PR PAIN SEVERITY QUANTIFIED, NO PAIN PRESENT: ICD-10-PCS | Mod: HCNC,S$GLB,, | Performed by: NURSE PRACTITIONER

## 2020-12-07 PROCEDURE — 3074F SYST BP LT 130 MM HG: CPT | Mod: HCNC,CPTII,S$GLB, | Performed by: NURSE PRACTITIONER

## 2020-12-07 PROCEDURE — 1158F ADVNC CARE PLAN TLK DOCD: CPT | Mod: HCNC,S$GLB,, | Performed by: NURSE PRACTITIONER

## 2020-12-07 PROCEDURE — G0439 PR MEDICARE ANNUAL WELLNESS SUBSEQUENT VISIT: ICD-10-PCS | Mod: HCNC,S$GLB,, | Performed by: NURSE PRACTITIONER

## 2020-12-07 PROCEDURE — 99999 PR PBB SHADOW E&M-EST. PATIENT-LVL IV: ICD-10-PCS | Mod: PBBFAC,HCNC,, | Performed by: NURSE PRACTITIONER

## 2020-12-07 PROCEDURE — G0008 FLU VACCINE - QUADRIVALENT - ADJUVANTED: ICD-10-PCS | Mod: HCNC,S$GLB,, | Performed by: NURSE PRACTITIONER

## 2020-12-07 PROCEDURE — 3074F PR MOST RECENT SYSTOLIC BLOOD PRESSURE < 130 MM HG: ICD-10-PCS | Mod: HCNC,CPTII,S$GLB, | Performed by: NURSE PRACTITIONER

## 2020-12-07 PROCEDURE — 99999 PR PBB SHADOW E&M-EST. PATIENT-LVL IV: CPT | Mod: PBBFAC,HCNC,, | Performed by: NURSE PRACTITIONER

## 2020-12-07 PROCEDURE — 90694 FLU VACCINE - QUADRIVALENT - ADJUVANTED: ICD-10-PCS | Mod: HCNC,S$GLB,, | Performed by: NURSE PRACTITIONER

## 2020-12-07 PROCEDURE — 1101F PR PT FALLS ASSESS DOC 0-1 FALLS W/OUT INJ PAST YR: ICD-10-PCS | Mod: HCNC,CPTII,S$GLB, | Performed by: NURSE PRACTITIONER

## 2020-12-07 PROCEDURE — G0008 ADMIN INFLUENZA VIRUS VAC: HCPCS | Mod: HCNC,S$GLB,, | Performed by: NURSE PRACTITIONER

## 2020-12-07 PROCEDURE — 3078F PR MOST RECENT DIASTOLIC BLOOD PRESSURE < 80 MM HG: ICD-10-PCS | Mod: HCNC,CPTII,S$GLB, | Performed by: NURSE PRACTITIONER

## 2020-12-07 PROCEDURE — 1126F AMNT PAIN NOTED NONE PRSNT: CPT | Mod: HCNC,S$GLB,, | Performed by: NURSE PRACTITIONER

## 2020-12-07 PROCEDURE — 3288F FALL RISK ASSESSMENT DOCD: CPT | Mod: HCNC,CPTII,S$GLB, | Performed by: NURSE PRACTITIONER

## 2020-12-07 PROCEDURE — G0439 PPPS, SUBSEQ VISIT: HCPCS | Mod: HCNC,S$GLB,, | Performed by: NURSE PRACTITIONER

## 2020-12-07 PROCEDURE — 90694 VACC AIIV4 NO PRSRV 0.5ML IM: CPT | Mod: HCNC,S$GLB,, | Performed by: NURSE PRACTITIONER

## 2020-12-07 PROCEDURE — 3288F PR FALLS RISK ASSESSMENT DOCUMENTED: ICD-10-PCS | Mod: HCNC,CPTII,S$GLB, | Performed by: NURSE PRACTITIONER

## 2020-12-07 PROCEDURE — 1158F PR ADVANCE CARE PLANNING DISCUSS DOCUMENTED IN MEDICAL RECORD: ICD-10-PCS | Mod: HCNC,S$GLB,, | Performed by: NURSE PRACTITIONER

## 2020-12-07 PROCEDURE — 1101F PT FALLS ASSESS-DOCD LE1/YR: CPT | Mod: HCNC,CPTII,S$GLB, | Performed by: NURSE PRACTITIONER

## 2020-12-07 PROCEDURE — 3078F DIAST BP <80 MM HG: CPT | Mod: HCNC,CPTII,S$GLB, | Performed by: NURSE PRACTITIONER

## 2020-12-07 NOTE — PROGRESS NOTES
"  Dean Hahn presented for a  Medicare AWV and comprehensive Health Risk Assessment today. The following components were reviewed and updated:    · Medical history  · Family History  · Social history  · Allergies and Current Medications  · Health Risk Assessment  · Health Maintenance  · Care Team         ** See Completed Assessments for Annual Wellness Visit within the encounter summary.**         The following assessments were completed:  · Living Situation  · CAGE  · Depression Screening  · Timed Get Up and Go  · Whisper Test  · Cognitive Function Screening      · Nutrition Screening  · ADL Screening  · PAQ Screening        Vitals:    12/07/20 1309   BP: 124/70   BP Location: Right arm   Patient Position: Sitting   BP Method: Medium (Manual)   Pulse: 80   Temp: 97.2 °F (36.2 °C)   TempSrc: Temporal   SpO2: 98%   Weight: 64.5 kg (142 lb 3.2 oz)   Height: 5' 6" (1.676 m)     Body mass index is 22.95 kg/m².     Physical Exam  Constitutional:       General: He is not in acute distress.     Appearance: Normal appearance. He is well-developed, well-groomed and normal weight.   HENT:      Head: Normocephalic.      Right Ear: External ear normal.      Left Ear: External ear normal.   Eyes:      General:         Right eye: No discharge.         Left eye: No discharge.      Extraocular Movements: Extraocular movements intact.      Comments: Wearing glasses   Cardiovascular:      Rate and Rhythm: Normal rate.   Pulmonary:      Effort: Pulmonary effort is normal. No respiratory distress.   Abdominal:      General: There is no distension.   Musculoskeletal:      Right lower leg: No edema.   Skin:     General: Skin is warm and dry.      Coloration: Skin is not pale.   Neurological:      Mental Status: He is alert and oriented to person, place, and time.      Coordination: Coordination normal.      Gait: Gait normal.   Psychiatric:         Attention and Perception: Attention normal.         Mood and Affect: Mood and affect " normal.         Speech: Speech normal.         Behavior: Behavior normal. Behavior is cooperative.         Cognition and Memory: Cognition normal. He exhibits impaired recent memory.             Diagnoses and health risks identified today and associated recommendations/orders:    1. Encounter for preventive health examination    2. Essential hypertension  Chronic; stable on medication. Follow up with PCP.    3. Hyperlipidemia, unspecified hyperlipidemia type  Chronic; stable on medication. Follow up with PCP.    4. Prediabetes  Chronic; stable. Diet-controlled. Follow up with PCP.    5. Need for influenza vaccination  - Influenza (FLUAD) - Quadrivalent (Adjuvanted) *Preferred* (65+) (PF)    6. Body mass index (BMI) of 22.0 to 22.9 in adult  Patient's BMI is WNL. Encouraged patient to continue to eat a low salt/low fat ADA diet and discussed importance of engaging in physical activity at least 5x/week for a minimum of 30 min/day.      Provided Dean with a 5-10 year written screening schedule and personal prevention plan. Recommendations were developed using the USPSTF age appropriate recommendations. Education, counseling, and referrals were provided as needed. After Visit Summary printed and given to patient which includes a list of additional screenings/tests needed.    I offered to discuss end of life issues, including information on how to make advance directives that the patient could use to name someone who would make medical decisions on their behalf if they became too ill to make themselves.    ___Patient declined  _X_Patient is interested, I provided paper work and offered to discuss.      Follow up for your next annual wellness visit.      Tiffany Stone NP

## 2020-12-07 NOTE — Clinical Note
Katie Tobias,    I saw your patient today for his AWV. I initiated the ACP discussion and provided him with paperwork. I advised him to return with his wife and/or children to discuss further and patient agreed.    Thanks,    NAGI

## 2020-12-07 NOTE — PATIENT INSTRUCTIONS
Counseling and Referral of Other Preventative  (Italic type indicates deductible and co-insurance are waived)    Patient Name: Dean Hahn  Today's Date: 12/7/2020    Health Maintenance       Date Due Completion Date    Influenza Vaccine (1) 08/01/2020 9/24/2019    Override on 10/17/2018: Done    Shingles Vaccine (1 of 2) 12/25/2020 (Originally 10/16/1990) ---    TETANUS VACCINE 01/22/2021 (Originally 10/16/1958) ---    Lipid Panel 02/19/2024 2/19/2019        Orders Placed This Encounter   Procedures    Influenza (FLUAD) - Quadrivalent (Adjuvanted) *Preferred* (65+) (PF)     The following information is provided to all patients.  This information is to help you find resources for any of the problems found today that may be affecting your health:                Living healthy guide: www.[facility].[place].gov      Understanding Diabetes: www.diabetes.org      Eating healthy: www.cdc.gov/healthyweight      CDC home safety checklist: www.cdc.gov/steadi/patient.html      Agency on Aging: www.goea.[place].[facility]      Alcoholics anonymous (AA): www.aa.org      Physical Activity: www.erickson.nih.gov/ug6juwd      Tobacco use: www.quitwithusla.org

## 2020-12-07 NOTE — PROGRESS NOTES
Advance Care Planning     Advance Directives:   Living Will: No        Oral Declaration: No    [place]: No    Do Not Resuscitate Status: No    Medical Power of : No        Oral Declaration: No        I offered to discuss Advance Care Planning and provided patient with information on Advanced Directives. Suggested that patient return with wife and/or children to discuss further. Verbalized understanding. No questions at this time.    Tiffany Stone NP

## 2020-12-16 DIAGNOSIS — I10 ESSENTIAL HYPERTENSION: ICD-10-CM

## 2020-12-16 RX ORDER — METOPROLOL SUCCINATE 25 MG/1
TABLET, EXTENDED RELEASE ORAL
Qty: 90 TABLET | Refills: 2 | Status: SHIPPED | OUTPATIENT
Start: 2020-12-16 | End: 2022-12-21 | Stop reason: SDUPTHER

## 2020-12-16 NOTE — TELEPHONE ENCOUNTER
No new care gaps identified.  Powered by [employer]. Reference number: [id number]. 12/16/2020 1:03:07 AM   CST

## 2020-12-17 NOTE — PROGRESS NOTES
Refill Authorization Note   Dean Hahn  is requesting a refill authorization.  Brief Assessment and Rationale for Refill:  Approve     Medication Therapy Plan:  [facility]    Medication Reconciliation Completed: No   Comments:   Orders Placed This Encounter    metoprolol succinate (TOPROL-XL) 25 MG 24 hr tablet      Requested Prescriptions   Signed Prescriptions Disp Refills    metoprolol succinate (TOPROL-XL) 25 MG 24 hr tablet 90 tablet 2     Sig: TAKE 1 TABLET BY MOUTH EVERY DAY IN THE EVENING       Cardiovascular:  Beta Blockers Passed - 12/16/2020  1:02 AM        Passed - Patient is at least 18 years old        Passed - Last BP in normal range within 360 days.     BP Readings from Last 3 Encounters:   12/07/20 124/70   08/10/20 132/74   05/11/20 132/82              Passed - Last Heart Rate in normal range within 360 days.     Pulse Readings from Last 3 Encounters:   12/07/20 80   08/10/20 58   05/11/20 60             Passed - Office visit in past 12 months or future 90 days     Recent Outpatient Visits            1 week ago Encounter for preventive health examination    [facility] Tiffany Stone NP    4 months ago Essential hypertension    [facility] Angel Luis Tobias III, MD    7 months ago Essential hypertension    [facility] Angel Luis Tobias III, MD    1 year ago Onychomycosis due to dermatophyte    [facility] Marysol Molina DPM    1 year ago Essential hypertension    [facility] Herman Padilla MD          Future Appointments              In 1 month LAB, [facility], [place]    In 2 months Angel Luis Tobias III, MD [facility], [place]                    Appointments  past 12m or future 3m with PCP    Date Provider   Last Visit   8/10/2020 Angel Luis Tobias III, MD   Next Visit   2/19/2021 Angel Luis Tobias III, MD   ED visits in past 90 days: 0     Note composed:8:53 PM 12/16/2020

## 2021-01-09 ENCOUNTER — TELEPHONE (OUTPATIENT)
Dept: FAMILY MEDICINE | Facility: CLINIC | Age: 81
End: 2021-01-09

## 2021-01-17 DIAGNOSIS — I10 ESSENTIAL HYPERTENSION: ICD-10-CM

## 2021-01-19 RX ORDER — OLMESARTAN MEDOXOMIL AND HYDROCHLOROTHIAZIDE 40/12.5 40; 12.5 MG/1; MG/1
TABLET ORAL
Qty: 90 TABLET | Refills: 1 | Status: SHIPPED | OUTPATIENT
Start: 2021-01-19 | End: 2021-07-20

## 2021-02-08 ENCOUNTER — TELEPHONE (OUTPATIENT)
Dept: FAMILY MEDICINE | Facility: CLINIC | Age: 81
End: 2021-02-08

## 2021-02-08 ENCOUNTER — LAB VISIT (OUTPATIENT)
Dept: LAB | Facility: HOSPITAL | Age: 81
End: 2021-02-08
Attending: INTERNAL MEDICINE
Payer: MEDICARE

## 2021-02-08 DIAGNOSIS — R17 TOTAL BILIRUBIN, ELEVATED: ICD-10-CM

## 2021-02-08 DIAGNOSIS — I10 ESSENTIAL HYPERTENSION: ICD-10-CM

## 2021-02-08 DIAGNOSIS — R79.89 ABNORMAL LIVER FUNCTION TEST: ICD-10-CM

## 2021-02-08 DIAGNOSIS — R73.03 PREDIABETES: ICD-10-CM

## 2021-02-08 DIAGNOSIS — E80.6 HYPERBILIRUBINEMIA: Primary | ICD-10-CM

## 2021-02-08 LAB
ALBUMIN SERPL BCP-MCNC: 3.3 G/DL (ref 3.5–5.2)
ALP SERPL-CCNC: 79 U/L (ref 55–135)
ALT SERPL W/O P-5'-P-CCNC: 51 U/L (ref 10–44)
ANION GAP SERPL CALC-SCNC: 9 MMOL/L (ref 8–16)
AST SERPL-CCNC: 67 U/L (ref 10–40)
BILIRUB DIRECT SERPL-MCNC: 0.7 MG/DL (ref 0.1–0.3)
BILIRUB SERPL-MCNC: 1.7 MG/DL (ref 0.1–1)
BUN SERPL-MCNC: 17 MG/DL (ref 8–23)
CALCIUM SERPL-MCNC: 9.2 MG/DL (ref 8.7–10.5)
CHLORIDE SERPL-SCNC: 109 MMOL/L (ref 95–110)
CO2 SERPL-SCNC: 21 MMOL/L (ref 23–29)
CREAT SERPL-MCNC: 1.4 MG/DL (ref 0.5–1.4)
EST. GFR  (AFRICAN AMERICAN): 55.2 ML/MIN/1.73 M^2
EST. GFR  (NON AFRICAN AMERICAN): 47.8 ML/MIN/1.73 M^2
ESTIMATED AVG GLUCOSE: 117 MG/DL (ref 68–131)
GLUCOSE SERPL-MCNC: 130 MG/DL (ref 70–110)
HBA1C MFR BLD: 5.7 % (ref 4–5.6)
POTASSIUM SERPL-SCNC: 3.9 MMOL/L (ref 3.5–5.1)
PROT SERPL-MCNC: 7.5 G/DL (ref 6–8.4)
SODIUM SERPL-SCNC: 139 MMOL/L (ref 136–145)

## 2021-02-08 PROCEDURE — 36415 COLL VENOUS BLD VENIPUNCTURE: CPT | Mod: PO

## 2021-02-08 PROCEDURE — 80048 BASIC METABOLIC PNL TOTAL CA: CPT

## 2021-02-08 PROCEDURE — 83036 HEMOGLOBIN GLYCOSYLATED A1C: CPT

## 2021-02-08 PROCEDURE — 80076 HEPATIC FUNCTION PANEL: CPT

## 2021-02-10 ENCOUNTER — TELEPHONE (OUTPATIENT)
Dept: ADMINISTRATIVE | Facility: OTHER | Age: 81
End: 2021-02-10

## 2021-02-19 ENCOUNTER — OFFICE VISIT (OUTPATIENT)
Dept: HEPATOLOGY | Facility: CLINIC | Age: 81
End: 2021-02-19
Payer: COMMERCIAL

## 2021-02-19 ENCOUNTER — PATIENT OUTREACH (OUTPATIENT)
Dept: ADMINISTRATIVE | Facility: OTHER | Age: 81
End: 2021-02-19

## 2021-02-19 ENCOUNTER — OFFICE VISIT (OUTPATIENT)
Dept: INTERNAL MEDICINE | Facility: CLINIC | Age: 81
End: 2021-02-19
Payer: COMMERCIAL

## 2021-02-19 VITALS
HEART RATE: 114 BPM | WEIGHT: 141.13 LBS | OXYGEN SATURATION: 97 % | BODY MASS INDEX: 22.15 KG/M2 | DIASTOLIC BLOOD PRESSURE: 70 MMHG | HEIGHT: 67 IN | SYSTOLIC BLOOD PRESSURE: 128 MMHG | RESPIRATION RATE: 18 BRPM

## 2021-02-19 VITALS
BODY MASS INDEX: 22.15 KG/M2 | HEART RATE: 89 BPM | OXYGEN SATURATION: 98 % | RESPIRATION RATE: 16 BRPM | HEIGHT: 67 IN | WEIGHT: 141.13 LBS | SYSTOLIC BLOOD PRESSURE: 118 MMHG | TEMPERATURE: 97 F | DIASTOLIC BLOOD PRESSURE: 60 MMHG

## 2021-02-19 DIAGNOSIS — Z23 NEED FOR VACCINATION: ICD-10-CM

## 2021-02-19 DIAGNOSIS — I10 ESSENTIAL HYPERTENSION: Primary | ICD-10-CM

## 2021-02-19 DIAGNOSIS — R17 TOTAL BILIRUBIN, ELEVATED: ICD-10-CM

## 2021-02-19 DIAGNOSIS — R73.03 PREDIABETES: ICD-10-CM

## 2021-02-19 DIAGNOSIS — E80.6 HYPERBILIRUBINEMIA: ICD-10-CM

## 2021-02-19 DIAGNOSIS — R79.89 ABNORMAL LIVER FUNCTION TEST: ICD-10-CM

## 2021-02-19 DIAGNOSIS — R74.8 ELEVATED LIVER ENZYMES: Primary | ICD-10-CM

## 2021-02-19 PROCEDURE — 3074F PR MOST RECENT SYSTOLIC BLOOD PRESSURE < 130 MM HG: ICD-10-PCS | Mod: CPTII,S$GLB,, | Performed by: NURSE PRACTITIONER

## 2021-02-19 PROCEDURE — 1101F PR PT FALLS ASSESS DOC 0-1 FALLS W/OUT INJ PAST YR: ICD-10-PCS | Mod: CPTII,S$GLB,, | Performed by: INTERNAL MEDICINE

## 2021-02-19 PROCEDURE — 90715 TDAP VACCINE 7 YRS/> IM: CPT | Mod: S$GLB,,, | Performed by: INTERNAL MEDICINE

## 2021-02-19 PROCEDURE — 3288F PR FALLS RISK ASSESSMENT DOCUMENTED: ICD-10-PCS | Mod: CPTII,S$GLB,, | Performed by: INTERNAL MEDICINE

## 2021-02-19 PROCEDURE — 1159F PR MEDICATION LIST DOCUMENTED IN MEDICAL RECORD: ICD-10-PCS | Mod: S$GLB,,, | Performed by: NURSE PRACTITIONER

## 2021-02-19 PROCEDURE — 99499 RISK ADDL DX/OHS AUDIT: ICD-10-PCS | Mod: S$GLB,,, | Performed by: NURSE PRACTITIONER

## 2021-02-19 PROCEDURE — 1126F AMNT PAIN NOTED NONE PRSNT: CPT | Mod: S$GLB,,, | Performed by: NURSE PRACTITIONER

## 2021-02-19 PROCEDURE — 3078F PR MOST RECENT DIASTOLIC BLOOD PRESSURE < 80 MM HG: ICD-10-PCS | Mod: CPTII,S$GLB,, | Performed by: INTERNAL MEDICINE

## 2021-02-19 PROCEDURE — 3074F SYST BP LT 130 MM HG: CPT | Mod: CPTII,S$GLB,, | Performed by: NURSE PRACTITIONER

## 2021-02-19 PROCEDURE — 99999 PR PBB SHADOW E&M-EST. PATIENT-LVL IV: CPT | Mod: PBBFAC,,, | Performed by: INTERNAL MEDICINE

## 2021-02-19 PROCEDURE — 99999 PR PBB SHADOW E&M-EST. PATIENT-LVL IV: ICD-10-PCS | Mod: PBBFAC,,, | Performed by: INTERNAL MEDICINE

## 2021-02-19 PROCEDURE — 3078F PR MOST RECENT DIASTOLIC BLOOD PRESSURE < 80 MM HG: ICD-10-PCS | Mod: CPTII,S$GLB,, | Performed by: NURSE PRACTITIONER

## 2021-02-19 PROCEDURE — 3074F PR MOST RECENT SYSTOLIC BLOOD PRESSURE < 130 MM HG: ICD-10-PCS | Mod: CPTII,S$GLB,, | Performed by: INTERNAL MEDICINE

## 2021-02-19 PROCEDURE — 99499 UNLISTED E&M SERVICE: CPT | Mod: S$GLB,,, | Performed by: NURSE PRACTITIONER

## 2021-02-19 PROCEDURE — 3078F DIAST BP <80 MM HG: CPT | Mod: CPTII,S$GLB,, | Performed by: INTERNAL MEDICINE

## 2021-02-19 PROCEDURE — 99999 PR PBB SHADOW E&M-EST. PATIENT-LVL V: ICD-10-PCS | Mod: PBBFAC,,, | Performed by: NURSE PRACTITIONER

## 2021-02-19 PROCEDURE — 90715 TDAP VACCINE GREATER THAN OR EQUAL TO 7YO IM: ICD-10-PCS | Mod: S$GLB,,, | Performed by: INTERNAL MEDICINE

## 2021-02-19 PROCEDURE — 1159F PR MEDICATION LIST DOCUMENTED IN MEDICAL RECORD: ICD-10-PCS | Mod: S$GLB,,, | Performed by: INTERNAL MEDICINE

## 2021-02-19 PROCEDURE — 99214 PR OFFICE/OUTPT VISIT, EST, LEVL IV, 30-39 MIN: ICD-10-PCS | Mod: 25,S$GLB,, | Performed by: INTERNAL MEDICINE

## 2021-02-19 PROCEDURE — 1159F MED LIST DOCD IN RCRD: CPT | Mod: S$GLB,,, | Performed by: INTERNAL MEDICINE

## 2021-02-19 PROCEDURE — 90471 IMMUNIZATION ADMIN: CPT | Mod: S$GLB,,, | Performed by: INTERNAL MEDICINE

## 2021-02-19 PROCEDURE — 99204 PR OFFICE/OUTPT VISIT, NEW, LEVL IV, 45-59 MIN: ICD-10-PCS | Mod: S$GLB,,, | Performed by: NURSE PRACTITIONER

## 2021-02-19 PROCEDURE — 90471 TDAP VACCINE GREATER THAN OR EQUAL TO 7YO IM: ICD-10-PCS | Mod: S$GLB,,, | Performed by: INTERNAL MEDICINE

## 2021-02-19 PROCEDURE — 1126F PR PAIN SEVERITY QUANTIFIED, NO PAIN PRESENT: ICD-10-PCS | Mod: S$GLB,,, | Performed by: NURSE PRACTITIONER

## 2021-02-19 PROCEDURE — 3074F SYST BP LT 130 MM HG: CPT | Mod: CPTII,S$GLB,, | Performed by: INTERNAL MEDICINE

## 2021-02-19 PROCEDURE — 3078F DIAST BP <80 MM HG: CPT | Mod: CPTII,S$GLB,, | Performed by: NURSE PRACTITIONER

## 2021-02-19 PROCEDURE — 99204 OFFICE O/P NEW MOD 45 MIN: CPT | Mod: S$GLB,,, | Performed by: NURSE PRACTITIONER

## 2021-02-19 PROCEDURE — 99214 OFFICE O/P EST MOD 30 MIN: CPT | Mod: 25,S$GLB,, | Performed by: INTERNAL MEDICINE

## 2021-02-19 PROCEDURE — 1101F PT FALLS ASSESS-DOCD LE1/YR: CPT | Mod: CPTII,S$GLB,, | Performed by: INTERNAL MEDICINE

## 2021-02-19 PROCEDURE — 1159F MED LIST DOCD IN RCRD: CPT | Mod: S$GLB,,, | Performed by: NURSE PRACTITIONER

## 2021-02-19 PROCEDURE — 99999 PR PBB SHADOW E&M-EST. PATIENT-LVL V: CPT | Mod: PBBFAC,,, | Performed by: NURSE PRACTITIONER

## 2021-02-19 PROCEDURE — 3288F FALL RISK ASSESSMENT DOCD: CPT | Mod: CPTII,S$GLB,, | Performed by: INTERNAL MEDICINE

## 2021-03-05 ENCOUNTER — TELEPHONE (OUTPATIENT)
Dept: HEPATOLOGY | Facility: CLINIC | Age: 81
End: 2021-03-05

## 2021-04-29 RX ORDER — POTASSIUM CHLORIDE 750 MG/1
TABLET, EXTENDED RELEASE ORAL
Qty: 120 TABLET | Refills: 3 | Status: SHIPPED | OUTPATIENT
Start: 2021-04-29 | End: 2021-11-18 | Stop reason: SDUPTHER

## 2021-07-17 DIAGNOSIS — I10 ESSENTIAL HYPERTENSION: ICD-10-CM

## 2021-07-20 RX ORDER — OLMESARTAN MEDOXOMIL AND HYDROCHLOROTHIAZIDE 40/12.5 40; 12.5 MG/1; MG/1
TABLET ORAL
Qty: 90 TABLET | Refills: 2 | Status: SHIPPED | OUTPATIENT
Start: 2021-07-20 | End: 2022-05-27 | Stop reason: SDUPTHER

## 2021-08-16 ENCOUNTER — PES CALL (OUTPATIENT)
Dept: ADMINISTRATIVE | Facility: CLINIC | Age: 81
End: 2021-08-16

## 2021-09-16 ENCOUNTER — DOCUMENTATION ONLY (OUTPATIENT)
Dept: HEMATOLOGY/ONCOLOGY | Facility: CLINIC | Age: 81
End: 2021-09-16

## 2021-11-18 ENCOUNTER — OFFICE VISIT (OUTPATIENT)
Dept: INTERNAL MEDICINE | Facility: CLINIC | Age: 81
End: 2021-11-18
Payer: COMMERCIAL

## 2021-11-18 ENCOUNTER — LAB VISIT (OUTPATIENT)
Dept: LAB | Facility: HOSPITAL | Age: 81
End: 2021-11-18
Attending: INTERNAL MEDICINE
Payer: COMMERCIAL

## 2021-11-18 VITALS
WEIGHT: 136.25 LBS | BODY MASS INDEX: 21.38 KG/M2 | HEIGHT: 67 IN | DIASTOLIC BLOOD PRESSURE: 80 MMHG | HEART RATE: 85 BPM | SYSTOLIC BLOOD PRESSURE: 124 MMHG | OXYGEN SATURATION: 98 %

## 2021-11-18 DIAGNOSIS — H26.9 CATARACT, UNSPECIFIED CATARACT TYPE, UNSPECIFIED LATERALITY: ICD-10-CM

## 2021-11-18 DIAGNOSIS — I10 ESSENTIAL HYPERTENSION: Primary | ICD-10-CM

## 2021-11-18 DIAGNOSIS — H91.90 DECREASED HEARING, UNSPECIFIED LATERALITY: ICD-10-CM

## 2021-11-18 DIAGNOSIS — R73.03 PREDIABETES: ICD-10-CM

## 2021-11-18 DIAGNOSIS — R17 TOTAL BILIRUBIN, ELEVATED: ICD-10-CM

## 2021-11-18 DIAGNOSIS — I10 ESSENTIAL HYPERTENSION: ICD-10-CM

## 2021-11-18 DIAGNOSIS — Z23 NEED FOR VACCINATION: ICD-10-CM

## 2021-11-18 DIAGNOSIS — E87.6 HYPOKALEMIA: ICD-10-CM

## 2021-11-18 DIAGNOSIS — Z13.220 SCREENING FOR HYPERLIPIDEMIA: ICD-10-CM

## 2021-11-18 LAB
ALBUMIN SERPL BCP-MCNC: 3.2 G/DL (ref 3.5–5.2)
ALP SERPL-CCNC: 69 U/L (ref 55–135)
ALT SERPL W/O P-5'-P-CCNC: 19 U/L (ref 10–44)
ANION GAP SERPL CALC-SCNC: 10 MMOL/L (ref 8–16)
AST SERPL-CCNC: 35 U/L (ref 10–40)
BILIRUB DIRECT SERPL-MCNC: 0.4 MG/DL (ref 0.1–0.3)
BILIRUB SERPL-MCNC: 1.4 MG/DL (ref 0.1–1)
BUN SERPL-MCNC: 19 MG/DL (ref 8–23)
CALCIUM SERPL-MCNC: 9.6 MG/DL (ref 8.7–10.5)
CHLORIDE SERPL-SCNC: 108 MMOL/L (ref 95–110)
CHOLEST SERPL-MCNC: 171 MG/DL (ref 120–199)
CHOLEST/HDLC SERPL: 4.8 {RATIO} (ref 2–5)
CO2 SERPL-SCNC: 20 MMOL/L (ref 23–29)
CREAT SERPL-MCNC: 1.3 MG/DL (ref 0.5–1.4)
EST. GFR  (AFRICAN AMERICAN): 60 ML/MIN/1.73 M^2
EST. GFR  (NON AFRICAN AMERICAN): 51.9 ML/MIN/1.73 M^2
ESTIMATED AVG GLUCOSE: 108 MG/DL (ref 68–131)
GLUCOSE SERPL-MCNC: 105 MG/DL (ref 70–110)
HBA1C MFR BLD: 5.4 % (ref 4–5.6)
HDLC SERPL-MCNC: 36 MG/DL (ref 40–75)
HDLC SERPL: 21.1 % (ref 20–50)
LDLC SERPL CALC-MCNC: 101 MG/DL (ref 63–159)
NONHDLC SERPL-MCNC: 135 MG/DL
POTASSIUM SERPL-SCNC: 3.9 MMOL/L (ref 3.5–5.1)
PROT SERPL-MCNC: 7.3 G/DL (ref 6–8.4)
SODIUM SERPL-SCNC: 138 MMOL/L (ref 136–145)
TRIGL SERPL-MCNC: 170 MG/DL (ref 30–150)

## 2021-11-18 PROCEDURE — 90694 VACC AIIV4 NO PRSRV 0.5ML IM: CPT | Mod: S$GLB,,, | Performed by: INTERNAL MEDICINE

## 2021-11-18 PROCEDURE — 1126F AMNT PAIN NOTED NONE PRSNT: CPT | Mod: CPTII,S$GLB,, | Performed by: INTERNAL MEDICINE

## 2021-11-18 PROCEDURE — 36415 COLL VENOUS BLD VENIPUNCTURE: CPT | Mod: PO | Performed by: INTERNAL MEDICINE

## 2021-11-18 PROCEDURE — 1101F PT FALLS ASSESS-DOCD LE1/YR: CPT | Mod: CPTII,S$GLB,, | Performed by: INTERNAL MEDICINE

## 2021-11-18 PROCEDURE — 1159F MED LIST DOCD IN RCRD: CPT | Mod: CPTII,S$GLB,, | Performed by: INTERNAL MEDICINE

## 2021-11-18 PROCEDURE — 1126F PR PAIN SEVERITY QUANTIFIED, NO PAIN PRESENT: ICD-10-PCS | Mod: CPTII,S$GLB,, | Performed by: INTERNAL MEDICINE

## 2021-11-18 PROCEDURE — 3079F PR MOST RECENT DIASTOLIC BLOOD PRESSURE 80-89 MM HG: ICD-10-PCS | Mod: CPTII,S$GLB,, | Performed by: INTERNAL MEDICINE

## 2021-11-18 PROCEDURE — 3079F DIAST BP 80-89 MM HG: CPT | Mod: CPTII,S$GLB,, | Performed by: INTERNAL MEDICINE

## 2021-11-18 PROCEDURE — 1159F PR MEDICATION LIST DOCUMENTED IN MEDICAL RECORD: ICD-10-PCS | Mod: CPTII,S$GLB,, | Performed by: INTERNAL MEDICINE

## 2021-11-18 PROCEDURE — 3074F SYST BP LT 130 MM HG: CPT | Mod: CPTII,S$GLB,, | Performed by: INTERNAL MEDICINE

## 2021-11-18 PROCEDURE — 80076 HEPATIC FUNCTION PANEL: CPT | Performed by: INTERNAL MEDICINE

## 2021-11-18 PROCEDURE — 99999 PR PBB SHADOW E&M-EST. PATIENT-LVL V: ICD-10-PCS | Mod: PBBFAC,,, | Performed by: INTERNAL MEDICINE

## 2021-11-18 PROCEDURE — 3074F PR MOST RECENT SYSTOLIC BLOOD PRESSURE < 130 MM HG: ICD-10-PCS | Mod: CPTII,S$GLB,, | Performed by: INTERNAL MEDICINE

## 2021-11-18 PROCEDURE — 83036 HEMOGLOBIN GLYCOSYLATED A1C: CPT | Performed by: INTERNAL MEDICINE

## 2021-11-18 PROCEDURE — 1160F RVW MEDS BY RX/DR IN RCRD: CPT | Mod: CPTII,S$GLB,, | Performed by: INTERNAL MEDICINE

## 2021-11-18 PROCEDURE — 1160F PR REVIEW ALL MEDS BY PRESCRIBER/CLIN PHARMACIST DOCUMENTED: ICD-10-PCS | Mod: CPTII,S$GLB,, | Performed by: INTERNAL MEDICINE

## 2021-11-18 PROCEDURE — 80048 BASIC METABOLIC PNL TOTAL CA: CPT | Performed by: INTERNAL MEDICINE

## 2021-11-18 PROCEDURE — 3288F PR FALLS RISK ASSESSMENT DOCUMENTED: ICD-10-PCS | Mod: CPTII,S$GLB,, | Performed by: INTERNAL MEDICINE

## 2021-11-18 PROCEDURE — 99999 PR PBB SHADOW E&M-EST. PATIENT-LVL V: CPT | Mod: PBBFAC,,, | Performed by: INTERNAL MEDICINE

## 2021-11-18 PROCEDURE — 99499 RISK ADDL DX/OHS AUDIT: ICD-10-PCS | Mod: S$GLB,,, | Performed by: INTERNAL MEDICINE

## 2021-11-18 PROCEDURE — G0008 ADMIN INFLUENZA VIRUS VAC: HCPCS | Mod: S$GLB,,, | Performed by: INTERNAL MEDICINE

## 2021-11-18 PROCEDURE — 99214 OFFICE O/P EST MOD 30 MIN: CPT | Mod: 25,S$GLB,, | Performed by: INTERNAL MEDICINE

## 2021-11-18 PROCEDURE — 99499 UNLISTED E&M SERVICE: CPT | Mod: S$GLB,,, | Performed by: INTERNAL MEDICINE

## 2021-11-18 PROCEDURE — 90694 FLU VACCINE - QUADRIVALENT - ADJUVANTED: ICD-10-PCS | Mod: S$GLB,,, | Performed by: INTERNAL MEDICINE

## 2021-11-18 PROCEDURE — 3288F FALL RISK ASSESSMENT DOCD: CPT | Mod: CPTII,S$GLB,, | Performed by: INTERNAL MEDICINE

## 2021-11-18 PROCEDURE — 99214 PR OFFICE/OUTPT VISIT, EST, LEVL IV, 30-39 MIN: ICD-10-PCS | Mod: 25,S$GLB,, | Performed by: INTERNAL MEDICINE

## 2021-11-18 PROCEDURE — G0008 FLU VACCINE - QUADRIVALENT - ADJUVANTED: ICD-10-PCS | Mod: S$GLB,,, | Performed by: INTERNAL MEDICINE

## 2021-11-18 PROCEDURE — 80061 LIPID PANEL: CPT | Performed by: INTERNAL MEDICINE

## 2021-11-18 PROCEDURE — 1101F PR PT FALLS ASSESS DOC 0-1 FALLS W/OUT INJ PAST YR: ICD-10-PCS | Mod: CPTII,S$GLB,, | Performed by: INTERNAL MEDICINE

## 2021-11-18 RX ORDER — DORZOLAMIDE HYDROCHLORIDE AND TIMOLOL MALEATE 20; 5 MG/ML; MG/ML
1 SOLUTION/ DROPS OPHTHALMIC 2 TIMES DAILY
Qty: 10 ML | Refills: 1 | Status: SHIPPED | OUTPATIENT
Start: 2021-11-18 | End: 2023-05-05

## 2021-11-18 RX ORDER — POTASSIUM CHLORIDE 750 MG/1
TABLET, EXTENDED RELEASE ORAL
Qty: 120 TABLET | Refills: 3 | Status: SHIPPED | OUTPATIENT
Start: 2021-11-18 | End: 2022-11-23 | Stop reason: SDUPTHER

## 2021-11-22 ENCOUNTER — TELEPHONE (OUTPATIENT)
Dept: INTERNAL MEDICINE | Facility: CLINIC | Age: 81
End: 2021-11-22
Payer: COMMERCIAL

## 2021-12-06 ENCOUNTER — PATIENT OUTREACH (OUTPATIENT)
Dept: ADMINISTRATIVE | Facility: OTHER | Age: 81
End: 2021-12-06
Payer: COMMERCIAL

## 2022-03-17 ENCOUNTER — PATIENT OUTREACH (OUTPATIENT)
Dept: ADMINISTRATIVE | Facility: OTHER | Age: 82
End: 2022-03-17
Payer: COMMERCIAL

## 2022-03-17 NOTE — PROGRESS NOTES
Health Maintenance Due   Topic Date Due    Shingles Vaccine (1 of 2) Never done     Updates were requested from care everywhere.  Chart was reviewed for overdue Proactive [facility] Encounters (MARS) topics (CRS, Breast Cancer Screening, Eye exam)  Health Maintenance has been updated.  LINKS immunization registry triggered.  Immunizations were reconciled.

## 2022-03-21 ENCOUNTER — OFFICE VISIT (OUTPATIENT)
Dept: PODIATRY | Facility: CLINIC | Age: 82
End: 2022-03-21
Payer: COMMERCIAL

## 2022-03-21 VITALS — BODY MASS INDEX: 21.68 KG/M2 | WEIGHT: 138.13 LBS | HEIGHT: 67 IN

## 2022-03-21 DIAGNOSIS — M79.676 PAIN DUE TO ONYCHOMYCOSIS OF TOENAIL: ICD-10-CM

## 2022-03-21 DIAGNOSIS — R26.2 DIFFICULTY IN WALKING, NOT ELSEWHERE CLASSIFIED: Primary | ICD-10-CM

## 2022-03-21 DIAGNOSIS — B35.1 PAIN DUE TO ONYCHOMYCOSIS OF TOENAIL: ICD-10-CM

## 2022-03-21 DIAGNOSIS — B35.1 TINEA UNGUIUM: ICD-10-CM

## 2022-03-21 PROCEDURE — 1126F AMNT PAIN NOTED NONE PRSNT: CPT | Mod: CPTII,S$GLB,, | Performed by: PODIATRIST

## 2022-03-21 PROCEDURE — 3288F FALL RISK ASSESSMENT DOCD: CPT | Mod: CPTII,S$GLB,, | Performed by: PODIATRIST

## 2022-03-21 PROCEDURE — 99999 PR PBB SHADOW E&M-EST. PATIENT-LVL III: CPT | Mod: PBBFAC,,, | Performed by: PODIATRIST

## 2022-03-21 PROCEDURE — 1101F PR PT FALLS ASSESS DOC 0-1 FALLS W/OUT INJ PAST YR: ICD-10-PCS | Mod: CPTII,S$GLB,, | Performed by: PODIATRIST

## 2022-03-21 PROCEDURE — 1101F PT FALLS ASSESS-DOCD LE1/YR: CPT | Mod: CPTII,S$GLB,, | Performed by: PODIATRIST

## 2022-03-21 PROCEDURE — 99999 PR PBB SHADOW E&M-EST. PATIENT-LVL III: ICD-10-PCS | Mod: PBBFAC,,, | Performed by: PODIATRIST

## 2022-03-21 PROCEDURE — 1159F PR MEDICATION LIST DOCUMENTED IN MEDICAL RECORD: ICD-10-PCS | Mod: CPTII,S$GLB,, | Performed by: PODIATRIST

## 2022-03-21 PROCEDURE — 1160F RVW MEDS BY RX/DR IN RCRD: CPT | Mod: CPTII,S$GLB,, | Performed by: PODIATRIST

## 2022-03-21 PROCEDURE — 1159F MED LIST DOCD IN RCRD: CPT | Mod: CPTII,S$GLB,, | Performed by: PODIATRIST

## 2022-03-21 PROCEDURE — 99213 OFFICE O/P EST LOW 20 MIN: CPT | Mod: 25,S$GLB,, | Performed by: PODIATRIST

## 2022-03-21 PROCEDURE — 11721 PR DEBRIDEMENT OF NAILS, 6 OR MORE: ICD-10-PCS | Mod: S$GLB,,, | Performed by: PODIATRIST

## 2022-03-21 PROCEDURE — 11721 DEBRIDE NAIL 6 OR MORE: CPT | Mod: S$GLB,,, | Performed by: PODIATRIST

## 2022-03-21 PROCEDURE — 3288F PR FALLS RISK ASSESSMENT DOCUMENTED: ICD-10-PCS | Mod: CPTII,S$GLB,, | Performed by: PODIATRIST

## 2022-03-21 PROCEDURE — 99213 PR OFFICE/OUTPT VISIT, EST, LEVL III, 20-29 MIN: ICD-10-PCS | Mod: 25,S$GLB,, | Performed by: PODIATRIST

## 2022-03-21 PROCEDURE — 1126F PR PAIN SEVERITY QUANTIFIED, NO PAIN PRESENT: ICD-10-PCS | Mod: CPTII,S$GLB,, | Performed by: PODIATRIST

## 2022-03-21 PROCEDURE — 1160F PR REVIEW ALL MEDS BY PRESCRIBER/CLIN PHARMACIST DOCUMENTED: ICD-10-PCS | Mod: CPTII,S$GLB,, | Performed by: PODIATRIST

## 2022-03-21 NOTE — PROGRESS NOTES
Subjective:      Patient ID: Dean Hahn is a 81 y.o. male.    Chief Complaint: Nail Problem (bilateral) and Foot Swelling (Top of both feet)    Dean is a 81 y.o. male who presents to the clinic for evaluation for elongated, discolored and thickened nails times 10. Dean has a past medical history of Cataract, HLD (hyperlipidemia), and Hypertension. The patient's chief complaint is long, thick toenails.  No previous treatments for onychomycosis. With his daughter. In regular tennis shoes. I prescribed penlac last time but has not actively using it. Complains of toenail around the pain due to chronic thickening.     PCP: Jeremy Tobias III, MD    Date Last Seen by PCP: in epic     Current shoe gear:  Affected Foot: Tennis shoes     Unaffected Foot: Tennis shoes    Hemoglobin A1C   Date Value Ref Range Status   11/18/2021 5.4 4.0 - 5.6 % Final     Comment:     ADA Screening Guidelines:  5.7-6.4%  Consistent with prediabetes  >or=6.5%  Consistent with diabetes    High levels of fetal hemoglobin interfere with the HbA1C  assay. Heterozygous hemoglobin variants (HbS, HgC, etc)do  not significantly interfere with this assay.   However, presence of multiple variants may affect accuracy.     02/08/2021 5.7 (H) 4.0 - 5.6 % Final     Comment:     ADA Screening Guidelines:  5.7-6.4%  Consistent with prediabetes  >or=6.5%  Consistent with diabetes    High levels of fetal hemoglobin interfere with the HbA1C  assay. Heterozygous hemoglobin variants (HbS, HgC, etc)do  not significantly interfere with this assay.   However, presence of multiple variants may affect accuracy.     05/11/2020 6.0 (H) 4.0 - 5.6 % Final     Comment:     ADA Screening Guidelines:  5.7-6.4%  Consistent with prediabetes  >or=6.5%  Consistent with diabetes  High levels of fetal hemoglobin interfere with the HbA1C  assay. Heterozygous hemoglobin variants (HbS, HgC, etc)do  not significantly interfere with this assay.   However, presence of  multiple variants may affect accuracy.         Review of Systems   Constitutional: Negative for chills, decreased appetite, fever and malaise/fatigue.   HENT: Negative for congestion, ear discharge and sore throat.    Eyes: Negative for discharge and pain.   Cardiovascular: Negative for chest pain, claudication and leg swelling.   Respiratory: Negative for cough and shortness of breath.    Skin: Positive for color change. Negative for nail changes and rash.   Musculoskeletal: Negative for arthritis, joint pain, joint swelling and muscle weakness.   Gastrointestinal: Negative for bloating, abdominal pain, diarrhea, nausea and vomiting.   Genitourinary: Negative for flank pain and hematuria.   Neurological: Negative for headaches, numbness and weakness.   Psychiatric/Behavioral: Negative for altered mental status.             Past Medical History:   Diagnosis Date    Cataract     HLD (hyperlipidemia)     Hypertension        Past Surgical History:   Procedure Laterality Date    CHOLECYSTECTOMY      EYE SURGERY Right     cataract removal surgery       Family History   Problem Relation Age of Onset    Hypertension Mother     Heart attack Father     No Known Problems Sister     Hypertension Brother     No Known Problems Daughter     No Known Problems Son     Cirrhosis Neg Hx        Social History     Socioeconomic History    Marital status:     Number of children: 5   Occupational History    Occupation: Former electrican    Tobacco Use    Smoking status: Never Smoker    Smokeless tobacco: Never Used   Substance and Sexual Activity    Alcohol use: Yes    Drug use: No    Sexual activity: Yes     Partners: Female     Comment: wife   Social History Narrative    Orginally from    [facility] electrical - retired [employer]      - [place] - 5 years ,      Children    3 girls , 2 boys - 1 daughter oldest in [place]       Current Outpatient Medications  "  Medication Sig Dispense Refill    dorzolamide-timolol 2-0.5% (COSOPT) 22.3-6.8 mg/mL ophthalmic solution Place 1 drop into both eyes 2 (two) times daily. 10 mL 1    fish oil-omega-3 fatty acids 300-1,000 mg capsule Take 2 g by mouth once daily.      metoprolol succinate (TOPROL-XL) 25 MG 24 hr tablet TAKE 1 TABLET BY MOUTH EVERY DAY IN THE EVENING 90 tablet 2    multivitamin capsule Take 1 capsule by mouth once daily.      olmesartan-hydrochlorothiazide (BENICAR HCT) 40-12.5 mg Tab TAKE 1 TABLET BY MOUTH EVERY DAY 90 tablet 2    potassium chloride (KLOR-CON 10) 10 MEQ TbSR TAKE 1 TABLET BY MOUTH DAILY EXCEPT TAKE 2 TABS ON MON, WED AND FRIDAY 120 tablet 3     No current facility-administered medications for this visit.       Review of patient's allergies indicates:  No Known Allergies    Vitals:    03/21/22 0857   Weight: 62.6 kg (138 lb 1.6 oz)   Height: 5' 7" (1.702 m)   PainSc: 0-No pain       Objective:      Physical Exam  Constitutional:       General: He is not in acute distress.     Appearance: He is well-developed.   HENT:      Nose: Nose normal.   Eyes:      Conjunctiva/sclera: Conjunctivae normal.   Pulmonary:      Effort: Pulmonary effort is normal.   Chest:      Chest wall: No tenderness.   Abdominal:      Tenderness: There is no abdominal tenderness.   Musculoskeletal:      Cervical back: Normal range of motion.   Neurological:      Mental Status: He is alert and oriented to person, place, and time.   Psychiatric:         Behavior: Behavior normal.       Vascular: Distal DP/PT pulses palpable 1/4. CRT < 3 sec to tips of toes. No vericosities noted to LEs. warm to touch LE, No edema noted to LE.    Dermatologic: No open lesions, lacerations or wounds. Interdigital spaces clean, dry and intact. No erythema, rubor, calor noted LE  Toenails 1-5 bilaterally are elongated by 2-3 mm, thickened by 2-3 mm, discolored/yellowed, dystrophic, brittle with subungual debris. No incurvation. Mild xerosis noted, " bilboaz.     Musculoskeletal: MMT 5/5 in DF/PF/Inv/Ev resistance with no reproduction of pain in any direction. Passive range of motion of ankle and pedal joints is painless. No calf tenderness LE, Compartments soft/compressible.     Neurological: Light touch, proprioception, and sharp/dull sensation are all intact. Protective threshold with the [place]-Wienstein monofilament is intact. Vibratory sensation intact.         Assessment:       Encounter Diagnoses   Name Primary?    Difficulty in walking, not elsewhere classified Yes    Tinea unguium     Pain due to onychomycosis of toenail          Plan:       Dean was seen today for nail problem and foot swelling.    Diagnoses and all orders for this visit:    Difficulty in walking, not elsewhere classified    Tinea unguium    Pain due to onychomycosis of toenail      I counseled the patient on his conditions, their implications and medical management.    81 y.o. male with onychomycosis.    -nails x 10 sharply debrided with nail nipper. Tolerated well.     -Instructed patient on the importance of keeping feet dry. Patient instructed to use absorbent cotton socks and change them if they become sweaty; or wear an open-toe shoe or sandal. Wash the feet at least once a day with soap and water. Patient instructed to use lysol or over-the-counter antifungal powders or sprays to shoes daily and allow them to air dry, switching shoes from every other day would be optimal. Patient is to avoid barefoot walking in high-risk environments (public showers, gyms and locker rooms) may prevent future infections.   -The nature of the condition, options for management, as well as potential risks and complications were discussed in detail with patient. Patient was amenable to my recommendations and left my office fully informed and will follow up as instructed or sooner if necessary.    -f/u prn.    Note dictated with voice recognition software, please excuse any grammatical errors.

## 2022-05-19 ENCOUNTER — TELEPHONE (OUTPATIENT)
Dept: INTERNAL MEDICINE | Facility: CLINIC | Age: 82
End: 2022-05-19
Payer: COMMERCIAL

## 2022-05-19 NOTE — TELEPHONE ENCOUNTER
Called to remind the patient of his lab appointment for tomorrow at 10:00 am. The patient's wife voiced understanding.

## 2022-05-24 ENCOUNTER — LAB VISIT (OUTPATIENT)
Dept: LAB | Facility: HOSPITAL | Age: 82
End: 2022-05-24
Attending: INTERNAL MEDICINE
Payer: COMMERCIAL

## 2022-05-24 DIAGNOSIS — I10 ESSENTIAL HYPERTENSION: ICD-10-CM

## 2022-05-24 LAB
ANION GAP SERPL CALC-SCNC: 7 MMOL/L (ref 8–16)
BUN SERPL-MCNC: 14 MG/DL (ref 8–23)
CALCIUM SERPL-MCNC: 9 MG/DL (ref 8.7–10.5)
CHLORIDE SERPL-SCNC: 110 MMOL/L (ref 95–110)
CO2 SERPL-SCNC: 23 MMOL/L (ref 23–29)
CREAT SERPL-MCNC: 1.1 MG/DL (ref 0.5–1.4)
EST. GFR  (AFRICAN AMERICAN): >60 ML/MIN/1.73 M^2
EST. GFR  (NON AFRICAN AMERICAN): >60 ML/MIN/1.73 M^2
GLUCOSE SERPL-MCNC: 110 MG/DL (ref 70–110)
POTASSIUM SERPL-SCNC: 4.1 MMOL/L (ref 3.5–5.1)
SODIUM SERPL-SCNC: 140 MMOL/L (ref 136–145)

## 2022-05-24 PROCEDURE — 36415 COLL VENOUS BLD VENIPUNCTURE: CPT | Mod: PO | Performed by: INTERNAL MEDICINE

## 2022-05-24 PROCEDURE — 80048 BASIC METABOLIC PNL TOTAL CA: CPT | Performed by: INTERNAL MEDICINE

## 2022-05-25 ENCOUNTER — OFFICE VISIT (OUTPATIENT)
Dept: INTERNAL MEDICINE | Facility: CLINIC | Age: 82
End: 2022-05-25
Payer: COMMERCIAL

## 2022-05-25 VITALS
WEIGHT: 140.63 LBS | DIASTOLIC BLOOD PRESSURE: 82 MMHG | SYSTOLIC BLOOD PRESSURE: 150 MMHG | HEART RATE: 79 BPM | BODY MASS INDEX: 22.07 KG/M2 | HEIGHT: 67 IN | OXYGEN SATURATION: 98 %

## 2022-05-25 DIAGNOSIS — I10 ESSENTIAL HYPERTENSION: Primary | ICD-10-CM

## 2022-05-25 DIAGNOSIS — E87.6 HYPOKALEMIA: ICD-10-CM

## 2022-05-25 DIAGNOSIS — R73.03 PREDIABETES: ICD-10-CM

## 2022-05-25 PROCEDURE — 3079F PR MOST RECENT DIASTOLIC BLOOD PRESSURE 80-89 MM HG: ICD-10-PCS | Mod: CPTII,S$GLB,, | Performed by: INTERNAL MEDICINE

## 2022-05-25 PROCEDURE — 3077F PR MOST RECENT SYSTOLIC BLOOD PRESSURE >= 140 MM HG: ICD-10-PCS | Mod: CPTII,S$GLB,, | Performed by: INTERNAL MEDICINE

## 2022-05-25 PROCEDURE — 1126F AMNT PAIN NOTED NONE PRSNT: CPT | Mod: CPTII,S$GLB,, | Performed by: INTERNAL MEDICINE

## 2022-05-25 PROCEDURE — 3077F SYST BP >= 140 MM HG: CPT | Mod: CPTII,S$GLB,, | Performed by: INTERNAL MEDICINE

## 2022-05-25 PROCEDURE — 1101F PR PT FALLS ASSESS DOC 0-1 FALLS W/OUT INJ PAST YR: ICD-10-PCS | Mod: CPTII,S$GLB,, | Performed by: INTERNAL MEDICINE

## 2022-05-25 PROCEDURE — 3288F PR FALLS RISK ASSESSMENT DOCUMENTED: ICD-10-PCS | Mod: CPTII,S$GLB,, | Performed by: INTERNAL MEDICINE

## 2022-05-25 PROCEDURE — 99214 OFFICE O/P EST MOD 30 MIN: CPT | Mod: S$GLB,,, | Performed by: INTERNAL MEDICINE

## 2022-05-25 PROCEDURE — 1101F PT FALLS ASSESS-DOCD LE1/YR: CPT | Mod: CPTII,S$GLB,, | Performed by: INTERNAL MEDICINE

## 2022-05-25 PROCEDURE — 3079F DIAST BP 80-89 MM HG: CPT | Mod: CPTII,S$GLB,, | Performed by: INTERNAL MEDICINE

## 2022-05-25 PROCEDURE — 99214 PR OFFICE/OUTPT VISIT, EST, LEVL IV, 30-39 MIN: ICD-10-PCS | Mod: S$GLB,,, | Performed by: INTERNAL MEDICINE

## 2022-05-25 PROCEDURE — 3288F FALL RISK ASSESSMENT DOCD: CPT | Mod: CPTII,S$GLB,, | Performed by: INTERNAL MEDICINE

## 2022-05-25 PROCEDURE — 99999 PR PBB SHADOW E&M-EST. PATIENT-LVL III: CPT | Mod: PBBFAC,,, | Performed by: INTERNAL MEDICINE

## 2022-05-25 PROCEDURE — 99999 PR PBB SHADOW E&M-EST. PATIENT-LVL III: ICD-10-PCS | Mod: PBBFAC,,, | Performed by: INTERNAL MEDICINE

## 2022-05-25 PROCEDURE — 1126F PR PAIN SEVERITY QUANTIFIED, NO PAIN PRESENT: ICD-10-PCS | Mod: CPTII,S$GLB,, | Performed by: INTERNAL MEDICINE

## 2022-05-25 NOTE — PROGRESS NOTES
"Assessment:       1. Essential hypertension    2. Hypokalemia    3. Prediabetes        Plan:         Dean was seen today for follow-up and hypertension.    Diagnoses and all orders for this visit:    Essential hypertension    Hypokalemia    Prediabetes      Chronic  Uncontrolled  Patient is not at goal today  I have reviewed lifestyle modification to achieve/maintain goals  We will adjust the current medication regimen to  resume medicaiotn  Patient will follow up in 2 weeks    Subjective:       Patient ID: Dean Hahn is a 81 y.o. male.    Chief Complaint: Follow-up and Hypertension    HTN  Complication: no CVA/CKD/CAD  Last labs :   - BMP  Lab Results   Component Value Date     05/24/2022    K 4.1 05/24/2022     05/24/2022    CO2 23 05/24/2022    BUN 14 05/24/2022    CREATININE 1.1 05/24/2022    CALCIUM 9.0 05/24/2022    ANIONGAP 7 (L) 05/24/2022    ESTGFRAFRICA >60.0 05/24/2022    EGFRNONAA >60.0 05/24/2022       Medication: adherent to   - didn't take medication today   Home Bps;   Symptoms: denies CP, SOB, changes in urination, sudden weakness, numbness        HPI  Review of Systems   All other systems reviewed and are negative.        Objective:     BP (!) 150/82 (BP Location: Right arm, Patient Position: Sitting, BP Method: Medium (Manual))   Pulse 79   Ht 5' 7" (1.702 m)   Wt 63.8 kg (140 lb 10.5 oz)   SpO2 98%   BMI 22.03 kg/m²       Wt Readings from Last 1 Encounters:   05/25/22 1133 63.8 kg (140 lb 10.5 oz)       BMI Readings from Last 1 Encounters:   05/25/22 22.03 kg/m²            Physical Exam  Vitals and nursing note reviewed.   Constitutional:       General: He is not in acute distress.     Appearance: He is well-developed. He is not ill-appearing, toxic-appearing or diaphoretic.   HENT:      Head: Normocephalic.   Eyes:      Conjunctiva/sclera: Conjunctivae normal.   Cardiovascular:      Rate and Rhythm: Normal rate and regular rhythm.      Heart sounds: Normal heart " sounds. No murmur heard.    No friction rub. No gallop.   Pulmonary:      Effort: Pulmonary effort is normal. No respiratory distress.   Abdominal:      General: There is no distension.      Palpations: Abdomen is soft.   Neurological:      General: No focal deficit present.      Mental Status: He is alert and oriented to person, place, and time.             Future Appointments   Date Time Provider Department Center   6/8/2022 11:00 AM CAROLYN LACEY [facility] [place]         Medication List with Changes/Refills   Current Medications    DORZOLAMIDE-TIMOLOL 2-0.5% (COSOPT) 22.3-6.8 MG/ML OPHTHALMIC SOLUTION    Place 1 drop into both eyes 2 (two) times daily.    FISH OIL-OMEGA-3 FATTY ACIDS 300-1,000 MG CAPSULE    Take 2 g by mouth once daily.    METOPROLOL SUCCINATE (TOPROL-XL) 25 MG 24 HR TABLET    TAKE 1 TABLET BY MOUTH EVERY DAY IN THE EVENING    MULTIVITAMIN CAPSULE    Take 1 capsule by mouth once daily.    OLMESARTAN-HYDROCHLOROTHIAZIDE (BENICAR HCT) 40-12.5 MG TAB    TAKE 1 TABLET BY MOUTH EVERY DAY    POTASSIUM CHLORIDE (KLOR-CON 10) 10 MEQ TBSR    TAKE 1 TABLET BY MOUTH DAILY EXCEPT TAKE 2 TABS ON MON, WED AND FRIDAY         Disclaimer:  This note has been generated using voice-recognition software. There may be grammatical or spelling errors that have been missed during proof-reading

## 2022-06-08 ENCOUNTER — CLINICAL SUPPORT (OUTPATIENT)
Dept: FAMILY MEDICINE | Facility: CLINIC | Age: 82
End: 2022-06-08
Payer: COMMERCIAL

## 2022-06-08 VITALS — DIASTOLIC BLOOD PRESSURE: 76 MMHG | SYSTOLIC BLOOD PRESSURE: 134 MMHG

## 2022-06-08 PROCEDURE — 99999 PR PBB SHADOW E&M-EST. PATIENT-LVL I: CPT | Mod: PBBFAC,,,

## 2022-06-08 PROCEDURE — 99999 PR PBB SHADOW E&M-EST. PATIENT-LVL I: ICD-10-PCS | Mod: PBBFAC,,,

## 2022-06-08 NOTE — PROGRESS NOTES
I advised patient to continue current medication regimen. 134/76,Message forward to Dr. Tobias.Patient voiced understanding.

## 2022-08-19 ENCOUNTER — TELEPHONE (OUTPATIENT)
Dept: INTERNAL MEDICINE | Facility: CLINIC | Age: 82
End: 2022-08-19
Payer: COMMERCIAL

## 2022-08-19 NOTE — TELEPHONE ENCOUNTER
----- Message from June Rankin sent at 8/19/2022  7:56 AM CDT -----  Regarding: call back  Contact: [phone]  Type:  Same Day Appointment Request    Caller is requesting a same day appointment.  Caller declined first available appointment listed below.    Name of Caller: PT   When is the first available appointment?   Symptoms: cold cough sore throat   Best Call Back Number: [phone]  Additional Information:

## 2022-11-23 ENCOUNTER — HOSPITAL ENCOUNTER (OUTPATIENT)
Dept: RADIOLOGY | Facility: HOSPITAL | Age: 82
Discharge: HOME | End: 2022-11-23
Attending: INTERNAL MEDICINE
Payer: MEDICARE

## 2022-11-23 ENCOUNTER — OFFICE VISIT (OUTPATIENT)
Dept: INTERNAL MEDICINE | Facility: CLINIC | Age: 82
End: 2022-11-23
Payer: COMMERCIAL

## 2022-11-23 VITALS
HEART RATE: 71 BPM | OXYGEN SATURATION: 99 % | DIASTOLIC BLOOD PRESSURE: 94 MMHG | SYSTOLIC BLOOD PRESSURE: 150 MMHG | BODY MASS INDEX: 19.58 KG/M2 | HEIGHT: 67 IN | WEIGHT: 124.75 LBS

## 2022-11-23 DIAGNOSIS — Z23 FLU VACCINE NEED: ICD-10-CM

## 2022-11-23 DIAGNOSIS — I10 ESSENTIAL HYPERTENSION: Primary | ICD-10-CM

## 2022-11-23 DIAGNOSIS — R41.3 MEMORY LOSS: ICD-10-CM

## 2022-11-23 DIAGNOSIS — S39.92XA INJURY OF COCCYX, INITIAL ENCOUNTER: ICD-10-CM

## 2022-11-23 PROCEDURE — 3080F PR MOST RECENT DIASTOLIC BLOOD PRESSURE >= 90 MM HG: ICD-10-PCS | Mod: CPTII,S$GLB,, | Performed by: INTERNAL MEDICINE

## 2022-11-23 PROCEDURE — G0008 FLU VACCINE - QUADRIVALENT - ADJUVANTED: ICD-10-PCS | Mod: S$GLB,,, | Performed by: INTERNAL MEDICINE

## 2022-11-23 PROCEDURE — 72220 X-RAY EXAM SACRUM TAILBONE: CPT | Mod: 26,,, | Performed by: RADIOLOGY

## 2022-11-23 PROCEDURE — 1160F RVW MEDS BY RX/DR IN RCRD: CPT | Mod: CPTII,S$GLB,, | Performed by: INTERNAL MEDICINE

## 2022-11-23 PROCEDURE — 99999 PR PBB SHADOW E&M-EST. PATIENT-LVL IV: CPT | Mod: PBBFAC,,, | Performed by: INTERNAL MEDICINE

## 2022-11-23 PROCEDURE — 90694 FLU VACCINE - QUADRIVALENT - ADJUVANTED: ICD-10-PCS | Mod: S$GLB,,, | Performed by: INTERNAL MEDICINE

## 2022-11-23 PROCEDURE — 1160F PR REVIEW ALL MEDS BY PRESCRIBER/CLIN PHARMACIST DOCUMENTED: ICD-10-PCS | Mod: CPTII,S$GLB,, | Performed by: INTERNAL MEDICINE

## 2022-11-23 PROCEDURE — 72220 XR SACRUM AND COCCYX: ICD-10-PCS | Mod: 26,,, | Performed by: RADIOLOGY

## 2022-11-23 PROCEDURE — 99214 PR OFFICE/OUTPT VISIT, EST, LEVL IV, 30-39 MIN: ICD-10-PCS | Mod: 25,S$GLB,, | Performed by: INTERNAL MEDICINE

## 2022-11-23 PROCEDURE — G0008 ADMIN INFLUENZA VIRUS VAC: HCPCS | Mod: S$GLB,,, | Performed by: INTERNAL MEDICINE

## 2022-11-23 PROCEDURE — 1159F MED LIST DOCD IN RCRD: CPT | Mod: CPTII,S$GLB,, | Performed by: INTERNAL MEDICINE

## 2022-11-23 PROCEDURE — 90694 VACC AIIV4 NO PRSRV 0.5ML IM: CPT | Mod: S$GLB,,, | Performed by: INTERNAL MEDICINE

## 2022-11-23 PROCEDURE — 3077F PR MOST RECENT SYSTOLIC BLOOD PRESSURE >= 140 MM HG: ICD-10-PCS | Mod: CPTII,S$GLB,, | Performed by: INTERNAL MEDICINE

## 2022-11-23 PROCEDURE — 1101F PR PT FALLS ASSESS DOC 0-1 FALLS W/OUT INJ PAST YR: ICD-10-PCS | Mod: CPTII,S$GLB,, | Performed by: INTERNAL MEDICINE

## 2022-11-23 PROCEDURE — 1126F AMNT PAIN NOTED NONE PRSNT: CPT | Mod: CPTII,S$GLB,, | Performed by: INTERNAL MEDICINE

## 2022-11-23 PROCEDURE — 3288F PR FALLS RISK ASSESSMENT DOCUMENTED: ICD-10-PCS | Mod: CPTII,S$GLB,, | Performed by: INTERNAL MEDICINE

## 2022-11-23 PROCEDURE — 3288F FALL RISK ASSESSMENT DOCD: CPT | Mod: CPTII,S$GLB,, | Performed by: INTERNAL MEDICINE

## 2022-11-23 PROCEDURE — 1159F PR MEDICATION LIST DOCUMENTED IN MEDICAL RECORD: ICD-10-PCS | Mod: CPTII,S$GLB,, | Performed by: INTERNAL MEDICINE

## 2022-11-23 PROCEDURE — 3080F DIAST BP >= 90 MM HG: CPT | Mod: CPTII,S$GLB,, | Performed by: INTERNAL MEDICINE

## 2022-11-23 PROCEDURE — 99214 OFFICE O/P EST MOD 30 MIN: CPT | Mod: 25,S$GLB,, | Performed by: INTERNAL MEDICINE

## 2022-11-23 PROCEDURE — 1101F PT FALLS ASSESS-DOCD LE1/YR: CPT | Mod: CPTII,S$GLB,, | Performed by: INTERNAL MEDICINE

## 2022-11-23 PROCEDURE — 99999 PR PBB SHADOW E&M-EST. PATIENT-LVL IV: ICD-10-PCS | Mod: PBBFAC,,, | Performed by: INTERNAL MEDICINE

## 2022-11-23 PROCEDURE — 3077F SYST BP >= 140 MM HG: CPT | Mod: CPTII,S$GLB,, | Performed by: INTERNAL MEDICINE

## 2022-11-23 PROCEDURE — 72220 X-RAY EXAM SACRUM TAILBONE: CPT | Mod: TC,FY,PO

## 2022-11-23 PROCEDURE — 1126F PR PAIN SEVERITY QUANTIFIED, NO PAIN PRESENT: ICD-10-PCS | Mod: CPTII,S$GLB,, | Performed by: INTERNAL MEDICINE

## 2022-11-23 RX ORDER — AMLODIPINE BESYLATE 10 MG/1
10 TABLET ORAL DAILY
Qty: 90 TABLET | Refills: 1 | Status: SHIPPED | OUTPATIENT
Start: 2022-11-23 | End: 2023-05-19

## 2022-11-23 NOTE — PATIENT INSTRUCTIONS
You can start the new medication   Take 1/2 tablet once daily for one week   Then if you don't have any major side effects you can  increase to one full tablet until our follow up

## 2022-11-23 NOTE — PROGRESS NOTES
Assessment:       1. Essential hypertension    2. Flu vaccine need    3. Injury of coccyx, initial encounter    4. Memory loss          Plan:         Dean was seen today for hypertension.    Diagnoses and all orders for this visit:    Essential hypertension  Chronic  Controlled  Patient is at goal today   I have reviewed lifestyle modification to achieve/maintain goals  We will continue the current medication regimen as listed below  Patient will follow up in 6 months   -     amLODIPine (NORVASC) 10 MG tablet; Take 1 tablet (10 mg total) by mouth once daily.  -     Basic Metabolic Panel; Future    Flu vaccine need  -- I reviewed the patient vaccine history and provided the risk benefits and side effects of the vaccine.   -- I provided the patient a VIS sheet on the vaccine.   -     Cancel: Influenza (FLUAD) - Quadrivalent (Adjuvanted) *Preferred* (65+) (PF)  -     Influenza - Quadrivalent (Adjuvanted)    Injury of coccyx, initial encounter  Xray r/o fx  -     X-Ray Sacrum And Coccyx; Future    Memory loss  Check labs  Rtc 4 weeks for full eval   -     Basic Metabolic Panel; Future  -     Ammonia; Future  -     Comprehensive Metabolic Panel; Future  -     HIV 1/2 Ag/Ab (4th Gen); Future  -     RPR; Future  -     Vitamin B12; Future  -     Urinalysis  -     TSH; Future  -     Urinalysis; Future        Subjective:       Patient ID: Dean Hahn is a 82 y.o. male.    Chief Complaint: Hypertension    Interim:      Hypertension  This is a chronic problem. The current episode started more than 1 year ago. The problem has been waxing and waning since onset. The problem is uncontrolled. Past treatments include diuretics, beta blockers and angiotensin blockers. The current treatment provides mild improvement. There are no compliance problems.      Comes today with , wife. She reports forgetfulness with things he typically likes, saints games, cooking. No injuries cooking problems. He aslo had a fall in  "October, was not seen . He has had some pain in the tailbone since them, No change in b/b. No numbness      Review of Systems   All other systems reviewed and are negative.          Health Maintenance Due   Topic Date Due    Shingles Vaccine (1 of 2) Never done    COVID-19 Vaccine (4 - Booster) 02/02/2022       Objective:     BP (!) 150/94 (BP Location: Left arm, Patient Position: Sitting, BP Method: Medium (Automatic))   Pulse 71   Ht 5' 7" (1.702 m)   Wt 56.6 kg (124 lb 12.5 oz)   SpO2 99%   BMI 19.54 kg/m²     Vitals 11/23/2022 5/25/2022 3/21/2022 11/18/2021 2/19/2021   Height 67 67 67 67 67   Weight (lbs) 124.78 140.65 138.1 136.24 141.09   BMI (kg/m2) 19.5 22 21.6 21.3 22.1            Physical Exam  Nursing note reviewed.   Constitutional:       General: He is not in acute distress.     Appearance: Normal appearance. He is not ill-appearing, toxic-appearing or diaphoretic.   HENT:      Head: Normocephalic.   Eyes:      Conjunctiva/sclera: Conjunctivae normal.   Cardiovascular:      Rate and Rhythm: Normal rate.      Heart sounds: No murmur heard.  Pulmonary:      Effort: Pulmonary effort is normal. No respiratory distress.   Abdominal:      General: Abdomen is flat. There is no distension.      Palpations: Abdomen is soft. There is no mass.      Tenderness: There is no abdominal tenderness.      Hernia: No hernia is present.   Musculoskeletal:      Comments: Mild scaral tendernewss    Neurological:      General: No focal deficit present.      Mental Status: He is alert and oriented to person, place, and time.   Psychiatric:         Mood and Affect: Mood normal.         Behavior: Behavior normal.         Thought Content: Thought content normal.         Judgment: Judgment normal.             ASCVD  The ASCVD Risk score (Abhay NOEL, et al., 2019) failed to calculate for the following reasons:    The 2019 ASCVD risk score is only valid for ages 40 to 79    Basic Labs    BMP  Lab Results   Component Value Date "     05/24/2022    K 4.1 05/24/2022     05/24/2022    CO2 23 05/24/2022    BUN 14 05/24/2022    CREATININE 1.1 05/24/2022    CALCIUM 9.0 05/24/2022    ANIONGAP 7 (L) 05/24/2022    ESTGFRAFRICA >60.0 05/24/2022    EGFRNONAA >60.0 05/24/2022     No results found for: EGFRNORACEVR    Lab Results   Component Value Date    ALT 19 11/18/2021    AST 35 11/18/2021    ALKPHOS 69 11/18/2021    BILITOT 1.4 (H) 11/18/2021         No results found for: TSH  Lab Results   Component Value Date    WBC 10.50 09/17/2019    HGB 14.8 09/17/2019    HCT 42.3 09/17/2019    MCV 96 09/17/2019     09/17/2019           STD  No results found for: HIV1X2, [id number]  No results found for: RPR  Lab Results   Component Value Date    HEPAIGM Negative 02/19/2019    HEPBIGM Negative 02/19/2019    HEPBCAB Negative 02/23/2021    HEPCAB Negative 02/19/2019     No results found for: LABNGO, LABCHLA        Lipids  Lab Results   Component Value Date    CHOL 171 11/18/2021     Lab Results   Component Value Date    HDL 36 (L) 11/18/2021     Lab Results   Component Value Date    LDLCALC 101.0 11/18/2021     Lab Results   Component Value Date    TRIG 170 (H) 11/18/2021     Lab Results   Component Value Date    CHOLHDL 21.1 11/18/2021       DM  Lab Results   Component Value Date    HGBA1C 5.4 11/18/2021       PSA  PSA, Screen (ng/mL)   Date Value   05/11/2020 2.8         Future Appointments   Date Time Provider Department Center   12/7/2022  1:00 PM CAROLYN LACEY [facility]   12/21/2022 10:00 AM MD [place]IE [place] III  [place]         Medication List with Changes/Refills   New Medications    AMLODIPINE (NORVASC) 10 MG TABLET    Take 1 tablet (10 mg total) by mouth once daily.   Current Medications    DORZOLAMIDE-TIMOLOL 2-0.5% (COSOPT) 22.3-6.8 MG/ML OPHTHALMIC SOLUTION    Place 1 drop into both eyes 2 (two) times daily.    FISH OIL-OMEGA-3 FATTY ACIDS 300-1,000 MG CAPSULE    Take 2 g by mouth once daily.    METOPROLOL  SUCCINATE (TOPROL-XL) 25 MG 24 HR TABLET    TAKE 1 TABLET BY MOUTH EVERY DAY IN THE EVENING    MULTIVITAMIN CAPSULE    Take 1 capsule by mouth once daily.    OLMESARTAN-HYDROCHLOROTHIAZIDE (BENICAR HCT) 40-12.5 MG TAB    TAKE 1 TABLET BY MOUTH EVERY DAY    POTASSIUM CHLORIDE (KLOR-CON 10) 10 MEQ TBSR    TAKE 1 TABLET BY MOUTH DAILY EXCEPT TAKE 2 TABS ON MON, WED AND FRIDAY         Disclaimer:  This note has been generated using voice-recognition software. There may be grammatical or spelling errors that have been missed during proof-reading

## 2022-11-25 DIAGNOSIS — E53.8 B12 DEFICIENCY: Primary | ICD-10-CM

## 2022-11-25 RX ORDER — PNV NO.95/FERROUS FUM/FOLIC AC 28MG-0.8MG
100 TABLET ORAL DAILY
Qty: 90 TABLET | Refills: 1 | Status: SHIPPED | OUTPATIENT
Start: 2022-11-25 | End: 2023-09-13 | Stop reason: SDUPTHER

## 2022-12-07 ENCOUNTER — CLINICAL SUPPORT (OUTPATIENT)
Dept: FAMILY MEDICINE | Facility: CLINIC | Age: 82
End: 2022-12-07
Payer: COMMERCIAL

## 2022-12-07 VITALS — DIASTOLIC BLOOD PRESSURE: 60 MMHG | SYSTOLIC BLOOD PRESSURE: 118 MMHG

## 2022-12-07 DIAGNOSIS — I10 HYPERTENSION, UNSPECIFIED TYPE: Primary | ICD-10-CM

## 2022-12-07 PROCEDURE — 99999 PR PBB SHADOW E&M-EST. PATIENT-LVL I: ICD-10-PCS | Mod: PBBFAC,,,

## 2022-12-07 PROCEDURE — 99999 PR PBB SHADOW E&M-EST. PATIENT-LVL I: CPT | Mod: PBBFAC,,,

## 2022-12-07 NOTE — PATIENT INSTRUCTIONS
Patient arrived for a blood pressure check. Patients BP is 118/60. Patient stated he had taken all medications in the A.M.

## 2022-12-21 ENCOUNTER — OFFICE VISIT (OUTPATIENT)
Dept: INTERNAL MEDICINE | Facility: CLINIC | Age: 82
End: 2022-12-21
Payer: MEDICARE

## 2022-12-21 VITALS
HEIGHT: 67 IN | OXYGEN SATURATION: 100 % | SYSTOLIC BLOOD PRESSURE: 100 MMHG | HEART RATE: 76 BPM | DIASTOLIC BLOOD PRESSURE: 60 MMHG | WEIGHT: 126.31 LBS | BODY MASS INDEX: 19.82 KG/M2

## 2022-12-21 DIAGNOSIS — R73.03 PREDIABETES: ICD-10-CM

## 2022-12-21 DIAGNOSIS — I10 ESSENTIAL HYPERTENSION: Primary | ICD-10-CM

## 2022-12-21 DIAGNOSIS — R41.3 IMPAIRED MEMORY: ICD-10-CM

## 2022-12-21 DIAGNOSIS — R41.3 OTHER AMNESIA: ICD-10-CM

## 2022-12-21 DIAGNOSIS — R73.02 GLUCOSE INTOLERANCE (IMPAIRED GLUCOSE TOLERANCE): ICD-10-CM

## 2022-12-21 DIAGNOSIS — R79.89 LFT ELEVATION: ICD-10-CM

## 2022-12-21 DIAGNOSIS — E78.5 HYPERLIPIDEMIA, UNSPECIFIED HYPERLIPIDEMIA TYPE: ICD-10-CM

## 2022-12-21 DIAGNOSIS — E53.8 B12 DEFICIENCY: ICD-10-CM

## 2022-12-21 PROCEDURE — 1159F MED LIST DOCD IN RCRD: CPT | Mod: HCNC,CPTII,S$GLB, | Performed by: INTERNAL MEDICINE

## 2022-12-21 PROCEDURE — 3078F PR MOST RECENT DIASTOLIC BLOOD PRESSURE < 80 MM HG: ICD-10-PCS | Mod: HCNC,CPTII,S$GLB, | Performed by: INTERNAL MEDICINE

## 2022-12-21 PROCEDURE — 1126F AMNT PAIN NOTED NONE PRSNT: CPT | Mod: HCNC,CPTII,S$GLB, | Performed by: INTERNAL MEDICINE

## 2022-12-21 PROCEDURE — 99499 UNLISTED E&M SERVICE: CPT | Mod: HCNC,S$GLB,, | Performed by: INTERNAL MEDICINE

## 2022-12-21 PROCEDURE — 1160F RVW MEDS BY RX/DR IN RCRD: CPT | Mod: HCNC,CPTII,S$GLB, | Performed by: INTERNAL MEDICINE

## 2022-12-21 PROCEDURE — 3078F DIAST BP <80 MM HG: CPT | Mod: HCNC,CPTII,S$GLB, | Performed by: INTERNAL MEDICINE

## 2022-12-21 PROCEDURE — 99214 OFFICE O/P EST MOD 30 MIN: CPT | Mod: HCNC,S$GLB,, | Performed by: INTERNAL MEDICINE

## 2022-12-21 PROCEDURE — 99499 RISK ADDL DX/OHS AUDIT: ICD-10-PCS | Mod: HCNC,S$GLB,, | Performed by: INTERNAL MEDICINE

## 2022-12-21 PROCEDURE — 1160F PR REVIEW ALL MEDS BY PRESCRIBER/CLIN PHARMACIST DOCUMENTED: ICD-10-PCS | Mod: HCNC,CPTII,S$GLB, | Performed by: INTERNAL MEDICINE

## 2022-12-21 PROCEDURE — 1126F PR PAIN SEVERITY QUANTIFIED, NO PAIN PRESENT: ICD-10-PCS | Mod: HCNC,CPTII,S$GLB, | Performed by: INTERNAL MEDICINE

## 2022-12-21 PROCEDURE — 99999 PR PBB SHADOW E&M-EST. PATIENT-LVL V: CPT | Mod: PBBFAC,HCNC,, | Performed by: INTERNAL MEDICINE

## 2022-12-21 PROCEDURE — 3074F SYST BP LT 130 MM HG: CPT | Mod: HCNC,CPTII,S$GLB, | Performed by: INTERNAL MEDICINE

## 2022-12-21 PROCEDURE — 1159F PR MEDICATION LIST DOCUMENTED IN MEDICAL RECORD: ICD-10-PCS | Mod: HCNC,CPTII,S$GLB, | Performed by: INTERNAL MEDICINE

## 2022-12-21 PROCEDURE — 99214 PR OFFICE/OUTPT VISIT, EST, LEVL IV, 30-39 MIN: ICD-10-PCS | Mod: HCNC,S$GLB,, | Performed by: INTERNAL MEDICINE

## 2022-12-21 PROCEDURE — 3074F PR MOST RECENT SYSTOLIC BLOOD PRESSURE < 130 MM HG: ICD-10-PCS | Mod: HCNC,CPTII,S$GLB, | Performed by: INTERNAL MEDICINE

## 2022-12-21 PROCEDURE — 99999 PR PBB SHADOW E&M-EST. PATIENT-LVL V: ICD-10-PCS | Mod: PBBFAC,HCNC,, | Performed by: INTERNAL MEDICINE

## 2022-12-21 PROCEDURE — 1101F PR PT FALLS ASSESS DOC 0-1 FALLS W/OUT INJ PAST YR: ICD-10-PCS | Mod: HCNC,CPTII,S$GLB, | Performed by: INTERNAL MEDICINE

## 2022-12-21 PROCEDURE — 1101F PT FALLS ASSESS-DOCD LE1/YR: CPT | Mod: HCNC,CPTII,S$GLB, | Performed by: INTERNAL MEDICINE

## 2022-12-21 PROCEDURE — 3288F PR FALLS RISK ASSESSMENT DOCUMENTED: ICD-10-PCS | Mod: HCNC,CPTII,S$GLB, | Performed by: INTERNAL MEDICINE

## 2022-12-21 PROCEDURE — 3288F FALL RISK ASSESSMENT DOCD: CPT | Mod: HCNC,CPTII,S$GLB, | Performed by: INTERNAL MEDICINE

## 2022-12-21 RX ORDER — METOPROLOL SUCCINATE 25 MG/1
25 TABLET, EXTENDED RELEASE ORAL NIGHTLY
Qty: 90 TABLET | Refills: 1 | Status: SHIPPED | OUTPATIENT
Start: 2022-12-21 | End: 2023-06-16

## 2023-01-04 ENCOUNTER — HOSPITAL ENCOUNTER (OUTPATIENT)
Dept: RADIOLOGY | Facility: HOSPITAL | Age: 83
Discharge: HOME | End: 2023-01-04
Attending: INTERNAL MEDICINE
Payer: MEDICARE

## 2023-01-04 DIAGNOSIS — R41.3 OTHER AMNESIA: ICD-10-CM

## 2023-01-04 PROCEDURE — 70551 MRI BRAIN STEM W/O DYE: CPT | Mod: 26,,, | Performed by: STUDENT IN AN ORGANIZED HEALTH CARE EDUCATION/TRAINING PROGRAM

## 2023-01-04 PROCEDURE — 70551 MRI BRAIN STEM W/O DYE: CPT | Mod: TC

## 2023-01-04 PROCEDURE — 70551 MRI BRAIN WITHOUT CONTRAST: ICD-10-PCS | Mod: 26,,, | Performed by: STUDENT IN AN ORGANIZED HEALTH CARE EDUCATION/TRAINING PROGRAM

## 2023-01-06 ENCOUNTER — PATIENT MESSAGE (OUTPATIENT)
Dept: INTERNAL MEDICINE | Facility: CLINIC | Age: 83
End: 2023-01-06
Payer: COMMERCIAL

## 2023-01-06 DIAGNOSIS — I63.81 LACUNAR INFARCTION: ICD-10-CM

## 2023-01-06 DIAGNOSIS — I63.9 STROKE WITH CEREBRAL ISCHEMIA: Primary | ICD-10-CM

## 2023-01-06 RX ORDER — ATORVASTATIN CALCIUM 20 MG/1
20 TABLET, FILM COATED ORAL DAILY
Qty: 90 TABLET | Refills: 3 | Status: SHIPPED | OUTPATIENT
Start: 2023-01-06 | End: 2024-01-08

## 2023-01-06 RX ORDER — NAPROXEN SODIUM 220 MG/1
81 TABLET, FILM COATED ORAL DAILY
Qty: 90 TABLET | Refills: 1
Start: 2023-01-06 | End: 2023-06-22 | Stop reason: SDUPTHER

## 2023-01-06 NOTE — TELEPHONE ENCOUNTER
MRI Brain Without Contrast  Narrative: EXAMINATION:  MRI BRAIN WITHOUT CONTRAST    CLINICAL HISTORY:  Memory loss;.  Other amnesia    TECHNIQUE:  Multiplanar multisequence MR imaging of the brain was performed without contrast.    COMPARISON:  None    FINDINGS:  Intracranial compartment:    Generalized cerebral volume loss with compensatory widening of the sulci and ventricular system.  No hydrocephalus.  No extra-axial blood or fluid collections.    Few scattered punctate foci of DWI signal hyperintensity within the left corona radiata (series 4, 15-60), left temporal lobe (series 4, image 13), and right splenium corpus callosum (series 4, image 16).  Foci demonstrate corresponding intermediate signal on ADC and hyperintensity on T2/FLAIR sequences.  Findings are concerning for subacute lacunar type infarcts however cannot entirely exclude artifactual T2 shine through.    Patchy and confluent T2/FLAIR signal hyperintensity throughout the supratentorial white matter consistent with moderate degree chronic microvascular ischemic change.    No acute intracranial hemorrhage or parenchymal mass.  No midline shift.    Normal vascular flow voids are preserved.    Skull/extracranial contents (limited evaluation): Bone marrow signal intensity is normal.  Impression: 1. Few punctate foci of DWI signal hyperintensity within the bilateral cerebral hemispheres.  Findings are concerning for subacute lacunar type infarcts however cannot entirely exclude artifact.  Recommend clinical correlation.  2. Moderate degree chronic microvascular ischemic change.  3. Generalized cerebral volume loss.  This report was flagged in Epic as abnormal.    Electronically signed by: Bj Colindres  Date:    01/04/2023  Time:    15:50        Diagnoses and all orders for this visit:    Stroke with cerebral ischemia  -     aspirin 81 MG Chew; Take 1 tablet (81 mg total) by mouth once daily.  -     atorvastatin (LIPITOR) 20 MG tablet; Take 1 tablet (20  mg total) by mouth once daily.  -     Ambulatory referral/consult to Vascular Neurology; Future    Lacunar infarction        Future Appointments   Date Time Provider Department Center   6/19/2023  9:30 AM LAB, CAROLYN KENH [facility] [place]   6/22/2023  9:40 AM Angel Luis Tobias III, MD [facility]

## 2023-01-24 ENCOUNTER — OFFICE VISIT (OUTPATIENT)
Dept: NEUROLOGY | Facility: CLINIC | Age: 83
End: 2023-01-24
Payer: COMMERCIAL

## 2023-01-24 VITALS
BODY MASS INDEX: 20.16 KG/M2 | HEIGHT: 67 IN | WEIGHT: 128.44 LBS | DIASTOLIC BLOOD PRESSURE: 69 MMHG | SYSTOLIC BLOOD PRESSURE: 106 MMHG | HEART RATE: 60 BPM

## 2023-01-24 DIAGNOSIS — I10 ESSENTIAL HYPERTENSION: ICD-10-CM

## 2023-01-24 DIAGNOSIS — R73.03 PREDIABETES: ICD-10-CM

## 2023-01-24 DIAGNOSIS — E78.2 MIXED HYPERLIPIDEMIA: ICD-10-CM

## 2023-01-24 DIAGNOSIS — I63.9 STROKE WITH CEREBRAL ISCHEMIA: ICD-10-CM

## 2023-01-24 DIAGNOSIS — I63.413 CEREBROVASCULAR ACCIDENT (CVA) DUE TO BILATERAL EMBOLISM OF MIDDLE CEREBRAL ARTERIES: Primary | ICD-10-CM

## 2023-01-24 PROCEDURE — 3074F PR MOST RECENT SYSTOLIC BLOOD PRESSURE < 130 MM HG: ICD-10-PCS | Mod: CPTII,S$GLB,, | Performed by: STUDENT IN AN ORGANIZED HEALTH CARE EDUCATION/TRAINING PROGRAM

## 2023-01-24 PROCEDURE — 3288F PR FALLS RISK ASSESSMENT DOCUMENTED: ICD-10-PCS | Mod: CPTII,S$GLB,, | Performed by: STUDENT IN AN ORGANIZED HEALTH CARE EDUCATION/TRAINING PROGRAM

## 2023-01-24 PROCEDURE — 3078F PR MOST RECENT DIASTOLIC BLOOD PRESSURE < 80 MM HG: ICD-10-PCS | Mod: CPTII,S$GLB,, | Performed by: STUDENT IN AN ORGANIZED HEALTH CARE EDUCATION/TRAINING PROGRAM

## 2023-01-24 PROCEDURE — 3074F SYST BP LT 130 MM HG: CPT | Mod: CPTII,S$GLB,, | Performed by: STUDENT IN AN ORGANIZED HEALTH CARE EDUCATION/TRAINING PROGRAM

## 2023-01-24 PROCEDURE — 1101F PT FALLS ASSESS-DOCD LE1/YR: CPT | Mod: CPTII,S$GLB,, | Performed by: STUDENT IN AN ORGANIZED HEALTH CARE EDUCATION/TRAINING PROGRAM

## 2023-01-24 PROCEDURE — 1160F RVW MEDS BY RX/DR IN RCRD: CPT | Mod: CPTII,S$GLB,, | Performed by: STUDENT IN AN ORGANIZED HEALTH CARE EDUCATION/TRAINING PROGRAM

## 2023-01-24 PROCEDURE — 1159F PR MEDICATION LIST DOCUMENTED IN MEDICAL RECORD: ICD-10-PCS | Mod: CPTII,S$GLB,, | Performed by: STUDENT IN AN ORGANIZED HEALTH CARE EDUCATION/TRAINING PROGRAM

## 2023-01-24 PROCEDURE — 99999 PR PBB SHADOW E&M-EST. PATIENT-LVL V: ICD-10-PCS | Mod: PBBFAC,,, | Performed by: STUDENT IN AN ORGANIZED HEALTH CARE EDUCATION/TRAINING PROGRAM

## 2023-01-24 PROCEDURE — 1101F PR PT FALLS ASSESS DOC 0-1 FALLS W/OUT INJ PAST YR: ICD-10-PCS | Mod: CPTII,S$GLB,, | Performed by: STUDENT IN AN ORGANIZED HEALTH CARE EDUCATION/TRAINING PROGRAM

## 2023-01-24 PROCEDURE — 1159F MED LIST DOCD IN RCRD: CPT | Mod: CPTII,S$GLB,, | Performed by: STUDENT IN AN ORGANIZED HEALTH CARE EDUCATION/TRAINING PROGRAM

## 2023-01-24 PROCEDURE — 99999 PR PBB SHADOW E&M-EST. PATIENT-LVL V: CPT | Mod: PBBFAC,,, | Performed by: STUDENT IN AN ORGANIZED HEALTH CARE EDUCATION/TRAINING PROGRAM

## 2023-01-24 PROCEDURE — 1160F PR REVIEW ALL MEDS BY PRESCRIBER/CLIN PHARMACIST DOCUMENTED: ICD-10-PCS | Mod: CPTII,S$GLB,, | Performed by: STUDENT IN AN ORGANIZED HEALTH CARE EDUCATION/TRAINING PROGRAM

## 2023-01-24 PROCEDURE — 1126F AMNT PAIN NOTED NONE PRSNT: CPT | Mod: CPTII,S$GLB,, | Performed by: STUDENT IN AN ORGANIZED HEALTH CARE EDUCATION/TRAINING PROGRAM

## 2023-01-24 PROCEDURE — 3288F FALL RISK ASSESSMENT DOCD: CPT | Mod: CPTII,S$GLB,, | Performed by: STUDENT IN AN ORGANIZED HEALTH CARE EDUCATION/TRAINING PROGRAM

## 2023-01-24 PROCEDURE — 99205 PR OFFICE/OUTPT VISIT, NEW, LEVL V, 60-74 MIN: ICD-10-PCS | Mod: S$GLB,,, | Performed by: STUDENT IN AN ORGANIZED HEALTH CARE EDUCATION/TRAINING PROGRAM

## 2023-01-24 PROCEDURE — 99205 OFFICE O/P NEW HI 60 MIN: CPT | Mod: S$GLB,,, | Performed by: STUDENT IN AN ORGANIZED HEALTH CARE EDUCATION/TRAINING PROGRAM

## 2023-01-24 PROCEDURE — 1126F PR PAIN SEVERITY QUANTIFIED, NO PAIN PRESENT: ICD-10-PCS | Mod: CPTII,S$GLB,, | Performed by: STUDENT IN AN ORGANIZED HEALTH CARE EDUCATION/TRAINING PROGRAM

## 2023-01-24 PROCEDURE — 3078F DIAST BP <80 MM HG: CPT | Mod: CPTII,S$GLB,, | Performed by: STUDENT IN AN ORGANIZED HEALTH CARE EDUCATION/TRAINING PROGRAM

## 2023-01-24 NOTE — PATIENT INSTRUCTIONS
- Continue taking Aspirin 81 mg   - Continue taking Atorvastatin 20 mg   - We will get the following tests    - Lab work - Lipid panel (fasting),A1c, Renal function     - CTA head and neck to look at your vessels     - Echocardiogram to look at your heart     - 30 day event monitor  - will set up with an appointment   - Follow up in 2-3 months

## 2023-01-24 NOTE — PROGRESS NOTES
Vascular Neurology Clinic  Initial Consult    Patient Name: Dean Hahn  MRN: [id number]    CC: Abnormal MRI   HPI: Dean Hahn is a 82 y.o. R-handed male w/ PMH significant for HTN, Pre-Dm, HLD  presenting as referral  from Dr. Tobias status-post abnormal MRI.   MRI was obtained due to reported memory deficits, and it was noted that he had several multifocal areas of diffusion restriction consistent w/ acute/subacute infarcts.   Patient denies any focal neurologic deficits, denies any numbness or tingling.   Partner at beside also denies any episodes consistent w/ stroke symptoms.   She does inquire if these could have been brought on by stress.   Currently on ASA 81 mg and Atorvastatin 20 mg QHS.   He does report that he has had about a 20 lbs weight loss, unintentional.   Denies any B-symptoms  SO reports good appetite,   Recommend close monitoring and bringing this up to his PCP.       Brain Imaging  2023        1. Few punctate foci of DWI signal hyperintensity within the bilateral cerebral hemispheres.  Findings are concerning for subacute lacunar type infarcts however cannot entirely exclude artifact.  Recommend clinical correlation.  2. Moderate degree chronic microvascular ischemic change.  3. Generalized cerebral volume loss.  Vessel Imaging  N/A  Cardiac Evaluation  N/A    Relevant Lab work   Recent Labs   Lab 21  1422   Hemoglobin A1C 5.4   LDL Cholesterol 101.0   HDL 36 L   Triglycerides 170 H   Cholesterol 171         NIH Stroke Scale:  Interval: baseline (upon arrival/admit)  Level of Consciousness: 0 - alert  LOC Questions: 0 - answers both correctly  LOC Commands: 0 - performs both correctly  Best Gaze: 0 - normal  Visual: 0 - no visual loss  Facial Palsy: 0 - normal  Motor Left Arm: 0 - no drift  Motor Right Arm: 0 - no drift  Motor Left Le - no drift  Motor Right Le - no drift  Limb Ataxia: 0 - absent  Sensory: 0 - normal  Best Language: 0 - no  aphasia  Dysarthria: 0 - normal articulation  Extinction and Inattention: 0 - no neglect  NIH Stroke Scale Total: 0       Review of Systems:  General: No fevers, chills  Eyes: No changes in vision  ENT: No changes in hearing  Respiratory: No SOB  CV: No chest pain, palpitations  GI: No diarrhea, blood in stool  Urinary: No dysuria, hematuria  Skin: No rashes  Neurological: No weakness, confusion  Psychiatric: No auditory nor visual hallucinations      Past Medical History  Past Medical History:   Diagnosis Date    Cataract     HLD (hyperlipidemia)     Hypertension        Medications    Current Outpatient Medications:     amLODIPine (NORVASC) 10 MG tablet, Take 1 tablet (10 mg total) by mouth once daily., Disp: 90 tablet, Rfl: 1    aspirin 81 MG Chew, Take 1 tablet (81 mg total) by mouth once daily., Disp: 90 tablet, Rfl: 1    atorvastatin (LIPITOR) 20 MG tablet, Take 1 tablet (20 mg total) by mouth once daily., Disp: 90 tablet, Rfl: 3    cyanocobalamin (VITAMIN B-12) 100 MCG tablet, Take 1 tablet (100 mcg total) by mouth once daily., Disp: 90 tablet, Rfl: 1    dorzolamide-timolol 2-0.5% (COSOPT) 22.3-6.8 mg/mL ophthalmic solution, Place 1 drop into both eyes 2 (two) times daily., Disp: 10 mL, Rfl: 1    fish oil-omega-3 fatty acids 300-1,000 mg capsule, Take 2 g by mouth once daily., Disp: , Rfl:     metoprolol succinate (TOPROL-XL) 25 MG 24 hr tablet, Take 1 tablet (25 mg total) by mouth every evening., Disp: 90 tablet, Rfl: 1    multivitamin capsule, Take 1 capsule by mouth once daily., Disp: , Rfl:     olmesartan-hydrochlorothiazide (BENICAR HCT) 40-12.5 mg Tab, TAKE 1 TABLET BY MOUTH EVERY DAY, Disp: 90 tablet, Rfl: 2    potassium chloride (KLOR-CON 10) 10 MEQ TbSR, TAKE 1 TABLET BY MOUTH DAILY EXCEPT TAKE 2 TABS ON MON, WED AND FRIDAY, Disp: 120 tablet, Rfl: 3  Any other notable medications as documented in HPI    Allergies  Review of patient's allergies indicates:  No Known Allergies    Social History  Social  "History     Socioeconomic History    Marital status:     Number of children: 5   Occupational History    Occupation: Former electrican    Tobacco Use    Smoking status: Never    Smokeless tobacco: Never   Substance and Sexual Activity    Alcohol use: Yes    Drug use: No    Sexual activity: Yes     Partners: Female     Comment: wife   Social History Narrative    Orginally from    [facility], [facility] electrical - retired [employer]      - [employer] - 5 years ,      Children    3 girls , 2 boys - 1 daughter oldest in [place]     Any other notable Social History as documented in HPI.    Family History  Family History   Problem Relation Age of Onset    Hypertension Mother     Heart attack Father     No Known Problems Sister     Hypertension Brother     No Known Problems Daughter     No Known Problems Son     Cirrhosis Neg Hx      Any other notable FMH as documented in HPI.    Physical Exam  /69   Pulse 60   Ht 5' 7" (1.702 m)   Wt 58.3 kg (128 lb 6.7 oz)   BMI 20.11 kg/m²     General: Well-developed, well-groomed. No apparent distress. Thin gentleman.   HENT: Normocephalic, atraumatic.    Cardiovascular: Regular rate and rhythm  Chest: No audible wheezes, stridor, ronchi appreciated.  Musculoskeletal: No peripheral edema    Neurologic Exam: The patient is awake, alert and oriented to person, place, time and situation. Attentive, vigilant during exam. Language is fluent. .    Cranial nerves:   CN II: Visual fields are full to confrontation. Pupils are 4 mm and briskly reactive to light.  CN III, IV, VI: EOMI, no nystagmus, no ptosis  CN V: Facial sensation is intact in all 3 divisions bilaterally.  CN VII: Face is symmetric with normal eye closure and smile.  CN VIII: Hearing is normal bilaterally  CN IX, X: Palate elevates symmetrically. Phonation is normal.  CN XI: Head turning and shoulder shrug are intact  CN XII: Tongue is midline with normal movements and no " atrophy.    Motor examination of all extremities demonstrates normal bulk and tone in all four limbs. There are no atrophy or fasciculations.      Left Right   Left Right   Deltoid 5/5 5/5  Hip Flexion 5/5 5/5   Biceps 5/5 5/5  Hip Extension 5/5 5/5   Triceps 5/5 5/5  Knee Flexion 5/5 5/5   Wrist Ext 5/5 5/5  Knee Extension 5/5 5/5   Finger Abd 5/5 5/5  Ankle dorsiflex 5/5 5/5       Ankle plantar flex 5/5 5/5     Sensory examination is normal light touch in BUE and BLE.  Romberg is negative.    Deep tendon reflexes are 2+ and symmetric in the upper and lower extremities bilaterally.      Gait: Normal tandem, and casual gait.     Coordination: No dysmetria with finger-to-nose. Rapid alternating movements and fine finger movements are intact.     Assessment and Plan    Diagnosis/Etiology:Multifocal Ischemic Strokes  Stroke Risk Factors:HTN, Pre-Dm, HLD   - Continue taking Aspirin 81 mg   - Continue taking Atorvastatin 20 mg   - We will get the following tests    - Lab work - Lipid panel (fasting),A1c, Renal function     - CTA head and neck to look at your vessels     - Echocardiogram to look at your heart     - 30 day event monitor  - will set up with an appointment   - Follow up in 2-3 months     Recommendations:   Antiplatelet/Anticoagulation:ASA 81 mg QD  Lipid Management: Atorvastatin 20 mg QHS (long-term goal LDL < 70).   - Monitoring for liver dysfunction and myopathy is suggested for statins. To be addressed by PCP  Diabetes: Target hemoglobin A1c <7%, measured 2X/year or quarterly if not meeting goals  Hypertension: Long term goal is normotension w/ target BP of less than 130/80 mmHg  Sleep: Denies any symptoms of GERARD. Consider Sleep Medicine follow up in the future if suspicion for GERARD occurs.   Diet: Discussed Mediterranean Diet recommendations (Adopted from Clemente et al, [facility], 2018.)  - Eat primarily plant-based foods, such as fruits and vegetables, whole grains, legumes (beans) and nuts  - Limit refined  carbohydrates (white pasta, bread, rice).  - Replace butter with healthy fats such as olive oil.  - Use herbs and spices instead of salt to flavor foods.  - Limit red meat and processed meats to no more than a few times a month.  - Avoid sugary sodas, bakery goods, and sweets.  - Eat fish and poultry at least twice a week.  - Get plenty of exercise (150 minutes per week).    Problem List Items Addressed This Visit          Neuro    Stroke with cerebral ischemia    Relevant Orders    CTA Head and Neck (xpd) (Completed)    RENAL FUNCTION PANEL    Echo Saline Bubble? No    Cardiac event monitor       Cardiac/Vascular    Essential hypertension    Mixed hyperlipidemia       Endocrine    Prediabetes    Overview     04/28/16 HgA1c 6.3          Other Visit Diagnoses       Cerebrovascular accident (CVA) due to bilateral embolism of middle cerebral arteries    -  Primary    Relevant Orders    CTA Head and Neck (xpd) (Completed)    RENAL FUNCTION PANEL    Echo Saline Bubble? No    Cardiac event monitor    LIPID PANEL    HEMOGLOBIN A1C                Sommer Fleming MD  Vascular Neurology  [facility]  [address] [place] [id number]

## 2023-02-02 ENCOUNTER — CLINICAL SUPPORT (OUTPATIENT)
Dept: CARDIOLOGY | Facility: HOSPITAL | Age: 83
End: 2023-02-02
Attending: STUDENT IN AN ORGANIZED HEALTH CARE EDUCATION/TRAINING PROGRAM
Payer: MEDICARE

## 2023-02-02 DIAGNOSIS — I63.9 STROKE WITH CEREBRAL ISCHEMIA: ICD-10-CM

## 2023-02-02 DIAGNOSIS — I63.413 CEREBROVASCULAR ACCIDENT (CVA) DUE TO BILATERAL EMBOLISM OF MIDDLE CEREBRAL ARTERIES: ICD-10-CM

## 2023-02-02 PROCEDURE — 93272 ECG/REVIEW INTERPRET ONLY: CPT | Mod: ,,, | Performed by: INTERNAL MEDICINE

## 2023-02-02 PROCEDURE — 93270 REMOTE 30 DAY ECG REV/REPORT: CPT

## 2023-02-02 PROCEDURE — 93272 CARDIAC EVENT MONITOR (CUPID ONLY): ICD-10-PCS | Mod: ,,, | Performed by: INTERNAL MEDICINE

## 2023-02-03 ENCOUNTER — HOSPITAL ENCOUNTER (OUTPATIENT)
Dept: RADIOLOGY | Facility: HOSPITAL | Age: 83
Discharge: HOME | End: 2023-02-03
Attending: STUDENT IN AN ORGANIZED HEALTH CARE EDUCATION/TRAINING PROGRAM
Payer: MEDICARE

## 2023-02-03 DIAGNOSIS — I63.9 STROKE WITH CEREBRAL ISCHEMIA: ICD-10-CM

## 2023-02-03 DIAGNOSIS — I63.413 CEREBROVASCULAR ACCIDENT (CVA) DUE TO BILATERAL EMBOLISM OF MIDDLE CEREBRAL ARTERIES: ICD-10-CM

## 2023-02-03 LAB
CREAT SERPL-MCNC: 1.6 MG/DL (ref 0.5–1.4)
SAMPLE: ABNORMAL

## 2023-02-03 PROCEDURE — 70496 CT ANGIOGRAPHY HEAD: CPT | Mod: 26,HCNC,, | Performed by: RADIOLOGY

## 2023-02-03 PROCEDURE — 70496 CT ANGIOGRAPHY HEAD: CPT | Mod: TC,HCNC

## 2023-02-03 PROCEDURE — 25500020 PHARM REV CODE 255: Mod: HCNC | Performed by: STUDENT IN AN ORGANIZED HEALTH CARE EDUCATION/TRAINING PROGRAM

## 2023-02-03 PROCEDURE — 70498 CTA HEAD AND NECK (XPD): ICD-10-PCS | Mod: 26,HCNC,, | Performed by: RADIOLOGY

## 2023-02-03 PROCEDURE — 70496 CTA HEAD AND NECK (XPD): ICD-10-PCS | Mod: 26,HCNC,, | Performed by: RADIOLOGY

## 2023-02-03 PROCEDURE — 70498 CT ANGIOGRAPHY NECK: CPT | Mod: 26,HCNC,, | Performed by: RADIOLOGY

## 2023-02-03 RX ADMIN — IOHEXOL 100 ML: 350 INJECTION, SOLUTION INTRAVENOUS at 09:02

## 2023-02-07 DIAGNOSIS — Z00.00 ENCOUNTER FOR MEDICARE ANNUAL WELLNESS EXAM: ICD-10-CM

## 2023-02-09 DIAGNOSIS — Z00.00 ENCOUNTER FOR MEDICARE ANNUAL WELLNESS EXAM: ICD-10-CM

## 2023-02-14 ENCOUNTER — HOSPITAL ENCOUNTER (OUTPATIENT)
Dept: CARDIOLOGY | Facility: HOSPITAL | Age: 83
Discharge: HOME | End: 2023-02-14
Attending: STUDENT IN AN ORGANIZED HEALTH CARE EDUCATION/TRAINING PROGRAM
Payer: MEDICARE

## 2023-02-14 VITALS
SYSTOLIC BLOOD PRESSURE: 110 MMHG | HEART RATE: 74 BPM | HEIGHT: 67 IN | WEIGHT: 128 LBS | DIASTOLIC BLOOD PRESSURE: 64 MMHG | BODY MASS INDEX: 20.09 KG/M2

## 2023-02-14 DIAGNOSIS — I63.9 STROKE WITH CEREBRAL ISCHEMIA: ICD-10-CM

## 2023-02-14 DIAGNOSIS — I63.413 CEREBROVASCULAR ACCIDENT (CVA) DUE TO BILATERAL EMBOLISM OF MIDDLE CEREBRAL ARTERIES: ICD-10-CM

## 2023-02-14 LAB
ASCENDING AORTA: 2.45 CM
AV INDEX (PROSTH): 0.81
AV MEAN GRADIENT: 3 MMHG
AV PEAK GRADIENT: 7 MMHG
AV VALVE AREA: 2.52 CM2
AV VELOCITY RATIO: 0.83
BSA FOR ECHO PROCEDURE: 1.66 M2
CV ECHO LV RWT: 0.34 CM
DOP CALC AO PEAK VEL: 1.32 M/S
DOP CALC AO VTI: 27.13 CM
DOP CALC LVOT AREA: 3.1 CM2
DOP CALC LVOT DIAMETER: 1.99 CM
DOP CALC LVOT PEAK VEL: 1.1 M/S
DOP CALC LVOT STROKE VOLUME: 68.33 CM3
DOP CALCLVOT PEAK VEL VTI: 21.98 CM
E WAVE DECELERATION TIME: 194.49 MSEC
E/A RATIO: 0.77
E/E' RATIO: 10.71 M/S
ECHO LV POSTERIOR WALL: 0.64 CM (ref 0.6–1.1)
EJECTION FRACTION: 60 %
FRACTIONAL SHORTENING: 29 % (ref 28–44)
INTERVENTRICULAR SEPTUM: 0.71 CM (ref 0.6–1.1)
IVRT: 91.34 MSEC
LA MAJOR: 4.62 CM
LA MINOR: 4.57 CM
LA WIDTH: 3.46 CM
LEFT ATRIUM SIZE: 2.93 CM
LEFT ATRIUM VOLUME INDEX MOD: 23.3 ML/M2
LEFT ATRIUM VOLUME INDEX: 23.7 ML/M2
LEFT ATRIUM VOLUME MOD: 38.99 CM3
LEFT ATRIUM VOLUME: 39.59 CM3
LEFT INTERNAL DIMENSION IN SYSTOLE: 2.63 CM (ref 2.1–4)
LEFT VENTRICLE DIASTOLIC VOLUME INDEX: 35.25 ML/M2
LEFT VENTRICLE DIASTOLIC VOLUME: 58.86 ML
LEFT VENTRICLE MASS INDEX: 40 G/M2
LEFT VENTRICLE SYSTOLIC VOLUME INDEX: 15.2 ML/M2
LEFT VENTRICLE SYSTOLIC VOLUME: 25.42 ML
LEFT VENTRICULAR INTERNAL DIMENSION IN DIASTOLE: 3.72 CM (ref 3.5–6)
LEFT VENTRICULAR MASS: 66.2 G
LV LATERAL E/E' RATIO: 9.38 M/S
LV SEPTAL E/E' RATIO: 12.5 M/S
MV A" WAVE DURATION": 9.13 MSEC
MV PEAK A VEL: 0.97 M/S
MV PEAK E VEL: 0.75 M/S
MV STENOSIS PRESSURE HALF TIME: 56.4 MS
MV VALVE AREA P 1/2 METHOD: 3.9 CM2
PISA TR MAX VEL: 2.09 M/S
PULM VEIN S/D RATIO: 2.05
PV PEAK D VEL: 0.43 M/S
PV PEAK S VEL: 0.88 M/S
RA MAJOR: 4.03 CM
RA PRESSURE: 3 MMHG
RA WIDTH: 3.06 CM
RIGHT VENTRICULAR END-DIASTOLIC DIMENSION: 3.38 CM
RV TISSUE DOPPLER FREE WALL SYSTOLIC VELOCITY 1 (APICAL 4 CHAMBER VIEW): 13.52 CM/S
SINUS: 3.21 CM
STJ: 2.41 CM
TDI LATERAL: 0.08 M/S
TDI SEPTAL: 0.06 M/S
TDI: 0.07 M/S
TR MAX PG: 17 MMHG
TRICUSPID ANNULAR PLANE SYSTOLIC EXCURSION: 2.36 CM
TV REST PULMONARY ARTERY PRESSURE: 20 MMHG

## 2023-02-14 PROCEDURE — 93306 TTE W/DOPPLER COMPLETE: CPT

## 2023-02-14 PROCEDURE — 93306 ECHO (CUPID ONLY): ICD-10-PCS | Mod: 26,,, | Performed by: INTERNAL MEDICINE

## 2023-02-14 PROCEDURE — 93306 TTE W/DOPPLER COMPLETE: CPT | Mod: 26,,, | Performed by: INTERNAL MEDICINE

## 2023-04-03 ENCOUNTER — TELEPHONE (OUTPATIENT)
Dept: FAMILY MEDICINE | Facility: CLINIC | Age: 83
End: 2023-04-03
Payer: COMMERCIAL

## 2023-04-10 ENCOUNTER — OFFICE VISIT (OUTPATIENT)
Dept: FAMILY MEDICINE | Facility: CLINIC | Age: 83
End: 2023-04-10
Payer: MEDICARE

## 2023-04-10 VITALS
DIASTOLIC BLOOD PRESSURE: 70 MMHG | HEIGHT: 67 IN | WEIGHT: 125.69 LBS | BODY MASS INDEX: 19.73 KG/M2 | HEART RATE: 62 BPM | OXYGEN SATURATION: 99 % | SYSTOLIC BLOOD PRESSURE: 104 MMHG

## 2023-04-10 DIAGNOSIS — Z74.09 OTHER REDUCED MOBILITY: ICD-10-CM

## 2023-04-10 DIAGNOSIS — E78.2 MIXED HYPERLIPIDEMIA: ICD-10-CM

## 2023-04-10 DIAGNOSIS — Z00.00 ENCOUNTER FOR PREVENTIVE HEALTH EXAMINATION: Primary | ICD-10-CM

## 2023-04-10 DIAGNOSIS — D22.30 CHANGE IN FACIAL MOLE: ICD-10-CM

## 2023-04-10 DIAGNOSIS — I77.9 BILATERAL CAROTID ARTERY DISEASE, UNSPECIFIED TYPE: ICD-10-CM

## 2023-04-10 DIAGNOSIS — I10 ESSENTIAL HYPERTENSION: ICD-10-CM

## 2023-04-10 DIAGNOSIS — Z00.00 ENCOUNTER FOR MEDICARE ANNUAL WELLNESS EXAM: ICD-10-CM

## 2023-04-10 PROBLEM — I63.9 STROKE WITH CEREBRAL ISCHEMIA: Status: RESOLVED | Noted: 2023-01-06 | Resolved: 2023-04-10

## 2023-04-10 PROBLEM — F10.10 ENGAGES IN BINGE CONSUMPTION OF ALCOHOL: Status: RESOLVED | Noted: 2018-08-20 | Resolved: 2023-04-10

## 2023-04-10 PROBLEM — R91.1 PULMONARY NODULE: Status: ACTIVE | Noted: 2023-04-10

## 2023-04-10 PROCEDURE — 1170F PR FUNCTIONAL STATUS ASSESSED: ICD-10-PCS | Mod: CPTII,S$GLB,, | Performed by: NURSE PRACTITIONER

## 2023-04-10 PROCEDURE — 3288F FALL RISK ASSESSMENT DOCD: CPT | Mod: CPTII,S$GLB,, | Performed by: NURSE PRACTITIONER

## 2023-04-10 PROCEDURE — 3288F PR FALLS RISK ASSESSMENT DOCUMENTED: ICD-10-PCS | Mod: CPTII,S$GLB,, | Performed by: NURSE PRACTITIONER

## 2023-04-10 PROCEDURE — 99999 PR PBB SHADOW E&M-EST. PATIENT-LVL V: ICD-10-PCS | Mod: PBBFAC,,, | Performed by: NURSE PRACTITIONER

## 2023-04-10 PROCEDURE — 1170F FXNL STATUS ASSESSED: CPT | Mod: CPTII,S$GLB,, | Performed by: NURSE PRACTITIONER

## 2023-04-10 PROCEDURE — 1126F AMNT PAIN NOTED NONE PRSNT: CPT | Mod: CPTII,S$GLB,, | Performed by: NURSE PRACTITIONER

## 2023-04-10 PROCEDURE — 1160F RVW MEDS BY RX/DR IN RCRD: CPT | Mod: CPTII,S$GLB,, | Performed by: NURSE PRACTITIONER

## 2023-04-10 PROCEDURE — 3074F PR MOST RECENT SYSTOLIC BLOOD PRESSURE < 130 MM HG: ICD-10-PCS | Mod: CPTII,S$GLB,, | Performed by: NURSE PRACTITIONER

## 2023-04-10 PROCEDURE — G0439 PPPS, SUBSEQ VISIT: HCPCS | Mod: S$GLB,,, | Performed by: NURSE PRACTITIONER

## 2023-04-10 PROCEDURE — 99999 PR PBB SHADOW E&M-EST. PATIENT-LVL V: CPT | Mod: PBBFAC,,, | Performed by: NURSE PRACTITIONER

## 2023-04-10 PROCEDURE — 1159F PR MEDICATION LIST DOCUMENTED IN MEDICAL RECORD: ICD-10-PCS | Mod: CPTII,S$GLB,, | Performed by: NURSE PRACTITIONER

## 2023-04-10 PROCEDURE — G0439 PR MEDICARE ANNUAL WELLNESS SUBSEQUENT VISIT: ICD-10-PCS | Mod: S$GLB,,, | Performed by: NURSE PRACTITIONER

## 2023-04-10 PROCEDURE — 1126F PR PAIN SEVERITY QUANTIFIED, NO PAIN PRESENT: ICD-10-PCS | Mod: CPTII,S$GLB,, | Performed by: NURSE PRACTITIONER

## 2023-04-10 PROCEDURE — 3078F DIAST BP <80 MM HG: CPT | Mod: CPTII,S$GLB,, | Performed by: NURSE PRACTITIONER

## 2023-04-10 PROCEDURE — 1101F PT FALLS ASSESS-DOCD LE1/YR: CPT | Mod: CPTII,S$GLB,, | Performed by: NURSE PRACTITIONER

## 2023-04-10 PROCEDURE — 3078F PR MOST RECENT DIASTOLIC BLOOD PRESSURE < 80 MM HG: ICD-10-PCS | Mod: CPTII,S$GLB,, | Performed by: NURSE PRACTITIONER

## 2023-04-10 PROCEDURE — 1160F PR REVIEW ALL MEDS BY PRESCRIBER/CLIN PHARMACIST DOCUMENTED: ICD-10-PCS | Mod: CPTII,S$GLB,, | Performed by: NURSE PRACTITIONER

## 2023-04-10 PROCEDURE — 1159F MED LIST DOCD IN RCRD: CPT | Mod: CPTII,S$GLB,, | Performed by: NURSE PRACTITIONER

## 2023-04-10 PROCEDURE — 1101F PR PT FALLS ASSESS DOC 0-1 FALLS W/OUT INJ PAST YR: ICD-10-PCS | Mod: CPTII,S$GLB,, | Performed by: NURSE PRACTITIONER

## 2023-04-10 PROCEDURE — 3074F SYST BP LT 130 MM HG: CPT | Mod: CPTII,S$GLB,, | Performed by: NURSE PRACTITIONER

## 2023-04-10 NOTE — PROGRESS NOTES
"  Dean Hahn presented for a  Medicare AWV and comprehensive Health Risk Assessment today. The following components were reviewed and updated:    Medical history  Family History  Social history  Allergies and Current Medications  Health Risk Assessment  Health Maintenance  Care Team         ** See Completed Assessments for Annual Wellness Visit within the encounter summary.**         The following assessments were completed:  Living Situation  CAGE  Depression Screening  Timed Get Up and Go  Whisper Test  Cognitive Function Screening      Nutrition Screening  ADL Screening  PAQ Screening        Vitals:    04/10/23 0806   BP: 104/70   BP Location: Right arm   Patient Position: Sitting   BP Method: Medium (Manual)   Pulse: 62   SpO2: 99%   Weight: 57 kg (125 lb 10.6 oz)   Height: 5' 7" (1.702 m)     Body mass index is 19.68 kg/m².    Physical Exam  Vitals reviewed.   Constitutional:       General: He is not in acute distress.     Appearance: Normal appearance. He is well-developed, well-groomed and normal weight.   HENT:      Head: Normocephalic.        Right Ear: External ear normal.      Left Ear: External ear normal.   Eyes:      General:         Right eye: No discharge.         Left eye: No discharge.   Cardiovascular:      Rate and Rhythm: Normal rate.   Pulmonary:      Effort: Pulmonary effort is normal. No respiratory distress.   Abdominal:      General: There is no distension.   Skin:     General: Skin is warm and dry.      Coloration: Skin is not pale.      Findings: Lesion present.             Comments: Mole to area marked in red, black in color; no drainage, no surrounding erythema; denies pain, TTP or itching, reports increasing size   Neurological:      Mental Status: He is alert and oriented to person, place, and time.      Coordination: Coordination normal.      Gait: Gait normal.   Psychiatric:         Attention and Perception: Attention normal.         Mood and Affect: Mood and affect normal.    "      Speech: Speech normal.         Behavior: Behavior normal. Behavior is cooperative.         Thought Content: Thought content normal.           Diagnoses and health risks identified today and associated recommendations/orders:    1. Encounter for preventive health examination    2. Bilateral carotid artery disease, unspecified type  As seen on recent CTA imaging; stable. Follow up with PCP.    3. Mixed hyperlipidemia  Chronic; stable on medication. Follow up with PCP.    4. Essential hypertension  Chronic; stable on medication. Follow up with PCP.    5. Other reduced mobility  Ambulates independently; slightly slowed but stable gait. Follow up with PCP.    6. Change in facial mole  Mole to right cheek; wife and patient report it has grown in size in the past year; recommended Derm evaluation to which patient agrees. Referral placed today.  - Ambulatory referral/consult to Dermatology; Future    7. Body mass index (BMI) of 19.0 to 19.9 in adult  Patient's BMI is WNL. Continue to eat a low salt/low fat diet and discussed importance of engaging in physical activity at least 5x/week for a minimum of 30 min/day.    8. Encounter for Medicare annual wellness exam  - Ambulatory Referral/Consult to Enhanced Annual Wellness Visit (eAWV)      Provided Dean with a 5-10 year written screening schedule and personal prevention plan. Recommendations were developed using the USPSTF age appropriate recommendations. Education, counseling, and referrals were provided as needed. After Visit Summary printed and given to patient which includes a list of additional screenings/tests needed.    Follow up for your next annual wellness visit.    Tiffany Stone NP      Advance Care Planning     I offered to discuss advanced care planning, including how to pick a person who would make decisions for you if you were unable to make them for yourself, called a health care power of , and what kind of decisions you might make such as  use of life sustaining treatments such as ventilators and tube feeding when faced with a life limiting illness recorded on a living will that they will need to know. (How you want to be cared for as you near the end of your natural life)     X Patient is interested in learning more about how to make advanced directives. Had spouse listed as HCPOA but wishes to complete Living Will form as well, also wants to add 2nd choice HCPOA. I provided them paperwork and offered to discuss this with them.

## 2023-04-10 NOTE — PATIENT INSTRUCTIONS
Counseling and Referral of Other Preventative  (Italic type indicates deductible and co-insurance are waived)    Patient Name: Dean Hahn  Today's Date: 4/10/2023    Health Maintenance       Date Due Completion Date    Hemoglobin A1c (Prediabetes) 11/18/2022 11/18/2021    Shingles Vaccine (1 of 2) 05/05/2023 (Originally 10/16/1990) ---    COVID-19 Vaccine (5 - Booster) 05/05/2023 (Originally 2/2/2022) 12/8/2021    Lipid Panel 11/18/2026 11/18/2021    TETANUS VACCINE 02/19/2031 2/19/2021        No orders of the defined types were placed in this encounter.      The following information is provided to all patients.  This information is to help you find resources for any of the problems found today that may be affecting your health:                Living healthy guide: www.[facility].[place].gov      Understanding Diabetes: www.diabetes.org      Eating healthy: www.cdc.gov/healthyweight      [facility] home safety checklist: www.cdc.gov/steadi/patient.html      Agency on Aging: www.goea.[place].[facility]      Alcoholics anonymous (AA): www.aa.org      Physical Activity: www.erickson.nih.gov/cr0llqw      Tobacco use: www.quitwithusla.org

## 2023-05-19 DIAGNOSIS — I10 ESSENTIAL HYPERTENSION: ICD-10-CM

## 2023-05-19 RX ORDER — AMLODIPINE BESYLATE 10 MG/1
TABLET ORAL
Qty: 90 TABLET | Refills: 2 | Status: SHIPPED | OUTPATIENT
Start: 2023-05-19 | End: 2024-02-21

## 2023-05-19 NOTE — TELEPHONE ENCOUNTER
Refill Routing Note   Medication(s) are not appropriate for processing by [facility] for the following reason(s):      Drug-disease interaction    [facility] action(s):  Defer None identified   Medication Therapy Plan: Drug-Disease: amLODIPine and Total bilirubin, elevated; FLOS 06/19/23      Appointments  past 12m or future 3m with PCP    Date Provider   Last Visit   12/21/2022 Angel Luis Tobias III, MD   Next Visit   6/22/2023 Angel Luis Tobias III, MD   ED visits in past 90 days: 0        Note composed:7:54 AM 05/19/2023

## 2023-05-19 NOTE — TELEPHONE ENCOUNTER
Care Due:                  Date            Visit Type   Department     Provider  --------------------------------------------------------------------------------                                EP -                              PRIMARY      [facility] INTERNAL  Last Visit: 12-      CARE ([facility])   MEDICINE       Angel Luis Tobias                               -                              PRIMARY      [facility] INTERNAL  Next Visit: 06-      CARE ([facility])   MEDICINE       Angel Luis Tobias                                                            Last  Test          Frequency    Reason                     Performed    Due Date  --------------------------------------------------------------------------------    Lipid Panel.  12 months..  atorvastatin.............  11- 11-    [facility] Embedded Care Due Messages. Reference number: [id number].   5/19/2023 12:18:57 AM CDT

## 2023-06-16 DIAGNOSIS — I10 ESSENTIAL HYPERTENSION: ICD-10-CM

## 2023-06-16 RX ORDER — METOPROLOL SUCCINATE 25 MG/1
TABLET, EXTENDED RELEASE ORAL
Qty: 90 TABLET | Refills: 1 | Status: SHIPPED | OUTPATIENT
Start: 2023-06-16 | End: 2023-12-13

## 2023-06-16 NOTE — TELEPHONE ENCOUNTER
Refill Decision Note   Deanjurgen GallagherSelma  is requesting a refill authorization.  Brief Assessment and Rationale for Refill:  Approve     Medication Therapy Plan:       Medication Reconciliation Completed: No   Comments:     No Care Gaps recommended.     Note composed:3:53 AM 06/16/2023

## 2023-06-16 NOTE — TELEPHONE ENCOUNTER
No care due was identified.  [facility] Embedded Care Due Messages. Reference number: [id number].   6/16/2023 12:17:30 AM CDT

## 2023-06-19 ENCOUNTER — LAB VISIT (OUTPATIENT)
Dept: LAB | Facility: HOSPITAL | Age: 83
End: 2023-06-19
Attending: INTERNAL MEDICINE
Payer: MEDICARE

## 2023-06-19 DIAGNOSIS — R79.89 LFT ELEVATION: ICD-10-CM

## 2023-06-19 DIAGNOSIS — E78.5 HYPERLIPIDEMIA, UNSPECIFIED HYPERLIPIDEMIA TYPE: ICD-10-CM

## 2023-06-19 DIAGNOSIS — R73.03 PREDIABETES: ICD-10-CM

## 2023-06-19 DIAGNOSIS — R73.02 GLUCOSE INTOLERANCE (IMPAIRED GLUCOSE TOLERANCE): ICD-10-CM

## 2023-06-19 PROCEDURE — 80048 BASIC METABOLIC PNL TOTAL CA: CPT | Performed by: INTERNAL MEDICINE

## 2023-06-19 PROCEDURE — 80061 LIPID PANEL: CPT | Performed by: INTERNAL MEDICINE

## 2023-06-19 PROCEDURE — 80076 HEPATIC FUNCTION PANEL: CPT | Performed by: INTERNAL MEDICINE

## 2023-06-19 PROCEDURE — 36415 COLL VENOUS BLD VENIPUNCTURE: CPT | Mod: PO | Performed by: INTERNAL MEDICINE

## 2023-06-19 PROCEDURE — 83036 HEMOGLOBIN GLYCOSYLATED A1C: CPT | Performed by: INTERNAL MEDICINE

## 2023-06-20 LAB
ALBUMIN SERPL BCP-MCNC: 2.9 G/DL (ref 3.5–5.2)
ALP SERPL-CCNC: 79 U/L (ref 55–135)
ALT SERPL W/O P-5'-P-CCNC: 30 U/L (ref 10–44)
ANION GAP SERPL CALC-SCNC: 10 MMOL/L (ref 8–16)
AST SERPL-CCNC: 37 U/L (ref 10–40)
BILIRUB DIRECT SERPL-MCNC: 0.8 MG/DL (ref 0.1–0.3)
BILIRUB SERPL-MCNC: 1.8 MG/DL (ref 0.1–1)
BUN SERPL-MCNC: 22 MG/DL (ref 8–23)
CALCIUM SERPL-MCNC: 8.8 MG/DL (ref 8.7–10.5)
CHLORIDE SERPL-SCNC: 113 MMOL/L (ref 95–110)
CHOLEST SERPL-MCNC: 102 MG/DL (ref 120–199)
CHOLEST/HDLC SERPL: 2.7 {RATIO} (ref 2–5)
CO2 SERPL-SCNC: 18 MMOL/L (ref 23–29)
CREAT SERPL-MCNC: 1.4 MG/DL (ref 0.5–1.4)
EST. GFR  (NO RACE VARIABLE): 50.8 ML/MIN/1.73 M^2
ESTIMATED AVG GLUCOSE: 88 MG/DL (ref 68–131)
GLUCOSE SERPL-MCNC: 95 MG/DL (ref 70–110)
HBA1C MFR BLD: 4.7 % (ref 4–5.6)
HDLC SERPL-MCNC: 38 MG/DL (ref 40–75)
HDLC SERPL: 37.3 % (ref 20–50)
LDLC SERPL CALC-MCNC: 52.2 MG/DL (ref 63–159)
NONHDLC SERPL-MCNC: 64 MG/DL
POTASSIUM SERPL-SCNC: 4.2 MMOL/L (ref 3.5–5.1)
PROT SERPL-MCNC: 6.1 G/DL (ref 6–8.4)
SODIUM SERPL-SCNC: 141 MMOL/L (ref 136–145)
TRIGL SERPL-MCNC: 59 MG/DL (ref 30–150)

## 2023-06-22 ENCOUNTER — OFFICE VISIT (OUTPATIENT)
Dept: INTERNAL MEDICINE | Facility: CLINIC | Age: 83
End: 2023-06-22
Payer: COMMERCIAL

## 2023-06-22 VITALS
HEIGHT: 68 IN | OXYGEN SATURATION: 99 % | HEART RATE: 66 BPM | DIASTOLIC BLOOD PRESSURE: 62 MMHG | SYSTOLIC BLOOD PRESSURE: 100 MMHG | WEIGHT: 120.56 LBS | BODY MASS INDEX: 18.27 KG/M2

## 2023-06-22 DIAGNOSIS — I63.9 STROKE WITH CEREBRAL ISCHEMIA: ICD-10-CM

## 2023-06-22 DIAGNOSIS — R73.03 PREDIABETES: ICD-10-CM

## 2023-06-22 DIAGNOSIS — N17.9 AKI (ACUTE KIDNEY INJURY): ICD-10-CM

## 2023-06-22 DIAGNOSIS — N28.9 RENAL INSUFFICIENCY: Primary | ICD-10-CM

## 2023-06-22 PROCEDURE — 99214 OFFICE O/P EST MOD 30 MIN: CPT | Mod: S$GLB,,, | Performed by: INTERNAL MEDICINE

## 2023-06-22 PROCEDURE — 1126F PR PAIN SEVERITY QUANTIFIED, NO PAIN PRESENT: ICD-10-PCS | Mod: CPTII,S$GLB,, | Performed by: INTERNAL MEDICINE

## 2023-06-22 PROCEDURE — 1126F AMNT PAIN NOTED NONE PRSNT: CPT | Mod: CPTII,S$GLB,, | Performed by: INTERNAL MEDICINE

## 2023-06-22 PROCEDURE — 1159F PR MEDICATION LIST DOCUMENTED IN MEDICAL RECORD: ICD-10-PCS | Mod: CPTII,S$GLB,, | Performed by: INTERNAL MEDICINE

## 2023-06-22 PROCEDURE — 99999 PR PBB SHADOW E&M-EST. PATIENT-LVL IV: CPT | Mod: PBBFAC,,, | Performed by: INTERNAL MEDICINE

## 2023-06-22 PROCEDURE — 99214 PR OFFICE/OUTPT VISIT, EST, LEVL IV, 30-39 MIN: ICD-10-PCS | Mod: S$GLB,,, | Performed by: INTERNAL MEDICINE

## 2023-06-22 PROCEDURE — 1159F MED LIST DOCD IN RCRD: CPT | Mod: CPTII,S$GLB,, | Performed by: INTERNAL MEDICINE

## 2023-06-22 PROCEDURE — 1101F PT FALLS ASSESS-DOCD LE1/YR: CPT | Mod: CPTII,S$GLB,, | Performed by: INTERNAL MEDICINE

## 2023-06-22 PROCEDURE — 3288F PR FALLS RISK ASSESSMENT DOCUMENTED: ICD-10-PCS | Mod: CPTII,S$GLB,, | Performed by: INTERNAL MEDICINE

## 2023-06-22 PROCEDURE — 99999 PR PBB SHADOW E&M-EST. PATIENT-LVL IV: ICD-10-PCS | Mod: PBBFAC,,, | Performed by: INTERNAL MEDICINE

## 2023-06-22 PROCEDURE — 3078F PR MOST RECENT DIASTOLIC BLOOD PRESSURE < 80 MM HG: ICD-10-PCS | Mod: CPTII,S$GLB,, | Performed by: INTERNAL MEDICINE

## 2023-06-22 PROCEDURE — 3288F FALL RISK ASSESSMENT DOCD: CPT | Mod: CPTII,S$GLB,, | Performed by: INTERNAL MEDICINE

## 2023-06-22 PROCEDURE — 3074F SYST BP LT 130 MM HG: CPT | Mod: CPTII,S$GLB,, | Performed by: INTERNAL MEDICINE

## 2023-06-22 PROCEDURE — 1101F PR PT FALLS ASSESS DOC 0-1 FALLS W/OUT INJ PAST YR: ICD-10-PCS | Mod: CPTII,S$GLB,, | Performed by: INTERNAL MEDICINE

## 2023-06-22 PROCEDURE — 3074F PR MOST RECENT SYSTOLIC BLOOD PRESSURE < 130 MM HG: ICD-10-PCS | Mod: CPTII,S$GLB,, | Performed by: INTERNAL MEDICINE

## 2023-06-22 PROCEDURE — 3078F DIAST BP <80 MM HG: CPT | Mod: CPTII,S$GLB,, | Performed by: INTERNAL MEDICINE

## 2023-06-22 RX ORDER — NAPROXEN SODIUM 220 MG/1
81 TABLET, FILM COATED ORAL DAILY
Qty: 90 TABLET | Refills: 1 | Status: SHIPPED | OUTPATIENT
Start: 2023-06-22 | End: 2023-12-21 | Stop reason: SDUPTHER

## 2023-06-22 RX ORDER — AMOXICILLIN 500 MG
1 CAPSULE ORAL DAILY
Qty: 90 CAPSULE | Refills: 1 | Status: SHIPPED | OUTPATIENT
Start: 2023-06-22 | End: 2023-09-26 | Stop reason: SDUPTHER

## 2023-06-22 NOTE — Clinical Note
"Not sure about follow up for this - seeing him today  "Heavy burden of plaque at the origin of the left vertebral artery concerning for severe stenosis.  The more distal vertebral artery is patent.""

## 2023-06-22 NOTE — PROGRESS NOTES
Assessment:       1. Renal insufficiency    2. Prediabetes    3. HOMER (acute kidney injury)    4. Stroke with cerebral ischemia          Plan:         Dean was seen today for hypertension.    Diagnoses and all orders for this visit:    Renal insufficiency    New   Uncontrolled  Signs, symptoms consistent with unclear etiology   history or pyhsical exam do not suggest rpgn  I provided instruction on supportive care measures   Prior tesing reviewed and new testing was was givenwas given   reviewed signs and symptoms that should prompt return to provider or evaluation in the ED  -     Basic Metabolic Panel; Standing  -     CBC Auto Differential; Standing  -     Ferritin; Standing  -     Hepatic Function Panel; Standing  -     Immunofixation Electrophoresis; Standing  -     Immunofixation, Urine  Random; Standing  -     Iron and TIBC; Standing  -     Phosphorus; Standing  -     Protein Electrophoresis, Random Urine; Standing  -     Protein Electrophoresis, Serum; Standing  -     Protein/Creatinine Ratio, Urine; Standing  -     PTH, Intact; Standing  -     Urinalysis; Standing  -     Vitamin D; Standing    Prediabetes    HOMER (acute kidney injury)  -     Basic Metabolic Panel; Standing  -     CBC Auto Differential; Standing  -     Ferritin; Standing  -     Hepatic Function Panel; Standing  -     Immunofixation Electrophoresis; Standing  -     Immunofixation, Urine  Random; Standing  -     Iron and TIBC; Standing  -     Phosphorus; Standing  -     Protein Electrophoresis, Random Urine; Standing  -     Protein Electrophoresis, Serum; Standing  -     Protein/Creatinine Ratio, Urine; Standing  -     PTH, Intact; Standing  -     Urinalysis; Standing  -     Vitamin D; Standing    Stroke with cerebral ischemia  -     omega-3 fatty acids/fish oil (FISH OIL-OMEGA-3 FATTY ACIDS) 300-1,000 mg capsule; Take 1 capsule by mouth once daily.  -     aspirin 81 MG Chew; Take 1 tablet (81 mg total) by mouth once  "daily.          Subjective:       Patient ID: Dean Hahn is a 82 y.o. male.    Chief Complaint: Hypertension      Interim Hx  Awv completed  CTA head + for stenosis   Echo and holter normal  Last fu was in December       Concerns today    Otheriwse labs wnl    Chronic problems       Tobacco Use: Low Risk     Smoking Tobacco Use: Never    Smokeless Tobacco Use: Never    Passive Exposure: Not on file       Hypertension  This is a chronic problem. The current episode started more than 1 year ago. The problem has been waxing and waning since onset. The problem is controlled. Past treatments include diuretics, angiotensin blockers and beta blockers. The current treatment provides significant improvement. There are no compliance problems.        Review of Systems   All other systems reviewed and are negative.          Health Maintenance Due   Topic Date Due    Shingles Vaccine (1 of 2) Never done    COVID-19 Vaccine (5 - Mixed Product series) 02/02/2022         Objective:     /62 (BP Location: Right arm, Patient Position: Sitting, BP Method: Medium (Manual))   Pulse 66   Ht 5' 7.5" (1.715 m)   Wt 54.7 kg (120 lb 9.5 oz)   SpO2 99%   BMI 18.61 kg/m²     Vitals 6/22/2023 4/10/2023 2/14/2023 1/24/2023 12/21/2022   Height 67.5 67 67 67 67   Weight (lbs) 120.59 125.66 128 128.42 126.32   BMI (kg/m2) 18.6 19.7 20 20.1 19.8         No hydronephrosis or renal masses identified.  The spleen measures 10.8 x 4.0 cm.     Impression:     1. Findings suggesting chronic liver disease.  No focal liver lesions.  2. Patient status post cholecystectomy.  No biliary ductal dilation suggested.  .        Electronically signed by: Latrell Sousa,   Date:                                            02/23/2021     Physical Exam  Vitals and nursing note reviewed.   Constitutional:       General: He is not in acute distress.     Appearance: He is well-developed. He is not ill-appearing, toxic-appearing or diaphoretic. "   HENT:      Head: Normocephalic.   Eyes:      Conjunctiva/sclera: Conjunctivae normal.   Cardiovascular:      Rate and Rhythm: Normal rate and regular rhythm.      Heart sounds: Normal heart sounds. No murmur heard.    No friction rub. No gallop.   Pulmonary:      Effort: Pulmonary effort is normal. No respiratory distress.   Abdominal:      General: There is no distension.      Palpations: Abdomen is soft.   Neurological:      General: No focal deficit present.      Mental Status: He is alert and oriented to person, place, and time.         Recent Results (from the past 336 hour(s))   Basic Metabolic Panel    Collection Time: 06/19/23 12:17 PM   Result Value Ref Range    Sodium 141 136 - 145 mmol/L    Potassium 4.2 3.5 - 5.1 mmol/L    Chloride 113 (H) 95 - 110 mmol/L    CO2 18 (L) 23 - 29 mmol/L    Glucose 95 70 - 110 mg/dL    BUN 22 8 - 23 mg/dL    Creatinine 1.4 0.5 - 1.4 mg/dL    Calcium 8.8 8.7 - 10.5 mg/dL    Anion Gap 10 8 - 16 mmol/L    eGFR 50.8 (A) >60 mL/min/1.73 m^2   Hemoglobin A1C    Collection Time: 06/19/23 12:17 PM   Result Value Ref Range    Hemoglobin A1C 4.7 4.0 - 5.6 %    Estimated Avg Glucose 88 68 - 131 mg/dL   Hepatic Function Panel    Collection Time: 06/19/23 12:17 PM   Result Value Ref Range    Total Protein 6.1 6.0 - 8.4 g/dL    Albumin 2.9 (L) 3.5 - 5.2 g/dL    Total Bilirubin 1.8 (H) 0.1 - 1.0 mg/dL    Bilirubin, Direct 0.8 (H) 0.1 - 0.3 mg/dL    AST 37 10 - 40 U/L    ALT 30 10 - 44 U/L    Alkaline Phosphatase 79 55 - 135 U/L   Lipid Panel    Collection Time: 06/19/23 12:17 PM   Result Value Ref Range    Cholesterol 102 (L) 120 - 199 mg/dL    Triglycerides 59 30 - 150 mg/dL    HDL 38 (L) 40 - 75 mg/dL    LDL Cholesterol 52.2 (L) 63.0 - 159.0 mg/dL    HDL/Cholesterol Ratio 37.3 20.0 - 50.0 %    Total Cholesterol/HDL Ratio 2.7 2.0 - 5.0    Non-HDL Cholesterol 64 mg/dL         Future Appointments   Date Time Provider Department Center   8/11/2023 10:45 AM [facility]  [place]   9/22/2023  9:30 AM [facility] [place]   9/22/2023  9:45 AM LAB CAROL[facility] KATE LAB [place]   9/26/2023 10:40 AM Angel Luis Tobias III, MD [facility]         Medication List with Changes/Refills   Current Medications    AMLODIPINE (NORVASC) 10 MG TABLET    TAKE 1 TABLET BY MOUTH EVERY DAY    ATORVASTATIN (LIPITOR) 20 MG TABLET    Take 1 tablet (20 mg total) by mouth once daily.    CYANOCOBALAMIN (VITAMIN B-12) 100 MCG TABLET    Take 1 tablet (100 mcg total) by mouth once daily.    DORZOLAMIDE-TIMOLOL 2-0.5% (COSOPT) 22.3-6.8 MG/ML OPHTHALMIC SOLUTION    INSTILL 1 DROP INTO BOTH EYES TWICE A DAY    METOPROLOL SUCCINATE (TOPROL-XL) 25 MG 24 HR TABLET    TAKE 1 TABLET BY MOUTH EVERY EVENING    MULTIVITAMIN CAPSULE    Take 1 capsule by mouth once daily.    OLMESARTAN-HYDROCHLOROTHIAZIDE (BENICAR HCT) 40-12.5 MG TAB    TAKE 1 TABLET BY MOUTH EVERY DAY    POTASSIUM CHLORIDE (KLOR-CON 10) 10 MEQ TBSR    TAKE 1 TABLET BY MOUTH DAILY EXCEPT TAKE 2 TABS ON MON, WED AND FRIDAY   Changed and/or Refilled Medications    Modified Medication Previous Medication    ASPIRIN 81 MG CHEW aspirin 81 MG Chew       Take 1 tablet (81 mg total) by mouth once daily.    Take 1 tablet (81 mg total) by mouth once daily.    OMEGA-3 FATTY ACIDS/FISH OIL (FISH OIL-OMEGA-3 FATTY ACIDS) 300-1,000 MG CAPSULE fish oil-omega-3 fatty acids 300-1,000 mg capsule       Take 1 capsule by mouth once daily.    Take 2 g by mouth once daily.         Disclaimer:  This note has been generated using voice-recognition software. There may be grammatical or spelling errors that have been missed during proof-reading

## 2023-06-29 DIAGNOSIS — I10 ESSENTIAL HYPERTENSION: ICD-10-CM

## 2023-06-29 RX ORDER — OLMESARTAN MEDOXOMIL AND HYDROCHLOROTHIAZIDE 40/12.5 40; 12.5 MG/1; MG/1
TABLET ORAL
Qty: 90 TABLET | Refills: 3 | Status: SHIPPED | OUTPATIENT
Start: 2023-06-29 | End: 2023-12-12

## 2023-06-29 NOTE — TELEPHONE ENCOUNTER
Refill Decision Note   Dean Hahn  is requesting a refill authorization.  Brief Assessment and Rationale for Refill:  Approve     Medication Therapy Plan:         Comments:     Note composed:3:45 AM 06/29/2023

## 2023-06-29 NOTE — TELEPHONE ENCOUNTER
No care due was identified.  [facility] Embedded Care Due Messages. Reference number: [id number].   6/29/2023 12:17:31 AM CDT

## 2023-07-24 ENCOUNTER — PATIENT MESSAGE (OUTPATIENT)
Dept: DERMATOLOGY | Facility: CLINIC | Age: 83
End: 2023-07-24
Payer: COMMERCIAL

## 2023-08-14 DIAGNOSIS — H26.9 CATARACT, UNSPECIFIED CATARACT TYPE, UNSPECIFIED LATERALITY: ICD-10-CM

## 2023-08-14 RX ORDER — DORZOLAMIDE HYDROCHLORIDE AND TIMOLOL MALEATE 20; 5 MG/ML; MG/ML
SOLUTION/ DROPS OPHTHALMIC
Qty: 10 ML | Refills: 1 | Status: SHIPPED | OUTPATIENT
Start: 2023-08-14 | End: 2023-09-15 | Stop reason: SDUPTHER

## 2023-08-14 NOTE — TELEPHONE ENCOUNTER
Refill Routing Note   Medication(s) are not appropriate for processing by [facility] for the following reason(s):      Medication outside of protocol    [facility] action(s):  Route Care Due:  None identified            Appointments  past 12m or future 3m with PCP    Date Provider   Last Visit   6/22/2023 Angel Luis Tobias III, MD   Next Visit   9/26/2023 Angel Luis Tobias III, MD   ED visits in past 90 days: 0        Note composed:10:11 AM 08/14/2023

## 2023-09-12 DIAGNOSIS — E53.8 B12 DEFICIENCY: ICD-10-CM

## 2023-09-12 NOTE — TELEPHONE ENCOUNTER
Refill Routing Note   Medication(s) are not appropriate for processing by [facility] for the following reason(s):      Medication outside of protocol    [facility] action(s):  Route Care Due:  None identified              Appointments  past 12m or future 3m with PCP    Date Provider   Last Visit   6/22/2023 Angel Luis Tobias III, MD   Next Visit   9/26/2023 Angel Luis Tobias III, MD   ED visits in past 90 days: 0        Note composed:6:22 PM 09/12/2023

## 2023-09-13 RX ORDER — PNV NO.95/FERROUS FUM/FOLIC AC 28MG-0.8MG
100 TABLET ORAL
Qty: 90 TABLET | Refills: 1 | Status: SHIPPED | OUTPATIENT
Start: 2023-09-13

## 2023-09-14 DIAGNOSIS — H26.9 CATARACT, UNSPECIFIED CATARACT TYPE, UNSPECIFIED LATERALITY: ICD-10-CM

## 2023-09-15 RX ORDER — DORZOLAMIDE HYDROCHLORIDE AND TIMOLOL MALEATE 20; 5 MG/ML; MG/ML
SOLUTION/ DROPS OPHTHALMIC
Qty: 10 ML | Refills: 1 | Status: SHIPPED | OUTPATIENT
Start: 2023-09-15 | End: 2023-12-26

## 2023-09-15 NOTE — TELEPHONE ENCOUNTER
Refill Routing Note   Medication(s) are not appropriate for processing by [facility] for the following reason(s):      Medication outside of protocol    [facility] action(s):  Route Care Due:  None identified              Appointments  past 12m or future 3m with PCP    Date Provider   Last Visit   6/22/2023 Angel Luis Tobias III, MD   Next Visit   9/26/2023 Angel Luis Tobias III, MD   ED visits in past 90 days: 0        Note composed:8:39 AM 09/15/2023

## 2023-09-22 ENCOUNTER — LAB VISIT (OUTPATIENT)
Dept: LAB | Facility: HOSPITAL | Age: 83
End: 2023-09-22
Attending: INTERNAL MEDICINE
Payer: COMMERCIAL

## 2023-09-22 DIAGNOSIS — N17.9 AKI (ACUTE KIDNEY INJURY): ICD-10-CM

## 2023-09-22 DIAGNOSIS — N28.9 RENAL INSUFFICIENCY: ICD-10-CM

## 2023-09-22 LAB
25(OH)D3+25(OH)D2 SERPL-MCNC: 14 NG/ML (ref 30–96)
BASOPHILS # BLD AUTO: 0.01 K/UL (ref 0–0.2)
BASOPHILS NFR BLD: 0.2 % (ref 0–1.9)
DIFFERENTIAL METHOD: ABNORMAL
EOSINOPHIL # BLD AUTO: 0 K/UL (ref 0–0.5)
EOSINOPHIL NFR BLD: 0.6 % (ref 0–8)
ERYTHROCYTE [DISTWIDTH] IN BLOOD BY AUTOMATED COUNT: 12.6 % (ref 11.5–14.5)
FERRITIN SERPL-MCNC: 285 NG/ML (ref 20–300)
HCT VFR BLD AUTO: 34.6 % (ref 40–54)
HGB BLD-MCNC: 11.9 G/DL (ref 14–18)
IMM GRANULOCYTES # BLD AUTO: 0.02 K/UL (ref 0–0.04)
IMM GRANULOCYTES NFR BLD AUTO: 0.3 % (ref 0–0.5)
LYMPHOCYTES # BLD AUTO: 1.8 K/UL (ref 1–4.8)
LYMPHOCYTES NFR BLD: 27.3 % (ref 18–48)
MCH RBC QN AUTO: 32.7 PG (ref 27–31)
MCHC RBC AUTO-ENTMCNC: 34.4 G/DL (ref 32–36)
MCV RBC AUTO: 95 FL (ref 82–98)
MONOCYTES # BLD AUTO: 0.9 K/UL (ref 0.3–1)
MONOCYTES NFR BLD: 13.8 % (ref 4–15)
NEUTROPHILS # BLD AUTO: 3.7 K/UL (ref 1.8–7.7)
NEUTROPHILS NFR BLD: 57.8 % (ref 38–73)
NRBC BLD-RTO: 0 /100 WBC
PHOSPHATE SERPL-MCNC: 2.9 MG/DL (ref 2.7–4.5)
PLATELET # BLD AUTO: 153 K/UL (ref 150–450)
PMV BLD AUTO: 9.2 FL (ref 9.2–12.9)
RBC # BLD AUTO: 3.64 M/UL (ref 4.6–6.2)
WBC # BLD AUTO: 6.4 K/UL (ref 3.9–12.7)

## 2023-09-22 PROCEDURE — 82306 VITAMIN D 25 HYDROXY: CPT | Performed by: INTERNAL MEDICINE

## 2023-09-22 PROCEDURE — 84100 ASSAY OF PHOSPHORUS: CPT | Performed by: INTERNAL MEDICINE

## 2023-09-22 PROCEDURE — 36415 COLL VENOUS BLD VENIPUNCTURE: CPT | Mod: PO | Performed by: INTERNAL MEDICINE

## 2023-09-22 PROCEDURE — 85025 COMPLETE CBC W/AUTO DIFF WBC: CPT | Performed by: INTERNAL MEDICINE

## 2023-09-22 PROCEDURE — 82728 ASSAY OF FERRITIN: CPT | Performed by: INTERNAL MEDICINE

## 2023-09-26 ENCOUNTER — OFFICE VISIT (OUTPATIENT)
Dept: INTERNAL MEDICINE | Facility: CLINIC | Age: 83
End: 2023-09-26
Payer: COMMERCIAL

## 2023-09-26 ENCOUNTER — HOSPITAL ENCOUNTER (OUTPATIENT)
Dept: RADIOLOGY | Facility: HOSPITAL | Age: 83
Discharge: HOME | End: 2023-09-26
Attending: INTERNAL MEDICINE
Payer: MEDICARE

## 2023-09-26 ENCOUNTER — LAB VISIT (OUTPATIENT)
Dept: LAB | Facility: HOSPITAL | Age: 83
End: 2023-09-26
Attending: INTERNAL MEDICINE
Payer: MEDICARE

## 2023-09-26 VITALS
OXYGEN SATURATION: 99 % | WEIGHT: 112.19 LBS | DIASTOLIC BLOOD PRESSURE: 80 MMHG | BODY MASS INDEX: 17 KG/M2 | SYSTOLIC BLOOD PRESSURE: 118 MMHG | HEART RATE: 66 BPM | TEMPERATURE: 97 F | HEIGHT: 68 IN

## 2023-09-26 DIAGNOSIS — R80.9 PROTEINURIA, UNSPECIFIED TYPE: ICD-10-CM

## 2023-09-26 DIAGNOSIS — R26.2 DIFFICULTY IN WALKING: ICD-10-CM

## 2023-09-26 DIAGNOSIS — N17.9 AKI (ACUTE KIDNEY INJURY): ICD-10-CM

## 2023-09-26 DIAGNOSIS — E55.9 VITAMIN D DEFICIENCY: ICD-10-CM

## 2023-09-26 DIAGNOSIS — D64.9 NORMOCHROMIC ANEMIA: ICD-10-CM

## 2023-09-26 DIAGNOSIS — J06.9 VIRAL URI WITH COUGH: ICD-10-CM

## 2023-09-26 DIAGNOSIS — Z99.89 USES ROLLER WALKER: ICD-10-CM

## 2023-09-26 DIAGNOSIS — I63.9 STROKE WITH CEREBRAL ISCHEMIA: ICD-10-CM

## 2023-09-26 DIAGNOSIS — I10 ESSENTIAL HYPERTENSION: Primary | ICD-10-CM

## 2023-09-26 DIAGNOSIS — H54.7 DECREASED VISION: ICD-10-CM

## 2023-09-26 DIAGNOSIS — H91.90 HEARING LOSS, UNSPECIFIED HEARING LOSS TYPE, UNSPECIFIED LATERALITY: ICD-10-CM

## 2023-09-26 DIAGNOSIS — R73.03 PREDIABETES: ICD-10-CM

## 2023-09-26 DIAGNOSIS — B35.1 NAIL FUNGUS: ICD-10-CM

## 2023-09-26 DIAGNOSIS — E78.2 MIXED HYPERLIPIDEMIA: ICD-10-CM

## 2023-09-26 DIAGNOSIS — N28.9 RENAL INSUFFICIENCY: ICD-10-CM

## 2023-09-26 LAB
CTP QC/QA: YES
CTP QC/QA: YES
FLUAV AG NPH QL: NEGATIVE
FLUBV AG NPH QL: NEGATIVE
SARS-COV-2 RDRP RESP QL NAA+PROBE: POSITIVE

## 2023-09-26 PROCEDURE — 36415 COLL VENOUS BLD VENIPUNCTURE: CPT | Mod: PO | Performed by: INTERNAL MEDICINE

## 2023-09-26 PROCEDURE — 71046 X-RAY EXAM CHEST 2 VIEWS: CPT | Mod: 26,,, | Performed by: RADIOLOGY

## 2023-09-26 PROCEDURE — 84466 ASSAY OF TRANSFERRIN: CPT | Performed by: INTERNAL MEDICINE

## 2023-09-26 PROCEDURE — 3074F PR MOST RECENT SYSTOLIC BLOOD PRESSURE < 130 MM HG: ICD-10-PCS | Mod: CPTII,S$GLB,, | Performed by: INTERNAL MEDICINE

## 2023-09-26 PROCEDURE — 3079F DIAST BP 80-89 MM HG: CPT | Mod: CPTII,S$GLB,, | Performed by: INTERNAL MEDICINE

## 2023-09-26 PROCEDURE — 84165 PROTEIN E-PHORESIS SERUM: CPT | Performed by: INTERNAL MEDICINE

## 2023-09-26 PROCEDURE — 3074F SYST BP LT 130 MM HG: CPT | Mod: CPTII,S$GLB,, | Performed by: INTERNAL MEDICINE

## 2023-09-26 PROCEDURE — 86334 IMMUNOFIX E-PHORESIS SERUM: CPT | Performed by: INTERNAL MEDICINE

## 2023-09-26 PROCEDURE — 1159F PR MEDICATION LIST DOCUMENTED IN MEDICAL RECORD: ICD-10-PCS | Mod: CPTII,S$GLB,, | Performed by: INTERNAL MEDICINE

## 2023-09-26 PROCEDURE — 1126F AMNT PAIN NOTED NONE PRSNT: CPT | Mod: CPTII,S$GLB,, | Performed by: INTERNAL MEDICINE

## 2023-09-26 PROCEDURE — 83970 ASSAY OF PARATHORMONE: CPT | Performed by: INTERNAL MEDICINE

## 2023-09-26 PROCEDURE — 1101F PR PT FALLS ASSESS DOC 0-1 FALLS W/OUT INJ PAST YR: ICD-10-PCS | Mod: CPTII,S$GLB,, | Performed by: INTERNAL MEDICINE

## 2023-09-26 PROCEDURE — 1126F PR PAIN SEVERITY QUANTIFIED, NO PAIN PRESENT: ICD-10-PCS | Mod: CPTII,S$GLB,, | Performed by: INTERNAL MEDICINE

## 2023-09-26 PROCEDURE — 84165 PROTEIN E-PHORESIS SERUM: CPT | Mod: 26,,, | Performed by: PATHOLOGY

## 2023-09-26 PROCEDURE — 99999 PR PBB SHADOW E&M-EST. PATIENT-LVL V: CPT | Mod: PBBFAC,,, | Performed by: INTERNAL MEDICINE

## 2023-09-26 PROCEDURE — 86334 PATHOLOGIST INTERPRETATION IFE: ICD-10-PCS | Mod: 26,,, | Performed by: PATHOLOGY

## 2023-09-26 PROCEDURE — 99215 PR OFFICE/OUTPT VISIT, EST, LEVL V, 40-54 MIN: ICD-10-PCS | Mod: S$GLB,,, | Performed by: INTERNAL MEDICINE

## 2023-09-26 PROCEDURE — 3288F PR FALLS RISK ASSESSMENT DOCUMENTED: ICD-10-PCS | Mod: CPTII,S$GLB,, | Performed by: INTERNAL MEDICINE

## 2023-09-26 PROCEDURE — 87804 INFLUENZA ASSAY W/OPTIC: CPT | Mod: QW,S$GLB,, | Performed by: INTERNAL MEDICINE

## 2023-09-26 PROCEDURE — 71046 XR CHEST PA AND LATERAL: ICD-10-PCS | Mod: 26,,, | Performed by: RADIOLOGY

## 2023-09-26 PROCEDURE — 3079F PR MOST RECENT DIASTOLIC BLOOD PRESSURE 80-89 MM HG: ICD-10-PCS | Mod: CPTII,S$GLB,, | Performed by: INTERNAL MEDICINE

## 2023-09-26 PROCEDURE — 84165 PATHOLOGIST INTERPRETATION SPE: ICD-10-PCS | Mod: 26,,, | Performed by: PATHOLOGY

## 2023-09-26 PROCEDURE — 71046 X-RAY EXAM CHEST 2 VIEWS: CPT | Mod: TC,FY

## 2023-09-26 PROCEDURE — 87635 SARS-COV-2 COVID-19 AMP PRB: CPT | Mod: QW,S$GLB,, | Performed by: INTERNAL MEDICINE

## 2023-09-26 PROCEDURE — 99999 PR PBB SHADOW E&M-EST. PATIENT-LVL V: ICD-10-PCS | Mod: PBBFAC,,, | Performed by: INTERNAL MEDICINE

## 2023-09-26 PROCEDURE — 83540 ASSAY OF IRON: CPT | Performed by: INTERNAL MEDICINE

## 2023-09-26 PROCEDURE — 1159F MED LIST DOCD IN RCRD: CPT | Mod: CPTII,S$GLB,, | Performed by: INTERNAL MEDICINE

## 2023-09-26 PROCEDURE — 87804 POCT INFLUENZA A/B: ICD-10-PCS | Mod: QW,S$GLB,, | Performed by: INTERNAL MEDICINE

## 2023-09-26 PROCEDURE — 1101F PT FALLS ASSESS-DOCD LE1/YR: CPT | Mod: CPTII,S$GLB,, | Performed by: INTERNAL MEDICINE

## 2023-09-26 PROCEDURE — 80076 HEPATIC FUNCTION PANEL: CPT | Performed by: INTERNAL MEDICINE

## 2023-09-26 PROCEDURE — 87635: ICD-10-PCS | Mod: QW,S$GLB,, | Performed by: INTERNAL MEDICINE

## 2023-09-26 PROCEDURE — 99215 OFFICE O/P EST HI 40 MIN: CPT | Mod: S$GLB,,, | Performed by: INTERNAL MEDICINE

## 2023-09-26 PROCEDURE — 80048 BASIC METABOLIC PNL TOTAL CA: CPT | Performed by: INTERNAL MEDICINE

## 2023-09-26 PROCEDURE — 86334 IMMUNOFIX E-PHORESIS SERUM: CPT | Mod: 26,,, | Performed by: PATHOLOGY

## 2023-09-26 PROCEDURE — 3288F FALL RISK ASSESSMENT DOCD: CPT | Mod: CPTII,S$GLB,, | Performed by: INTERNAL MEDICINE

## 2023-09-26 RX ORDER — ERGOCALCIFEROL 1.25 MG/1
50000 CAPSULE ORAL
Qty: 12 CAPSULE | Refills: 1 | Status: SHIPPED | OUTPATIENT
Start: 2023-09-26 | End: 2024-01-24 | Stop reason: SDUPTHER

## 2023-09-26 RX ORDER — AMOXICILLIN 500 MG
1 CAPSULE ORAL DAILY
Qty: 90 CAPSULE | Refills: 1 | Status: SHIPPED | OUTPATIENT
Start: 2023-09-26

## 2023-09-26 NOTE — PATIENT INSTRUCTIONS
We were happy to see you today    For your Testing  Please have your labs and/or imaging test done    Flu and covid  Chest xray  Labs today    Labs in 3 months         For your Medication   Please start vitamin D supplement   We will send rx for the Rollator   For more information about side effects please visit medlineplus.gov    Referrals  Hearing testing  Eye doctor   Foot doctor       For your Vaccinations  We gave your the following vaccines  Flu shot  Please obtain the following vaccines at the pharmacy  Covid  Rsv         Please return to clinic in    Follow up for 2 Week Virtual Visit audio .        Extra resources     We can do flu, covid and xray today

## 2023-09-26 NOTE — PROGRESS NOTES
Assessment:         1. Essential hypertension    2. Stroke with cerebral ischemia    3. Viral URI with cough    4. Vitamin D deficiency    5. Uses roller walker    6. Difficulty in walking    7. Decreased vision    8. Hearing loss, unspecified hearing loss type, unspecified laterality    9. Nail fungus    10. Mixed hyperlipidemia    11. Prediabetes    12. Proteinuria, unspecified type    13. Normochromic anemia          Plan:           Dean was seen today for follow-up and cough.    Diagnoses and all orders for this visit:    Essential hypertension  Chronic  Controlled  Patient is at goal today   I have reviewed lifestyle modification to achieve/maintain goals  We will continue the current medication regimen as listed below  Patient will follow up in 3 months   Stroke with cerebral ischemia  -     omega-3 fatty acids/fish oil (FISH OIL-OMEGA-3 FATTY ACIDS) 300-1,000 mg capsule; Take 1 capsule by mouth once daily.    Viral URI with cough  -     POCT COVID-19 Rapid Screening  -     POCT Influenza A/B  -     X-Ray Chest PA And Lateral; Future    Vitamin D deficiency  -     ergocalciferol (ERGOCALCIFEROL) 50,000 unit Cap; Take 1 capsule (50,000 Units total) by mouth every 7 days.    Uses roller walker  -     WALKER FOR HOME USE    Difficulty in walking  -     WALKER FOR HOME USE    Decreased vision  -     Ambulatory referral/consult to Ophthalmology; Future    Hearing loss, unspecified hearing loss type, unspecified laterality  -     Ambulatory referral/consult to Audiology; Future  -     Ambulatory referral/consult to ENT; Future    Nail fungus  -     Ambulatory referral/consult to Podiatry; Future    Mixed hyperlipidemia    Prediabetes    Proteinuria, unspecified type    Normochromic anemia        I spent at least 40 mins with preparing to see the patient, obtaining and/or revieweing separately obtained history, preforming a examination and evaluation, counseling, communicating with other health care professional,  "documenting clinical information in the electronic health record,  and/or coordinating care.             Subjective:       Patient ID: Dean Hahn is a 82 y.o. male.    Chief Complaint: Follow-up and Cough (Cough and cold for about three weeks)          Interim Hx  Didn't draw all the labs ?    Concerns today    Hearing has decreased, wondering if he needs hearing aids  Using an old walker, hoping to get rollator       URI   Patient reports that symptoms started   3 weeks   prior to presentation .   Symptoms are show no change   Symptoms include cough  Patient denies fever, chills, night sweats, hemoptysis, dyspnea, wheezing or change taste/smell  They have tried OTC  for the symptoms   Associated symptoms inlude wet cough   Daughter is   sick contact  Recent travel;none  Recent visits; did home covid testing that was negative        Tobacco Use: Low Risk  (9/26/2023)    Patient History     Smoking Tobacco Use: Never     Smokeless Tobacco Use: Never     Passive Exposure: Not on file                   Chronic problems     Hypertension  This is a chronic problem. The current episode started more than 1 year ago. The problem has been waxing and waning since onset. The problem is controlled. Past treatments include angiotensin blockers, diuretics and calcium channel blockers. The current treatment provides significant improvement. There are no compliance problems.        Review of Systems   All other systems reviewed and are negative.            Health Maintenance Due   Topic Date Due    Shingles Vaccine (1 of 2) Never done    COVID-19 Vaccine (5 - Mixed Product series) 02/02/2022    Influenza Vaccine (1) 09/01/2023         Objective:     /80 (BP Location: Right arm, Patient Position: Sitting, BP Method: Large (Manual))   Pulse 66   Temp 97.4 °F (36.3 °C) (Oral)   Ht 5' 7.5" (1.715 m)   Wt 50.9 kg (112 lb 3.4 oz)   SpO2 99%   BMI 17.32 kg/m²         9/26/2023    10:51 AM 6/22/2023     9:58 AM " "4/10/2023     8:06 AM 2/14/2023     7:50 AM 1/24/2023     2:54 PM   Vitals   Height 5' 7.5" (1.715 m) 5' 7.5" (1.715 m) 5' 7" (1.702 m) 5' 7" (1.702 m) 5' 7" (1.702 m)   Weight (lbs) 112.21 120.59 125.66 128 128.42   BMI (kg/m2) 17.3 18.6 19.7 20 20.1              Physical Exam  Nursing note reviewed.   Constitutional:       General: He is not in acute distress.     Appearance: Normal appearance. He is not ill-appearing, toxic-appearing or diaphoretic.   HENT:      Head: Normocephalic.   Eyes:      Conjunctiva/sclera: Conjunctivae normal.   Cardiovascular:      Rate and Rhythm: Normal rate.   Pulmonary:      Effort: Pulmonary effort is normal. No respiratory distress.   Neurological:      General: No focal deficit present.      Mental Status: He is alert and oriented to person, place, and time.   Psychiatric:         Mood and Affect: Mood normal.         Behavior: Behavior normal.         Thought Content: Thought content normal.         Judgment: Judgment normal.           Recent Results (from the past 336 hour(s))   Immunofixation, Urine  Random    Collection Time: 09/22/23  9:02 AM   Result Value Ref Range    CELESTINO, UR SEE COMMENT    Protein/Creatinine Ratio, Urine    Collection Time: 09/22/23  9:02 AM   Result Value Ref Range    Protein, Urine Random 47 (H) 0 - 15 mg/dL    Creatinine, Urine 189.0 23.0 - 375.0 mg/dL    Prot/Creat Ratio, Urine 0.25 (H) 0.00 - 0.20   Urinalysis    Collection Time: 09/22/23  9:02 AM   Result Value Ref Range    Specimen UA Urine, Clean Catch     Color, UA Yellow Yellow, Straw, Kaitlin    Appearance, UA Clear Clear    pH, UA 5.0 5.0 - 8.0    Specific Gravity, UA 1.020 1.005 - 1.030    Protein, UA 1+ (A) Negative    Glucose, UA Negative Negative    Ketones, UA Negative Negative    Bilirubin (UA) Negative Negative    Occult Blood UA Negative Negative    Nitrite, UA Negative Negative    Leukocytes, UA Negative Negative   Urinalysis Microscopic    Collection Time: 09/22/23  9:02 AM   Result " Value Ref Range    RBC, UA 2 0 - 4 /hpf    WBC, UA 7 (H) 0 - 5 /hpf    Bacteria None None-Occ /hpf    Squam Epithel, UA 6 /hpf    Hyaline Casts, UA 3 (A) 0-1/lpf /lpf    Microscopic Comment SEE COMMENT    Pathologist Interpretation UIFE    Collection Time: 09/22/23  9:02 AM   Result Value Ref Range    Pathologist Interpretation UIFE REVIEWED    CBC Auto Differential    Collection Time: 09/22/23  9:12 AM   Result Value Ref Range    WBC 6.40 3.90 - 12.70 K/uL    RBC 3.64 (L) 4.60 - 6.20 M/uL    Hemoglobin 11.9 (L) 14.0 - 18.0 g/dL    Hematocrit 34.6 (L) 40.0 - 54.0 %    MCV 95 82 - 98 fL    MCH 32.7 (H) 27.0 - 31.0 pg    MCHC 34.4 32.0 - 36.0 g/dL    RDW 12.6 11.5 - 14.5 %    Platelets 153 150 - 450 K/uL    MPV 9.2 9.2 - 12.9 fL    Immature Granulocytes 0.3 0.0 - 0.5 %    Gran # (ANC) 3.7 1.8 - 7.7 K/uL    Immature Grans (Abs) 0.02 0.00 - 0.04 K/uL    Lymph # 1.8 1.0 - 4.8 K/uL    Mono # 0.9 0.3 - 1.0 K/uL    Eos # 0.0 0.0 - 0.5 K/uL    Baso # 0.01 0.00 - 0.20 K/uL    nRBC 0 0 /100 WBC    Gran % 57.8 38.0 - 73.0 %    Lymph % 27.3 18.0 - 48.0 %    Mono % 13.8 4.0 - 15.0 %    Eosinophil % 0.6 0.0 - 8.0 %    Basophil % 0.2 0.0 - 1.9 %    Differential Method Automated    Ferritin    Collection Time: 09/22/23  9:12 AM   Result Value Ref Range    Ferritin 285 20.0 - 300.0 ng/mL   Phosphorus    Collection Time: 09/22/23  9:12 AM   Result Value Ref Range    Phosphorus 2.9 2.7 - 4.5 mg/dL   Vitamin D    Collection Time: 09/22/23  9:12 AM   Result Value Ref Range    Vit D, 25-Hydroxy 14 (L) 30 - 96 ng/mL   Protein Electrophoresis, Random Urine    Collection Time: 09/22/23  9:36 AM   Result Value Ref Range    Protein Electrophoresis, Ur Random SEE COMMENT    Protein, Random Urine    Collection Time: 09/22/23  9:36 AM   Result Value Ref Range    Protein, Urine Random 45 (H) 0 - 15 mg/dL   Pathologist Interpretation UPE    Collection Time: 09/22/23  9:36 AM   Result Value Ref Range    Pathologist Interpretation UPE REVIEWED         Future Appointments   Date Time Provider Department Center   9/26/2023 12:30 PM LAB[facility] LAB [place]   9/26/2023  2:00 PM [facility] ODC XR-A LIMIT 350 LBS [facility] XRAY OP [facility]   10/3/2023 11:30 AM Scott Lee Jr., DEANNE [facility] [place]   10/5/2023  9:30 AM Ester Mcrae, BRYNN, CCC-A DES[facility] ENT [place]   10/5/2023 10:00 AM Allie Aguilar NP DESC ENT [place]   10/11/2023  4:20 PM Angel Luis Tobias III, MD [facility] IM [place]   5/14/2024  8:00 AM Jeff Bolanos, ALEX [facility] OPTOMTY [place]           Medication List with Changes/Refills   New Medications    ERGOCALCIFEROL (ERGOCALCIFEROL) 50,000 UNIT CAP    Take 1 capsule (50,000 Units total) by mouth every 7 days.   Current Medications    AMLODIPINE (NORVASC) 10 MG TABLET    TAKE 1 TABLET BY MOUTH EVERY DAY    ASPIRIN 81 MG CHEW    Take 1 tablet (81 mg total) by mouth once daily.    ATORVASTATIN (LIPITOR) 20 MG TABLET    Take 1 tablet (20 mg total) by mouth once daily.    CYANOCOBALAMIN (VITAMIN B-12) 100 MCG TABLET    TAKE 1 TABLET BY MOUTH EVERY DAY    DORZOLAMIDE-TIMOLOL 2-0.5% (COSOPT) 22.3-6.8 MG/ML OPHTHALMIC SOLUTION    INSTILL 1 DROP INTO BOTH EYES TWICE A DAY    METOPROLOL SUCCINATE (TOPROL-XL) 25 MG 24 HR TABLET    TAKE 1 TABLET BY MOUTH EVERY EVENING    MULTIVITAMIN CAPSULE    Take 1 capsule by mouth once daily.    OLMESARTAN-HYDROCHLOROTHIAZIDE (BENICAR HCT) 40-12.5 MG TAB    TAKE 1 TABLET BY MOUTH EVERY DAY    POTASSIUM CHLORIDE (KLOR-CON 10) 10 MEQ TBSR    TAKE 1 TABLET BY MOUTH DAILY EXCEPT TAKE 2 TABS ON MON, WED AND FRIDAY   Changed and/or Refilled Medications    Modified Medication Previous Medication    OMEGA-3 FATTY ACIDS/FISH OIL (FISH OIL-OMEGA-3 FATTY ACIDS) 300-1,000 MG CAPSULE omega-3 fatty acids/fish oil (FISH OIL-OMEGA-3 FATTY ACIDS) 300-1,000 mg capsule       Take 1 capsule by mouth once daily.    Take 1 capsule by mouth once daily.         Disclaimer:  This note has been generated using voice-recognition software.  There may be grammatical or spelling errors that have been missed during proof-reading

## 2023-09-27 LAB
ALBUMIN SERPL BCP-MCNC: 2.9 G/DL (ref 3.5–5.2)
ALBUMIN SERPL ELPH-MCNC: 3.01 G/DL (ref 3.35–5.55)
ALP SERPL-CCNC: 101 U/L (ref 55–135)
ALPHA1 GLOB SERPL ELPH-MCNC: 0.28 G/DL (ref 0.17–0.41)
ALPHA2 GLOB SERPL ELPH-MCNC: 0.71 G/DL (ref 0.43–0.99)
ALT SERPL W/O P-5'-P-CCNC: 40 U/L (ref 10–44)
ANION GAP SERPL CALC-SCNC: 9 MMOL/L (ref 8–16)
AST SERPL-CCNC: 49 U/L (ref 10–40)
B-GLOBULIN SERPL ELPH-MCNC: 0.8 G/DL (ref 0.5–1.1)
BILIRUB DIRECT SERPL-MCNC: 0.7 MG/DL (ref 0.1–0.3)
BILIRUB SERPL-MCNC: 1.5 MG/DL (ref 0.1–1)
BUN SERPL-MCNC: 29 MG/DL (ref 8–23)
CALCIUM SERPL-MCNC: 9 MG/DL (ref 8.7–10.5)
CHLORIDE SERPL-SCNC: 112 MMOL/L (ref 95–110)
CO2 SERPL-SCNC: 20 MMOL/L (ref 23–29)
CREAT SERPL-MCNC: 1.8 MG/DL (ref 0.5–1.4)
EST. GFR  (NO RACE VARIABLE): 37.6 ML/MIN/1.73 M^2
GAMMA GLOB SERPL ELPH-MCNC: 1.8 G/DL (ref 0.67–1.58)
GLUCOSE SERPL-MCNC: 91 MG/DL (ref 70–110)
INTERPRETATION SERPL IFE-IMP: NORMAL
IRON SERPL-MCNC: 134 UG/DL (ref 45–160)
POTASSIUM SERPL-SCNC: 4.5 MMOL/L (ref 3.5–5.1)
PROT SERPL-MCNC: 6.6 G/DL (ref 6–8.4)
PROT SERPL-MCNC: 7 G/DL (ref 6–8.4)
PTH-INTACT SERPL-MCNC: 68.4 PG/ML (ref 9–77)
SATURATED IRON: 55 % (ref 20–50)
SODIUM SERPL-SCNC: 141 MMOL/L (ref 136–145)
TOTAL IRON BINDING CAPACITY: 243 UG/DL (ref 250–450)
TRANSFERRIN SERPL-MCNC: 164 MG/DL (ref 200–375)

## 2023-09-28 LAB
PATHOLOGIST INTERPRETATION IFE: NORMAL
PATHOLOGIST INTERPRETATION SPE: NORMAL

## 2023-10-05 ENCOUNTER — CLINICAL SUPPORT (OUTPATIENT)
Dept: OTOLARYNGOLOGY | Facility: CLINIC | Age: 83
End: 2023-10-05
Payer: COMMERCIAL

## 2023-10-05 ENCOUNTER — OFFICE VISIT (OUTPATIENT)
Dept: OTOLARYNGOLOGY | Facility: CLINIC | Age: 83
End: 2023-10-05
Payer: COMMERCIAL

## 2023-10-05 VITALS — BODY MASS INDEX: 18.32 KG/M2 | WEIGHT: 118.69 LBS

## 2023-10-05 DIAGNOSIS — H91.90 HEARING LOSS, UNSPECIFIED HEARING LOSS TYPE, UNSPECIFIED LATERALITY: ICD-10-CM

## 2023-10-05 DIAGNOSIS — H90.3 SENSORINEURAL HEARING LOSS (SNHL) OF BOTH EARS: Primary | ICD-10-CM

## 2023-10-05 DIAGNOSIS — H61.20 IMPACTED CERUMEN, UNSPECIFIED LATERALITY: ICD-10-CM

## 2023-10-05 DIAGNOSIS — H61.23 BILATERAL IMPACTED CERUMEN: ICD-10-CM

## 2023-10-05 PROCEDURE — 99999 PR PBB SHADOW E&M-EST. PATIENT-LVL III: ICD-10-PCS | Mod: PBBFAC,,, | Performed by: NURSE PRACTITIONER

## 2023-10-05 PROCEDURE — 92557 PR COMPREHENSIVE HEARING TEST: ICD-10-PCS | Mod: S$GLB,,,

## 2023-10-05 PROCEDURE — 1126F PR PAIN SEVERITY QUANTIFIED, NO PAIN PRESENT: ICD-10-PCS | Mod: CPTII,S$GLB,, | Performed by: NURSE PRACTITIONER

## 2023-10-05 PROCEDURE — 3288F PR FALLS RISK ASSESSMENT DOCUMENTED: ICD-10-PCS | Mod: CPTII,S$GLB,, | Performed by: NURSE PRACTITIONER

## 2023-10-05 PROCEDURE — 1101F PT FALLS ASSESS-DOCD LE1/YR: CPT | Mod: CPTII,S$GLB,, | Performed by: NURSE PRACTITIONER

## 2023-10-05 PROCEDURE — 1160F RVW MEDS BY RX/DR IN RCRD: CPT | Mod: CPTII,S$GLB,, | Performed by: NURSE PRACTITIONER

## 2023-10-05 PROCEDURE — 3288F FALL RISK ASSESSMENT DOCD: CPT | Mod: CPTII,S$GLB,, | Performed by: NURSE PRACTITIONER

## 2023-10-05 PROCEDURE — 1160F PR REVIEW ALL MEDS BY PRESCRIBER/CLIN PHARMACIST DOCUMENTED: ICD-10-PCS | Mod: CPTII,S$GLB,, | Performed by: NURSE PRACTITIONER

## 2023-10-05 PROCEDURE — 92567 TYMPANOMETRY: CPT | Mod: S$GLB,,,

## 2023-10-05 PROCEDURE — 69210 EAR CERUMEN REMOVAL: ICD-10-PCS | Mod: S$GLB,,, | Performed by: NURSE PRACTITIONER

## 2023-10-05 PROCEDURE — 1101F PR PT FALLS ASSESS DOC 0-1 FALLS W/OUT INJ PAST YR: ICD-10-PCS | Mod: CPTII,S$GLB,, | Performed by: NURSE PRACTITIONER

## 2023-10-05 PROCEDURE — 1126F AMNT PAIN NOTED NONE PRSNT: CPT | Mod: CPTII,S$GLB,, | Performed by: NURSE PRACTITIONER

## 2023-10-05 PROCEDURE — 69210 REMOVE IMPACTED EAR WAX UNI: CPT | Mod: S$GLB,,, | Performed by: NURSE PRACTITIONER

## 2023-10-05 PROCEDURE — 99999 PR PBB SHADOW E&M-EST. PATIENT-LVL III: CPT | Mod: PBBFAC,,, | Performed by: NURSE PRACTITIONER

## 2023-10-05 PROCEDURE — 92557 COMPREHENSIVE HEARING TEST: CPT | Mod: S$GLB,,,

## 2023-10-05 PROCEDURE — 1159F PR MEDICATION LIST DOCUMENTED IN MEDICAL RECORD: ICD-10-PCS | Mod: CPTII,S$GLB,, | Performed by: NURSE PRACTITIONER

## 2023-10-05 PROCEDURE — 99213 OFFICE O/P EST LOW 20 MIN: CPT | Mod: 25,S$GLB,, | Performed by: NURSE PRACTITIONER

## 2023-10-05 PROCEDURE — 1159F MED LIST DOCD IN RCRD: CPT | Mod: CPTII,S$GLB,, | Performed by: NURSE PRACTITIONER

## 2023-10-05 PROCEDURE — 99999 PR PBB SHADOW E&M-EST. PATIENT-LVL II: CPT | Mod: PBBFAC,,,

## 2023-10-05 PROCEDURE — 99213 PR OFFICE/OUTPT VISIT, EST, LEVL III, 20-29 MIN: ICD-10-PCS | Mod: 25,S$GLB,, | Performed by: NURSE PRACTITIONER

## 2023-10-05 PROCEDURE — 99999 PR PBB SHADOW E&M-EST. PATIENT-LVL II: ICD-10-PCS | Mod: PBBFAC,,,

## 2023-10-05 PROCEDURE — 92567 PR TYMPA2METRY: ICD-10-PCS | Mod: S$GLB,,,

## 2023-10-05 NOTE — PROGRESS NOTES
Patient ID: Dean Hahn is a 82 y.o. y.o. male    Chief Complaint:   Chief Complaint   Patient presents with    Hearing Loss    Cerumen Impaction       Patient is self-referred for evaluation of a possible wax impaction in bilateral ears.  he has complaints of hearing loss in the affected ears, but denies pain or drainage.  This has been an issue in the past.  The patient has not been using any sort of ear drop to soften the wax.    Review of Systems   Constitutional: Negative for fever, chills, fatigue and unexpected weight change.   HENT: Positive for ear blockage. Negative for hearing loss, nosebleeds, congestion, sore throat, facial swelling, rhinorrhea, sneezing, trouble swallowing, dental problem, voice change, postnasal drip, sinus pressure, tinnitus and ear discharge.    Eyes: Negative for redness, itching and visual disturbance.   Respiratory: Negative for cough, choking and wheezing.    Cardiovascular: Negative for chest pain and palpitations.   Gastrointestinal: Negative for abdominal pain.        No reflux.   Musculoskeletal: Negative for gait problem.   Skin: Negative for rash.   Neurological: Negative for dizziness, light-headedness and headaches.     Past Medical History:   Diagnosis Date    Cataract     HLD (hyperlipidemia)     Hypertension      Past Surgical History:   Procedure Laterality Date    CHOLECYSTECTOMY      EYE SURGERY Right     cataract removal surgery     Social History     Socioeconomic History    Marital status:     Number of children: 5   Occupational History    Occupation: Former electrican    Tobacco Use    Smoking status: Never    Smokeless tobacco: Never   Substance and Sexual Activity    Alcohol use: Yes    Drug use: Never    Sexual activity: Not Currently     Partners: Female     Birth control/protection: None     Comment: wife   Social History Narrative    Orginally from    [employer] electrical - retired [employer] 32      -  [place] - 5 years ,      Children    3 girls , 2 boys - 1 daughter oldest in [place]     Social Determinants of Health     Financial Resource Strain: Low Risk  (4/10/2023)    Overall Financial Resource Strain (CARDIA)     Difficulty of Paying Living Expenses: Not very hard   Food Insecurity: No Food Insecurity (4/10/2023)    Hunger Vital Sign     Worried About Running Out of Food in the Last Year: Never true     Ran Out of Food in the Last Year: Never true   Transportation Needs: No Transportation Needs (4/10/2023)    PRAPARE - Transportation     Lack of Transportation (Medical): No     Lack of Transportation (Non-Medical): No   Physical Activity: Insufficiently Active (4/10/2023)    Exercise Vital Sign     Days of Exercise per Week: 7 days     Minutes of Exercise per Session: 20 min   Stress: No Stress Concern Present (4/10/2023)    [place] [employer] - Occupational Stress Questionnaire     Feeling of Stress : Not at all   Social Connections: Moderately Integrated (4/10/2023)    Social Connection and Isolation Panel [NHANES]     Frequency of Communication with Friends and Family: More than three times a week     Frequency of Social Gatherings with Friends and Family: Once a week     Attends Congregation Services: Never     Active Member of Clubs or Organizations: Yes     Attends Club or Organization Meetings: More than 4 times per year     Marital Status:    Housing Stability: Low Risk  (4/10/2023)    Housing Stability Vital Sign     Unable to Pay for Housing in the Last Year: No     Number of Places Lived in the Last Year: 1     Unstable Housing in the Last Year: No     Family History   Problem Relation Age of Onset    Hypertension Mother     Heart attack Father     No Known Problems Sister     Hypertension Brother     No Known Problems Daughter     No Known Problems Son     Cirrhosis Neg Hx        Objective:      There were no vitals filed for this  visit.    Constitutional: Well appearing / communicating without difficutly.  NAD.  Eyes: EOM I Bilaterally  Head/Face: Normocephalic. Negative paranasal sinus pressure/tenderness.  Salivary glands WNL.  House Brackmann I Bilaterally.    Right Ear: Auricle normal appearance. External Auditory Canal with cerumen impaction. EAC with no lesions or masses,TM w/o masses/lesions/perforations. TM mobility noted.   Left Ear: Auricle normal appearance. External Auditory Canal with cerumen impaction. EAC with no lesions or masses,TM w/o masses/lesions/perforations. TM mobility noted.  Nose: No gross nasal septal deviation. Inferior Turbinates 3+ bilaterally. No septal perforation. No masses/lesions. External nasal skin appears normal without masses/lesions.   Oral Cavity: Gingiva/lips within normal limits.  Dentition/gingiva healthy appearing. Mucus membranes moist. Floor of mouth soft, no masses palpated. Oral Tongue mobile. Hard Palate appears normal.    Oropharynx: Base of tongue appears normal. No masses/lesions noted. Tonsillar fossa/pharyngeal wall without lesions. Posterior oropharynx WNL.  Soft palate without masses. Midline uvula.   Neck/Lymphatic: No LAD I-VI bilaterally.  No thyromegaly.  No masses noted on exam.    Mirror laryngoscopy/nasopharyngoscopy: Active gag reflex.  Unable to perform.    Neuro/Psychiatric: AOx3.  Normal mood and affect.   Cardiovascular: Normal carotid pulses bilaterally, no increasing jugular venous distention noted at cervical region bilaterally.    Respiratory: Normal respiratory effort, no stridor, no retractions noted.          Ear Cerumen Removal    Date/Time: 10/5/2023 10:00 AM    Performed by: Allie Aguilar NP  Authorized by: Allie Aguilar NP    Consent Done?:  Yes (Verbal)  Location details:  Both ears  Procedure type: curette    Procedure type comment:  Suction and forceps  Cerumen  Removal Results:  Cerumen completely removed  Patient tolerance:  Patient tolerated the  procedure well with no immediate complications    Audiogram interpreted personally by me and discussed in detail with the patient today.   Pure tone testing revealed moderate sloping to severe sensorineural hearing loss, bilaterally. Speech reception thresholds were obtained at 40 dBHL in the right ear and 50 dBHL in the left ear. Speech discrimination scores were 52% in the right ear and 68% in the left ear. Tympanometry revealed Type As tympanogram in both ears (compliance AD 1.9 ml  AS 1.2 ml).        Assessment:         ICD-10-CM ICD-9-CM    1. Sensorineural hearing loss (SNHL) of both ears  H90.3 389.18       2. Bilateral impacted cerumen  H61.23 380.4 Ear Cerumen Removal           Plan:       1.   Cerumen impaction:  Removed today without difficulty.  I would recommend the use of a wax softening drop, either over the counter Debrox or mineral oil, on a weekly basis.  I also instructed the patient to avoid Qtips.    2. We reviewed the patient's recent audiogram and hearing loss in detail.  We also discussed that he is a good candidate for hearing aids, if and when he the patient is motivated.  He was given handouts with information and pricing of hearing aids, and will contact audiology when ready to proceed.  We also discussed the use hearing protection when exposed to loud noise, including lawn equipment.      3. F/U 1 year or sooner as needed      Allie Aguilar NP  Answers submitted by the patient for this visit:  Review of Symptoms Questionnaire  (Submitted on 10/4/2023)  None of these: Yes  None of these : Yes  cough: Yes  None of these : Yes  None of these: Yes  None of these: Yes  None of these: Yes  None of these : Yes  None of these: Yes  None of these : Yes  None of these: Yes  None of these: Yes  None of these: Yes

## 2023-10-05 NOTE — PROGRESS NOTES
Dean Tressameer Hahn, a 82 y.o. male was seen today in the clinic for an audiologic evaluation after ear cleaning. The patient's primary complaint was reduced hearing perception over the past year.  Mr. Hahn denies tinntinus, dizziness, ear pain and drainage. He report having noise exposure from working as an  and from hunting.     Otoscopy revealed cerumen impaction, worse in the right ear. Testing was conducted following ear cleaning. Pure tone testing revealed moderate sloping to severe sensorineural hearing loss, bilaterally. Speech reception thresholds were obtained at 40 dBHL in the right ear and 50 dBHL in the left ear. Speech discrimination scores were 52% in the right ear and 68% in the left ear. Tympanometry revealed Type As tympanogram in both ears (compliance AD 1.9 ml  AS 1.2 ml).    Results were reviewed with the patient and the recommendation of a hearing aid consultation was discussed.    Recommendations:  Otologic evaluation  Annual audiologic evaluation  Hearing protection in noise  Hearing aid consultation

## 2023-10-05 NOTE — PROCEDURES
Ear Cerumen Removal    Date/Time: 10/5/2023 10:00 AM    Performed by: Allie Aguilar NP  Authorized by: Allie Aguilar NP    Consent Done?:  Yes (Verbal)  Location details:  Both ears  Procedure type: curette    Procedure type comment:  Suction and forceps  Cerumen  Removal Results:  Cerumen completely removed  Patient tolerance:  Patient tolerated the procedure well with no immediate complications

## 2023-10-07 DIAGNOSIS — I63.9 STROKE WITH CEREBRAL ISCHEMIA: ICD-10-CM

## 2023-10-08 RX ORDER — AMOXICILLIN 500 MG
1 CAPSULE ORAL DAILY
Qty: 90 CAPSULE | Refills: 1 | OUTPATIENT
Start: 2023-10-08

## 2023-10-08 NOTE — TELEPHONE ENCOUNTER
Refill Decision Note   Dean Hahn  is requesting a refill authorization.  Brief Assessment and Rationale for Refill:  Quick Discontinue     Medication Therapy Plan:         Comments:     Note composed:12:17 PM 10/08/2023

## 2023-10-11 ENCOUNTER — OFFICE VISIT (OUTPATIENT)
Dept: INTERNAL MEDICINE | Facility: CLINIC | Age: 83
End: 2023-10-11
Payer: COMMERCIAL

## 2023-10-11 DIAGNOSIS — E78.2 MIXED HYPERLIPIDEMIA: ICD-10-CM

## 2023-10-11 DIAGNOSIS — I10 ESSENTIAL HYPERTENSION: ICD-10-CM

## 2023-10-11 DIAGNOSIS — E87.6 HYPOKALEMIA: ICD-10-CM

## 2023-10-11 DIAGNOSIS — R73.03 PREDIABETES: Primary | ICD-10-CM

## 2023-10-11 PROCEDURE — 1160F RVW MEDS BY RX/DR IN RCRD: CPT | Mod: CPTII,93,, | Performed by: INTERNAL MEDICINE

## 2023-10-11 PROCEDURE — 99441 PR PHYSICIAN TELEPHONE EVALUATION 5-10 MIN: ICD-10-PCS | Mod: 93,,, | Performed by: INTERNAL MEDICINE

## 2023-10-11 PROCEDURE — 1159F MED LIST DOCD IN RCRD: CPT | Mod: CPTII,93,, | Performed by: INTERNAL MEDICINE

## 2023-10-11 PROCEDURE — 1159F PR MEDICATION LIST DOCUMENTED IN MEDICAL RECORD: ICD-10-PCS | Mod: CPTII,93,, | Performed by: INTERNAL MEDICINE

## 2023-10-11 PROCEDURE — 1160F PR REVIEW ALL MEDS BY PRESCRIBER/CLIN PHARMACIST DOCUMENTED: ICD-10-PCS | Mod: CPTII,93,, | Performed by: INTERNAL MEDICINE

## 2023-10-11 PROCEDURE — 99441 PR PHYSICIAN TELEPHONE EVALUATION 5-10 MIN: CPT | Mod: 93,,, | Performed by: INTERNAL MEDICINE

## 2023-10-11 RX ORDER — POTASSIUM CHLORIDE 750 MG/1
TABLET, EXTENDED RELEASE ORAL
Qty: 135 TABLET | Refills: 3 | Status: SHIPPED | OUTPATIENT
Start: 2023-10-11

## 2023-10-11 NOTE — TELEPHONE ENCOUNTER
No care due was identified.  [facility] Embedded Care Due Messages. Reference number: [id number].   10/11/2023 12:16:15 AM CDT

## 2023-10-11 NOTE — TELEPHONE ENCOUNTER
Refill Routing Note   Medication(s) are not appropriate for processing by [facility] for the following reason(s):      Required labs abnormal    [facility] action(s):  Defer Care Due:  None identified          Appointments  past 12m or future 3m with PCP    Date Provider   Last Visit   9/26/2023 Angel Luis Tobias III, MD   Next Visit   10/11/2023 Angel Luis Tobias III, MD   ED visits in past 90 days: 0        Note composed:4:21 AM 10/11/2023

## 2023-10-11 NOTE — PATIENT INSTRUCTIONS
We were happy to see you today    For your Testing  Please have your labs and/or imaging test done  3 motnhs         For your Medication     For more information about side effects please visit medlineplus.gov        For your Referrals        For your Vaccinations  We gave your the following vaccines    Please obtain the following vaccines at the pharmacy  Flu   Covid  rsv        Please return to clinic in    No follow-ups on file.        Extra resources

## 2023-10-11 NOTE — PROGRESS NOTES
Established Patient - Audio Only Telehealth Visit     The patient location is: [place]  The chief complaint leading to consultation is: labs   Visit type: Virtual visit with audio only (telephone)  Total time spent with patient: 8        The reason for the audio only service rather than synchronous audio and video virtual visit was related to technical difficulties or patient preference/necessity.     Each patient to whom I provide medical services by telemedicine is:  (1) informed of the relationship between the physician and patient and the respective role of any other health care provider with respect to management of the patient; and (2) notified that they may decline to receive medical services by telemedicine and may withdraw from such care at any time. Patient verbally consented to receive this service via voice-only telephone call.       HPI:     No results found for this or any previous visit (from the past 336 hour(s)).         Health Maintenance Due   Topic Date Due    Shingles Vaccine (1 of 2) Never done    Influenza Vaccine (1) 09/01/2023    COVID-19 Vaccine (6 - 2023-24 season) 09/01/2023         Assessment and plan:          Future Appointments   Date Time Provider Department Center   10/19/2023  1:00 PM Scott Lee Jr., DPBIJU [facility] [place]   5/14/2024  8:00 AM Jeff Bolanos OD [facility] [place]         Medication List with Changes/Refills   Current Medications    AMLODIPINE (NORVASC) 10 MG TABLET    TAKE 1 TABLET BY MOUTH EVERY DAY    ASPIRIN 81 MG CHEW    Take 1 tablet (81 mg total) by mouth once daily.    ATORVASTATIN (LIPITOR) 20 MG TABLET    Take 1 tablet (20 mg total) by mouth once daily.    CYANOCOBALAMIN (VITAMIN B-12) 100 MCG TABLET    TAKE 1 TABLET BY MOUTH EVERY DAY    DORZOLAMIDE-TIMOLOL 2-0.5% (COSOPT) 22.3-6.8 MG/ML OPHTHALMIC SOLUTION    INSTILL 1 DROP INTO BOTH EYES TWICE A DAY    ERGOCALCIFEROL (ERGOCALCIFEROL) 50,000 UNIT CAP    Take 1 capsule (50,000 Units  total) by mouth every 7 days.    METOPROLOL SUCCINATE (TOPROL-XL) 25 MG 24 HR TABLET    TAKE 1 TABLET BY MOUTH EVERY EVENING    MULTIVITAMIN CAPSULE    Take 1 capsule by mouth once daily.    OLMESARTAN-HYDROCHLOROTHIAZIDE (BENICAR HCT) 40-12.5 MG TAB    TAKE 1 TABLET BY MOUTH EVERY DAY    OMEGA-3 FATTY ACIDS/FISH OIL (FISH OIL-OMEGA-3 FATTY ACIDS) 300-1,000 MG CAPSULE    Take 1 capsule by mouth once daily.    POTASSIUM CHLORIDE (KLOR-CON 10) 10 MEQ TBSR    TAKE 1 TABLET BY MOUTH DAILY EXCEPT TAKE 2 TABS ON MON, WED AND FRIDAY         Disclaimer:  This note has been generated using voice-recognition software. There may be grammatical or spelling errors that have been missed during proof-reading                         This service was not originating from a related E/M service provided within the previous 7 days nor will  to an E/M service or procedure within the next 24 hours or my soonest available appointment.  Prevailing standard of care was able to be met in this audio-only visit.

## 2023-10-19 ENCOUNTER — OFFICE VISIT (OUTPATIENT)
Dept: PODIATRY | Facility: CLINIC | Age: 83
End: 2023-10-19
Payer: COMMERCIAL

## 2023-10-19 VITALS — WEIGHT: 116.88 LBS | BODY MASS INDEX: 17.72 KG/M2 | HEIGHT: 68 IN

## 2023-10-19 DIAGNOSIS — B35.1 NAIL FUNGUS: Primary | ICD-10-CM

## 2023-10-19 DIAGNOSIS — L60.3 NAIL DYSTROPHY: ICD-10-CM

## 2023-10-19 PROCEDURE — 3288F FALL RISK ASSESSMENT DOCD: CPT | Mod: CPTII,S$GLB,, | Performed by: PODIATRIST

## 2023-10-19 PROCEDURE — 1101F PR PT FALLS ASSESS DOC 0-1 FALLS W/OUT INJ PAST YR: ICD-10-PCS | Mod: CPTII,S$GLB,, | Performed by: PODIATRIST

## 2023-10-19 PROCEDURE — 1160F RVW MEDS BY RX/DR IN RCRD: CPT | Mod: CPTII,S$GLB,, | Performed by: PODIATRIST

## 2023-10-19 PROCEDURE — 99213 OFFICE O/P EST LOW 20 MIN: CPT | Mod: S$GLB,,, | Performed by: PODIATRIST

## 2023-10-19 PROCEDURE — 1159F MED LIST DOCD IN RCRD: CPT | Mod: CPTII,S$GLB,, | Performed by: PODIATRIST

## 2023-10-19 PROCEDURE — 99999 PR PBB SHADOW E&M-EST. PATIENT-LVL III: CPT | Mod: PBBFAC,,, | Performed by: PODIATRIST

## 2023-10-19 PROCEDURE — 1101F PT FALLS ASSESS-DOCD LE1/YR: CPT | Mod: CPTII,S$GLB,, | Performed by: PODIATRIST

## 2023-10-19 PROCEDURE — 1160F PR REVIEW ALL MEDS BY PRESCRIBER/CLIN PHARMACIST DOCUMENTED: ICD-10-PCS | Mod: CPTII,S$GLB,, | Performed by: PODIATRIST

## 2023-10-19 PROCEDURE — 3288F PR FALLS RISK ASSESSMENT DOCUMENTED: ICD-10-PCS | Mod: CPTII,S$GLB,, | Performed by: PODIATRIST

## 2023-10-19 PROCEDURE — 99999 PR PBB SHADOW E&M-EST. PATIENT-LVL III: ICD-10-PCS | Mod: PBBFAC,,, | Performed by: PODIATRIST

## 2023-10-19 PROCEDURE — 99213 PR OFFICE/OUTPT VISIT, EST, LEVL III, 20-29 MIN: ICD-10-PCS | Mod: S$GLB,,, | Performed by: PODIATRIST

## 2023-10-19 PROCEDURE — 1159F PR MEDICATION LIST DOCUMENTED IN MEDICAL RECORD: ICD-10-PCS | Mod: CPTII,S$GLB,, | Performed by: PODIATRIST

## 2023-10-19 RX ORDER — CICLOPIROX 80 MG/ML
SOLUTION TOPICAL NIGHTLY
Qty: 6.6 ML | Refills: 6 | Status: SHIPPED | OUTPATIENT
Start: 2023-10-19 | End: 2024-01-24 | Stop reason: SDUPTHER

## 2023-10-19 NOTE — PROGRESS NOTES
[facility]  [place] [facility]  [address]  [address]  [place] [address]  Dept: [phone]  Dept Fax: [phone]    Scott eLe Jr., DPM     Assessment:   MDM    Coding  1. Nail fungus  Ambulatory referral/consult to Podiatry    ciclopirox (PENLAC) 8 % Soln      2. Nail dystrophy            Plan:     Procedures  1. Nail fungus  -     Ambulatory referral/consult to Podiatry  -     ciclopirox (PENLAC) 8 % Soln; Apply topically nightly.  Dispense: 6.6 mL; Refill: 6    2. Nail dystrophy      Diagnoses and all orders for this visit:    Nail fungus  -     Ambulatory referral/consult to Podiatry  -     ciclopirox (PENLAC) 8 % Soln; Apply topically nightly.    Nail dystrophy      -pt seen, evaluated, and managed  -dx discussed in detail. All questions/concerns addressed  -all tx options discussed. All alternatives, risks, benefits of all txs discussed  -will try topical rx first  -rxs dispensed: penlac  -referrals: none  -WB: wbat  - educated on antifungal footcare at home  - rec'd otc antifungal foot powder and spray. Use prn  - discussed nail bx in future in nonresponsive to rxd medication  -Discussed importance of keeping feet dry and changing socks and shoes on a regular basis to prevent spread of fungus      Follow up if symptoms worsen or fail to improve.    Subjective:      Patient ID: Dean Hahn is a 83 y.o. male.    Chief Complaint: No chief complaint on file.      CC - fungal nails: Patient presents to the clinic complaining of thick and discolored toenails on both feet. Patient is inquiring about treatment options.    HPI    Last Podiatry Enc: Visit date not found  Last Enc w/ Me: Visit date not found    Outside reports reviewed: historical medical records.  Family hx: as below  Past Medical History:   Diagnosis Date    Cataract     HLD (hyperlipidemia)     Hypertension      Past Surgical History:   Procedure Laterality Date    CHOLECYSTECTOMY      EYE SURGERY Right      cataract removal surgery     Family History   Problem Relation Age of Onset    Hypertension Mother     Heart attack Father     No Known Problems Sister     Hypertension Brother     No Known Problems Daughter     No Known Problems Son     Cirrhosis Neg Hx      Current Outpatient Medications   Medication Sig Dispense Refill    amLODIPine (NORVASC) 10 MG tablet TAKE 1 TABLET BY MOUTH EVERY DAY 90 tablet 2    aspirin 81 MG Chew Take 1 tablet (81 mg total) by mouth once daily. 90 tablet 1    atorvastatin (LIPITOR) 20 MG tablet Take 1 tablet (20 mg total) by mouth once daily. 90 tablet 3    cyanocobalamin (VITAMIN B-12) 100 MCG tablet TAKE 1 TABLET BY MOUTH EVERY DAY 90 tablet 1    dorzolamide-timolol 2-0.5% (COSOPT) 22.3-6.8 mg/mL ophthalmic solution INSTILL 1 DROP INTO BOTH EYES TWICE A DAY 10 mL 1    ergocalciferol (ERGOCALCIFEROL) 50,000 unit Cap Take 1 capsule (50,000 Units total) by mouth every 7 days. 12 capsule 1    metoprolol succinate (TOPROL-XL) 25 MG 24 hr tablet TAKE 1 TABLET BY MOUTH EVERY EVENING 90 tablet 1    multivitamin capsule Take 1 capsule by mouth once daily.      olmesartan-hydrochlorothiazide (BENICAR HCT) 40-12.5 mg Tab TAKE 1 TABLET BY MOUTH EVERY DAY 90 tablet 3    omega-3 fatty acids/fish oil (FISH OIL-OMEGA-3 FATTY ACIDS) 300-1,000 mg capsule Take 1 capsule by mouth once daily. 90 capsule 1    potassium chloride (KLOR-CON 10) 10 MEQ TbSR TAKE 1 TABLET BY MOUTH DAILY EXCEPT TAKE 2 TABS ON MON, WED AND FRIDAY 135 tablet 3    ciclopirox (PENLAC) 8 % Soln Apply topically nightly. 6.6 mL 6     No current facility-administered medications for this visit.     Review of patient's allergies indicates:  No Known Allergies  Social History     Socioeconomic History    Marital status:     Number of children: 5   Occupational History    Occupation: Former electrican    Tobacco Use    Smoking status: Never    Smokeless tobacco: Never   Substance and Sexual Activity     Alcohol use: Yes    Drug use: Never    Sexual activity: Not Currently     Partners: Female     Birth control/protection: None     Comment: wife   Social History Narrative    Orginally from     [employer] - retired [employer] 32      - [place] - 5 years ,      Children    3 girls , 2 boys - 1 daughter oldest in [place]     Social Determinants of Health     Financial Resource Strain: Low Risk  (4/10/2023)    Overall Financial Resource Strain (CARDIA)     Difficulty of Paying Living Expenses: Not very hard   Food Insecurity: No Food Insecurity (4/10/2023)    Hunger Vital Sign     Worried About Running Out of Food in the Last Year: Never true     Ran Out of Food in the Last Year: Never true   Transportation Needs: No Transportation Needs (4/10/2023)    PRAPARE - Transportation     Lack of Transportation (Medical): No     Lack of Transportation (Non-Medical): No   Physical Activity: Insufficiently Active (4/10/2023)    Exercise Vital Sign     Days of Exercise per Week: 7 days     Minutes of Exercise per Session: 20 min   Stress: No Stress Concern Present (4/10/2023)    [place] [employer] - Occupational Stress Questionnaire     Feeling of Stress : Not at all   Social Connections: Moderately Integrated (4/10/2023)    Social Connection and Isolation Panel [NHANES]     Frequency of Communication with Friends and Family: More than three times a week     Frequency of Social Gatherings with Friends and Family: Once a week     Attends Mormonism Services: Never     Active Member of Clubs or Organizations: Yes     Attends Club or Organization Meetings: More than 4 times per year     Marital Status:    Housing Stability: Low Risk  (4/10/2023)    Housing Stability Vital Sign     Unable to Pay for Housing in the Last Year: No     Number of Places Lived in the Last Year: 1     Unstable Housing in the Last Year: No       ROS    REVIEW  "OF SYSTEMS: Negative as documented below as well as positive findings in bold.       Constitutional  Respiratory  Gastrointestinal  Skin   - Fever - Cough - Heartburn - Rash   - Chills - Spit blood - Nausea - Itching   - Weight Loss - Shortness of breath - Vomiting - Nail pain   - Malaise/Fatigue - Wheezing - Abdominal Pain  Wound/Ulcer   - Weight Gain   - Blood in Stool  Poor wound healing       - Diarrhea          Cardiovascular  Genitourinary  Neurological  HEENT   - Chest Pain - Dysuria - Burning Sensation of feet - Headache   - Palpitations - Hematuria - Tingling / Paresthesia - Congestion   - Pain at night in legs - Flank Pain - Dizziness - Sore Throat   - Cramping   - Tremor - Blurred Vision   - Leg Swelling   - Sensory Change - Double Vision   - Dizzy when standing   - Speech Change - Eye Redness       - Focal Weakness - Dry Eyes       - Loss of Consciousness          Endocrine  Musculoskeletal  Psychiatric   - Cold intolerance - Muscle Pain - Depression   - Heat intolerance - Neck Pain - Insomnia   - Anemia - Joint Pain - Memory Loss   -  Easy bruising, bleeding - Heel pain - Anxiety      Toe Pain        Leg/Ankle/Foot Pain         Objective:     Ht 5' 7.5" (1.715 m)   Wt 53 kg (116 lb 13.5 oz)   BMI 18.03 kg/m²   Vitals:    10/19/23 1310   Weight: 53 kg (116 lb 13.5 oz)   Height: 5' 7.5" (1.715 m)       Physical Exam    General Appearance:   Patient appears well developed, well nourished  Patient appears stated age    Psychiatric:   Patient is oriented to time, place, and person.  Patient has appropriate mood and affect    Neck:  Trachea Midline  No visible masses    Respiratory/Ears:  No distress or labored breathing.  Able to differentiate between normal talking voice and whisper.  Able to follow commands    Eyes:  Visual Acuity intact  Lids and conjunctivae normal. No discoloration noted.    Foot Exam  Physical Exam  Ortho Exam  Ortho/SPM Exam  Physical Exam  Neurologic Exam    B/l LE exam con't:  V: "  DP 2/4, PT 2/4   CRT< 3s to all digits tested   Tibial and popliteal lymph nodes are w/o abnormality    edema absent, varicosities absent    N:  Patient displays normal ankle reflexes   SILT in SP/DP/T/Rasheeda/Saph distributions    Ortho: +Motor EHL/FHL/TA/GA   No TTP  Compartments soft/compressible. No pain on passive stretch of big toe. No calf  Pain.    Derm:  skin intact, skin warm and dry, skin without ulcers or lesions, skin without induration, nails abnormal, nails discolored and nails dystrophic, mycotic x all nails    Imaging / Labs:    none    Note: This was dictated using a computer transcription program. Although proofread, it may contain computer transcription errors and phonetic errors. Other human proofreading errors may also exist. Corrections may be performed at a later time. Please contact us for any clarification if needed.    Scott Lee DPM  [facility]

## 2023-12-11 NOTE — PROGRESS NOTES
Assessment:         1. Essential hypertension    2. Need for vaccination    3. Visit for wound check          Plan:           Dean was seen today for suture / staple removal.    Diagnoses and all orders for this visit:    Essential hypertension  Chronic  Uncontrolled  Patient is not at goal today  I have reviewed lifestyle modification to achieve/maintain goals  We will adjust the current medication regimen to   Patient will follow up in 4 weeks  -     Hypertension Digital Medicine (HDMP) Enrollment Order  -     Hypertension Digital Medicine (HDMP): Assign Onboarding Questionnaires  -     olmesartan (BENICAR) 20 MG tablet; Take 1 tablet (20 mg total) by mouth once daily.    Need for vaccination    -- I reviewed the patient vaccine history and provided the risk benefits and side effects of the vaccine.   -- I provided the patient a VIS sheet on the vaccine.   -     Influenza (FLUAD) - Quadrivalent (Adjuvanted) *Preferred* (65+) (PF)    Visit for wound check          5 stiches remved  BP is low , last labs with renal insufficency  - Switch to olmesartan alone  Start digital meidcine  Fu as scheduled in January       Subjective:       Patient ID: Dean Hahn is a 83 y.o. male.    Chief Complaint: Suture / Staple Removal          Interim Hx  Last seen by me in October   Seen by podiatr  WENT to ED for fall   Number of sutures:  5     Concerns today  Wound check    Chronic problems     Hypertension  This is a chronic problem. The current episode started more than 1 year ago. The problem has been waxing and waning since onset. Condition status: LOW BP. Past treatments include calcium channel blockers, beta blockers, diuretics and angiotensin blockers. The current treatment provides significant improvement. There are no compliance problems.              Review of Systems   All other systems reviewed and are negative.            Health Maintenance Due   Topic Date Due    Shingles Vaccine (1 of 2) Never  "done    RSV Vaccine (Age 60+ and Pregnant patients) (1 - 1-dose 60+ series) Never done    COVID-19 Vaccine (6 - 2023-24 season) 09/01/2023         Objective:     /62   Pulse 66   Wt 54.6 kg (120 lb 5.9 oz)   SpO2 98%   BMI 18.57 kg/m²         12/12/2023     9:36 AM 12/5/2023    12:41 PM 10/19/2023     1:10 PM 10/5/2023     9:13 AM 9/26/2023    10:51 AM   Vitals   Height   5' 7.5" (1.715 m)  5' 7.5" (1.715 m)   Weight (lbs) 120.37 116.84 116.84 118.72 112.21   BMI (kg/m2)   18  17.3                Physical Exam  Vitals and nursing note reviewed.   Constitutional:       General: He is not in acute distress.     Appearance: He is well-developed. He is not ill-appearing, toxic-appearing or diaphoretic.   HENT:      Head: Normocephalic.        Comments: Well approximated sound with 5 stiches   Eyes:      Conjunctiva/sclera: Conjunctivae normal.   Cardiovascular:      Rate and Rhythm: Normal rate and regular rhythm.      Heart sounds: Normal heart sounds. No murmur heard.     No friction rub. No gallop.   Pulmonary:      Effort: Pulmonary effort is normal. No respiratory distress.   Abdominal:      General: There is no distension.      Palpations: Abdomen is soft.   Neurological:      General: No focal deficit present.      Mental Status: He is alert and oriented to person, place, and time.                 No results found for this or any previous visit (from the past 336 hour(s)).  CT Maxillofacial Without Contrast  Narrative: EXAMINATION:  CT MAXILLOFACIAL WITHOUT CONTRAST    CLINICAL HISTORY:  Facial trauma, blunt;    TECHNIQUE:  Low dose axial images, sagittal and coronal reformations were obtained through the face.  Contrast was not administered.    FINDINGS:  The visualized intracranial content is significant for involutional change.  The visualized extracranial soft tissues and vascular structures including the orbits and orbital content are unremarkable.  There is left-sided maxillary mucous membrane " thickening.  Note is made of bilateral middle nasal turbinate clemente bullosa.  There is leftward nasal septal deviation.  Impression: No evidence of fracture.    Electronically signed by: Marcelo Lopez MD  Date:    12/05/2023  Time:    14:02  CT Cervical Spine Without Contrast  Narrative: EXAMINATION:  CT CERVICAL SPINE WITHOUT CONTRAST    CLINICAL HISTORY:  Neck trauma (Age >= 65y);    TECHNIQUE:  Low dose axial images, sagittal and coronal reformations were performed though the cervical spine.  Contrast was not administered.    FINDINGS:  The visualized intracranial content is unremarkable.  The visualized extracranial soft tissues and vascular structures from the base of the skull to the visualized portion of the superior mediastinum are significant for extensive bilateral carotid calcification.  The osseous structures demonstrate degenerative change.  The vertebral body heights are satisfactorily preserved.  There is mild straightening of the normal cervical lordosis.  There is no prevertebral soft tissue swelling.  There is no fracture, dislocation, or bony erosion.  Impression: No evidence of fracture.    Electronically signed by: Marcelo Lopez MD  Date:    12/05/2023  Time:    14:00  CT Head Without Contrast  Narrative: EXAMINATION:  CT HEAD WITHOUT CONTRAST    CLINICAL HISTORY:  Head trauma, minor (Age >= 65y);    TECHNIQUE:  Low dose axial images were obtained through the head.  Coronal and sagittal reformations were also performed. Contrast was not administered.    FINDINGS:  The brain is significant for periventricular and subcortical hypoattenuation consistent with microvascular ischemic change.  The ventricular system is prominent consistent with involutional change.  There is no intra or extra-axial mass or hemorrhage identified.  The visualized extracranial structures are unremarkable.  Impression: No acute intracranial abnormality.    Electronically signed by: Marcelo Lopez  MD  Date:    12/05/2023  Time:    13:58        Future Appointments   Date Time Provider Department Center   1/8/2024  8:00 AM SPECIMEN, [facility] [place]   1/8/2024 10:15 AM LAB, [facility] [place]   1/11/2024  1:00 PM Angel Luis Tobias III, MD [facility]   5/14/2024  8:00 AM Jeff Bolanos, [facility] [place]         Medication List with Changes/Refills   New Medications    OLMESARTAN (BENICAR) 20 MG TABLET    Take 1 tablet (20 mg total) by mouth once daily.   Current Medications    AMLODIPINE (NORVASC) 10 MG TABLET    TAKE 1 TABLET BY MOUTH EVERY DAY    ASPIRIN 81 MG CHEW    Take 1 tablet (81 mg total) by mouth once daily.    ATORVASTATIN (LIPITOR) 20 MG TABLET    Take 1 tablet (20 mg total) by mouth once daily.    CICLOPIROX (PENLAC) 8 % SOLN    Apply topically nightly.    CYANOCOBALAMIN (VITAMIN B-12) 100 MCG TABLET    TAKE 1 TABLET BY MOUTH EVERY DAY    DORZOLAMIDE-TIMOLOL 2-0.5% (COSOPT) 22.3-6.8 MG/ML OPHTHALMIC SOLUTION    INSTILL 1 DROP INTO BOTH EYES TWICE A DAY    ERGOCALCIFEROL (ERGOCALCIFEROL) 50,000 UNIT CAP    Take 1 capsule (50,000 Units total) by mouth every 7 days.    METOPROLOL SUCCINATE (TOPROL-XL) 25 MG 24 HR TABLET    TAKE 1 TABLET BY MOUTH EVERY EVENING    MULTIVITAMIN CAPSULE    Take 1 capsule by mouth once daily.    OMEGA-3 FATTY ACIDS/FISH OIL (FISH OIL-OMEGA-3 FATTY ACIDS) 300-1,000 MG CAPSULE    Take 1 capsule by mouth once daily.    POTASSIUM CHLORIDE (KLOR-CON 10) 10 MEQ TBSR    TAKE 1 TABLET BY MOUTH DAILY EXCEPT TAKE 2 TABS ON MON, WED AND FRIDAY   Discontinued Medications    OLMESARTAN-HYDROCHLOROTHIAZIDE (BENICAR HCT) 40-12.5 MG TAB    TAKE 1 TABLET BY MOUTH EVERY DAY         Disclaimer:  This note has been generated using voice-recognition software. There may be grammatical or spelling errors that have been missed during proof-reading

## 2023-12-12 ENCOUNTER — OFFICE VISIT (OUTPATIENT)
Dept: INTERNAL MEDICINE | Facility: CLINIC | Age: 83
End: 2023-12-12
Payer: MEDICARE

## 2023-12-12 VITALS
WEIGHT: 120.38 LBS | DIASTOLIC BLOOD PRESSURE: 62 MMHG | OXYGEN SATURATION: 98 % | SYSTOLIC BLOOD PRESSURE: 102 MMHG | BODY MASS INDEX: 18.57 KG/M2 | HEART RATE: 66 BPM

## 2023-12-12 DIAGNOSIS — Z51.89 VISIT FOR WOUND CHECK: ICD-10-CM

## 2023-12-12 DIAGNOSIS — Z23 NEED FOR VACCINATION: ICD-10-CM

## 2023-12-12 DIAGNOSIS — I10 ESSENTIAL HYPERTENSION: Primary | ICD-10-CM

## 2023-12-12 PROCEDURE — 3288F FALL RISK ASSESSMENT DOCD: CPT | Mod: CPTII,S$GLB,, | Performed by: INTERNAL MEDICINE

## 2023-12-12 PROCEDURE — 1126F AMNT PAIN NOTED NONE PRSNT: CPT | Mod: CPTII,S$GLB,, | Performed by: INTERNAL MEDICINE

## 2023-12-12 PROCEDURE — 1101F PT FALLS ASSESS-DOCD LE1/YR: CPT | Mod: CPTII,S$GLB,, | Performed by: INTERNAL MEDICINE

## 2023-12-12 PROCEDURE — 1101F PR PT FALLS ASSESS DOC 0-1 FALLS W/OUT INJ PAST YR: ICD-10-PCS | Mod: CPTII,S$GLB,, | Performed by: INTERNAL MEDICINE

## 2023-12-12 PROCEDURE — 3074F PR MOST RECENT SYSTOLIC BLOOD PRESSURE < 130 MM HG: ICD-10-PCS | Mod: CPTII,S$GLB,, | Performed by: INTERNAL MEDICINE

## 2023-12-12 PROCEDURE — G0008 FLU VACCINE - QUADRIVALENT - ADJUVANTED: ICD-10-PCS | Mod: S$GLB,,, | Performed by: INTERNAL MEDICINE

## 2023-12-12 PROCEDURE — 1160F RVW MEDS BY RX/DR IN RCRD: CPT | Mod: CPTII,S$GLB,, | Performed by: INTERNAL MEDICINE

## 2023-12-12 PROCEDURE — G0008 ADMIN INFLUENZA VIRUS VAC: HCPCS | Mod: S$GLB,,, | Performed by: INTERNAL MEDICINE

## 2023-12-12 PROCEDURE — 3288F PR FALLS RISK ASSESSMENT DOCUMENTED: ICD-10-PCS | Mod: CPTII,S$GLB,, | Performed by: INTERNAL MEDICINE

## 2023-12-12 PROCEDURE — 99214 PR OFFICE/OUTPT VISIT, EST, LEVL IV, 30-39 MIN: ICD-10-PCS | Mod: 25,S$GLB,, | Performed by: INTERNAL MEDICINE

## 2023-12-12 PROCEDURE — 3078F PR MOST RECENT DIASTOLIC BLOOD PRESSURE < 80 MM HG: ICD-10-PCS | Mod: CPTII,S$GLB,, | Performed by: INTERNAL MEDICINE

## 2023-12-12 PROCEDURE — 1159F PR MEDICATION LIST DOCUMENTED IN MEDICAL RECORD: ICD-10-PCS | Mod: CPTII,S$GLB,, | Performed by: INTERNAL MEDICINE

## 2023-12-12 PROCEDURE — 1126F PR PAIN SEVERITY QUANTIFIED, NO PAIN PRESENT: ICD-10-PCS | Mod: CPTII,S$GLB,, | Performed by: INTERNAL MEDICINE

## 2023-12-12 PROCEDURE — 99999 PR PBB SHADOW E&M-EST. PATIENT-LVL IV: CPT | Mod: PBBFAC,,, | Performed by: INTERNAL MEDICINE

## 2023-12-12 PROCEDURE — 99999 PR PBB SHADOW E&M-EST. PATIENT-LVL IV: ICD-10-PCS | Mod: PBBFAC,,, | Performed by: INTERNAL MEDICINE

## 2023-12-12 PROCEDURE — 90694 FLU VACCINE - QUADRIVALENT - ADJUVANTED: ICD-10-PCS | Mod: S$GLB,,, | Performed by: INTERNAL MEDICINE

## 2023-12-12 PROCEDURE — 1159F MED LIST DOCD IN RCRD: CPT | Mod: CPTII,S$GLB,, | Performed by: INTERNAL MEDICINE

## 2023-12-12 PROCEDURE — 99214 OFFICE O/P EST MOD 30 MIN: CPT | Mod: 25,S$GLB,, | Performed by: INTERNAL MEDICINE

## 2023-12-12 PROCEDURE — 3078F DIAST BP <80 MM HG: CPT | Mod: CPTII,S$GLB,, | Performed by: INTERNAL MEDICINE

## 2023-12-12 PROCEDURE — 1160F PR REVIEW ALL MEDS BY PRESCRIBER/CLIN PHARMACIST DOCUMENTED: ICD-10-PCS | Mod: CPTII,S$GLB,, | Performed by: INTERNAL MEDICINE

## 2023-12-12 PROCEDURE — 3074F SYST BP LT 130 MM HG: CPT | Mod: CPTII,S$GLB,, | Performed by: INTERNAL MEDICINE

## 2023-12-12 PROCEDURE — 90694 VACC AIIV4 NO PRSRV 0.5ML IM: CPT | Mod: S$GLB,,, | Performed by: INTERNAL MEDICINE

## 2023-12-12 RX ORDER — OLMESARTAN MEDOXOMIL 20 MG/1
20 TABLET ORAL DAILY
Qty: 90 TABLET | Refills: 3 | Status: SHIPPED | OUTPATIENT
Start: 2023-12-12 | End: 2024-12-11

## 2023-12-13 DIAGNOSIS — I10 ESSENTIAL HYPERTENSION: ICD-10-CM

## 2023-12-13 RX ORDER — METOPROLOL SUCCINATE 25 MG/1
25 TABLET, EXTENDED RELEASE ORAL NIGHTLY
Qty: 90 TABLET | Refills: 3 | Status: SHIPPED | OUTPATIENT
Start: 2023-12-13

## 2023-12-13 NOTE — TELEPHONE ENCOUNTER
No care due was identified.  Health [facility] Embedded Care Due Messages. Reference number: [id number].   12/13/2023 12:16:15 AM CST

## 2023-12-13 NOTE — TELEPHONE ENCOUNTER
Refill Routing Note   Medication(s) are not appropriate for processing by [facility] for the following reason(s):        Drug-disease interaction: : metoprolol succinate and Total bilirubin, elevated     [facility] action(s):  Defer           Pharmacist review requested: Yes     Appointments  past 12m or future 3m with PCP    Date Provider   Last Visit   12/12/2023 Angel Luis Tobias III, MD   Next Visit   1/11/2024 Angel Luis Tobias III, MD   ED visits in past 90 days: 1        Note composed:2:03 AM 12/13/2023

## 2023-12-13 NOTE — TELEPHONE ENCOUNTER
Refill Decision Note   Dean Hahn  is requesting a refill authorization.  Brief Assessment and Rationale for Refill:  Approve     Medication Therapy Plan:         Pharmacist review requested: Yes   Extended chart review required: Yes   Comments:     Note composed:3:12 AM 12/13/2023

## 2023-12-15 ENCOUNTER — PATIENT MESSAGE (OUTPATIENT)
Dept: ADMINISTRATIVE | Facility: OTHER | Age: 83
End: 2023-12-15
Payer: COMMERCIAL

## 2023-12-21 DIAGNOSIS — I63.9 STROKE WITH CEREBRAL ISCHEMIA: ICD-10-CM

## 2023-12-21 RX ORDER — NAPROXEN SODIUM 220 MG/1
81 TABLET, FILM COATED ORAL
Qty: 90 TABLET | Refills: 1 | Status: SHIPPED | OUTPATIENT
Start: 2023-12-21 | End: 2024-03-23 | Stop reason: SDUPTHER

## 2023-12-21 NOTE — TELEPHONE ENCOUNTER
Refill Routing Note   Medication(s) are not appropriate for processing by [facility] for the following reason(s):        Outside of protocol: non-delegated    [facility] action(s):  Route      Medication Therapy Plan:         Appointments  past 12m or future 3m with PCP    Date Provider   Last Visit   12/12/2023 Angel Luis Tobias III, MD   Next Visit   1/11/2024 Angel Luis Tobias III, MD   ED visits in past 90 days: 1        Note composed:2:16 PM 12/21/2023

## 2023-12-25 DIAGNOSIS — H26.9 CATARACT, UNSPECIFIED CATARACT TYPE, UNSPECIFIED LATERALITY: ICD-10-CM

## 2023-12-26 RX ORDER — DORZOLAMIDE HYDROCHLORIDE AND TIMOLOL MALEATE 20; 5 MG/ML; MG/ML
SOLUTION/ DROPS OPHTHALMIC
Qty: 10 ML | Refills: 1 | Status: SHIPPED | OUTPATIENT
Start: 2023-12-26

## 2023-12-26 NOTE — TELEPHONE ENCOUNTER
Refill Routing Note   Medication(s) are not appropriate for processing by [facility] for the following reason(s):        Outside of protocol    [facility] action(s):  Route               Appointments  past 12m or future 3m with PCP    Date Provider   Last Visit   12/12/2023 Angel Luis Tobias III, MD   Next Visit   1/11/2024 Angel Luis Tobias III, MD   ED visits in past 90 days: 1        Note composed:11:52 AM 12/26/2023

## 2024-01-06 DIAGNOSIS — I63.9 STROKE WITH CEREBRAL ISCHEMIA: ICD-10-CM

## 2024-01-06 NOTE — TELEPHONE ENCOUNTER
No care due was identified.  Health [facility] Embedded Care Due Messages. Reference number: [id number].   1/06/2024 12:25:57 AM CST

## 2024-01-08 ENCOUNTER — LAB VISIT (OUTPATIENT)
Dept: LAB | Facility: HOSPITAL | Age: 84
End: 2024-01-08
Attending: INTERNAL MEDICINE
Payer: COMMERCIAL

## 2024-01-08 DIAGNOSIS — E78.2 MIXED HYPERLIPIDEMIA: ICD-10-CM

## 2024-01-08 DIAGNOSIS — I10 ESSENTIAL HYPERTENSION: ICD-10-CM

## 2024-01-08 DIAGNOSIS — R73.03 PREDIABETES: ICD-10-CM

## 2024-01-08 LAB
BILIRUB UR QL STRIP: NEGATIVE
CLARITY UR REFRACT.AUTO: CLEAR
COLOR UR AUTO: YELLOW
GLUCOSE UR QL STRIP: NEGATIVE
HGB UR QL STRIP: NEGATIVE
KETONES UR QL STRIP: NEGATIVE
LEUKOCYTE ESTERASE UR QL STRIP: NEGATIVE
NITRITE UR QL STRIP: NEGATIVE
PH UR STRIP: 5 [PH] (ref 5–8)
PROT UR QL STRIP: ABNORMAL
SP GR UR STRIP: 1.01 (ref 1–1.03)
URN SPEC COLLECT METH UR: ABNORMAL

## 2024-01-08 PROCEDURE — 81003 URINALYSIS AUTO W/O SCOPE: CPT | Performed by: INTERNAL MEDICINE

## 2024-01-08 RX ORDER — ATORVASTATIN CALCIUM 20 MG/1
20 TABLET, FILM COATED ORAL DAILY
Qty: 90 TABLET | Refills: 1 | Status: SHIPPED | OUTPATIENT
Start: 2024-01-08 | End: 2024-02-22 | Stop reason: SDUPTHER

## 2024-01-08 NOTE — TELEPHONE ENCOUNTER
Refill Decision Note   Dean Hahn  is requesting a refill authorization.  Brief Assessment and Rationale for Refill:  Approve     Medication Therapy Plan:         Comments:     Note composed:12:16 AM 01/08/2024

## 2024-01-11 ENCOUNTER — E-CONSULT (OUTPATIENT)
Dept: HEMATOLOGY/ONCOLOGY | Facility: CLINIC | Age: 84
End: 2024-01-11
Payer: MEDICARE

## 2024-01-11 ENCOUNTER — OFFICE VISIT (OUTPATIENT)
Dept: INTERNAL MEDICINE | Facility: CLINIC | Age: 84
End: 2024-01-11
Payer: MEDICARE

## 2024-01-11 VITALS
BODY MASS INDEX: 18.82 KG/M2 | HEIGHT: 67 IN | WEIGHT: 119.94 LBS | DIASTOLIC BLOOD PRESSURE: 60 MMHG | HEART RATE: 72 BPM | OXYGEN SATURATION: 99 % | SYSTOLIC BLOOD PRESSURE: 120 MMHG

## 2024-01-11 DIAGNOSIS — N18.31 STAGE 3A CHRONIC KIDNEY DISEASE: Primary | ICD-10-CM

## 2024-01-11 DIAGNOSIS — K76.9 LIVER DISEASE: ICD-10-CM

## 2024-01-11 DIAGNOSIS — D69.6 THROMBOCYTOPENIC: ICD-10-CM

## 2024-01-11 DIAGNOSIS — R17 TOTAL BILIRUBIN, ELEVATED: ICD-10-CM

## 2024-01-11 DIAGNOSIS — D64.9 NORMOCYTIC ANEMIA: ICD-10-CM

## 2024-01-11 DIAGNOSIS — I12.9 HYPERTENSIVE KIDNEY DISEASE: ICD-10-CM

## 2024-01-11 DIAGNOSIS — I77.9 BILATERAL CAROTID ARTERY DISEASE, UNSPECIFIED TYPE: ICD-10-CM

## 2024-01-11 PROCEDURE — 1126F AMNT PAIN NOTED NONE PRSNT: CPT | Mod: CPTII,S$GLB,, | Performed by: INTERNAL MEDICINE

## 2024-01-11 PROCEDURE — 99214 OFFICE O/P EST MOD 30 MIN: CPT | Mod: S$GLB,,, | Performed by: INTERNAL MEDICINE

## 2024-01-11 PROCEDURE — 3078F DIAST BP <80 MM HG: CPT | Mod: CPTII,S$GLB,, | Performed by: INTERNAL MEDICINE

## 2024-01-11 PROCEDURE — 1159F MED LIST DOCD IN RCRD: CPT | Mod: CPTII,S$GLB,, | Performed by: INTERNAL MEDICINE

## 2024-01-11 PROCEDURE — 99999 PR PBB SHADOW E&M-EST. PATIENT-LVL V: CPT | Mod: PBBFAC,,, | Performed by: INTERNAL MEDICINE

## 2024-01-11 PROCEDURE — 3288F FALL RISK ASSESSMENT DOCD: CPT | Mod: CPTII,S$GLB,, | Performed by: INTERNAL MEDICINE

## 2024-01-11 PROCEDURE — 99451 NTRPROF PH1/NTRNET/EHR 5/>: CPT | Mod: S$GLB,,, | Performed by: INTERNAL MEDICINE

## 2024-01-11 PROCEDURE — 1101F PT FALLS ASSESS-DOCD LE1/YR: CPT | Mod: CPTII,S$GLB,, | Performed by: INTERNAL MEDICINE

## 2024-01-11 PROCEDURE — 1160F RVW MEDS BY RX/DR IN RCRD: CPT | Mod: CPTII,S$GLB,, | Performed by: INTERNAL MEDICINE

## 2024-01-11 PROCEDURE — 3074F SYST BP LT 130 MM HG: CPT | Mod: CPTII,S$GLB,, | Performed by: INTERNAL MEDICINE

## 2024-01-11 NOTE — Clinical Note
Hi The said there needing help with onboarding and setting up machine if you can have someone call them:) thanks!

## 2024-01-11 NOTE — PROGRESS NOTES
Assessment:         1. Stage 3a chronic kidney disease    2. Bilateral carotid artery disease, unspecified type    3. Total bilirubin, elevated    4. Liver disease    5. Normocytic anemia    6. Thrombocytopenic          Plan:           Dean was seen today for follow-up.    Diagnoses and all orders for this visit:    Stage 3a chronic kidney disease  Chronic  Controlled  Patient is at goal today   I have reviewed lifestyle modification to achieve/maintain goals  We will continue the current medication regimen as listed below  Patient will follow up in 3 months   -     US Retroperitoneal Complete; Future    Bilateral carotid artery disease, unspecified type  Chronic  Controlled  Patient is at goal today   I have reviewed lifestyle modification to achieve/maintain goals  We will continue the current medication regimen as listed below  Patient will follow up in 3 months     Total bilirubin, elevated    Liver disease  Chronic  Uncontrolled  Patient is not at goal today  I have reviewed lifestyle modification to achieve/maintain goals  We will continue the current medication regimen as listed below  Patient will follow up in 2 weekswith hepatology  -     Ambulatory referral/consult to Hepatology; Future  -     US Abdomen Limited_Liver; Future    Normocytic anemia  Thrombocytopenic  Chronic  Uncontrolled  Patient is not at goal today  I have reviewed lifestyle modification to achieve/maintain goals  We will continue the current medication regimen as listed below  Patient will follow up in 2 weeks  -     E-Consult to [facility]        Hi  The said there needing help with onboarding and setting up machine if you can have someone call them:) thanks!      Subjective:       Patient ID: Dean Hahn is a 83 y.o. male.    Chief Complaint: Follow-up        Interim Hx        Concerns today  Having in issues with digital medciine     Chronic problems     Hypertension  This is a chronic problem. The current episode  "started more than 1 year ago. The problem has been waxing and waning since onset. Past treatments include calcium channel blockers, angiotensin blockers and beta blockers.       Review of Systems   All other systems reviewed and are negative.            Health Maintenance Due   Topic Date Due    Shingles Vaccine (1 of 2) Never done    RSV Vaccine (Age 60+ and Pregnant patients) (1 - 1-dose 60+ series) Never done    COVID-19 Vaccine (6 - 2023-24 season) 09/01/2023         Objective:     /60 (BP Location: Right arm, Patient Position: Sitting, BP Method: Large (Manual))   Pulse 72   Ht 5' 7" (1.702 m)   Wt 54.4 kg (119 lb 14.9 oz)   SpO2 99%   BMI 18.78 kg/m²         1/11/2024    12:52 PM 12/12/2023     9:36 AM 12/5/2023    12:41 PM 10/19/2023     1:10 PM 10/5/2023     9:13 AM   Vitals   Height 5' 7" (1.702 m)   5' 7.5" (1.715 m)    Weight (lbs) 119.93 120.37 116.84 116.84 118.72   BMI (kg/m2) 18.8   18                 Physical Exam  Vitals and nursing note reviewed.   Constitutional:       General: He is not in acute distress.     Appearance: He is well-developed. He is not ill-appearing, toxic-appearing or diaphoretic.   HENT:      Head: Normocephalic.   Eyes:      Conjunctiva/sclera: Conjunctivae normal.   Cardiovascular:      Rate and Rhythm: Normal rate and regular rhythm.      Heart sounds: Normal heart sounds. No murmur heard.     No friction rub. No gallop.   Pulmonary:      Effort: Pulmonary effort is normal. No respiratory distress.   Abdominal:      General: There is no distension.      Palpations: Abdomen is soft.   Neurological:      General: No focal deficit present.      Mental Status: He is alert and oriented to person, place, and time.           Recent Results (from the past 336 hour(s))   Urinalysis    Collection Time: 01/08/24  7:53 AM   Result Value Ref Range    Specimen UA Urine, Clean Catch     Color, UA Yellow Yellow, Straw, Kaitlin    Appearance, UA Clear Clear    pH, UA 5.0 5.0 - 8.0 "    Specific Gravity, UA 1.015 1.005 - 1.030    Protein, UA Trace (A) Negative    Glucose, UA Negative Negative    Ketones, UA Negative Negative    Bilirubin (UA) Negative Negative    Occult Blood UA Negative Negative    Nitrite, UA Negative Negative    Leukocytes, UA Negative Negative   Basic Metabolic Panel    Collection Time: 01/08/24  8:07 AM   Result Value Ref Range    Sodium 140 136 - 145 mmol/L    Potassium 4.7 3.5 - 5.1 mmol/L    Chloride 116 (H) 95 - 110 mmol/L    CO2 18 (L) 23 - 29 mmol/L    Glucose 101 70 - 110 mg/dL    BUN 19 8 - 23 mg/dL    Creatinine 1.6 (H) 0.5 - 1.4 mg/dL    Calcium 8.8 8.7 - 10.5 mg/dL    Anion Gap 6 (L) 8 - 16 mmol/L    eGFR 43.0 (A) >60 mL/min/1.73 m^2   CBC Auto Differential    Collection Time: 01/08/24  8:07 AM   Result Value Ref Range    WBC 6.04 3.90 - 12.70 K/uL    RBC 3.22 (L) 4.60 - 6.20 M/uL    Hemoglobin 10.8 (L) 14.0 - 18.0 g/dL    Hematocrit 31.5 (L) 40.0 - 54.0 %    MCV 98 82 - 98 fL    MCH 33.5 (H) 27.0 - 31.0 pg    MCHC 34.3 32.0 - 36.0 g/dL    RDW 12.4 11.5 - 14.5 %    Platelets 110 (L) 150 - 450 K/uL    MPV 9.7 9.2 - 12.9 fL    Immature Granulocytes 0.2 0.0 - 0.5 %    Gran # (ANC) 3.1 1.8 - 7.7 K/uL    Immature Grans (Abs) 0.01 0.00 - 0.04 K/uL    Lymph # 1.7 1.0 - 4.8 K/uL    Mono # 0.8 0.3 - 1.0 K/uL    Eos # 0.4 0.0 - 0.5 K/uL    Baso # 0.02 0.00 - 0.20 K/uL    nRBC 0 0 /100 WBC    Gran % 51.9 38.0 - 73.0 %    Lymph % 28.0 18.0 - 48.0 %    Mono % 13.1 4.0 - 15.0 %    Eosinophil % 6.5 0.0 - 8.0 %    Basophil % 0.3 0.0 - 1.9 %    Differential Method Automated    Ferritin    Collection Time: 01/08/24  8:07 AM   Result Value Ref Range    Ferritin 43 20.0 - 300.0 ng/mL   Hepatic Function Panel    Collection Time: 01/08/24  8:07 AM   Result Value Ref Range    Total Protein 6.2 6.0 - 8.4 g/dL    Albumin 2.7 (L) 3.5 - 5.2 g/dL    Total Bilirubin 1.5 (H) 0.1 - 1.0 mg/dL    Bilirubin, Direct 0.6 (H) 0.1 - 0.3 mg/dL    AST 63 (H) 10 - 40 U/L    ALT 60 (H) 10 - 44 U/L     Alkaline Phosphatase 110 55 - 135 U/L   Phosphorus    Collection Time: 01/08/24  8:07 AM   Result Value Ref Range    Phosphorus 3.4 2.7 - 4.5 mg/dL   Vitamin D    Collection Time: 01/08/24  8:07 AM   Result Value Ref Range    Vit D, 25-Hydroxy 24 (L) 30 - 96 ng/mL         Future Appointments   Date Time Provider Department Center   1/17/2024  9:30 AM DES[facility] OIC US1 [facility] ULTRS[facility] [place]   1/17/2024 10:15 AM DES[facility] OIC US1 [facility] ULTRSND [place]   3/11/2024 11:30 AM [facility] [place] [facility]   4/29/2024  7:00 AM LAB, [facility] LAB [place]   5/3/2024  2:20 PM Angel Luis Tobias III, MD [facility] IM [place]   5/14/2024  8:00 AM Jeff Bolanos OD [facility] OPTOMTY [place]         Medication List with Changes/Refills   Current Medications    AMLODIPINE (NORVASC) 10 MG TABLET    TAKE 1 TABLET BY MOUTH EVERY DAY    ASPIRIN 81 MG CHEW    TAKE 1 TABLET BY MOUTH EVERY DAY    ATORVASTATIN (LIPITOR) 20 MG TABLET    Take 1 tablet (20 mg total) by mouth once daily.    CICLOPIROX (PENLAC) 8 % SOLN    Apply topically nightly.    CYANOCOBALAMIN (VITAMIN B-12) 100 MCG TABLET    TAKE 1 TABLET BY MOUTH EVERY DAY    DORZOLAMIDE-TIMOLOL 2-0.5% (COSOPT) 22.3-6.8 MG/ML OPHTHALMIC SOLUTION    INSTILL 1 DROP INTO BOTH EYES TWICE A DAY    ERGOCALCIFEROL (ERGOCALCIFEROL) 50,000 UNIT CAP    Take 1 capsule (50,000 Units total) by mouth every 7 days.    METOPROLOL SUCCINATE (TOPROL-XL) 25 MG 24 HR TABLET    TAKE 1 TABLET BY MOUTH EVERY DAY IN THE EVENING    MULTIVITAMIN CAPSULE    Take 1 capsule by mouth once daily.    OLMESARTAN (BENICAR) 20 MG TABLET    Take 1 tablet (20 mg total) by mouth once daily.    OMEGA-3 FATTY ACIDS/FISH OIL (FISH OIL-OMEGA-3 FATTY ACIDS) 300-1,000 MG CAPSULE    Take 1 capsule by mouth once daily.    POTASSIUM CHLORIDE (KLOR-CON 10) 10 MEQ TBSR    TAKE 1 TABLET BY MOUTH DAILY EXCEPT TAKE 2 TABS ON MON, WED AND FRIDAY         Disclaimer:  This note has been generated using voice-recognition software. There may  be grammatical or spelling errors that have been missed during proof-reading

## 2024-01-11 NOTE — PATIENT INSTRUCTIONS
We were happy to see you today    For your Testing  Please have your labs and/or imaging test done  in [address] [place] [address]         For your Medication   No chagnes   For more information about side effects please visit medlineplus.gov        For your Referrals  Hematology  Hepatology  Digital medicine       For your Vaccinations  We gave your the following vaccines    Please obtain the following vaccines at the pharmacy          Please return to clinic in    Follow up for 3 months Office Visit with prelabs, Ultrasound (US).        Extra resources

## 2024-01-12 NOTE — CONSULTS
[facility]  Response for E-Consult     Patient Name: Dean Hahn  MRN: [id number]  Primary Care Provider: Angel Luis Tobias III, MD   Requesting Provider: Angel Luis Tobias III, MD  E-Consult to [facility]emon[facility]  Consult performed by: Bryanna Palomo MD  Consult ordered by: Angel Luis Tobias III, MD  Reason for consult: normocytic anemia          Recommendation: Please check a b12, methylmalonic acid, and epo level. CR is bumped and with his age, this may be a lack of signal to the bone marrow to make more blood.       Total time of Consultation: 5 minute    I did not speak to the requesting provider verbally about this.     *This eConsult is based on the clinical data available to me and is furnished without benefit of a physical examination. The eConsult will need to be interpreted in light of any clinical issues or changes in patient status not available to me at the time of filing this eConsults. Significant changes in patient condition or level of acuity should result in immediate formal consultation and reevaluation. Please alert me if you have further questions.    Thank you for this eConsult referral.     Bryanna Palomo MD  [facility]

## 2024-01-16 DIAGNOSIS — D69.6 THROMBOCYTOPENIC: ICD-10-CM

## 2024-01-16 DIAGNOSIS — E53.8 B12 DEFICIENCY: ICD-10-CM

## 2024-01-16 DIAGNOSIS — N18.31 STAGE 3A CHRONIC KIDNEY DISEASE: Primary | ICD-10-CM

## 2024-01-16 NOTE — PROGRESS NOTES
a b12, methylmalonic acid, and epo level. CR is bumped and with his age, this may be a lack of signal to the bone marrow to make more blood.    Lab Results   Component Value Date    [id number] 11/23/2022     Diagnoses and all orders for this visit:    Stage 3a chronic kidney disease  -     Vitamin B12; Standing  -     Methylmalonic Acid, Serum; Standing  -     ERYTHROPOIETIN; Standing    Thrombocytopenic  -     Vitamin B12; Standing  -     Methylmalonic Acid, Serum; Standing  -     ERYTHROPOIETIN; Standing    B12 deficiency  -     Vitamin B12; Standing  -     Methylmalonic Acid, Serum; Standing  -     ERYTHROPOIETIN; Standing        Future Appointments   Date Time Provider Department Center   1/17/2024  9:30 AM DES[facility] OIC US1 [facility] ULTRSND [place]   1/17/2024 10:15 AM DES[facility] OIC US1 DESH ULTRSND [place]   3/11/2024 11:30 AM Karen Sanford NP [facility] [place]   4/29/2024  7:00 AM LAB, [facility] LAB [place]   5/3/2024  2:20 PM [facility] [place]   5/14/2024  8:00 AM Jeff Bolanos OD [facility] OPTOMTY [place]

## 2024-01-24 ENCOUNTER — PATIENT MESSAGE (OUTPATIENT)
Dept: ADMINISTRATIVE | Facility: OTHER | Age: 84
End: 2024-01-24
Payer: COMMERCIAL

## 2024-01-24 DIAGNOSIS — B35.1 NAIL FUNGUS: ICD-10-CM

## 2024-01-24 DIAGNOSIS — E55.9 VITAMIN D DEFICIENCY: ICD-10-CM

## 2024-01-24 RX ORDER — ERGOCALCIFEROL 1.25 MG/1
50000 CAPSULE ORAL
Qty: 12 CAPSULE | Refills: 1 | Status: SHIPPED | OUTPATIENT
Start: 2024-01-24 | End: 2024-04-04 | Stop reason: SDUPTHER

## 2024-01-24 NOTE — TELEPHONE ENCOUNTER
Refill Routing Note   Medication(s) are not appropriate for processing by [facility] for the following reason(s):        Outside of protocol    [facility] action(s):  Route               Appointments  past 12m or future 3m with PCP    Date Provider   Last Visit   1/11/2024 Angel Luis Tobias III, MD   Next Visit   5/3/2024 Angel Luis Tobias III, MD   ED visits in past 90 days: 1        Note composed:4:20 PM 01/24/2024

## 2024-01-25 RX ORDER — CICLOPIROX 80 MG/ML
SOLUTION TOPICAL NIGHTLY
Qty: 6.6 ML | Refills: 6 | Status: SHIPPED | OUTPATIENT
Start: 2024-01-25 | End: 2024-04-04 | Stop reason: SDUPTHER

## 2024-02-08 PROBLEM — D69.6 THROMBOCYTOPENIA: Status: ACTIVE | Noted: 2024-02-08

## 2024-02-08 NOTE — PROGRESS NOTES
Dean Hahn presented for a  Medicare AWV and comprehensive Health Risk Assessment today. The following components were reviewed and updated:    Medical history  Family History  Social history  Allergies and Current Medications  Health Risk Assessment  Health Maintenance  Care Team         ** See Completed Assessments for Annual Wellness Visit within the encounter summary.**         The following assessments were completed:  Living Situation  CAGE  Depression Screening  Timed Get Up and Go  Whisper Test  Cognitive Function Screening - deferred due to known memory impairment  Nutrition Screening  ADL Screening  PAQ Screening      Review for Opioid Screening: Pt does not have Rx for Opioids      Review for Substance Use Disorders: Patient does not use substance        Current Outpatient Medications:     amLODIPine (NORVASC) 10 MG tablet, TAKE 1 TABLET BY MOUTH EVERY DAY, Disp: 90 tablet, Rfl: 2    aspirin 81 MG Chew, TAKE 1 TABLET BY MOUTH EVERY DAY, Disp: 90 tablet, Rfl: 1    atorvastatin (LIPITOR) 20 MG tablet, Take 1 tablet (20 mg total) by mouth once daily., Disp: 90 tablet, Rfl: 1    ciclopirox (PENLAC) 8 % Soln, Apply topically nightly., Disp: 6.6 mL, Rfl: 6    cyanocobalamin (VITAMIN B-12) 100 MCG tablet, TAKE 1 TABLET BY MOUTH EVERY DAY, Disp: 90 tablet, Rfl: 1    dorzolamide-timolol 2-0.5% (COSOPT) 22.3-6.8 mg/mL ophthalmic solution, INSTILL 1 DROP INTO BOTH EYES TWICE A DAY, Disp: 10 mL, Rfl: 1    ergocalciferol (ERGOCALCIFEROL) 50,000 unit Cap, Take 1 capsule (50,000 Units total) by mouth every 7 days., Disp: 12 capsule, Rfl: 1    metoprolol succinate (TOPROL-XL) 25 MG 24 hr tablet, TAKE 1 TABLET BY MOUTH EVERY DAY IN THE EVENING, Disp: 90 tablet, Rfl: 3    multivitamin capsule, Take 1 capsule by mouth once daily., Disp: , Rfl:     olmesartan (BENICAR) 20 MG tablet, Take 1 tablet (20 mg total) by mouth once daily., Disp: 90 tablet, Rfl: 3    omega-3 fatty acids/fish oil (FISH OIL-OMEGA-3 FATTY ACIDS)  "300-1,000 mg capsule, Take 1 capsule by mouth once daily., Disp: 90 capsule, Rfl: 1    potassium chloride (KLOR-CON 10) 10 MEQ TbSR, TAKE 1 TABLET BY MOUTH DAILY EXCEPT TAKE 2 TABS ON MON, WED AND FRIDAY, Disp: 135 tablet, Rfl: 3       Vitals:    02/12/24 0801   BP: (!) 104/58   Pulse: 61   SpO2: 99%   Weight: 55.2 kg (121 lb 11.1 oz)   Height: 5' 7" (1.702 m)   PainSc: 0-No pain      Physical Exam  Vitals and nursing note reviewed.   Constitutional:       General: He is not in acute distress.     Appearance: Normal appearance. He is not ill-appearing.   HENT:      Head: Normocephalic and atraumatic.      Mouth/Throat:      Mouth: Mucous membranes are moist.   Eyes:      General: No scleral icterus.        Right eye: No discharge.         Left eye: No discharge.      Extraocular Movements: Extraocular movements intact.      Conjunctiva/sclera: Conjunctivae normal.   Cardiovascular:      Rate and Rhythm: Normal rate.   Pulmonary:      Effort: Pulmonary effort is normal. No respiratory distress.   Musculoskeletal:      Cervical back: Normal range of motion.   Skin:     General: Skin is warm and dry.      Findings: No rash.   Neurological:      Mental Status: He is alert and oriented to person, place, and time.   Psychiatric:         Mood and Affect: Mood normal.         Behavior: Behavior normal. Behavior is cooperative.         Cognition and Memory: Cognition normal. Memory is impaired.               Diagnoses and health risks identified today and associated recommendations/orders:    1. Encounter for preventive health examination  - Chart reviewed. Problem list updated. Discussed current medical diagnosis, current medications, medical/surgical/family/social history; updated provider list; documented vital signs; identified any cognitive impairment; and updated risk factor list. Addressed any outstanding health maintenance. Provided patient with personalized health advice. Continue to follow up with PCP and any " specialists.     2. Bilateral carotid artery disease, unspecified type  Chronic; stable on current treatment plan; follow up with PCP  - taking statin     3. Stage 3a chronic kidney disease  Chronic; stable on current treatment plan; follow up with PCP  - taking olmesartan  - recommend avoiding NSAIDS and nephrotoxins    4. Thrombocytopenia  Chronic; stable on current treatment plan; follow up with PCP  - monitor labs     5. Essential hypertension  Chronic; stable on current treatment plan; follow up with PCP  - taking olmesartan, metoprolol, and norvasc    6. Pure hypercholesterolemia  Chronic; stable on current treatment plan; follow up with PCP  - taking statin     7. Hypertensive kidney disease  Chronic; stable on current treatment plan; follow up with PCP  - taking olmesartan    8. Impaired memory  Chronic; stable on current treatment plan; follow up with PCP schro  - declined referral to neuropsych    9. Prediabetes  Chronic; stable on current treatment plan; follow up with PCP    10. Dependence on other enabling machines and devices  Chronic; stable on current treatment plan; follow up with PCP  - uses rollator walker for ambulation     11. Abnormality of gait and mobility  Chronic; stable on current treatment plan; follow up with PCP   -- uses rollator walker for ambulation     12. Body mass index (BMI) 19.9 or less, adult  - Recommendation for healthy diet       Provided Dean with a 5-10 year written screening schedule and personal prevention plan. Recommendations were developed using the USPSTF age appropriate recommendations. Education, counseling, and referrals were provided as needed. After Visit Summary printed and given to patient which includes a list of additional screenings\tests needed.    Follow up in about 1 year (around 2/12/2025) for your next annual wellness visit.    BLAINE Langston    Advance Care Planning     I offered to discuss advanced care planning, including how to pick a  person who would make decisions for you if you were unable to make them for yourself, called a health care power of , and what kind of decisions you might make such as use of life sustaining treatments such as ventilators and tube feeding when faced with a life limiting illness recorded on a living will that they will need to know. (How you want to be cared for as you near the end of your natural life)     X  Patient has advanced directives written and agrees to provide copies to the institution.

## 2024-02-09 ENCOUNTER — PATIENT OUTREACH (OUTPATIENT)
Dept: ADMINISTRATIVE | Facility: HOSPITAL | Age: 84
End: 2024-02-09
Payer: COMMERCIAL

## 2024-02-09 NOTE — PROGRESS NOTES
02/09/2024 Gap report audit performed. Care Everywhere updates requested and reviewed  Overdue HM topic chart audit and/or requested. LINKS triggered and reconciled. Media reviewed : media upload Eye exam

## 2024-02-12 ENCOUNTER — OFFICE VISIT (OUTPATIENT)
Dept: FAMILY MEDICINE | Facility: CLINIC | Age: 84
End: 2024-02-12
Payer: COMMERCIAL

## 2024-02-12 VITALS
DIASTOLIC BLOOD PRESSURE: 58 MMHG | WEIGHT: 121.69 LBS | HEART RATE: 61 BPM | SYSTOLIC BLOOD PRESSURE: 104 MMHG | BODY MASS INDEX: 19.1 KG/M2 | OXYGEN SATURATION: 99 % | HEIGHT: 67 IN

## 2024-02-12 DIAGNOSIS — Z00.00 ENCOUNTER FOR PREVENTIVE HEALTH EXAMINATION: Primary | ICD-10-CM

## 2024-02-12 DIAGNOSIS — R41.3 IMPAIRED MEMORY: ICD-10-CM

## 2024-02-12 DIAGNOSIS — I10 ESSENTIAL HYPERTENSION: ICD-10-CM

## 2024-02-12 DIAGNOSIS — Z99.89 DEPENDENCE ON OTHER ENABLING MACHINES AND DEVICES: ICD-10-CM

## 2024-02-12 DIAGNOSIS — R73.03 PREDIABETES: ICD-10-CM

## 2024-02-12 DIAGNOSIS — R26.9 ABNORMALITY OF GAIT AND MOBILITY: ICD-10-CM

## 2024-02-12 DIAGNOSIS — E78.00 PURE HYPERCHOLESTEROLEMIA: ICD-10-CM

## 2024-02-12 DIAGNOSIS — N18.31 STAGE 3A CHRONIC KIDNEY DISEASE: ICD-10-CM

## 2024-02-12 DIAGNOSIS — D69.6 THROMBOCYTOPENIA: ICD-10-CM

## 2024-02-12 DIAGNOSIS — I12.9 HYPERTENSIVE KIDNEY DISEASE: ICD-10-CM

## 2024-02-12 DIAGNOSIS — I77.9 BILATERAL CAROTID ARTERY DISEASE, UNSPECIFIED TYPE: ICD-10-CM

## 2024-02-12 PROCEDURE — 1100F PTFALLS ASSESS-DOCD GE2>/YR: CPT | Mod: CPTII,S$GLB,, | Performed by: NURSE PRACTITIONER

## 2024-02-12 PROCEDURE — 1170F FXNL STATUS ASSESSED: CPT | Mod: CPTII,S$GLB,, | Performed by: NURSE PRACTITIONER

## 2024-02-12 PROCEDURE — 1160F RVW MEDS BY RX/DR IN RCRD: CPT | Mod: CPTII,S$GLB,, | Performed by: NURSE PRACTITIONER

## 2024-02-12 PROCEDURE — 1159F MED LIST DOCD IN RCRD: CPT | Mod: CPTII,S$GLB,, | Performed by: NURSE PRACTITIONER

## 2024-02-12 PROCEDURE — 3074F SYST BP LT 130 MM HG: CPT | Mod: CPTII,S$GLB,, | Performed by: NURSE PRACTITIONER

## 2024-02-12 PROCEDURE — 3078F DIAST BP <80 MM HG: CPT | Mod: CPTII,S$GLB,, | Performed by: NURSE PRACTITIONER

## 2024-02-12 PROCEDURE — G0439 PPPS, SUBSEQ VISIT: HCPCS | Mod: S$GLB,,, | Performed by: NURSE PRACTITIONER

## 2024-02-12 PROCEDURE — 3288F FALL RISK ASSESSMENT DOCD: CPT | Mod: CPTII,S$GLB,, | Performed by: NURSE PRACTITIONER

## 2024-02-12 PROCEDURE — 99999 PR PBB SHADOW E&M-EST. PATIENT-LVL IV: CPT | Mod: PBBFAC,,, | Performed by: NURSE PRACTITIONER

## 2024-02-12 PROCEDURE — 1126F AMNT PAIN NOTED NONE PRSNT: CPT | Mod: CPTII,S$GLB,, | Performed by: NURSE PRACTITIONER

## 2024-02-12 NOTE — PATIENT INSTRUCTIONS
Counseling and Referral of Other Preventative  (Italic type indicates deductible and co-insurance are waived)    Patient Name: Dean Hahn  Today's Date: 2/12/2024    Health Maintenance       Date Due Completion Date    COVID-19 Vaccine (6 - 2023-24 season) 02/16/2024 (Originally 9/1/2023) 12/8/2021    RSV Vaccine (Age 60+ and Pregnant patients) (1 - 1-dose 60+ series) 02/16/2024 (Originally 10/16/2002) ---    Shingles Vaccine (1 of 2) 02/08/2025 (Originally 10/16/1992) ---    Hemoglobin A1c (Prediabetes) 06/19/2024 6/19/2023    Lipid Panel 06/19/2028 6/19/2023    TETANUS VACCINE 02/19/2031 2/19/2021        No orders of the defined types were placed in this encounter.      The following information is provided to all patients.  This information is to help you find resources for any of the problems found today that may be affecting your health:                  Living healthy guide: www.[facility].gov      Understanding Diabetes: www.diabetes.org      Eating healthy: www.cdc.gov/healthyweight      CDC home safety checklist: www.cdc.gov/steadi/patient.html      Agency on Aging: www.goea.[place].gov      Alcoholics anonymous (AA): www.aa.org      Physical Activity: www.erickson.nih.gov/yg3kyld      Tobacco use: www.quitwithusla.org

## 2024-02-12 NOTE — Clinical Note
Patient has appt with you in May for back pain. He was hoping to push appt up sooner. I messaged your staff to try to get him in sooner than May. Just uvaldo

## 2024-02-21 DIAGNOSIS — I10 ESSENTIAL HYPERTENSION: ICD-10-CM

## 2024-02-21 RX ORDER — AMLODIPINE BESYLATE 10 MG/1
TABLET ORAL
Qty: 90 TABLET | Refills: 3 | Status: SHIPPED | OUTPATIENT
Start: 2024-02-21 | End: 2024-04-08

## 2024-02-21 NOTE — TELEPHONE ENCOUNTER
Refill Decision Note   Dean Hahn  is requesting a refill authorization.  Brief Assessment and Rationale for Refill:  Approve     Medication Therapy Plan:         Comments:     Note composed:3:21 AM 02/21/2024

## 2024-02-21 NOTE — TELEPHONE ENCOUNTER
No care due was identified.  Health [facility] Embedded Care Due Messages. Reference number: [id number].   2/21/2024 12:15:02 AM CST

## 2024-02-22 DIAGNOSIS — I63.9 STROKE WITH CEREBRAL ISCHEMIA: ICD-10-CM

## 2024-02-22 RX ORDER — ATORVASTATIN CALCIUM 20 MG/1
20 TABLET, FILM COATED ORAL DAILY
Qty: 90 TABLET | Refills: 1 | Status: ON HOLD | OUTPATIENT
Start: 2024-02-22

## 2024-02-22 NOTE — TELEPHONE ENCOUNTER
Refill Decision Note   Dean Hahn  is requesting a refill authorization.  Brief Assessment and Rationale for Refill:  Approve     Medication Therapy Plan:        Comments:     Note composed:10:28 AM 02/22/2024

## 2024-02-22 NOTE — TELEPHONE ENCOUNTER
No care due was identified.  Health [facility] Embedded Care Due Messages. Reference number: [id number].   2/22/2024 9:39:49 AM CST

## 2024-03-06 ENCOUNTER — OFFICE VISIT (OUTPATIENT)
Dept: HEPATOLOGY | Facility: CLINIC | Age: 84
End: 2024-03-06
Payer: MEDICARE

## 2024-03-06 DIAGNOSIS — N18.31 STAGE 3A CHRONIC KIDNEY DISEASE: Primary | ICD-10-CM

## 2024-03-06 DIAGNOSIS — K76.9 LIVER DISEASE: ICD-10-CM

## 2024-03-06 DIAGNOSIS — D69.6 THROMBOCYTOPENIA: ICD-10-CM

## 2024-03-06 DIAGNOSIS — K74.60 CIRRHOSIS OF LIVER WITHOUT ASCITES, UNSPECIFIED HEPATIC CIRRHOSIS TYPE: ICD-10-CM

## 2024-03-06 PROCEDURE — 99205 OFFICE O/P NEW HI 60 MIN: CPT | Mod: 95,,, | Performed by: INTERNAL MEDICINE

## 2024-03-06 NOTE — PROGRESS NOTES
Subjective:       Patient ID: Dean Hahn is a 83 y.o. male.    Chief Complaint: No chief complaint on file.  The patient location is: Home  The chief complaint leading to consultation is: Home    Visit type: audiovisual    Face to Face time with patient: 20 minutes of total time spent on the encounter, which includes face to face time and non-face to face time preparing to see the patient (eg, review of tests), Obtaining and/or reviewing separately obtained history, Documenting clinical information in the electronic or other health record, Independently interpreting results (not separately reported) and communicating results to the patient/family/caregiver, or Care coordination (not separately reported).       Each patient to whom he or she provides medical services by telemedicine is:  (1) informed of the relationship between the physician and patient and the respective role of any other health care provider with respect to management of the patient; and (2) notified that he or she may decline to receive medical services by telemedicine and may withdraw from such care at any time.    Notes:    HPI  I saw this 83 y.o. man who was previously seen in Feb 2021 by Rosa Castro in our liver clinic.    - Intermittent LFT elevation since 2016  - risk factors for MASLD= dyslipidemia/Hypertension/DM    - recent development of thrombocytopenia    Abdo US: 1/17/2024  1. Cirrhotic morphology of the liver with no focal lesions visualized.  2. Prior cholecystectomy  3. 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   HENT:  Negative for hearing loss.    Eyes:  Negative for discharge and visual disturbance.   Respiratory:  Negative for cough, chest tightness, shortness of breath and wheezing.    Cardiovascular:  Negative for chest pain, palpitations and leg swelling.   Gastrointestinal:  Negative for abdominal distention, abdominal pain, constipation, diarrhea and nausea.   Genitourinary:  Negative for dysuria and frequency.   Musculoskeletal:  Negative for arthralgias and back pain.   Skin:  Negative for pallor and rash.   Neurological:  Negative for dizziness, tremors, speech difficulty and headaches.   Hematological:  Negative for adenopathy.   Psychiatric/Behavioral:  Negative for agitation and confusion.            Lab Results   Component Value Date    ALT 60 (H) 01/08/2024    AST 63 (H) 01/08/2024    ALKPHOS 110 01/08/2024    BILITOT 1.5 (H) 01/08/2024     Past Medical History:   Diagnosis Date    Cataract     HLD (hyperlipidemia)     Hypertension      Past Surgical History:   Procedure Laterality Date    CHOLECYSTECTOMY      EYE SURGERY Right     cataract removal surgery     Current Outpatient Medications   Medication Sig    amLODIPine (NORVASC) 10 MG tablet TAKE 1 TABLET BY MOUTH EVERY DAY    aspirin 81 MG Chew TAKE 1 TABLET BY MOUTH EVERY DAY    atorvastatin (LIPITOR) 20 MG tablet Take 1 tablet (20 mg total) by mouth once daily.    ciclopirox (PENLAC) 8 % Soln Apply topically nightly.    cyanocobalamin (VITAMIN B-12) 100 MCG tablet TAKE 1 TABLET BY MOUTH EVERY DAY    dorzolamide-timolol 2-0.5% (COSOPT) 22.3-6.8 mg/mL ophthalmic solution INSTILL 1 DROP INTO BOTH EYES TWICE A DAY    ergocalciferol (ERGOCALCIFEROL) 50,000 unit Cap Take 1 capsule (50,000 Units total) by mouth every 7 days.    metoprolol succinate (TOPROL-XL) 25 MG 24 hr tablet TAKE 1 TABLET BY MOUTH EVERY DAY IN THE EVENING    multivitamin capsule Take 1 capsule by mouth once daily.    olmesartan (BENICAR) 20 MG tablet  Take 1 tablet (20 mg total) by mouth once daily.    omega-3 fatty acids/fish oil (FISH OIL-OMEGA-3 FATTY ACIDS) 300-1,000 mg capsule Take 1 capsule by mouth once daily.    potassium chloride (KLOR-CON 10) 10 MEQ TbSR TAKE 1 TABLET BY MOUTH DAILY EXCEPT TAKE 2 TABS ON MON, WED AND FRIDAY     No current facility-administered medications for this visit.       Objective:      Physical Exam    VIDEO VISIT- EXAM NOT DONE  Assessment:       1. Stage 3a chronic kidney disease    2. Liver disease    3. Thrombocytopenia    4. Cirrhosis of liver without ascites, unspecified hepatic cirrhosis type        Plan:   Mr Hahn was rather quiet today and most of the talking was done by his wife who was present during the consultation.    He feels well and remains active in his yard.    On review of his investigations. I see taht he has recently developed thrombocytopenia and has an US that suggests cirrhosis. The tests that were initiated in 2021, showed that he had a high IgG and a positive CHRIS with a signifiacnt titer. Although his AST/ALT in 2021 were only mildly elevated, I note that in 2016 his AST/ALT were around 150-200.    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    - discussed the new diagnosis of cirrhosis with him and his wife and emphasized the need for HCC sscreening.    - advised to follow a low salt diet  - advised not to eat raw oysters    1) Labs ordered  2) EGD after next visit  3) Clinic in 3 months.    I don't think he would benefit from steroids at this point given his advanced age and low LFTs. I was also not sure of his functional status since this was a video visit. I will see him in person at his next visit.

## 2024-03-20 ENCOUNTER — TELEPHONE (OUTPATIENT)
Dept: HEPATOLOGY | Facility: CLINIC | Age: 84
End: 2024-03-20
Payer: COMMERCIAL

## 2024-03-20 NOTE — TELEPHONE ENCOUNTER
----- Message from Uziel Andrea MD sent at 3/6/2024 10:15 AM CST -----  Please set up labs soon and in person visit in 3 months   Form was filled out and was supposed to be faxed.  Please check on that and assist the patient with this.  I marked on the phone to return date to work as of 1/2/2024 without restrictions.  He did not have any recent visit with me, his last video appointment was I believe in 7/2023.  Thank you.

## 2024-03-20 NOTE — TELEPHONE ENCOUNTER
Spoke with patient wife regarding scheduling blood work. Labs scheduled for 3/21/24. Also informed patient wife of follow up appointment with Dr. Andrea on 6/10/24. Patient wife verbalized understanding. Appointment letter mailed to address on file.

## 2024-03-23 DIAGNOSIS — I63.9 STROKE WITH CEREBRAL ISCHEMIA: ICD-10-CM

## 2024-03-24 RX ORDER — NAPROXEN SODIUM 220 MG/1
81 TABLET, FILM COATED ORAL DAILY
Qty: 90 TABLET | Refills: 1 | Status: ON HOLD | OUTPATIENT
Start: 2024-03-24

## 2024-04-04 ENCOUNTER — PATIENT MESSAGE (OUTPATIENT)
Dept: FAMILY MEDICINE | Facility: CLINIC | Age: 84
End: 2024-04-04
Payer: COMMERCIAL

## 2024-04-04 DIAGNOSIS — E55.9 VITAMIN D DEFICIENCY: ICD-10-CM

## 2024-04-04 DIAGNOSIS — E87.6 HYPOKALEMIA: ICD-10-CM

## 2024-04-04 DIAGNOSIS — E53.8 B12 DEFICIENCY: ICD-10-CM

## 2024-04-04 DIAGNOSIS — B35.1 NAIL FUNGUS: ICD-10-CM

## 2024-04-04 RX ORDER — CICLOPIROX 80 MG/ML
SOLUTION TOPICAL NIGHTLY
Qty: 6.6 ML | Refills: 6 | Status: ON HOLD | OUTPATIENT
Start: 2024-04-04

## 2024-04-04 NOTE — TELEPHONE ENCOUNTER
Care Due:                  Date            Visit Type   Department     Provider  --------------------------------------------------------------------------------                                EP -                              PRIMARY      [facility] INTERNAL  Last Visit: 01-      CARE ([facility])   MEDICINE       Angel Luis Tobias                               -                              PRIMARY      [facility] INTERNAL  Next Visit: 05-      CARE ([facility])   MEDICINE       Angel Luis Tobias                                                            Last  Test          Frequency    Reason                     Performed    Due Date  --------------------------------------------------------------------------------    Lipid Panel.  12 months..  atorvastatin.............  06- 06-    [facility] Embedded Care Due Messages. Reference number: [id number].   4/04/2024 8:13:03 AM CDT

## 2024-04-05 RX ORDER — PNV NO.95/FERROUS FUM/FOLIC AC 28MG-0.8MG
100 TABLET ORAL DAILY
Qty: 90 TABLET | Refills: 1 | Status: ON HOLD | OUTPATIENT
Start: 2024-04-05

## 2024-04-05 RX ORDER — ERGOCALCIFEROL 1.25 MG/1
50000 CAPSULE ORAL
Qty: 12 CAPSULE | Refills: 1 | Status: SHIPPED | OUTPATIENT
Start: 2024-04-05 | End: 2024-05-28 | Stop reason: SDUPTHER

## 2024-04-05 RX ORDER — POTASSIUM CHLORIDE 750 MG/1
TABLET, EXTENDED RELEASE ORAL
Qty: 135 TABLET | Refills: 3 | Status: ON HOLD | OUTPATIENT
Start: 2024-04-05 | End: 2024-06-17 | Stop reason: HOSPADM

## 2024-04-05 NOTE — TELEPHONE ENCOUNTER
Refill Routing Note   Medication(s) are not appropriate for processing by [facility] for the following reason(s):        Outside of protocol    [facility] action(s):  Route  Approve     Requires labs : Yes      Medication Therapy Plan: Labs (Lipid Panel) due 6/14/24; may require staff scheduling assistance      Appointments  past 12m or future 3m with PCP    Date Provider   Last Visit   1/11/2024 Angel Luis Tobias III, MD   Next Visit   5/27/2024 Angel Luis Tobias III, MD   ED visits in past 90 days: 0        Note composed:11:34 AM 04/05/2024

## 2024-04-05 NOTE — TELEPHONE ENCOUNTER
Refill Routing Note   Medication(s) are not appropriate for processing by [facility] for the following reason(s):        Required labs abnormal: Potassium Chloride (Cr)  Outside of protocol: Ergocalciferol, Cyanocobalamin    [facility] action(s):  Defer  Route     Requires labs : Yes      Medication Therapy Plan: Labs (Lipid Panel) due 6/14/24; may require staff scheduling assistance      Appointments  past 12m or future 3m with PCP    Date Provider   Last Visit   1/11/2024 Angel Luis Tobias III, MD   Next Visit   5/27/2024 Angel Luis Tobias III, MD   ED visits in past 90 days: 0        Note composed:10:18 AM 04/05/2024

## 2024-05-14 ENCOUNTER — OFFICE VISIT (OUTPATIENT)
Dept: OPTOMETRY | Facility: CLINIC | Age: 84
End: 2024-05-14
Payer: COMMERCIAL

## 2024-05-14 DIAGNOSIS — H47.20 OPTIC DISC ATROPHY, BILATERAL: ICD-10-CM

## 2024-05-14 DIAGNOSIS — H25.12 AGE-RELATED NUCLEAR CATARACT OF LEFT EYE: Primary | ICD-10-CM

## 2024-05-14 DIAGNOSIS — H40.1131 PRIMARY OPEN ANGLE GLAUCOMA (POAG) OF BOTH EYES, MILD STAGE: ICD-10-CM

## 2024-05-14 DIAGNOSIS — H54.7 DECREASED VISION: ICD-10-CM

## 2024-05-14 DIAGNOSIS — H26.9 CATARACT, UNSPECIFIED CATARACT TYPE, UNSPECIFIED LATERALITY: ICD-10-CM

## 2024-05-14 PROCEDURE — 3288F FALL RISK ASSESSMENT DOCD: CPT | Mod: CPTII,S$GLB,, | Performed by: OPTOMETRIST

## 2024-05-14 PROCEDURE — 99999 PR PBB SHADOW E&M-EST. PATIENT-LVL III: CPT | Mod: PBBFAC,,, | Performed by: OPTOMETRIST

## 2024-05-14 PROCEDURE — 92004 COMPRE OPH EXAM NEW PT 1/>: CPT | Mod: S$GLB,,, | Performed by: OPTOMETRIST

## 2024-05-14 PROCEDURE — 1100F PTFALLS ASSESS-DOCD GE2>/YR: CPT | Mod: CPTII,S$GLB,, | Performed by: OPTOMETRIST

## 2024-05-14 PROCEDURE — 1159F MED LIST DOCD IN RCRD: CPT | Mod: CPTII,S$GLB,, | Performed by: OPTOMETRIST

## 2024-05-14 PROCEDURE — 1160F RVW MEDS BY RX/DR IN RCRD: CPT | Mod: CPTII,S$GLB,, | Performed by: OPTOMETRIST

## 2024-05-14 RX ORDER — DORZOLAMIDE HYDROCHLORIDE AND TIMOLOL MALEATE 20; 5 MG/ML; MG/ML
1 SOLUTION/ DROPS OPHTHALMIC 2 TIMES DAILY
Qty: 10 ML | Refills: 1 | Status: ON HOLD | OUTPATIENT
Start: 2024-05-14

## 2024-05-14 NOTE — PROGRESS NOTES
HPI     Concerns About Ocular Health     Additional comments: Patient Dean Hahn is an 83 year old   male.           Comments    Pt referred by Dr. Angel Luis Tobias (PCP) for new patient eye exam. Pt's   daughter, Pati, is present for visit today. Pt states that he had red   eyes on occasion. Pt's daughter states that patient had a fall 2 days ago   and suffered from a red eye OS since incidient. Patient denies diplopia,   headaches, flashes/floaters, and pain.    Pt previously followed by Dr. Jennifer Gutierrez, last visit was 01/23/2024   (patient records scanned into media). Pt states that he was diagnosed with   glaucoma one year ago. Pt states that he sees well in MRX glasses from 6   months ago. Pt states that he was being followed by Dr. Gutierrez every 6   months for glaucoma condition.    JESE: 01/23/2024 with Dr. Jennifer Gutierrez    Meds: Dorzolamide-Timolol BID OU    POHx (per patient records):  1. Optic neuropathy, right eye  2. Blind right eye  3. Pseudophakia, right eye      S/p glaucoma and cataract surgery OD: 04/30/2019 by Dr. Calderon (record   scanned into media)  4. Cataract, left eye  5. Glaucoma          Last edited by Inés Swenson on 5/14/2024  8:06 AM.            Assessment /Plan     For exam results, see Encounter Report.    Age-related nuclear cataract of left eye    Decreased vision  -     Ambulatory referral/consult to Ophthalmology    Optic disc atrophy, bilateral    Primary open angle glaucoma (POAG) of both eyes, mild stage      Educated pt on presence of cataracts and effects on vision. No surgery at this time. Recheck in one year.  2. Vision stable from [facility] notes 1/2024  3. Cont appts with Dr Tobias for bp and cholesterol control to prevent another ischemic episode  4. Cont Cosopt bid OU, gave refills, Pt wants to stay under care of dr Gutierrez, due July 2024

## 2024-05-24 ENCOUNTER — LAB VISIT (OUTPATIENT)
Dept: LAB | Facility: HOSPITAL | Age: 84
End: 2024-05-24
Attending: INTERNAL MEDICINE
Payer: COMMERCIAL

## 2024-05-24 DIAGNOSIS — N18.31 STAGE 3A CHRONIC KIDNEY DISEASE: ICD-10-CM

## 2024-05-24 DIAGNOSIS — E53.8 B12 DEFICIENCY: ICD-10-CM

## 2024-05-24 DIAGNOSIS — D69.6 THROMBOCYTOPENIC: ICD-10-CM

## 2024-05-24 LAB — VIT B12 SERPL-MCNC: 1353 PG/ML (ref 210–950)

## 2024-05-24 PROCEDURE — 82607 VITAMIN B-12: CPT | Performed by: INTERNAL MEDICINE

## 2024-05-24 PROCEDURE — 83921 ORGANIC ACID SINGLE QUANT: CPT | Performed by: INTERNAL MEDICINE

## 2024-05-24 PROCEDURE — 82668 ASSAY OF ERYTHROPOIETIN: CPT | Performed by: INTERNAL MEDICINE

## 2024-05-27 ENCOUNTER — OFFICE VISIT (OUTPATIENT)
Dept: INTERNAL MEDICINE | Facility: CLINIC | Age: 84
End: 2024-05-27
Payer: COMMERCIAL

## 2024-05-27 ENCOUNTER — TELEPHONE (OUTPATIENT)
Dept: FAMILY MEDICINE | Facility: CLINIC | Age: 84
End: 2024-05-27
Payer: COMMERCIAL

## 2024-05-27 ENCOUNTER — LAB VISIT (OUTPATIENT)
Dept: LAB | Facility: HOSPITAL | Age: 84
End: 2024-05-27
Attending: INTERNAL MEDICINE
Payer: COMMERCIAL

## 2024-05-27 VITALS
BODY MASS INDEX: 19 KG/M2 | HEIGHT: 67 IN | OXYGEN SATURATION: 100 % | SYSTOLIC BLOOD PRESSURE: 108 MMHG | DIASTOLIC BLOOD PRESSURE: 54 MMHG | HEART RATE: 58 BPM | WEIGHT: 121.06 LBS

## 2024-05-27 DIAGNOSIS — D69.6 THROMBOCYTOPENIA: ICD-10-CM

## 2024-05-27 DIAGNOSIS — N18.31 STAGE 3A CHRONIC KIDNEY DISEASE: ICD-10-CM

## 2024-05-27 DIAGNOSIS — K74.69 OTHER CIRRHOSIS OF LIVER: Primary | ICD-10-CM

## 2024-05-27 DIAGNOSIS — Z86.73 CHRONIC ISCHEMIC MULTIFOCAL MULTIPLE VASCULAR TERRITORIES STROKE: ICD-10-CM

## 2024-05-27 DIAGNOSIS — K74.69 OTHER CIRRHOSIS OF LIVER: ICD-10-CM

## 2024-05-27 DIAGNOSIS — G31.84 MCI (MILD COGNITIVE IMPAIRMENT): ICD-10-CM

## 2024-05-27 DIAGNOSIS — R41.0 CONFUSION: ICD-10-CM

## 2024-05-27 DIAGNOSIS — D69.6 THROMBOCYTOPENIA: Primary | ICD-10-CM

## 2024-05-27 LAB
ALBUMIN SERPL BCP-MCNC: 2.5 G/DL (ref 3.5–5.2)
ALP SERPL-CCNC: 156 U/L (ref 55–135)
ALT SERPL W/O P-5'-P-CCNC: 77 U/L (ref 10–44)
ANION GAP SERPL CALC-SCNC: 6 MMOL/L (ref 8–16)
AST SERPL-CCNC: 91 U/L (ref 10–40)
BASOPHILS # BLD AUTO: 0.04 K/UL (ref 0–0.2)
BASOPHILS NFR BLD: 0.6 % (ref 0–1.9)
BILIRUB SERPL-MCNC: 1.6 MG/DL (ref 0.1–1)
BUN SERPL-MCNC: 21 MG/DL (ref 8–23)
CALCIUM SERPL-MCNC: 8.7 MG/DL (ref 8.7–10.5)
CHLORIDE SERPL-SCNC: 122 MMOL/L (ref 95–110)
CO2 SERPL-SCNC: 14 MMOL/L (ref 23–29)
CREAT SERPL-MCNC: 1.4 MG/DL (ref 0.5–1.4)
DIFFERENTIAL METHOD BLD: ABNORMAL
EOSINOPHIL # BLD AUTO: 0.4 K/UL (ref 0–0.5)
EOSINOPHIL NFR BLD: 5.5 % (ref 0–8)
ERYTHROCYTE [DISTWIDTH] IN BLOOD BY AUTOMATED COUNT: 13.1 % (ref 11.5–14.5)
EST. GFR  (NO RACE VARIABLE): 50.5 ML/MIN/1.73 M^2
GLUCOSE SERPL-MCNC: 94 MG/DL (ref 70–110)
HCT VFR BLD AUTO: 29.7 % (ref 40–54)
HGB BLD-MCNC: 9.6 G/DL (ref 14–18)
IMM GRANULOCYTES # BLD AUTO: 0.01 K/UL (ref 0–0.04)
IMM GRANULOCYTES NFR BLD AUTO: 0.1 % (ref 0–0.5)
INR PPP: 1.2 (ref 0.8–1.2)
LYMPHOCYTES # BLD AUTO: 1.9 K/UL (ref 1–4.8)
LYMPHOCYTES NFR BLD: 28.7 % (ref 18–48)
MCH RBC QN AUTO: 32.7 PG (ref 27–31)
MCHC RBC AUTO-ENTMCNC: 32.3 G/DL (ref 32–36)
MCV RBC AUTO: 101 FL (ref 82–98)
MONOCYTES # BLD AUTO: 1 K/UL (ref 0.3–1)
MONOCYTES NFR BLD: 14.8 % (ref 4–15)
NEUTROPHILS # BLD AUTO: 3.4 K/UL (ref 1.8–7.7)
NEUTROPHILS NFR BLD: 50.3 % (ref 38–73)
NRBC BLD-RTO: 0 /100 WBC
PLATELET # BLD AUTO: 132 K/UL (ref 150–450)
PMV BLD AUTO: 10.1 FL (ref 9.2–12.9)
POTASSIUM SERPL-SCNC: 4.4 MMOL/L (ref 3.5–5.1)
PROT SERPL-MCNC: 6.6 G/DL (ref 6–8.4)
PROTHROMBIN TIME: 13.4 SEC (ref 9–12.5)
RBC # BLD AUTO: 2.94 M/UL (ref 4.6–6.2)
SODIUM SERPL-SCNC: 142 MMOL/L (ref 136–145)
WBC # BLD AUTO: 6.75 K/UL (ref 3.9–12.7)

## 2024-05-27 PROCEDURE — 3074F SYST BP LT 130 MM HG: CPT | Mod: CPTII,S$GLB,, | Performed by: INTERNAL MEDICINE

## 2024-05-27 PROCEDURE — 85610 PROTHROMBIN TIME: CPT | Performed by: INTERNAL MEDICINE

## 2024-05-27 PROCEDURE — 85025 COMPLETE CBC W/AUTO DIFF WBC: CPT | Performed by: INTERNAL MEDICINE

## 2024-05-27 PROCEDURE — 80053 COMPREHEN METABOLIC PANEL: CPT | Performed by: INTERNAL MEDICINE

## 2024-05-27 PROCEDURE — 1100F PTFALLS ASSESS-DOCD GE2>/YR: CPT | Mod: CPTII,S$GLB,, | Performed by: INTERNAL MEDICINE

## 2024-05-27 PROCEDURE — 3078F DIAST BP <80 MM HG: CPT | Mod: CPTII,S$GLB,, | Performed by: INTERNAL MEDICINE

## 2024-05-27 PROCEDURE — 99215 OFFICE O/P EST HI 40 MIN: CPT | Mod: S$GLB,,, | Performed by: INTERNAL MEDICINE

## 2024-05-27 PROCEDURE — 1159F MED LIST DOCD IN RCRD: CPT | Mod: CPTII,S$GLB,, | Performed by: INTERNAL MEDICINE

## 2024-05-27 PROCEDURE — G2211 COMPLEX E/M VISIT ADD ON: HCPCS | Mod: S$GLB,,, | Performed by: INTERNAL MEDICINE

## 2024-05-27 PROCEDURE — 3288F FALL RISK ASSESSMENT DOCD: CPT | Mod: CPTII,S$GLB,, | Performed by: INTERNAL MEDICINE

## 2024-05-27 PROCEDURE — 99999 PR PBB SHADOW E&M-EST. PATIENT-LVL IV: CPT | Mod: PBBFAC,,, | Performed by: INTERNAL MEDICINE

## 2024-05-27 PROCEDURE — 1126F AMNT PAIN NOTED NONE PRSNT: CPT | Mod: CPTII,S$GLB,, | Performed by: INTERNAL MEDICINE

## 2024-05-27 PROCEDURE — 36415 COLL VENOUS BLD VENIPUNCTURE: CPT | Mod: PO | Performed by: INTERNAL MEDICINE

## 2024-05-27 PROCEDURE — 1160F RVW MEDS BY RX/DR IN RCRD: CPT | Mod: CPTII,S$GLB,, | Performed by: INTERNAL MEDICINE

## 2024-05-27 RX ORDER — LACTULOSE 10 G/15ML
10 SOLUTION ORAL DAILY
Qty: 300 ML | Refills: 1 | Status: ON HOLD | OUTPATIENT
Start: 2024-05-27

## 2024-05-27 RX ORDER — DONEPEZIL HYDROCHLORIDE 5 MG/1
5 TABLET, FILM COATED ORAL NIGHTLY
Qty: 90 TABLET | Refills: 1 | Status: ON HOLD | OUTPATIENT
Start: 2024-05-27 | End: 2025-05-27

## 2024-05-27 RX ORDER — SPIRONOLACTONE 25 MG/1
25 TABLET ORAL DAILY
Qty: 90 TABLET | Refills: 1 | Status: ON HOLD | OUTPATIENT
Start: 2024-05-27 | End: 2025-05-27

## 2024-05-27 NOTE — PROGRESS NOTES
Assessment:         1. Thrombocytopenia    2. Other cirrhosis of liver    3. Chronic ischemic multifocal multiple vascular territories stroke    4. MCI (mild cognitive impairment)    5. Confusion          Plan:           Dean was seen today for altered mental status and fall.    Diagnoses and all orders for this visit:    Thrombocytopenia  -     CBC Auto Differential; Future    Other cirrhosis of liver  -     Comprehensive Metabolic Panel; Future  -     Protime-INR; Future  -     Ammonia; Future  -     spironolactone (ALDACTONE) 25 MG tablet; Take 1 tablet (25 mg total) by mouth once daily.  -     lactulose (CHRONULAC) 20 gram/30 mL Soln; Take 15 mLs (10 g total) by mouth once daily.  -     CBC Auto Differential; Future    Chronic ischemic multifocal multiple vascular territories stroke  -     Ambulatory referral/consult to Adult Neuropsychology; Future  -     Ambulatory referral/consult to Neurology; Future  -     donepeziL (ARICEPT) 5 MG tablet; Take 1 tablet (5 mg total) by mouth every evening.    MCI (mild cognitive impairment)  -     Ambulatory referral/consult to Adult Neuropsychology; Future  -     Ambulatory referral/consult to Neurology; Future    Confusion  -     Urine culture; Future  -     Urinalysis; Future      Memory, confusion and falls  Maybe vascular dementia  Neuropsych and neurology referall  Try donepizil  Maybe HE  Check labs   Start lacutlose  Leg swelling  Maybe amldoipine --> swithc to aldactone  Discuss with hepatology       I spent at least 40 mins with preparing to see the patient, obtaining and/or revieweing separately obtained history, preforming a examination and evaluation, counseling, communicating with other health care professional, documenting clinical information in the electronic health record,  and/or coordinating care.             Subjective:       Patient ID: Dean Hahn is a 83 y.o. male.    Chief Complaint: Altered Mental Status (/Caregiver states pt has  shown confusion for the last 2 weeks and frequent falls ) and Fall      Epo, mma still pending      Interim Hx  Last seen by me in in januar'y  Seen by hepatology  Seen by optho    Concerns today  Starting the last few months  months, ? Confusion, Falls , normal appetite, swelling legs , no abdominal pain or fevers or chlls . Denies and pain with urination. No focal neurologic signs . He is using a rollator for ambualtion. Unable to get from sitting to standing without assitance      Chronic problems      Hypertension  Pertinent negatives include no chest pain, headaches, neck pain or palpitations. Past treatments include beta blockers, calcium channel blockers and angiotensin blockers.       MELD 3.0: 16 at 3/21/2024  8:29 AM  MELD-Na: 16 at 3/21/2024  8:29 AM  Calculated from:  Serum Creatinine: 1.57 mg/dL at 3/21/2024  8:29 AM  Serum Sodium: 144 mmol/L (Using max of 137 mmol/L) at 3/21/2024  8:29 AM  Total Bilirubin: 2.6 mg/dL at 3/21/2024  8:29 AM  Serum Albumin: 3.3 g/dL at 3/21/2024  8:29 AM  INR(ratio): 1.1 at 3/21/2024  8:29 AM  Age at listing (hypothetical): 83 years  Sex: Male at 3/21/2024  8:29 AM      Review of Systems   Constitutional:  Negative for activity change and unexpected weight change.   HENT:  Positive for rhinorrhea. Negative for hearing loss and trouble swallowing.    Eyes:  Positive for discharge. Negative for visual disturbance.   Respiratory:  Negative for chest tightness and wheezing.    Cardiovascular:  Negative for chest pain and palpitations.   Gastrointestinal:  Positive for constipation. Negative for blood in stool, diarrhea and vomiting.   Endocrine: Negative for polydipsia and polyuria.   Genitourinary:  Negative for difficulty urinating, hematuria and urgency.   Musculoskeletal:  Positive for joint swelling. Negative for arthralgias and neck pain.   Neurological:  Negative for weakness and headaches.   Psychiatric/Behavioral:  Positive for confusion. Negative for dysphoric mood.   "            Health Maintenance Due   Topic Date Due    COVID-19 Vaccine (6 - 2023-24 season) 09/01/2023         Objective:     BP (!) 108/54 (BP Location: Right arm, Patient Position: Sitting, BP Method: Small (Manual))   Pulse (!) 58   Ht 5' 7" (1.702 m)   Wt 54.9 kg (121 lb 0.5 oz)   SpO2 100%   BMI 18.96 kg/m²         5/27/2024     9:31 AM 2/12/2024     8:01 AM 1/11/2024    12:52 PM 12/12/2023     9:36 AM 12/5/2023    12:41 PM   Vitals   Height 5' 7" (1.702 m) 5' 7" (1.702 m) 5' 7" (1.702 m)     Weight (lbs) 121.03 121.69 119.93 120.37 116.84   BMI (kg/m2) 19 19.1 18.8                  Physical Exam  Nursing note reviewed.   Constitutional:       General: He is not in acute distress.     Appearance: Normal appearance. He is not ill-appearing, toxic-appearing or diaphoretic.   HENT:      Head: Normocephalic.   Eyes:      Conjunctiva/sclera: Conjunctivae normal.   Pulmonary:      Effort: Pulmonary effort is normal. No respiratory distress.   Neurological:      General: No focal deficit present.      Mental Status: He is alert.      Comments: Glow gait, asterixis            Recent Results (from the past 336 hour(s))   Vitamin B12    Collection Time: 05/24/24 11:10 AM   Result Value Ref Range    Vitamin B-12 1353 (H) 210 - 950 pg/mL     Basic Labs    BMP  Lab Results   Component Value Date     03/21/2024    K 4.7 03/21/2024     (H) 03/21/2024    CO2 18 (L) 03/21/2024    BUN 21 (H) 03/21/2024    CREATININE 1.57 (H) 03/21/2024    CALCIUM 9.3 03/21/2024    ANIONGAP 8 03/21/2024    ESTGFRAFRICA >60.0 05/24/2022    EGFRNONAA >60.0 05/24/2022     Lab Results   Component Value Date    EGFRNORACEVR 44.0 (A) 03/21/2024       Lab Results   Component Value Date    ALT 94 (H) 03/21/2024     (H) 03/21/2024    ALKPHOS 164 (H) 03/21/2024    BILITOT 2.6 (H) 03/21/2024         Lab Results   Component Value Date    TSH 1.988 11/23/2022     Lab Results   Component Value Date    WBC 7.24 03/21/2024    HGB 11.5 " (L) 03/21/2024    HCT 32.9 (L) 03/21/2024    MCV 94 03/21/2024     (L) 03/21/2024           STD  Lab Results   Component Value Date    NBY76SRHZ Non-reactive 11/23/2022     Lab Results   Component Value Date    RPR Non-reactive 11/23/2022     Lab Results   Component Value Date    HEPAIGM Negative 02/19/2019    HEPBIGM Negative 02/19/2019    HEPBCAB Negative 02/23/2021    HEPCAB Negative 02/19/2019       Lipids  Lab Results   Component Value Date    CHOL 102 (L) 06/19/2023     Lab Results   Component Value Date    HDL 38 (L) 06/19/2023     Lab Results   Component Value Date    LDLCALC 52.2 (L) 06/19/2023     Lab Results   Component Value Date    TRIG 59 06/19/2023     Lab Results   Component Value Date    CHOLHDL 37.3 06/19/2023       DM  Lab Results   Component Value Date    HGBA1C 4.7 06/19/2023       PSA  PSA, Screen (ng/mL)   Date Value   05/11/2020 2.8       Future Appointments   Date Time Provider Department Center   5/27/2024 12:10 PM LAB, CAROL[facility] LAB [place]   6/10/2024  9:00 AM Uziel Andrea MD [facility] HEPAT [address]   6/19/2024  7:30 AM Ronda Baez MD [facility] NEURO [facility]   7/9/2024  4:00 PM Angel Luis Tobias III, MD [facility]         Medication List with Changes/Refills   New Medications    DONEPEZIL (ARICEPT) 5 MG TABLET    Take 1 tablet (5 mg total) by mouth every evening.    LACTULOSE (CHRONULAC) 20 GRAM/30 ML SOLN    Take 15 mLs (10 g total) by mouth once daily.    SPIRONOLACTONE (ALDACTONE) 25 MG TABLET    Take 1 tablet (25 mg total) by mouth once daily.   Current Medications    ASPIRIN 81 MG CHEW    Take 1 tablet (81 mg total) by mouth once daily.    ATORVASTATIN (LIPITOR) 20 MG TABLET    Take 1 tablet (20 mg total) by mouth once daily.    CICLOPIROX (PENLAC) 8 % SOLN    Apply topically nightly.    CYANOCOBALAMIN (VITAMIN B-12) 100 MCG TABLET    Take 1 tablet (100 mcg total) by mouth once daily.    DORZOLAMIDE-TIMOLOL 2-0.5% (COSOPT) 22.3-6.8 MG/ML OPHTHALMIC SOLUTION     Place 1 drop into both eyes 2 (two) times daily.    ERGOCALCIFEROL (ERGOCALCIFEROL) 50,000 UNIT CAP    Take 1 capsule (50,000 Units total) by mouth every 7 days.    METOPROLOL SUCCINATE (TOPROL-XL) 25 MG 24 HR TABLET    TAKE 1 TABLET BY MOUTH EVERY DAY IN THE EVENING    MULTIVITAMIN CAPSULE    Take 1 capsule by mouth once daily.    OLMESARTAN (BENICAR) 20 MG TABLET    Take 1 tablet (20 mg total) by mouth once daily.    OMEGA-3 FATTY ACIDS/FISH OIL (FISH OIL-OMEGA-3 FATTY ACIDS) 300-1,000 MG CAPSULE    Take 1 capsule by mouth once daily.    POTASSIUM CHLORIDE (KLOR-CON 10) 10 MEQ TBSR    TAKE 1 TABLET BY MOUTH DAILY EXCEPT TAKE 2 TABS ON MON, WED AND FRIDAY   Discontinued Medications    AMLODIPINE (NORVASC) 10 MG TABLET    Take 0.5 tablets (5 mg total) by mouth once daily.         Disclaimer:  This note has been generated using voice-recognition software. There may be grammatical or spelling errors that have been missed during proof-reading Visit complexity inherent to evaluation and management associated with medical care services that serve as the continuing focal point for all needed health care services and/or with medical care services that are part of ongoing care related to a patient's single, serious condition or a complex condition

## 2024-05-27 NOTE — TELEPHONE ENCOUNTER
Spoke to Client services lab, the Ammonia test was cancelled because mick blood was sent and not spun down before it was sent to the main lab

## 2024-05-27 NOTE — TELEPHONE ENCOUNTER
----- Message from Ericka Luciano sent at 5/27/2024  3:50 PM CDT -----  Type:  Needs Medical Advice    Who Called:  Nate from [facility]    Would the patient rather a call back or a response via [facility]?  call  Best Call Back Number:  [phone]  Additional Information:  Nate states that they cannot perform the pt lab because the sample is supposed to be spun and instead the whole tube of blood was sent.  Nate would like a call back for further explanation.

## 2024-05-28 DIAGNOSIS — E55.9 VITAMIN D DEFICIENCY: ICD-10-CM

## 2024-05-28 LAB
EPO SERPL-ACNC: 22.6 MIU/ML (ref 2.6–18.5)
METHYLMALONATE SERPL-SCNC: 0.16 UMOL/L

## 2024-05-29 RX ORDER — ERGOCALCIFEROL 1.25 MG/1
50000 CAPSULE ORAL
Qty: 12 CAPSULE | Refills: 1 | Status: ON HOLD | OUTPATIENT
Start: 2024-05-29

## 2024-05-29 NOTE — TELEPHONE ENCOUNTER
No care due was identified.  [facility] Embedded Care Due Messages. Reference number: [id number].   5/28/2024 7:41:30 PM CDT

## 2024-05-29 NOTE — TELEPHONE ENCOUNTER
Refill Routing Note   Medication(s) are not appropriate for processing by [facility] for the following reason(s):        Outside of protocol    [facility] action(s):  Route             Appointments  past 12m or future 3m with PCP    Date Provider   Last Visit   5/27/2024 Angel Luis Tobias III, MD   Next Visit   7/9/2024 Angel Luis Tobias III, MD   ED visits in past 90 days: 0        Note composed:6:55 AM 05/29/2024

## 2024-06-03 ENCOUNTER — PATIENT MESSAGE (OUTPATIENT)
Dept: INTERNAL MEDICINE | Facility: CLINIC | Age: 84
End: 2024-06-03
Payer: COMMERCIAL

## 2024-06-10 ENCOUNTER — HOSPITAL ENCOUNTER (INPATIENT)
Facility: HOSPITAL | Age: 84
LOS: 7 days | Discharge: NURSING, REHAB OR LONG-TERM CARE | DRG: 062 | End: 2024-06-17
Attending: EMERGENCY MEDICINE | Admitting: FAMILY MEDICINE
Payer: MEDICARE

## 2024-06-10 ENCOUNTER — TELEPHONE (OUTPATIENT)
Dept: FAMILY MEDICINE | Facility: CLINIC | Age: 84
End: 2024-06-10
Payer: COMMERCIAL

## 2024-06-10 DIAGNOSIS — R07.9 CHEST PAIN: ICD-10-CM

## 2024-06-10 DIAGNOSIS — Z86.73 CHRONIC ISCHEMIC MULTIFOCAL MULTIPLE VASCULAR TERRITORIES STROKE: ICD-10-CM

## 2024-06-10 DIAGNOSIS — R29.818 ACUTE FOCAL NEUROLOGICAL DEFICIT: Primary | ICD-10-CM

## 2024-06-10 DIAGNOSIS — I63.9 STROKE DETERMINED BY CLINICAL ASSESSMENT: ICD-10-CM

## 2024-06-10 DIAGNOSIS — R29.818 OTHER SYMPTOMS AND SIGNS INVOLVING THE NERVOUS SYSTEM: ICD-10-CM
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
## 2024-06-10 LAB
ABO + RH BLD: NORMAL
ALBUMIN SERPL BCP-MCNC: 2.7 G/DL (ref 3.5–5.2)
ALLENS TEST: ABNORMAL
ALLENS TEST: ABNORMAL
ALP SERPL-CCNC: 186 U/L (ref 55–135)
ALT SERPL W/O P-5'-P-CCNC: 139 U/L (ref 10–44)
ANION GAP SERPL CALC-SCNC: 9 MMOL/L (ref 8–16)
AST SERPL-CCNC: 177 U/L (ref 10–40)
AV INDEX (PROSTH): 0.95
AV MEAN GRADIENT: 2 MMHG
AV PEAK GRADIENT: 4 MMHG
AV VALVE AREA BY VELOCITY RATIO: 2.72 CM²
AV VALVE AREA: 2.94 CM²
AV VELOCITY RATIO: 0.88
BASOPHILS # BLD AUTO: 0.05 K/UL (ref 0–0.2)
BASOPHILS NFR BLD: 0.6 % (ref 0–1.9)
BILIRUB SERPL-MCNC: 2.5 MG/DL (ref 0.1–1)
BILIRUB UR QL STRIP: NEGATIVE
BLD GP AB SCN CELLS X3 SERPL QL: NORMAL
BNP SERPL-MCNC: 27 PG/ML (ref 0–99)
BSA FOR ECHO PROCEDURE: 1.46 M2
BUN SERPL-MCNC: 23 MG/DL (ref 8–23)
CALCIUM SERPL-MCNC: 9.7 MG/DL (ref 8.7–10.5)
CHLORIDE SERPL-SCNC: 114 MMOL/L (ref 95–110)
CHOLEST SERPL-MCNC: 136 MG/DL (ref 120–199)
CHOLEST/HDLC SERPL: 3.3 {RATIO} (ref 2–5)
CLARITY UR: CLEAR
CO2 SERPL-SCNC: 15 MMOL/L (ref 23–29)
COLOR UR: YELLOW
CREAT SERPL-MCNC: 1.5 MG/DL (ref 0.5–1.4)
CREAT SERPL-MCNC: 1.6 MG/DL (ref 0.5–1.4)
CV ECHO LV RWT: 0.39 CM
DELSYS: ABNORMAL
DELSYS: ABNORMAL
DIFFERENTIAL METHOD BLD: ABNORMAL
DOP CALC AO PEAK VEL: 1.05 M/S
DOP CALC AO VTI: 22.4 CM
DOP CALC LVOT AREA: 3.1 CM2
DOP CALC LVOT DIAMETER: 1.99 CM
DOP CALC LVOT PEAK VEL: 0.92 M/S
DOP CALC LVOT STROKE VOLUME: 65.9 CM3
DOP CALC MV VTI: 25 CM
DOP CALCLVOT PEAK VEL VTI: 21.2 CM
E WAVE DECELERATION TIME: 299.67 MSEC
E/A RATIO: 0.78
E/E' RATIO: 13.6 M/S
ECHO LV POSTERIOR WALL: 0.77 CM (ref 0.6–1.1)
EOSINOPHIL # BLD AUTO: 0.4 K/UL (ref 0–0.5)
EOSINOPHIL NFR BLD: 4.8 % (ref 0–8)
ERYTHROCYTE [DISTWIDTH] IN BLOOD BY AUTOMATED COUNT: 13.3 % (ref 11.5–14.5)
EST. GFR  (NO RACE VARIABLE): 46 ML/MIN/1.73 M^2
FRACTIONAL SHORTENING: 43 % (ref 28–44)
GLUCOSE SERPL-MCNC: 85 MG/DL (ref 70–110)
GLUCOSE UR QL STRIP: NEGATIVE
HCT VFR BLD AUTO: 32.7 % (ref 40–54)
HDLC SERPL-MCNC: 41 MG/DL (ref 40–75)
HDLC SERPL: 30.1 % (ref 20–50)
HGB BLD-MCNC: 11.7 G/DL (ref 14–18)
HGB UR QL STRIP: ABNORMAL
IMM GRANULOCYTES # BLD AUTO: 0.02 K/UL (ref 0–0.04)
IMM GRANULOCYTES NFR BLD AUTO: 0.2 % (ref 0–0.5)
INR PPP: 1.2 (ref 0.8–1.2)
INTERVENTRICULAR SEPTUM: 0.72 CM (ref 0.6–1.1)
IVC DIAMETER: 1.16 CM
KETONES UR QL STRIP: NEGATIVE
LDLC SERPL CALC-MCNC: 77.4 MG/DL (ref 63–159)
LEFT ATRIUM SIZE: 2.5 CM
LEFT INTERNAL DIMENSION IN SYSTOLE: 2.22 CM (ref 2.1–4)
LEFT VENTRICLE DIASTOLIC VOLUME INDEX: 44.84 ML/M2
LEFT VENTRICLE DIASTOLIC VOLUME: 66.36 ML
LEFT VENTRICLE MASS INDEX: 55 G/M2
LEFT VENTRICLE SYSTOLIC VOLUME INDEX: 11.2 ML/M2
LEFT VENTRICLE SYSTOLIC VOLUME: 16.53 ML
LEFT VENTRICULAR INTERNAL DIMENSION IN DIASTOLE: 3.91 CM (ref 3.5–6)
LEFT VENTRICULAR MASS: 81.88 G
LEUKOCYTE ESTERASE UR QL STRIP: NEGATIVE
LV LATERAL E/E' RATIO: 13.6 M/S
LV SEPTAL E/E' RATIO: 13.6 M/S
LVOT MG: 1.57 MMHG
LVOT MV: 0.58 CM/S
LYMPHOCYTES # BLD AUTO: 2.9 K/UL (ref 1–4.8)
LYMPHOCYTES NFR BLD: 31.7 % (ref 18–48)
MCH RBC QN AUTO: 33.2 PG (ref 27–31)
MCHC RBC AUTO-ENTMCNC: 35.8 G/DL (ref 32–36)
MCV RBC AUTO: 93 FL (ref 82–98)
MICROSCOPIC COMMENT: NORMAL
MONOCYTES # BLD AUTO: 1.2 K/UL (ref 0.3–1)
MONOCYTES NFR BLD: 12.8 % (ref 4–15)
MV MEAN GRADIENT: 1 MMHG
MV PEAK A VEL: 0.87 M/S
MV PEAK E VEL: 0.68 M/S
MV PEAK GRADIENT: 3 MMHG
MV STENOSIS PRESSURE HALF TIME: 89.78 MS
MV VALVE AREA BY CONTINUITY EQUATION: 2.64 CM2
MV VALVE AREA P 1/2 METHOD: 2.45 CM2
NEUTROPHILS # BLD AUTO: 4.5 K/UL (ref 1.8–7.7)
NEUTROPHILS NFR BLD: 49.9 % (ref 38–73)
NITRITE UR QL STRIP: NEGATIVE
NONHDLC SERPL-MCNC: 95 MG/DL
NRBC BLD-RTO: 0 /100 WBC
PH UR STRIP: 6 [PH] (ref 5–8)
PLATELET # BLD AUTO: 147 K/UL (ref 150–450)
PMV BLD AUTO: 9.4 FL (ref 9.2–12.9)
POC PTINR: 1.3 (ref 0.9–1.2)
POCT GLUCOSE: 84 MG/DL (ref 70–110)
POCT GLUCOSE: 87 MG/DL (ref 70–110)
POCT GLUCOSE: 87 MG/DL (ref 70–110)
POCT GLUCOSE: 92 MG/DL (ref 70–110)
POTASSIUM SERPL-SCNC: 4.6 MMOL/L (ref 3.5–5.1)
PROT SERPL-MCNC: 7.6 G/DL (ref 6–8.4)
PROT UR QL STRIP: ABNORMAL
PROTHROMBIN TIME: 12.9 SEC (ref 9–12.5)
PV MV: 0.62 M/S
PV PEAK GRADIENT: 3 MMHG
PV PEAK VELOCITY: 0.87 M/S
RA PRESSURE ESTIMATED: 3 MMHG
RBC # BLD AUTO: 3.52 M/UL (ref 4.6–6.2)
RBC #/AREA URNS HPF: 1 /HPF (ref 0–4)
RV TISSUE DOPPLER FREE WALL SYSTOLIC VELOCITY 1 (APICAL 4 CHAMBER VIEW): 11.63 CM/S
SAMPLE: ABNORMAL
SAMPLE: ABNORMAL
SINUS: 3.13 CM
SITE: ABNORMAL
SITE: ABNORMAL
SODIUM SERPL-SCNC: 138 MMOL/L (ref 136–145)
SP GR UR STRIP: >1.03 (ref 1–1.03)
SPECIMEN OUTDATE: NORMAL
SQUAMOUS #/AREA URNS HPF: 0 /HPF
T4 FREE SERPL-MCNC: 1.1 NG/DL (ref 0.71–1.51)
TASV: 12 CM/S
TDI LATERAL: 0.05 M/S
TDI SEPTAL: 0.05 M/S
TDI: 0.05 M/S
TRIGL SERPL-MCNC: 88 MG/DL (ref 30–150)
TROPONIN I SERPL DL<=0.01 NG/ML-MCNC: 0.03 NG/ML (ref 0–0.03)
TROPONIN I SERPL DL<=0.01 NG/ML-MCNC: 0.04 NG/ML (ref 0–0.03)
TSH SERPL DL<=0.005 MIU/L-ACNC: 5.28 UIU/ML (ref 0.4–4)
URN SPEC COLLECT METH UR: ABNORMAL
UROBILINOGEN UR STRIP-ACNC: NEGATIVE EU/DL
WBC # BLD AUTO: 8.99 K/UL (ref 3.9–12.7)
WBC #/AREA URNS HPF: 1 /HPF (ref 0–5)
Z-SCORE OF LEFT VENTRICULAR DIMENSION IN END DIASTOLE: -1.21
Z-SCORE OF LEFT VENTRICULAR DIMENSION IN END SYSTOLE: -1.66

## 2024-06-10 PROCEDURE — 25000003 PHARM REV CODE 250: Mod: HCNC | Performed by: FAMILY MEDICINE

## 2024-06-10 PROCEDURE — 93005 ELECTROCARDIOGRAM TRACING: CPT | Mod: HCNC

## 2024-06-10 PROCEDURE — 82565 ASSAY OF CREATININE: CPT

## 2024-06-10 PROCEDURE — 82962 GLUCOSE BLOOD TEST: CPT | Mod: HCNC

## 2024-06-10 PROCEDURE — 84443 ASSAY THYROID STIM HORMONE: CPT | Mod: HCNC | Performed by: EMERGENCY MEDICINE

## 2024-06-10 PROCEDURE — 94761 N-INVAS EAR/PLS OXIMETRY MLT: CPT | Mod: HCNC

## 2024-06-10 PROCEDURE — 96374 THER/PROPH/DIAG INJ IV PUSH: CPT | Mod: HCNC

## 2024-06-10 PROCEDURE — 80074 ACUTE HEPATITIS PANEL: CPT | Mod: HCNC | Performed by: FAMILY MEDICINE

## 2024-06-10 PROCEDURE — 86850 RBC ANTIBODY SCREEN: CPT | Mod: HCNC | Performed by: FAMILY MEDICINE

## 2024-06-10 PROCEDURE — 36415 COLL VENOUS BLD VENIPUNCTURE: CPT | Mod: HCNC | Performed by: FAMILY MEDICINE

## 2024-06-10 PROCEDURE — 86900 BLOOD TYPING SEROLOGIC ABO: CPT | Mod: HCNC | Performed by: FAMILY MEDICINE

## 2024-06-10 PROCEDURE — 84439 ASSAY OF FREE THYROXINE: CPT | Mod: HCNC | Performed by: EMERGENCY MEDICINE

## 2024-06-10 PROCEDURE — 25000003 PHARM REV CODE 250: Performed by: EMERGENCY MEDICINE

## 2024-06-10 PROCEDURE — 25500020 PHARM REV CODE 255: Performed by: EMERGENCY MEDICINE

## 2024-06-10 PROCEDURE — 84484 ASSAY OF TROPONIN QUANT: CPT | Mod: 91,HCNC | Performed by: EMERGENCY MEDICINE

## 2024-06-10 PROCEDURE — 85610 PROTHROMBIN TIME: CPT | Mod: HCNC | Performed by: EMERGENCY MEDICINE

## 2024-06-10 PROCEDURE — 99900035 HC TECH TIME PER 15 MIN (STAT)

## 2024-06-10 PROCEDURE — A4216 STERILE WATER/SALINE, 10 ML: HCPCS | Performed by: EMERGENCY MEDICINE

## 2024-06-10 PROCEDURE — 93010 ELECTROCARDIOGRAM REPORT: CPT | Mod: HCNC,,, | Performed by: INTERNAL MEDICINE

## 2024-06-10 PROCEDURE — 3E03317 INTRODUCTION OF OTHER THROMBOLYTIC INTO PERIPHERAL VEIN, PERCUTANEOUS APPROACH: ICD-10-PCS | Performed by: EMERGENCY MEDICINE

## 2024-06-10 PROCEDURE — 36415 COLL VENOUS BLD VENIPUNCTURE: CPT | Mod: HCNC

## 2024-06-10 PROCEDURE — 85025 COMPLETE CBC W/AUTO DIFF WBC: CPT | Mod: HCNC | Performed by: EMERGENCY MEDICINE

## 2024-06-10 PROCEDURE — 80061 LIPID PANEL: CPT | Mod: HCNC | Performed by: EMERGENCY MEDICINE

## 2024-06-10 PROCEDURE — 85610 PROTHROMBIN TIME: CPT

## 2024-06-10 PROCEDURE — 99291 CRITICAL CARE FIRST HOUR: CPT | Mod: 25,HCNC

## 2024-06-10 PROCEDURE — 80053 COMPREHEN METABOLIC PANEL: CPT | Mod: HCNC | Performed by: EMERGENCY MEDICINE

## 2024-06-10 PROCEDURE — 20000000 HC ICU ROOM: Mod: HCNC

## 2024-06-10 PROCEDURE — 84484 ASSAY OF TROPONIN QUANT: CPT | Mod: HCNC

## 2024-06-10 PROCEDURE — 83880 ASSAY OF NATRIURETIC PEPTIDE: CPT | Mod: HCNC | Performed by: EMERGENCY MEDICINE

## 2024-06-10 PROCEDURE — 81000 URINALYSIS NONAUTO W/SCOPE: CPT | Mod: HCNC | Performed by: EMERGENCY MEDICINE

## 2024-06-10 PROCEDURE — 63600175 PHARM REV CODE 636 W HCPCS: Mod: JG | Performed by: EMERGENCY MEDICINE

## 2024-06-10 PROCEDURE — G0508 CRIT CARE TELEHEA CONSULT 60: HCPCS | Mod: 95,,, | Performed by: STUDENT IN AN ORGANIZED HEALTH CARE EDUCATION/TRAINING PROGRAM

## 2024-06-10 RX ORDER — ERGOCALCIFEROL 1.25 MG/1
50000 CAPSULE ORAL
Status: DISCONTINUED | OUTPATIENT
Start: 2024-06-10 | End: 2024-06-17 | Stop reason: HOSPADM

## 2024-06-10 RX ORDER — ATORVASTATIN CALCIUM 20 MG/1
20 TABLET, FILM COATED ORAL DAILY
Status: DISCONTINUED | OUTPATIENT
Start: 2024-06-10 | End: 2024-06-11

## 2024-06-10 RX ORDER — BISACODYL 10 MG/1
10 SUPPOSITORY RECTAL DAILY PRN
Status: DISCONTINUED | OUTPATIENT
Start: 2024-06-10 | End: 2024-06-17 | Stop reason: HOSPADM

## 2024-06-10 RX ORDER — DORZOLAMIDE HYDROCHLORIDE AND TIMOLOL MALEATE 20; 5 MG/ML; MG/ML
1 SOLUTION/ DROPS OPHTHALMIC 2 TIMES DAILY
Status: DISCONTINUED | OUTPATIENT
Start: 2024-06-10 | End: 2024-06-17 | Stop reason: HOSPADM

## 2024-06-10 RX ORDER — ATORVASTATIN CALCIUM 40 MG/1
40 TABLET, FILM COATED ORAL DAILY
Status: DISCONTINUED | OUTPATIENT
Start: 2024-06-10 | End: 2024-06-10

## 2024-06-10 RX ORDER — ACETAMINOPHEN 325 MG/1
650 TABLET ORAL EVERY 4 HOURS PRN
Status: DISCONTINUED | OUTPATIENT
Start: 2024-06-10 | End: 2024-06-11

## 2024-06-10 RX ORDER — SODIUM CHLORIDE 0.9 % (FLUSH) 0.9 %
10 SYRINGE (ML) INJECTION
Status: DISCONTINUED | OUTPATIENT
Start: 2024-06-10 | End: 2024-06-17 | Stop reason: HOSPADM

## 2024-06-10 RX ORDER — SODIUM CHLORIDE 0.9 % (FLUSH) 0.9 %
10 SYRINGE (ML) INJECTION ONCE
Status: COMPLETED | OUTPATIENT
Start: 2024-06-10 | End: 2024-06-10

## 2024-06-10 RX ORDER — IBUPROFEN 200 MG
24 TABLET ORAL
Status: DISCONTINUED | OUTPATIENT
Start: 2024-06-10 | End: 2024-06-17 | Stop reason: HOSPADM

## 2024-06-10 RX ORDER — SODIUM CHLORIDE 0.9 % (FLUSH) 0.9 %
10 SYRINGE (ML) INJECTION EVERY 12 HOURS PRN
Status: DISCONTINUED | OUTPATIENT
Start: 2024-06-10 | End: 2024-06-17 | Stop reason: HOSPADM

## 2024-06-10 RX ORDER — NALOXONE HCL 0.4 MG/ML
0.02 VIAL (ML) INJECTION
Status: DISCONTINUED | OUTPATIENT
Start: 2024-06-10 | End: 2024-06-17 | Stop reason: HOSPADM

## 2024-06-10 RX ORDER — SPIRONOLACTONE 25 MG/1
25 TABLET ORAL DAILY
Status: DISCONTINUED | OUTPATIENT
Start: 2024-06-10 | End: 2024-06-17 | Stop reason: HOSPADM

## 2024-06-10 RX ORDER — MUPIROCIN 20 MG/G
OINTMENT TOPICAL 2 TIMES DAILY
Status: COMPLETED | OUTPATIENT
Start: 2024-06-10 | End: 2024-06-15

## 2024-06-10 RX ORDER — IBUPROFEN 200 MG
16 TABLET ORAL
Status: DISCONTINUED | OUTPATIENT
Start: 2024-06-10 | End: 2024-06-17 | Stop reason: HOSPADM

## 2024-06-10 RX ORDER — GLUCAGON 1 MG
1 KIT INJECTION
Status: DISCONTINUED | OUTPATIENT
Start: 2024-06-10 | End: 2024-06-17 | Stop reason: HOSPADM

## 2024-06-10 RX ADMIN — ERGOCALCIFEROL 50000 UNITS: 1.25 CAPSULE ORAL at 01:06

## 2024-06-10 RX ADMIN — Medication 10 ML: at 09:06

## 2024-06-10 RX ADMIN — ATORVASTATIN CALCIUM 20 MG: 20 TABLET, FILM COATED ORAL at 01:06

## 2024-06-10 RX ADMIN — MUPIROCIN: 20 OINTMENT TOPICAL at 08:06

## 2024-06-10 RX ADMIN — DORZOLAMIDE HYDROCHLORIDE TIMOLOL MALEATE 1 DROP: 20; 5 SOLUTION/ DROPS OPHTHALMIC at 08:06

## 2024-06-10 RX ADMIN — SPIRONOLACTONE 25 MG: 25 TABLET ORAL at 01:06

## 2024-06-10 RX ADMIN — IOHEXOL 100 ML: 350 INJECTION, SOLUTION INTRAVENOUS at 09:06

## 2024-06-10 RX ADMIN — Medication 15 MG: at 09:06

## 2024-06-10 NOTE — PT/OT/SLP PROGRESS
Occupational Therapy  Visit Attempt     Patient Name:  Dean Hahn   MRN:  [id number]    Patient not seen today secondary to Other (Comment), Testing/imaging (xray/CT/MRI) (currently in testing; also [facility] no h&P/limited notes in chart at this time). Will follow-up .    6/10/2024

## 2024-06-10 NOTE — Clinical Note
Diagnosis: Acute focal neurological deficit [075075]   Future Attending Provider: MABEL WOOD [[id number]]   Reason for IP Medical Treatment  (Clinical interventions that can only be accomplished in the IP setting? ) :: CVA with thrombolytic administration   I certify that Inpatient services for greater than or equal to 2 midnights are medically necessary:: Yes   Plans for Post-Acute care--if anticipated (pick the single best option):: A. No post acute care anticipated at this time

## 2024-06-10 NOTE — CONSULTS
Pulmonary & Critical Care Medicine - Resident Consult Note     Primary Attending:  Gómez Vee MD   Consultant Attending: Bg Gray MD   Consultant Fellow: Consultant Resident: MD Jennifer Doherty MD     Date of Admit: 6/10/2024  Hospital day: 0        History of Present Illness:  Dean Hahn is a 83 y.o.  male with PMHx significant for HTN, dementia, HLD, carotid stenosis B/L CKD, was brought to the ER for new onset speech difficulty, right sided weakness. 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 He fell and was found in the ground. His family found him in the ground hour after fall, with dysphasia, weakness, and confusion. EMS called and brought to the ER. Code stroke was called. He had CT head no acute process. Microvascular small vessel ischemic change. Labs in the ER with elevated LFT, elevated creatinine, elevated TSH with normal free T, elevated troponin. He was given TNK and brought to the ICU for closer monitoring post-TNK.     Per the daughter she stated that she noticed that he was slurring his words approximately 1 week ago. They state that he is normally able to ambulate with a walker but over the past few weeks have needed to more help performing his ADL's. Per daughter his wife is his caregiver.     Past Medical and Surgical History:  Past Medical History:   Diagnosis Date    Cataract     HLD (hyperlipidemia)     Hypertension      Past Surgical History:   Procedure Laterality Date    CHOLECYSTECTOMY      EYE SURGERY Right     cataract removal surgery       Allergies:  Review of patient's allergies indicates:  No Known Allergies    Family History:  Family History   Problem Relation Name Age of Onset    Hypertension Mother Jania Hahn     Heart attack Father      Coronary artery disease Father      No Known Problems Sister x2     Hypertension Brother x3     No Known Problems Daughter x3     No Known  Problems Son x2     Cirrhosis Neg Hx         Social History:  Social History     Tobacco Use    Smoking status: Never    Smokeless tobacco: Never   Substance Use Topics    Alcohol use: Yes    Drug use: Never       Review of Systems:  Negative unless otherwise specified in HPI.       Objective:   Last 24 Hour Vital Signs:  BP  Min: 108/64  Max: 161/65  Temp  Av.9 °F (36.6 °C)  Min: 97.5 °F (36.4 °C)  Max: 98.6 °F (37 °C)  Pulse  Av.8  Min: 56  Max: 65  Resp  Av.2  Min: 10  Max: 20  SpO2  Av %  Min: 100 %  Max: 100 %  Weight  Av kg (130 lb)  Min: 59 kg (130 lb)  Max: 59 kg (130 lb)  Body mass index is 20.36 kg/m².  I & O (Last 24H):  Intake/Output Summary (Last 24 hours) at 6/10/2024 1317  Last data filed at 6/10/2024 1230  Gross per 24 hour   Intake --   Output 75 ml   Net -75 ml       General: frail, awake, cooperative, no acute distress  Head: Normocephalic, atraumatic  Lungs: clear to auscultation bilaterally, respirations unlabored  Heart: regular rate and rhythm, no murmur appreciated  Abdomen: soft, non-tender, normal bowel sounds  Extremities: no joint deformity or tenderness noted  Skin: warm and dry, Ecchymosis noted bilateral extremities   Neuro: alert and oriented to name but not place or time.      Laboratory, Microbiology, ECG(s), and Imaging:  Reviewed.        Assessment & Plan:       NEUROLOGICAL:  Stroke s/p TNK  Dementia   - Patient received TNK around 0900 on 6/10 for stroke symptoms including R) sided weakness and dysarthria   - Neurological assessment and vital signs (except temperature) every 15 minutes x 2 hours, then every 30 minutes x 6 hours, then every hour x 16 hours..  - BP stays >= 180 mm Hg or diastolic BP stays >= 105 mm Hg for 24 hrs following thrombolytic therapy.  - No antithrombotics or anticoagulants (including but not limited to: heparin, warfarin, aspirin, clopidigrel, or dipyridamole) for 24 hours, then start antithrombotics as ordered by treating  physician  - CT Head: Involutional change and small vessel ischemic change.   - CTA Head and Neck: Bilateral proximal ICA stenosis and a mild right M1 segment stenosis.   - MRI Brain ordered  - ECHO ordered   - Lipid panel, TSH, A1c: ordered   - PT/OT/SLP eval  - Repeat CT Head at 24hrs post TNK   - Neurology consulted, appreciate assistance   Home Meds: donepezil 5mg     CARDIOVASCULAR:  Chest x-ray: No acute abnormality   - ECHO 1/2023: EF: 60%, normal diastolic dysfunction  - BNP: 27  - Trop: 0.039>0.033  - Repeat ECHO pending     Hypertension  - Home meds: Toprol 25mg, Aldactone 25mg, ASA, olmesartan 20mg      PULMONOLOGY:  CTA     GI/FEN:  Fluids: Net even / net negative strategy  Electrolytes: CTM and replete as needed  Nutrition: NPO until SLP eval     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 Andrea  - In 2021, showed that he had a high IgG and a positive CHRIS with a signifiacnt titer. Although his AST/ALT in 2021 were only mildly elevated, I note that in 2016 his AST/ALT were around 150-200. 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 Bili: 2.5    - Home meds: Aldactone 25mg and lactulose 25mg      RENAL:   CKD3a  - Baseline around 1.5   - CTM with daily CMP       HEMATOLOGY/ONCOLOGY:  Thrombocytopenia   - Likely 2/2 to cirrhosis        ENDOCRINE:  TSH: 5.279  T4: 1.10    A1C: 4.7 11 months ago  -Hypoglycemic precautions.     INFECTIOUS DISEASE:  Afebrile, WBC: 8  No concern for infection at this time        Feeding: NPO, pending SLP eval  Analgesia: Tylenol  Sedation: None   VTE Ppx: SCD's  Head of Bed: 30 degrees to prevent VAP  Ulcer PPX: None  Glucose: Hypoglycemic precautions,  SBT/SAT: None   Bowels: Bisacodyl suppository PRN  Indwelling Lines: PIV Right AC  Deescalation Abx:  None     Code Status: Full     Dispo: PostTNK, neurochecks      Rounded with Dr. Gray. Attestation to follow.      Jennifer Das MD  [facility] HO-1  [facility] - Pulmonary [facility]

## 2024-06-10 NOTE — PLAN OF CARE
Late entry - 1050- pt arrived via stretcher accompanied by nurse and PCT. Assisted to bed, oriented to room, connected to monitor. Pt was aaox1 to self, cognitive able to state his  and full name.  stated he was at [facility], pt reoriented and instructed he was at [facility]. No evidence of learning. Denies Pain/ HA. Able to follow commands with repeated instructions, slow to respond.   1130- change in mental status, pt not able state his  and slower to respond. Head CT ordered.   1200- Head CT obtained. No acute bleed  1230- pt's wife Mrs. Pati Hahn is at bedside, reported pt has been confused, moving slower, and needing more assistance with ADLs the past few weeks.   1330- braga test done at bedside. Passed  1340- pt's daughter Mrs. Hall at bedside reported when she spoke to pt a few days ago he sounded groggy and a little more confused, she related it to possibly a new medication but was not sure but said this confusion is not new.   Provided and Reviewed Stroke patient education guide with Mrs. Hall   1510- echo being done at bedside  1515- pureed diet ordered.   1730- meal tray provided. Assisted with feeding. Tolerated well.       Problem: Adult Inpatient Plan of Care  Goal: Plan of Care Review  Outcome: Progressing  Goal: Patient-Specific Goal (Individualized)  Outcome: Progressing  Goal: Absence of Hospital-Acquired Illness or Injury  Outcome: Progressing  Goal: Optimal Comfort and Wellbeing  Outcome: Progressing  Goal: Readiness for Transition of Care  Outcome: Progressing     Problem: Fall Injury Risk  Goal: Absence of Fall and Fall-Related Injury  Outcome: Progressing     Problem: Skin Injury Risk Increased  Goal: Skin Health and Integrity  Outcome: Progressing     Problem: Stroke, Ischemic (Includes Transient Ischemic Attack)  Goal: Optimal Coping  Outcome: Progressing  Goal: Effective Bowel Elimination  Outcome: Progressing  Goal: Optimal Cerebral Tissue  Perfusion  Outcome: Progressing  Goal: Optimal Cognitive Function  Outcome: Progressing  Goal: Improved Communication Skills  Outcome: Progressing  Goal: Optimal Functional Ability  Outcome: Progressing  Goal: Optimal Nutrition Intake  Outcome: Progressing  Goal: Effective Oxygenation and Ventilation  Outcome: Progressing  Goal: Improved Sensorimotor Function  Outcome: Progressing  Goal: Safe and Effective Swallow  Outcome: Progressing  Goal: Effective Urinary Elimination  Outcome: Progressing

## 2024-06-10 NOTE — ASSESSMENT & PLAN NOTE
Creatine stable for now. BMP reviewed- noted Estimated Creatinine Clearance: 32.2 mL/min (A) (based on SCr of 1.5 mg/dL (H)). according to latest data. Based on current GFR, CKD stage is stage 3 - GFR 30-59.  Monitor UOP and serial BMP and adjust therapy as needed. Renally dose meds. Avoid nephrotoxic medications and procedures.

## 2024-06-10 NOTE — CONSULTS
"NEUROLOGY FLOOR CONSULT    Reason for consult:  acute stroke    CC:  difficulty speaking, right sided weakness    HPI:   Dean Hahn is a 83 y.o. M with a PMH of HTN, HLD, CKD, carotid stenosis (less than 50% stenosis of bilateral internal carotids), prior lacunar infarcts, and dementia who presented to [facility] on 6/10/24 with a chief complaint of speech difficulty and right sided weakness, LKN 6 AM on 6/10/24.    Pt was last seen normal at 6 AM by family earlier today. He was later found on the ground by family after a fall. Family noted that his speech was off and he appeared weak on the R side.    Pt was evaluated by telestroke. At that time NIHSS = 10 for not answering LOC questions correctly, partial hemianopia, R arm drift, severe aphasia, and severe dysarthria. CTH non con with no hemorrhage, CTA head and neck was negative for any LVOs or flow limiting stenoses; did show bilateral proximal ICA stenosis and mild R M1 stenosis. He was deemed to be a TNK candidate and received TNK at 0906 on 6/10/24.    At baseline, pt lives with his wife. They said he was diagnosed with dementia, however NP note from 6/20/2017 stated "Refused referral to Neurology for dementia evaluation. " He has never had official work up for dementia before. However, this documentation does reflect that pt has been having memory difficulties since at least 2017 (7 years ago).    In the past, pt has had multifocal lacunar strokes affecting the L corona radiata, L temporal lobe, R splenium of the corpus callosum. Has also had remote bilateral cerebellar infarcts. Previously was following with [facility] Dr. Gonzalez but it appears he was lost to follow up. Unclear if he was compliant with ASA 81 mg daily at home       ROS: As per HPI    Histories:     Allergies:  Patient has no known allergies.    Current Medications:    Current Facility-Administered Medications   Medication Dose Route Frequency Provider " Last Rate Last Admin    acetaminophen tablet 650 mg  650 mg Oral Q4H PRN Gómez Vee MD        atorvastatin tablet 20 mg  20 mg Oral Daily Gómez Vee MD   20 mg at 06/10/24 1350    bisacodyL suppository 10 mg  10 mg Rectal Daily PRN Gmóez Vee MD        dextrose 10% bolus 125 mL 125 mL  12.5 g Intravenous PRN Gómez Vee MD        dextrose 10% bolus 250 mL 250 mL  25 g Intravenous PRN Gómez Vee MD        dorzolamide-timolol 2-0.5% ophthalmic solution 1 drop  1 drop Both Eyes BID Gómez Vee MD        ergocalciferol capsule 50,000 Units  50,000 Units Oral Q7 Days Gómez Vee MD   50,000 Units at 06/10/24 1348    glucagon (human recombinant) injection 1 mg  1 mg Intramuscular PRN Gómez Vee MD        glucose chewable tablet 16 g  16 g Oral PRN Gómez Vee MD        glucose chewable tablet 24 g  24 g Oral PRN Gómez eVe MD        mupirocin 2 % ointment   Nasal BID Gómez Vee MD        naloxone 0.4 mg/mL injection 0.02 mg  0.02 mg Intravenous PRN Gómez Vee MD        sodium chloride 0.9% flush 10 mL  10 mL Intravenous Q12H PRN Gómez Vee MD        sodium chloride 0.9% flush 10 mL  10 mL Intravenous PRN Gómez Vee MD        spironolactone tablet 25 mg  25 mg Oral Daily Gómez Vee MD   25 mg at 06/10/24 1348       Past Medical/Surgical/Family History:  Medical:   Past Medical History:   Diagnosis Date    Cataract     HLD (hyperlipidemia)     Hypertension       Surgeries:   Past Surgical History:   Procedure Laterality Date    CHOLECYSTECTOMY      EYE SURGERY Right     cataract removal surgery      Family:   Family History   Problem Relation Name Age of Onset    Hypertension Mother Jania Overton     Heart attack Father      Coronary artery disease Father      No Known Problems Sister x2     Hypertension Brother x3     No Known Problems Daughter x3     No Known Problems Son x2     Cirrhosis Neg Hx         Social History:    Substance  Abuse/Dependence History:  Pt unable to provide information      Current Evaluation:     Vital Signs:   Vitals:    06/10/24 1615   BP: 132/67   Pulse: 71   Resp: 14   Temp:         Neurological Examination     NIH Stroke Scale    Time: 1:47 PM  Person Administering Scale: Liz Sepulveda    1a  Level of consciousness: 2=not alert, requires repeated stimulation to attend, or is obtunded and requires strong or painful stimulation to make movements (not stereotyped)   1b. LOC questions:  2=Answers neither task correctly   1c. LOC commands: 2=Answers neither task correctly   2.  Best Gaze: 0=normal   3.  Visual: 0=No visual loss   4. Facial Palsy: 0=Normal symmetric movement   5a.  Motor left arm: 0=No drift, limb holds 90 (or 45) degrees for full 10 seconds   5b.  Motor right arm: 0=No drift, limb holds 90 (or 45) degrees for full 10 seconds   6a. motor left le=No drift, limb holds 90 (or 45) degrees for full 10 seconds   6b  Motor right le=No drift, limb holds 90 (or 45) degrees for full 10 seconds   7. Limb Ataxia: 0=Absent   8.  Sensory: 0=Normal; no sensory loss   9. Best Language:  1=Mild to moderate aphasia; some obvious loss of fluency or facility of comprehension without significant limitation on ideas expressed or form of expression.   10. Dysarthria: 0=Normal   11. Extinction and Inattention: 0=No abnormality    Total:   7        [id number]  Eyes open spontaneously. Needs repeated encouragement to follow commands. Does not always understand what is being asked of him. Only oriented to name and birthday. Not oriented to location or year.     Language:  - Difficulty with naming (able to name spoon, but not pen. Called a straw a spoon)  - Minimal verbal output, short answers to questions    Cranial Nerves:  CN II: PERRL, blinks to threat bilaterally  CN V1 and VII: Corneal blink reflex intact bilaterally  CN VIII: Oculocephalic reflex intact  CN IX, X: pt did not follow command to open his mouth    Motor  and sensory:  Left UE: able to sustain against gravity with no drift  Left LE: able to sustain against gravity with no drift  Right UE:able to sustain against gravity with no drift  Right LE:able to sustain against gravity with no drift    Reflexes:         Left       Right   Brachioradialis        2+         2+   Bicep        2+         2+   Tricep        2+         2+   Patellar        2+         2+                Unable to assess memory, coordination or gait due to the above neurological deficits     LABORATORY STUDIES:  CBC:   Recent Labs   Lab 06/10/24  0821   WBC 8.99   HGB 11.7*   HCT 32.7*   *   MCV 93   RDW 13.3     BMP:   Recent Labs   Lab 06/10/24  0821      K 4.6   *   CO2 15*   BUN 23   CREATININE 1.5*   GLU 85   CALCIUM 9.7     LFTs:   Recent Labs   Lab 06/10/24  0821   PROT 7.6   ALBUMIN 2.7*   BILITOT 2.5*   *   ALKPHOS 186*   *     Coags:   Recent Labs   Lab 06/10/24  0821 06/10/24  0847   INR 1.2 1.3*     FLP:   Recent Labs   Lab 06/10/24  0821   CHOL 136   LDLCALC 77.4   TRIG 88   CHOLHDL 30.1     Thyroid:   Recent Labs   Lab 06/10/24  0821   TSH 5.279*   FREET4 1.10     Cardiac:   Recent Labs   Lab 06/10/24  0821 06/10/24  1405   TROPONINI 0.039* 0.033*   BNP 27  --      Urinalysis:   Recent Labs   Lab 06/10/24  1232   COLORU Yellow   APPEARANCEUA Clear   PHUR 6.0   SPECGRAV >1.030*   PROTEINUA Trace*   GLUCUA Negative   KETONESU Negative   BILIRUBINUA Negative   OCCULTUA 1+*   NITRITE Negative   UROBILINOGEN Negative   LEUKOCYTESUR Negative     Urine Micro:  Recent Labs   Lab 06/10/24  1232   RBCUA 1   WBCUA 1   SQUAMEPITHEL 0   MICROCMT SEE COMMENT        RADIOLOGY STUDIES:  I have personally reviewed the images performed.     Prior MRI brain 12/21/2022  Impression:     1. Few punctate foci of DWI signal hyperintensity within the bilateral cerebral hemispheres.  Findings are concerning for subacute lacunar type infarcts however cannot entirely exclude artifact.   Recommend clinical correlation.  2. Moderate degree chronic microvascular ischemic change.  3. Generalized cerebral volume loss.  This report was flagged in Epic as abnormal.    CTH non con 6/10/24  FINDINGS:  Comparison is 12/05/2023.     There is involutional change and microvascular small vessel ischemic change.  No acute bleed, mass, or mass effect seen.  The brain parenchyma demonstrates no acute stroke changes.  Skull and skull base demonstrate nothing unusual.  No fracture or trauma seen.     Impression:     No acute process seen.     Microvascular small vessel ischemic change.    CTA head and neck 6/10/24  Impression:     Bilateral proximal ICA stenosis and a mild right M1 segment stenosis.     Calcified pleural plaques.     Left apical pulmonary micronodule.  If there is a moderate-high risk factor for developing lung cancer a 12 month low-dose screening CT is recommended.     No abnormal enhancement seen and no acute process seen.  There is involutional change and small vessel ischemic change.     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    TTE 6/10/24    Left Ventricle: The left ventricle is normal in size. Normal wall thickness. There is normal systolic function with a visually estimated ejection fraction of 55 - 60%. There is normal diastolic function.    Right Ventricle: Normal right ventricular cavity size. Systolic function is normal. TAPSE is  cm. TDI S' is 12.0 cm/s.    IVC/SVC: Normal venous pressure at 3 mmHg.    Study is positive for intracardiac shunt.    Assessment:  VETO Gallaghersworth is a 83 y.o. M with a PMH of HTN, HLD, CKD, carotid stenosis (less than 50% stenosis of bilateral internal carotids), prior multifocal lacunar infarcts, and dementia who presented to [facility] on 6/10/24 with a chief complaint of speech difficulty and right sided weakness, LKN 6 AM on 6/10/24. Received TNK at 0906 on 6/10/24    Impression:  Concern for  acute stroke as cause of R sided weakness and speech difficulty. He has had multifocal ischemic strokes in the past, unclear if he ever got evaluation with holter monitor as was recommended at the time. Given prior multifocal strokes in different vascular territories, do have concern for cardioembolic etiology. He would benefit from either holter monitor or loop recorder on discharge to screen for paroxysmal afib.    Recommendations  1) TNK given at 0906 on 6/10/24    - AVOID arterial sticks, venipuncture, insertion of indwelling urethral catheter, insertion of NG tube during the first 24 hours after IV thrombolytic infusion.     - AVOID antiplatelet and antithrombotic medications during the first 24 hours of thrombolytic infusion.    2)  Anti-platelet therapy: Begin therapy with ASA 81 and Plavix 24 hours after administration of TNK    3) Admit to: MICU, neurochecks q15 min for the first two hours after TNK administration, then q30 min for 6 hours, then q1 hour for the next 12 hours    4) Recommend serum glucose between 140 and 180    5) Recommend SBP goal < 180 and DBP < 105    6) Labetalol 10 mg IV PRN for BP above goal if HR >65 or Hydralazine 10 mg IV for BP   above goal if HR < 65    - Use Cardene gtt it BP refractory to above medications    7) Keep NPO; if passes swallow screen, can have full liquids and oral medications but will have to wait until SLP swallow study to allow solid foods    8) Atorvastatin 80 mg, Goal LDL <70    9) Labs: HgA1C, Fasting Lipid Panel, TSH, ESR, Vitamin B-12, Folate    10) Imaging: MRI brain wo contrast pending. Holter monitor vs. Loop recorder on discharge to screen for paroxysmal afib    11) Repeat CT Head WO after 24 hours to assess for any hemorrhagic conversion post TNK    12) DVT Prophylaxis: TEDs/SCDs    13) Initiate rehab efforts with PT/OT/ST    Other  - Outpatient dementia evaluation    Case to be discussed with Dr. Aguilar    Will continue to follow.    Liz  MD Derick  [employer] [facility], PGY-III

## 2024-06-10 NOTE — TELEMEDICINE CONSULT
[facility]  Vascular Neurology  Comprehensive Stroke Center  TeleVascular Neurology Acute Consultation Note        Consult Information  Consult to Telemedicine - Acute Stroke  Consult performed by: Sallie Carter MD  Consult ordered by: Jarad Singer DO          Consulting Provider: JARAD SINGER   Current Providers  No providers found    Patient Location:  [facility] EMERGENCY DEPARTMENT Emergency Department    [facility]oke hospital nurse at bedside with patient assisting consultant.  Patient information was obtained from patient.       Stroke Documentation  Acute Stroke Times   Last Known Normal Date: 06/10/24  Last Known Normal Time: 0600  Symptom Onset Date: 06/10/24  Symptom Onset Time: 0600  Stroke Team Called Date: 06/10/24  Stroke Team Called Time: 0840  Stroke Team Arrival Date: 06/10/24  Stroke Team Arrival Time: 0844  CT Interpretation Time: 0852  Thrombolytic Therapy Recommended: Yes  Decision to Treat Time for Tenecteplase: 0900    NIH Scale:  Interval: baseline  1a. Level of Consciousness: 0-->Alert, keenly responsive  1b. LOC Questions: 2-->Answers neither question correctly  1c. LOC Commands: 2-->Performs neither task correctly  2. Best Gaze: 0-->Normal  3. Visual: 1-->Partial hemianopia  4. Facial Palsy: 0-->Normal symmetrical movements  5a. Motor Arm, Left: 0-->No drift, limb holds 90 (or 45) degrees for full 10 secs  5b. Motor Arm, Right: 1-->Drift, limb holds 90 (or 45) degrees, but drifts down before full 10 secs, does not hit bed or other support  6a. Motor Leg, Left: 0-->No drift, leg holds 30 degree position for full 5 secs  6b. Motor Leg, Right: 0-->No drift, leg holds 30 degree position for full 5 secs  7. Limb Ataxia: 0-->Absent  8. Sensory: 0-->Normal, no sensory loss  9. Best Language: 2-->Severe aphasia, all communication is through fragmentary expression, great need for inference, questioning, and guessing by the listener. Range of information that can be  exchanged is limited, listener carries burden of. . . (see row details)  10. Dysarthria: 2-->Severe dysarthria, patients speech is so slurred as to be unintelligible in the absence of or out of proportion to any dysphasia, or is mute/anarthric  11. Extinction and Inattention (formerly Neglect): 0-->No abnormality  Total (NIH Stroke Scale): 10      Modified Highland: Score: 0  Keyonna Coma Scale:     ABCD2 Score:    [id number] Score:    HAS -BLED Score:    ICH Score:    Hunt & Lopez Classification:      Blood pressure (!) 157/109, pulse 62, temperature 97.7 °F (36.5 °C), temperature source Oral, resp. rate 16, weight 59 kg (130 lb), SpO2 100%.    Van Positive  In your opinion, this was a: Tier 1     Medical Decision Making  HPI:  83 y.o. male with PMHx significant for HTN, dementia, prior strokes, CKD, prediabetes, liver disease presenting with confusion, speech difficulties, dysarthria, gait instability. He woke up around 0600 this morning and was seen as normal per family. Later found on the ground after a fall. Family does not think he hit his head. They noticed he wasn't speaking normally.      He is on aspirin 81mg. No AC medications.     Vitals:    06/10/24 0749   BP: (!) 157/109   Pulse: 62   Resp: 16   Temp: 97.7 °F (36.5 °C)     BG 92    Images personally reviewed and interpreted:  Study: Head CT  Study Interpretation: No acute intracranial hemorrhage or evidence of large territory ischemia.      Assessment and plan:  # Stroke determined by clinical assessment  -- Symptoms of dysarthria, aphasia, right-arm weakness. Overall concern for ischemic stroke though acute encephalopathy is also a consideration. However, given disabling symptoms and no contraindications to treatment, recommend IV TNK at this time. VAN+ exam. CTA head/neck pending treatment with IV lytics. If no LVO or high-grade stenosis, patient can remain at spoke for close neuro-monitoring.     Lytics recommendation: Recommend IV Tenecteplase  0.25mg/kg IV push (max dose 25mg); If Tenecteplase is not available use Alteplase 0.9mg/kg IV bolus followed by infusion (max dose 90mg)     Additional Recommendations:   Neurological assessment and vital signs (except temperature) every 15 minutes x 2 hours, then every 30 minutes x 6 hours, then every hour x 16 hours..  Frequency of BP assessments may need to be increased if systolic BP stays >= 180 mm Hg or diastolic BP stays >= 105 mm Hg. Administer antihypertensive meds as ordered  Temperature every 4 hours or as required.  Follow hospital protocol for further orders re: post thrombolytic therapy patient management.  No antithrombotics or anticoagulants (including but not limited to: heparin, warfarin, aspirin, clopidigrel, or dipyridamole) for 24 hours, then start antithrombotics as ordered by treating physician    Adapted from the American Heart Association/American Stroke Association (AHA/ASA) and American Association of Neuroscience Nurses (AANN) Guidelines.   Thrombectomy recommendation: Awaiting CTA results from Sp[facility] for determination   Placement recommendation: pending further studies               ROS  Physical Exam  Past Medical History:   Diagnosis Date    Cataract     HLD (hyperlipidemia)     Hypertension      Past Surgical History:   Procedure Laterality Date    CHOLECYSTECTOMY      EYE SURGERY Right     cataract removal surgery     Family History   Problem Relation Name Age of Onset    Hypertension Mother Jania Hahn     Heart attack Father      Coronary artery disease Father      No Known Problems Sister x2     Hypertension Brother x3     No Known Problems Daughter x3     No Known Problems Son x2     Cirrhosis Neg Hx         Diagnoses  Problem Noted   Stroke Determined By Clinical Assessment 6/10/2024       Sallie Carter MD      Emergent/Acute neurological consultation requested by spoke provider due to critical concerns for possible cerebrovascular event that could result in permanent loss of  neurologic/bodily function, severe disability or death of this patient.  Immediate/timely evaluation by a highly prepared expert is paramount for optimal outcomes  High risk for neurological deterioration if not properly diagnosed  High risk for neurological deterioration if not treated promplty/as soon as possible  Complex diagnostic evaluation may be required (advanced imaging)  High risk treatment options (thrombolytics and/or thrombectomy)    Patient care was coordinated with spoke provider, including but not limted to    Discussing likely diagnosis/etiology of symptoms  Making recommendations for further diagnostic studies  Making recommendations for intravenous thrombolytics or other advanced therapies  Making recommendations for disposition (admission/transfer for higher level of care)

## 2024-06-10 NOTE — PT/OT/SLP PROGRESS
Speech Language Pathology      Dean Joshuasameer Hahn  MRN: [id number]    1400pm  Patient not seen today secondary to Testing/imaging (xray/CT/MRI). Will follow-up and when full H&P is available.        6/10/2024

## 2024-06-10 NOTE — HPI
82 YO M with PMHx significant for HTN, dementia, HLD, carotid stenosis B/L CKD, was brought to the ER for new onset speech difficulty, right sided weakness, change in mental status. When seen in the ICU, patient was confused, alert, oriented to self and keep repeating the same thing. He was not able to give any history. 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 He fell and was found on the ground. His family found him on the floor at his house hours after fall, with dysphasia, weakness with limping, and confusion. EMS called and brought to the ER. Code stroke was called. He had CT head no acute process. Microvascular small vessel ischemic change. Labs in the ER with elevated LFT, elevated creatinine, elevated TSH with normal free T, elevated troponin. He was given TPA and post TPA transfer to ICU. In the unit he had another recurrent episode of worsening confusion, repeat CT head without acute evidence of acute process.

## 2024-06-10 NOTE — ASSESSMENT & PLAN NOTE
Patient was found to have thrombocytopenia, the likely etiology is secondary to cirrhosis/portal hypertension, will monitor the platelets Daily. Will transfuse if platelet count is <100k (if undergoing neurosurgery). Hold DVT prophylaxis if platelets are <50k. The patient's platelet results have been reviewed and are listed below.  Recent Labs   Lab 06/10/24  0821   *

## 2024-06-10 NOTE — ASSESSMENT & PLAN NOTE
Antithrombotics for secondary stroke prevention: Antiplatelets: Clopidogrel: 75 mg daily    Statins for secondary stroke prevention and hyperlipidemia, if present:   Statins: Atorvastatin- 40 mg daily    Aggressive risk factor modification: HTN, Diet, Exercise, CAD     Rehab efforts: The patient has been evaluated by a stroke team provider and the therapy needs have been fully considered based off the presenting complaints and exam findings. The following therapy evaluations are needed: PT evaluate and treat, OT evaluate and treat, SLP evaluate and treat, PM&R evaluate for appropriate placement    Diagnostics ordered/pending: CTA Head to assess vasculature , CTA Neck/Arch to assess vasculature, HgbA1C to assess blood glucose levels, Lipid Profile to assess cholesterol levels, MRI head without contrast to assess brain parenchyma, TSH to assess thyroid function    VTE prophylaxis: None: Reason for No Pharmacological VTE Prophylaxis: S/P TpA    BP parameters: carotid stenosis: defer to neuro surgery

## 2024-06-10 NOTE — ED PROVIDER NOTES
"Encounter Date: 6/10/2024       History     Chief Complaint   Patient presents with    Fall    Altered Mental Status     Pt w/ fall and AMS. Started this morning. Pt AOx1. CG says significant change since fall. Did not hit head or any other injuries.      Patient presents with wife.  Wife states that prior to arrival she found him on the floor next to his walker.  Wife states that he has had an acute change in his mental status.  She states that he is normally alert and oriented x3 and talks and has conversations with her.  He normally ambulates with a walker.  She states that she woke him up at 6:00 a.m. this morning and that his mental status was normal.  She states now he has not talking appropriately and "limping".  There has been no vomiting.    The history is provided by the patient and a relative.     Review of patient's allergies indicates:  No Known Allergies  Past Medical History:   Diagnosis Date    Cataract     HLD (hyperlipidemia)     Hypertension      Past Surgical History:   Procedure Laterality Date    CHOLECYSTECTOMY      EYE SURGERY Right     cataract removal surgery     Family History   Problem Relation Name Age of Onset    Hypertension Mother Jania Hahn     Heart attack Father      Coronary artery disease Father      No Known Problems Sister x2     Hypertension Brother x3     No Known Problems Daughter x3     No Known Problems Son x2     Cirrhosis Neg Hx       Social History     Tobacco Use    Smoking status: Never    Smokeless tobacco: Never   Substance Use Topics    Alcohol use: Yes    Drug use: Never     Review of Systems   Unable to perform ROS: Mental status change       Physical Exam     Initial Vitals [06/10/24 0749]   BP Pulse Resp Temp SpO2   (!) 157/109 62 16 97.7 °F (36.5 °C) 100 %      MAP       --         Physical Exam    Vitals reviewed.  Constitutional: No distress.   HENT:   Head: Normocephalic and atraumatic.   Patient has a normal facial exam   Eyes: Pupils are equal, " round, and reactive to light.   Cardiovascular:  Normal rate and regular rhythm.           No murmur heard.  Pulmonary/Chest: Breath sounds normal. He has no wheezes.   Abdominal: Abdomen is soft. There is no abdominal tenderness.     Neurological: He is alert.    Patient is displaying dysarthria and has right-sided weakness   Skin: Skin is warm and dry. No rash noted.         ED Course   Critical Care    Date/Time: 6/10/2024 10:00 AM    Performed by: Saran Singer DO  Authorized by: Saran Singer DO  Direct patient critical care time: 20 minutes  Additional history critical care time: 10 minutes  Ordering / reviewing critical care time: 5 minutes  Documentation critical care time: 5 minutes  Consulting other physicians critical care time: 10 minutes  Total critical care time (exclusive of procedural time) : 50 minutes  Critical care time was exclusive of separately billable procedures and treating other patients.  Critical care was necessary to treat or prevent imminent or life-threatening deterioration of the following conditions: CNS failure or compromise.  Critical care was time spent personally by me on the following activities: discussions with consultants, interpretation of cardiac output measurements, evaluation of patient's response to treatment, examination of patient, obtaining history from patient or surrogate, ordering and performing treatments and interventions, ordering and review of laboratory studies, ordering and review of radiographic studies, re-evaluation of patient's condition and review of old charts.        Labs Reviewed   CBC W/ AUTO DIFFERENTIAL - Abnormal; Notable for the following components:       Result Value    RBC 3.52 (*)     Hemoglobin 11.7 (*)     Hematocrit 32.7 (*)     MCH 33.2 (*)     Platelets 147 (*)     Mono # 1.2 (*)     All other components within normal limits   COMPREHENSIVE METABOLIC PANEL - Abnormal; Notable for the following components:    Chloride  114 (*)     CO2 15 (*)     Creatinine 1.5 (*)     Albumin 2.7 (*)     Total Bilirubin 2.5 (*)     Alkaline Phosphatase 186 (*)      (*)      (*)     eGFR 46 (*)     All other components within normal limits   PROTIME-INR - Abnormal; Notable for the following components:    Prothrombin Time 12.9 (*)     All other components within normal limits   TSH - Abnormal; Notable for the following components:    TSH 5.279 (*)     All other components within normal limits   TROPONIN I - Abnormal; Notable for the following components:    Troponin I 0.039 (*)     All other components within normal limits   ISTAT CREATININE - Abnormal; Notable for the following components:    POC Creatinine 1.6 (*)     All other components within normal limits   ISTAT PROCEDURE - Abnormal; Notable for the following components:    POC PTINR 1.3 (*)     All other components within normal limits   LIPID PANEL   B-TYPE NATRIURETIC PEPTIDE   T4, FREE   POCT GLUCOSE, HAND-HELD DEVICE   POCT GLUCOSE     EKG Readings: (Independently Interpreted)   Irregular rhythm, rate of 62, right axis, artifact noted     ECG Results              ECG 12 lead (In process)        Collection Time Result Time QRS Duration OHS QTC Calculation    06/10/24 08:21:53 06/10/24 11:21:19 70 479                     In process by Interface, Lab In [facility] (06/10/24 11:21:25)                   Narrative:    Test Reason : R29.818,    Vent. Rate : 062 BPM     Atrial Rate : 441 BPM     P-R Int : 000 ms          QRS Dur : 070 ms      QT Int : 472 ms       P-R-T Axes : 000 082 052 degrees     QTc Int : 479 ms    Atrial fibrillation with premature ventricular or aberrantly conducted  complexes  Septal infarct ,age undetermined  Abnormal ECG  When compared with ECG of 29-APR-2019 09:32,  Atrial fibrillation has replaced Sinus rhythm    Referred By: [email or web address]   SELF           Confirmed By:                                   Imaging Results              CTA Head and Neck (xpd)  (Final result)  Result time 06/10/24 09:36:55      Final result by Ramos Randle III, MD (06/10/24 09:36:55)                   Impression:      Bilateral proximal ICA stenosis and a mild right M1 segment stenosis.    Calcified pleural plaques.    Left apical pulmonary micronodule.  If there is a moderate-high risk factor for developing lung cancer a 12 month low-dose screening CT is recommended.    No abnormal enhancement seen and no acute process seen.  There is involutional change and small vessel ischemic change.    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      Electronically signed by: Ramos Randle MD  Date:    06/10/2024  Time:    09:36               Narrative:    EXAMINATION:  CTA HEAD AND NECK (XPD)    CLINICAL HISTORY:  Neuro deficit, acute, stroke suspected;    FINDINGS:  Patient was administered 100 cc of Omnipaque 350 intravenously.    There is involutional change small vessel ischemic change.  No acute blood products are seen.  Post-contrast images no abnormal enhancement, vascularity, or blood-brain barrier breakdown is seen.  Arch and great vessels are unremarkable.  Vertebral arteries show nothing unusual.  There is 50% diameter stenosis of the proximal left ICA.  There is 70% diameter stenosis of the proximal right ICA.  Vertebrobasilar system is patent.  The ACAs, MCAs, and PCAs are patent and symmetric including branches.  No thrombus, stenosis, aneurysm, or vascular malformation seen.  There is mild narrowing of right M1 segment.  No soft tissue mass or adenopathy is seen.  There is scar and calcified pleural plaques at the lung apices.  There is a left apical pulmonary micronodule.  There is DJD of the cervical spine with disc osteophyte complex and bilateral areas of neural foraminal narrowing.                                       CT Head Without Contrast (Final result)  Result time 06/10/24 08:55:24      Final result by Ramos Randle III, MD  (06/10/24 08:55:24)                   Impression:      No acute process seen.    Microvascular small vessel ischemic change.      Electronically signed by: Ramos Randle MD  Date:    06/10/2024  Time:    08:55               Narrative:    EXAMINATION:  CT HEAD WITHOUT CONTRAST    CLINICAL HISTORY:  Neuro deficit, acute, stroke suspected;    FINDINGS:  Comparison is 12/05/2023.    There is involutional change and microvascular small vessel ischemic change.  No acute bleed, mass, or mass effect seen.  The brain parenchyma demonstrates no acute stroke changes.  Skull and skull base demonstrate nothing unusual.  No fracture or trauma seen.                                       X-Ray Chest AP Portable (Final result)  Result time 06/10/24 08:38:18      Final result by Lucas Tobin DO (06/10/24 08:38:18)                   Impression:      No acute abnormality.      Electronically signed by: Lucas Tobin  Date:    06/10/2024  Time:    08:38               Narrative:    EXAMINATION:  XR CHEST AP PORTABLE    CLINICAL HISTORY:  fall;    TECHNIQUE:  Single frontal view of the chest was performed.    COMPARISON:  09/26/2023.    FINDINGS:  The lungs are hyperexpanded and clear. No focal opacities are seen. The pleural spaces are clear. The cardiac silhouette is unremarkable.  There are calcifications of the aortic arch.  The visualized osseous structures are intact.                                       X-Ray Pelvis Routine AP (Final result)  Result time 06/10/24 08:38:55      Final result by Lucas Tobin DO (06/10/24 08:38:55)                   Impression:      No acute fracture or dislocation.      Electronically signed by: Lucas Tobin  Date:    06/10/2024  Time:    08:38               Narrative:    EXAMINATION:  XR PELVIS ROUTINE AP    CLINICAL HISTORY:  Unspecified fall, initial encounter    TECHNIQUE:  AP view of the pelvis was performed.    COMPARISON:  None.    FINDINGS:  There is osteopenia.  There is no  evidence of an acute fracture or dislocation.  Alignment is normal.  The femoral heads are well seated in the acetabula.  There are degenerative changes of the partially visualized lower lumbar spine.  There are vascular calcifications.                                       Medications   atorvastatin tablet 20 mg (20 mg Oral Given 6/10/24 1350)   dorzolamide-timolol 2-0.5% ophthalmic solution 1 drop (1 drop Both Eyes Given 6/10/24 2016)   ergocalciferol capsule 50,000 Units (50,000 Units Oral Given 6/10/24 1348)   spironolactone tablet 25 mg (25 mg Oral Given 6/10/24 1348)   sodium chloride 0.9% flush 10 mL (has no administration in time range)   naloxone 0.4 mg/mL injection 0.02 mg (has no administration in time range)   glucose chewable tablet 16 g (has no administration in time range)   glucose chewable tablet 24 g (has no administration in time range)   glucagon (human recombinant) injection 1 mg (has no administration in time range)   acetaminophen tablet 650 mg (has no administration in time range)   dextrose 10% bolus 125 mL 125 mL (has no administration in time range)   dextrose 10% bolus 250 mL 250 mL (has no administration in time range)   sodium chloride 0.9% flush 10 mL (has no administration in time range)   bisacodyL suppository 10 mg (has no administration in time range)   mupirocin 2 % ointment ( Nasal Given 6/10/24 2016)   tenecteplase (TNKase) IV KIT 15 mg (15 mg Intravenous Given 6/10/24 0906)     Followed by   sodium chloride 0.9% flush 10 mL (10 mLs Intravenous Given 6/10/24 0909)   iohexoL (OMNIPAQUE 350) injection 100 mL (100 mLs Intravenous Given 6/10/24 0916)     Medical Decision Making  Amount and/or Complexity of Data Reviewed  Labs: ordered. Decision-making details documented in ED Course.  Radiology: ordered. Decision-making details documented in ED Course.     Details:  CT head without reviewed and shows no acute process, CTA of the head and neck shows no evidence of large vessel  occlusion    Risk  Prescription drug management.  Decision regarding hospitalization.  Risk Details:  Differential diagnosis includes but is not limited to:  Intracranial hemorrhage, CVA, metabolic encephalopathy, electrolyte abnormality, hypoglycemia,  ACS, heart failure               ED Course as of 06/11/24 0659   Mon Ray 10, 2024   0833 CBC W/ AUTO DIFFERENTIAL(!)  Nonspecific findings [CD]   0846 Protime-INR(!)  Nonspecific findings [CD]   0850 X-Ray Pelvis Routine AP  No acute fracture or dislocation [CD]   0850 X-Ray Chest AP Portable  No acute abnormality [CD]   0852 POCT glucose  Within normal limits [CD]   0859 Troponin I(!)  Elevated [CD]   0900 ISTAT CREATININE(!)  Elevated [CD]   0901 ISTAT PROCEDURE(!)  Nonspecific [CD]   0901 Troponin I(!)  Elevated [CD]   0902 [facility] neuro, Dr. Carter evaluated patient spoke about TNKase states family is agreeable and to administer TNKase, obtain CTA of head and neck and as long as there was no large vessel occlusion can admit patient to this facility. [CD]   0903 Comprehensive metabolic panel(!)  Elevated bilirubin and transaminases, this was seen as early as 2 months ago and previous lab work, creatinine of 1.5 [CD]   0947 Dr. Carter recommends admission, MRI, and CUS [CD]   0957 Spoke to Dr. Craft from [facility], CVA with consult from vascular neurology resulting in thrombolytic administration, clinically patient appears better.  He accepts admission. [CD]      ED Course User Index  [CD] Saran Singer, DO                           Clinical Impression:  Final diagnoses:  [R29.818] Acute focal neurological deficit (Primary)  [W19.XXXA] Fall          ED Disposition Condition    Admit Stable                Saran Singer, DO  06/11/24 0657       Saran Singer, DO  06/11/24 0659

## 2024-06-10 NOTE — ASSESSMENT & PLAN NOTE
Chronic, controlled. Latest blood pressure and vitals reviewed-     Temp:  [97.5 °F (36.4 °C)-98.6 °F (37 °C)]   Pulse:  [56-65]   Resp:  [10-20]   BP: (108-161)/()   SpO2:  [100 %] .   Home meds for hypertension were reviewed and noted below.   Hypertension Medications               metoprolol succinate (TOPROL-XL) 25 MG 24 hr tablet TAKE 1 TABLET BY MOUTH EVERY DAY IN THE EVENING    olmesartan (BENICAR) 20 MG tablet Take 1 tablet (20 mg total) by mouth once daily.    spironolactone (ALDACTONE) 25 MG tablet Take 1 tablet (25 mg total) by mouth once daily.            While in the hospital, will manage blood pressure as follows; Adjust home antihypertensive regimen as follows- hold for BP     Will utilize p.r.n. blood pressure medication only if patient's blood pressure greater than 180/110 and he develops symptoms such as worsening chest pain or shortness of breath.

## 2024-06-10 NOTE — H&P
[facility]  History & Physical    Patient Name: Dean Hahn  MRN: [id number]  Patient Class: IP- Inpatient  Admission Date: 6/10/2024  Attending Physician: Gómez Vee MD   Primary Care Provider: Angel Luis Tobias III, MD         Patient information was obtained from ER records.     Subjective:     Principal Problem:<principal problem not specified>    Chief Complaint:   Chief Complaint   Patient presents with    Fall    Altered Mental Status     Pt w/ fall and AMS. Started this morning. Pt AOx1. CG says significant change since fall. Did not hit head or any other injuries.         HPI: 82 YO M with PMHx significant for HTN, dementia, HLD, carotid stenosis B/L CKD, was brought to the ER for new onset speech difficulty, right sided weakness, change in mental status. When seen in the ICU, patient was confused, alert, oriented to self and keep repeating the same thing. He was not able to give any history. 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 He fell and was found on the ground. His family found him on the floor at his house hours after fall, with dysphasia, weakness with limping, and confusion. EMS called and brought to the ER. Code stroke was called. He had CT head no acute process. Microvascular small vessel ischemic change. Labs in the ER with elevated LFT, elevated creatinine, elevated TSH with normal free T, elevated troponin. He was given TPA and post TPA transfer to ICU. In the unit he had another recurrent episode of worsening confusion, repeat CT head without acute evidence of acute process.     Past Medical History:   Diagnosis Date    Cataract     HLD (hyperlipidemia)     Hypertension        Past Surgical History:   Procedure Laterality Date    CHOLECYSTECTOMY      EYE SURGERY Right     cataract removal surgery       Review of patient's allergies indicates:  No Known Allergies    No current  facility-administered medications on file prior to encounter.     Current Outpatient Medications on File Prior to Encounter   Medication Sig    aspirin 81 MG Chew Take 1 tablet (81 mg total) by mouth once daily.    atorvastatin (LIPITOR) 20 MG tablet Take 1 tablet (20 mg total) by mouth once daily.    ciclopirox (PENLAC) 8 % Soln Apply topically nightly.    cyanocobalamin (VITAMIN B-12) 100 MCG tablet Take 1 tablet (100 mcg total) by mouth once daily.    donepeziL (ARICEPT) 5 MG tablet Take 1 tablet (5 mg total) by mouth every evening.    dorzolamide-timolol 2-0.5% (COSOPT) 22.3-6.8 mg/mL ophthalmic solution Place 1 drop into both eyes 2 (two) times daily.    ergocalciferol (ERGOCALCIFEROL) 50,000 unit Cap Take 1 capsule (50,000 Units total) by mouth every 7 days.    lactulose (CHRONULAC) 20 gram/30 mL Soln Take 15 mLs (10 g total) by mouth once daily.    metoprolol succinate (TOPROL-XL) 25 MG 24 hr tablet TAKE 1 TABLET BY MOUTH EVERY DAY IN THE EVENING    multivitamin capsule Take 1 capsule by mouth once daily.    olmesartan (BENICAR) 20 MG tablet Take 1 tablet (20 mg total) by mouth once daily.    omega-3 fatty acids/fish oil (FISH OIL-OMEGA-3 FATTY ACIDS) 300-1,000 mg capsule Take 1 capsule by mouth once daily.    potassium chloride (KLOR-CON 10) 10 MEQ TbSR TAKE 1 TABLET BY MOUTH DAILY EXCEPT TAKE 2 TABS ON MON, WED AND FRIDAY    spironolactone (ALDACTONE) 25 MG tablet Take 1 tablet (25 mg total) by mouth once daily.     Family History       Problem Relation (Age of Onset)    Coronary artery disease Father    Heart attack Father    Hypertension Mother, Brother    No Known Problems Sister, Daughter, Son          Tobacco Use    Smoking status: Never    Smokeless tobacco: Never   Substance and Sexual Activity    Alcohol use: Yes    Drug use: Never    Sexual activity: Not Currently     Partners: Female     Birth control/protection: None     Comment: wife     Review of Systems   Unable to perform ROS: Mental status  change   Respiratory:  Negative for shortness of breath.    Cardiovascular:  Negative for chest pain.   Neurological:  Positive for speech difficulty and weakness.   Psychiatric/Behavioral:  Negative for agitation and confusion.      Objective:     Vital Signs (Most Recent):  Temp: 98.6 °F (37 °C) (06/10/24 1215)  Pulse: 63 (06/10/24 1230)  Resp: 20 (06/10/24 1230)  BP: 123/73 (06/10/24 1230)  SpO2: 100 % (06/10/24 1230) Vital Signs (24h Range):  Temp:  [97.5 °F (36.4 °C)-98.6 °F (37 °C)] 98.6 °F (37 °C)  Pulse:  [56-65] 63  Resp:  [10-20] 20  SpO2:  [100 %] 100 %  BP: (108-161)/() 123/73     Weight: 59 kg (130 lb)  Body mass index is 20.36 kg/m².     Physical Exam  Vitals reviewed.   Constitutional:       Appearance: He is ill-appearing. He is not toxic-appearing.   HENT:      Head: Normocephalic.      Nose: No congestion.   Cardiovascular:      Rate and Rhythm: Normal rate.   Pulmonary:      Effort: Pulmonary effort is normal. No respiratory distress.      Breath sounds: No wheezing or rales.   Chest:      Chest wall: No tenderness.   Abdominal:      General: Bowel sounds are normal. There is no distension.      Tenderness: There is no abdominal tenderness. There is no guarding or rebound.   Musculoskeletal:      Cervical back: No rigidity or tenderness.      Right lower leg: No edema.      Left lower leg: No edema.   Neurological:      Mental Status: He is alert. He is disoriented.      Cranial Nerves: Cranial nerve deficit present.      Motor: Weakness present.      Gait: Gait abnormal.                Significant Labs: All pertinent labs within the past 24 hours have been reviewed.  BMP:   Recent Labs   Lab 06/10/24  0821   GLU 85      K 4.6   *   CO2 15*   BUN 23   CREATININE 1.5*   CALCIUM 9.7     CBC:   Recent Labs   Lab 06/10/24  0821   WBC 8.99   HGB 11.7*   HCT 32.7*   *     CMP:   Recent Labs   Lab 06/10/24  0821      K 4.6   *   CO2 15*   GLU 85   BUN 23   CREATININE  "1.5*   CALCIUM 9.7   PROT 7.6   ALBUMIN 2.7*   BILITOT 2.5*   ALKPHOS 186*   *   *   ANIONGAP 9     Cardiac Markers:   Recent Labs   Lab 06/10/24  0821   BNP 27     Coagulation:   Recent Labs   Lab 06/10/24  0847   INR 1.3*     Lactic Acid: No results for input(s): "LACTATE" in the last 48 hours.  Lipase: No results for input(s): "LIPASE" in the last 48 hours.  Lipid Panel:   Recent Labs   Lab 06/10/24  0821   CHOL 136   HDL 41   LDLCALC 77.4   TRIG 88   CHOLHDL 30.1     Magnesium: No results for input(s): "MG" in the last 48 hours.  POCT Glucose:   Recent Labs   Lab 06/10/24  0820   POCTGLUCOSE 92     Prealbumin: No results for input(s): "PREALBUMIN" in the last 48 hours.  Respiratory Culture: No results for input(s): "GSRESP", "RESPIRATORYC" in the last 48 hours.  Troponin:   Recent Labs   Lab 06/10/24  0821   TROPONINI 0.039*     TSH:   Recent Labs   Lab 06/10/24  0821   TSH 5.279*     Urine Culture: No results for input(s): "LABURIN" in the last 48 hours.  Urine Studies:   Recent Labs   Lab 06/10/24  1232   COLORU Yellow   APPEARANCEUA Clear   PHUR 6.0   SPECGRAV >1.030*   PROTEINUA Trace*   GLUCUA Negative   KETONESU Negative   BILIRUBINUA Negative   OCCULTUA 1+*   NITRITE Negative   UROBILINOGEN Negative   LEUKOCYTESUR Negative   RBCUA 1   WBCUA 1   SQUAMEPITHEL 0     Recent Lab Results  (Last 5 results in the past 24 hours)        06/10/24  1232   06/10/24  0848   06/10/24  0847   06/10/24  0821   06/10/24  0820        Albumin       2.7         ALP       186         Allens Test   N/A   N/A           ALT       139         Anion Gap       9         Appearance, UA Clear               AST       177  Comment: Specimen slightly hemolyzed         Baso #       0.05         Basophil %       0.6         Bilirubin (UA) Negative               BILIRUBIN TOTAL       2.5  Comment: For infants and newborns, interpretation of results should be based  on gestational age, weight and in agreement with " clinical  observations.    Premature Infant recommended reference ranges:  Up to 24 hours.............<8.0 mg/dL  Up to 48 hours............<12.0 mg/dL  3-5 days..................<15.0 mg/dL  6-29 days.................<15.0 mg/dL           BNP       27  Comment: Values of less than 100 pg/ml are consistent with non-CHF populations.         Site   [facility]           BUN       23         Calcium       9.7         Chloride       114         Cholesterol Total       136  Comment: The National Cholesterol Education Program (NCEP) has set the  following guidelines (reference ranges) for Cholesterol:  Optimal.....................<200 mg/dL  Borderline High.............200-239 mg/dL  High........................> or = 240 mg/dL           CO2       15         Color, UA Yellow               Creatinine       1.5         DelSys   Room Air   Room Air           Differential Method       Automated         eGFR       46         Eos #       0.4         Eos %       4.8         Free T4       1.10         Glucose       85         Glucose, UA Negative               Gran # (ANC)       4.5         Gran %       49.9         HDL       41  Comment: The National Cholesterol Education Program (NCEP) has set the  following guidelines (reference values) for HDL Cholesterol:  Low...............<40 mg/dL  Optimal...........>60 mg/dL           HDL/Cholesterol Ratio       30.1         Hematocrit       32.7         Hemoglobin       11.7         Immature Grans (Abs)       0.02  Comment: Mild elevation in immature granulocytes is non specific and   can be seen in a variety of conditions including stress response,   acute inflammation, trauma and pregnancy. Correlation with other   laboratory and clinical findings is essential.           Immature Granulocytes       0.2         INR       1.2  Comment: Coumadin Therapy:  2.0 - 3.0 for INR for all indicators except mechanical heart valves  and antiphospholipid syndromes which should use 2.5 -  3.5.  LOT^040^PT Inn^[id number]           Ketones, UA Negative               LDL Cholesterol       77.4  Comment: The National Cholesterol Education Program (NCEP) has set the  following guidelines (reference values) for LDL Cholesterol:  Optimal.......................<130 mg/dL  Borderline High...............130-159 mg/dL  High..........................160-189 mg/dL  Very High.....................>190 mg/dL           Leukocyte Esterase, UA Negative               Lymph #       2.9         Lymph %       31.7         MCH       33.2         MCHC       35.8         MCV       93         Microscopic Comment SEE COMMENT  Comment: Other formed elements not mentioned in the report are not   present in the microscopic examination.                  Mono #       1.2         Mono %       12.8         MPV       9.4         NITRITE UA Negative               Non-HDL Cholesterol       95  Comment: Risk category and Non-HDL cholesterol goals:  Coronary heart disease (CHD)or equivalent (10-year risk of CHD >20%):  Non-HDL cholesterol goal     <130 mg/dL  Two or more CHD risk factors and 10-year risk of CHD <= 20%:  Non-HDL cholesterol goal     <160 mg/dL  0 to 1 CHD risk factor:  Non-HDL cholesterol goal     <190 mg/dL           nRBC       0         Blood, UA 1+               pH, UA 6.0               Platelet Count       147         POC Creatinine   1.6             POC PTINR     1.3           POCT Glucose         92       Potassium       4.6         PROTEIN TOTAL       7.6         Protein, UA Trace  Comment: Recommend a 24 hour urine protein or a urine   protein/creatinine ratio if globulin induced proteinuria is  clinically suspected.                 PT       12.9         RBC       3.52         RBC, UA 1               RDW       13.3         Sample   VENOUS   VENOUS           Sodium       138         Spec Grav UA >1.030               Specimen UA Urine, Catheterized               Squam Epithel, UA 0               Total Cholesterol/HDL  Ratio       3.3         Triglycerides       88  Comment: The National Cholesterol Education Program (NCEP) has set the  following guidelines (reference values) for triglycerides:  Normal......................<150 mg/dL  Borderline High.............150-199 mg/dL  High........................200-499 mg/dL           Troponin I       0.039  Comment: The reference interval for Troponin I represents the 99th percentile   cutoff   for our facility and is consistent with 3rd generation assay   performance.           TSH       5.279         UROBILINOGEN UA Negative               WBC, UA 1               WBC       8.99                                Significant Imaging: I have reviewed all pertinent imaging results/findings within the past 24 hours.  Assessment/Plan:     Stroke determined by clinical assessment    Antithrombotics for secondary stroke prevention: Antiplatelets: Clopidogrel: 75 mg daily    Statins for secondary stroke prevention and hyperlipidemia, if present:   Statins: Atorvastatin- 40 mg daily    Aggressive risk factor modification: HTN, Diet, Exercise, CAD     Rehab efforts: The patient has been evaluated by a stroke team provider and the therapy needs have been fully considered based off the presenting complaints and exam findings. The following therapy evaluations are needed: PT evaluate and treat, OT evaluate and treat, SLP evaluate and treat, PM&R evaluate for appropriate placement    Diagnostics ordered/pending: CTA Head to assess vasculature , CTA Neck/Arch to assess vasculature, HgbA1C to assess blood glucose levels, Lipid Profile to assess cholesterol levels, MRI head without contrast to assess brain parenchyma, TSH to assess thyroid function    VTE prophylaxis: None: Reason for No Pharmacological VTE Prophylaxis: S/P TpA    BP parameters: carotid stenosis: defer to neuro surgery        Thrombocytopenia  Patient was found to have thrombocytopenia, the likely etiology is secondary to cirrhosis/portal  hypertension, will monitor the platelets Daily. Will transfuse if platelet count is <100k (if undergoing neurosurgery). Hold DVT prophylaxis if platelets are <50k. The patient's platelet results have been reviewed and are listed below.  Recent Labs   Lab 06/10/24  0821   *         Stage 3a chronic kidney disease  Creatine stable for now. BMP reviewed- noted Estimated Creatinine Clearance: 32.2 mL/min (A) (based on SCr of 1.5 mg/dL (H)). according to latest data. Based on current GFR, CKD stage is stage 3 - GFR 30-59.  Monitor UOP and serial BMP and adjust therapy as needed. Renally dose meds. Avoid nephrotoxic medications and procedures.    Bilateral carotid artery disease  Defer to neuro surgery      LFT elevation  Will get acute hepatitis panel and monitor      Pure hypercholesterolemia  Cont statin      Essential hypertension  Chronic, controlled. Latest blood pressure and vitals reviewed-     Temp:  [97.5 °F (36.4 °C)-98.6 °F (37 °C)]   Pulse:  [56-65]   Resp:  [10-20]   BP: (108-161)/()   SpO2:  [100 %] .   Home meds for hypertension were reviewed and noted below.   Hypertension Medications               metoprolol succinate (TOPROL-XL) 25 MG 24 hr tablet TAKE 1 TABLET BY MOUTH EVERY DAY IN THE EVENING    olmesartan (BENICAR) 20 MG tablet Take 1 tablet (20 mg total) by mouth once daily.    spironolactone (ALDACTONE) 25 MG tablet Take 1 tablet (25 mg total) by mouth once daily.            While in the hospital, will manage blood pressure as follows; Adjust home antihypertensive regimen as follows- hold for BP     Will utilize p.r.n. blood pressure medication only if patient's blood pressure greater than 180/110 and he develops symptoms such as worsening chest pain or shortness of breath.      VTE Risk Mitigation (From admission, onward)           Ordered     IP VTE HIGH RISK PATIENT  Once         06/10/24 1233     Place sequential compression device  Until discontinued         06/10/24 1233      Reason for No Pharmacological VTE Prophylaxis  Once        Question:  Reasons:  Answer:  Risk of Bleeding    06/10/24 1233                  Critical care time spent on the evaluation and treatment of severe organ dysfunction, review of pertinent labs and imaging studies, discussions with consulting providers and discussions with patient/family: >45 minutes.                  Gómez Vee MD  Department of Hospital Medicine  [facility]

## 2024-06-10 NOTE — SUBJECTIVE & OBJECTIVE
Past Medical History:   Diagnosis Date    Cataract     HLD (hyperlipidemia)     Hypertension        Past Surgical History:   Procedure Laterality Date    CHOLECYSTECTOMY      EYE SURGERY Right     cataract removal surgery       Review of patient's allergies indicates:  No Known Allergies    No current facility-administered medications on file prior to encounter.     Current Outpatient Medications on File Prior to Encounter   Medication Sig    aspirin 81 MG Chew Take 1 tablet (81 mg total) by mouth once daily.    atorvastatin (LIPITOR) 20 MG tablet Take 1 tablet (20 mg total) by mouth once daily.    ciclopirox (PENLAC) 8 % Soln Apply topically nightly.    cyanocobalamin (VITAMIN B-12) 100 MCG tablet Take 1 tablet (100 mcg total) by mouth once daily.    donepeziL (ARICEPT) 5 MG tablet Take 1 tablet (5 mg total) by mouth every evening.    dorzolamide-timolol 2-0.5% (COSOPT) 22.3-6.8 mg/mL ophthalmic solution Place 1 drop into both eyes 2 (two) times daily.    ergocalciferol (ERGOCALCIFEROL) 50,000 unit Cap Take 1 capsule (50,000 Units total) by mouth every 7 days.    lactulose (CHRONULAC) 20 gram/30 mL Soln Take 15 mLs (10 g total) by mouth once daily.    metoprolol succinate (TOPROL-XL) 25 MG 24 hr tablet TAKE 1 TABLET BY MOUTH EVERY DAY IN THE EVENING    multivitamin capsule Take 1 capsule by mouth once daily.    olmesartan (BENICAR) 20 MG tablet Take 1 tablet (20 mg total) by mouth once daily.    omega-3 fatty acids/fish oil (FISH OIL-OMEGA-3 FATTY ACIDS) 300-1,000 mg capsule Take 1 capsule by mouth once daily.    potassium chloride (KLOR-CON 10) 10 MEQ TbSR TAKE 1 TABLET BY MOUTH DAILY EXCEPT TAKE 2 TABS ON MON, WED AND FRIDAY    spironolactone (ALDACTONE) 25 MG tablet Take 1 tablet (25 mg total) by mouth once daily.     Family History       Problem Relation (Age of Onset)    Coronary artery disease Father    Heart attack Father    Hypertension Mother, Brother    No Known Problems Sister, Daughter, Son           Tobacco Use    Smoking status: Never    Smokeless tobacco: Never   Substance and Sexual Activity    Alcohol use: Yes    Drug use: Never    Sexual activity: Not Currently     Partners: Female     Birth control/protection: None     Comment: wife     Review of Systems   Unable to perform ROS: Mental status change   Respiratory:  Negative for shortness of breath.    Cardiovascular:  Negative for chest pain.   Neurological:  Positive for speech difficulty and weakness.   Psychiatric/Behavioral:  Negative for agitation and confusion.      Objective:     Vital Signs (Most Recent):  Temp: 98.6 °F (37 °C) (06/10/24 1215)  Pulse: 63 (06/10/24 1230)  Resp: 20 (06/10/24 1230)  BP: 123/73 (06/10/24 1230)  SpO2: 100 % (06/10/24 1230) Vital Signs (24h Range):  Temp:  [97.5 °F (36.4 °C)-98.6 °F (37 °C)] 98.6 °F (37 °C)  Pulse:  [56-65] 63  Resp:  [10-20] 20  SpO2:  [100 %] 100 %  BP: (108-161)/() 123/73     Weight: 59 kg (130 lb)  Body mass index is 20.36 kg/m².     Physical Exam  Vitals reviewed.   Constitutional:       Appearance: He is ill-appearing. He is not toxic-appearing.   HENT:      Head: Normocephalic.      Nose: No congestion.   Cardiovascular:      Rate and Rhythm: Normal rate.   Pulmonary:      Effort: Pulmonary effort is normal. No respiratory distress.      Breath sounds: No wheezing or rales.   Chest:      Chest wall: No tenderness.   Abdominal:      General: Bowel sounds are normal. There is no distension.      Tenderness: There is no abdominal tenderness. There is no guarding or rebound.   Musculoskeletal:      Cervical back: No rigidity or tenderness.      Right lower leg: No edema.      Left lower leg: No edema.   Neurological:      Mental Status: He is alert. He is disoriented.      Cranial Nerves: Cranial nerve deficit present.      Motor: Weakness present.      Gait: Gait abnormal.                Significant Labs: All pertinent labs within the past 24 hours have been reviewed.  BMP:   Recent Labs  "  Lab 06/10/24  0821   GLU 85      K 4.6   *   CO2 15*   BUN 23   CREATININE 1.5*   CALCIUM 9.7     CBC:   Recent Labs   Lab 06/10/24  0821   WBC 8.99   HGB 11.7*   HCT 32.7*   *     CMP:   Recent Labs   Lab 06/10/24  0821      K 4.6   *   CO2 15*   GLU 85   BUN 23   CREATININE 1.5*   CALCIUM 9.7   PROT 7.6   ALBUMIN 2.7*   BILITOT 2.5*   ALKPHOS 186*   *   *   ANIONGAP 9     Cardiac Markers:   Recent Labs   Lab 06/10/24  0821   BNP 27     Coagulation:   Recent Labs   Lab 06/10/24  0847   INR 1.3*     Lactic Acid: No results for input(s): "LACTATE" in the last 48 hours.  Lipase: No results for input(s): "LIPASE" in the last 48 hours.  Lipid Panel:   Recent Labs   Lab 06/10/24  0821   CHOL 136   HDL 41   LDLCALC 77.4   TRIG 88   CHOLHDL 30.1     Magnesium: No results for input(s): "MG" in the last 48 hours.  POCT Glucose:   Recent Labs   Lab 06/10/24  0820   POCTGLUCOSE 92     Prealbumin: No results for input(s): "PREALBUMIN" in the last 48 hours.  Respiratory Culture: No results for input(s): "GSRESP", "RESPIRATORYC" in the last 48 hours.  Troponin:   Recent Labs   Lab 06/10/24  0821   TROPONINI 0.039*     TSH:   Recent Labs   Lab 06/10/24  0821   TSH 5.279*     Urine Culture: No results for input(s): "LABURIN" in the last 48 hours.  Urine Studies:   Recent Labs   Lab 06/10/24  1232   COLORU Yellow   APPEARANCEUA Clear   PHUR 6.0   SPECGRAV >1.030*   PROTEINUA Trace*   GLUCUA Negative   KETONESU Negative   BILIRUBINUA Negative   OCCULTUA 1+*   NITRITE Negative   UROBILINOGEN Negative   LEUKOCYTESUR Negative   RBCUA 1   WBCUA 1   SQUAMEPITHEL 0     Recent Lab Results  (Last 5 results in the past 24 hours)        06/10/24  1232   06/10/24  0848   06/10/24  0847   06/10/24  0821   06/10/24  0820        Albumin       2.7         ALP       186         Allens Test   N/A   N/A           ALT       139         Anion Gap       9         Appearance, UA Clear               AST       " 177  Comment: Specimen slightly hemolyzed         Baso #       0.05         Basophil %       0.6         Bilirubin (UA) Negative               BILIRUBIN TOTAL       2.5  Comment: For infants and newborns, interpretation of results should be based  on gestational age, weight and in agreement with clinical  observations.    Premature Infant recommended reference ranges:  Up to 24 hours.............<8.0 mg/dL  Up to 48 hours............<12.0 mg/dL  3-5 days..................<15.0 mg/dL  6-29 days.................<15.0 mg/dL           BNP       27  Comment: Values of less than 100 pg/ml are consistent with non-CHF populations.         Site   [facility]           BUN       23         Calcium       9.7         Chloride       114         Cholesterol Total       136  Comment: The National Cholesterol Education Program (NCEP) has set the  following guidelines (reference ranges) for Cholesterol:  Optimal.....................<200 mg/dL  Borderline High.............200-239 mg/dL  High........................> or = 240 mg/dL           CO2       15         Color, UA Yellow               Creatinine       1.5         DelSys   Room Air   Room Air           Differential Method       Automated         eGFR       46         Eos #       0.4         Eos %       4.8         Free T4       1.10         Glucose       85         Glucose, UA Negative               Gran # (ANC)       4.5         Gran %       49.9         HDL       41  Comment: The National Cholesterol Education Program (NCEP) has set the  following guidelines (reference values) for HDL Cholesterol:  Low...............<40 mg/dL  Optimal...........>60 mg/dL           HDL/Cholesterol Ratio       30.1         Hematocrit       32.7         Hemoglobin       11.7         Immature Grans (Abs)       0.02  Comment: Mild elevation in immature granulocytes is non specific and   can be seen in a variety of conditions including stress response,   acute inflammation, trauma and  pregnancy. Correlation with other   laboratory and clinical findings is essential.           Immature Granulocytes       0.2         INR       1.2  Comment: Coumadin Therapy:  2.0 - 3.0 for INR for all indicators except mechanical heart valves  and antiphospholipid syndromes which should use 2.5 - 3.5.  LOT^040^PT Inn^[id number]           Ketones, UA Negative               LDL Cholesterol       77.4  Comment: The National Cholesterol Education Program (NCEP) has set the  following guidelines (reference values) for LDL Cholesterol:  Optimal.......................<130 mg/dL  Borderline High...............130-159 mg/dL  High..........................160-189 mg/dL  Very High.....................>190 mg/dL           Leukocyte Esterase, UA Negative               Lymph #       2.9         Lymph %       31.7         MCH       33.2         MCHC       35.8         MCV       93         Microscopic Comment SEE COMMENT  Comment: Other formed elements not mentioned in the report are not   present in the microscopic examination.                  Mono #       1.2         Mono %       12.8         MPV       9.4         NITRITE UA Negative               Non-HDL Cholesterol       95  Comment: Risk category and Non-HDL cholesterol goals:  Coronary heart disease (CHD)or equivalent (10-year risk of CHD >20%):  Non-HDL cholesterol goal     <130 mg/dL  Two or more CHD risk factors and 10-year risk of CHD <= 20%:  Non-HDL cholesterol goal     <160 mg/dL  0 to 1 CHD risk factor:  Non-HDL cholesterol goal     <190 mg/dL           nRBC       0         Blood, UA 1+               pH, UA 6.0               Platelet Count       147         POC Creatinine   1.6             POC PTINR     1.3           POCT Glucose         92       Potassium       4.6         PROTEIN TOTAL       7.6         Protein, UA Trace  Comment: Recommend a 24 hour urine protein or a urine   protein/creatinine ratio if globulin induced proteinuria is  clinically suspected.                  PT       12.9         RBC       3.52         RBC, UA 1               RDW       13.3         Sample   VENOUS   VENOUS           Sodium       138         Spec Grav UA >1.030               Specimen UA Urine, Catheterized               Squam Epithel, UA 0               Total Cholesterol/HDL Ratio       3.3         Triglycerides       88  Comment: The National Cholesterol Education Program (NCEP) has set the  following guidelines (reference values) for triglycerides:  Normal......................<150 mg/dL  Borderline High.............150-199 mg/dL  High........................200-499 mg/dL           Troponin I       0.039  Comment: The reference interval for Troponin I represents the 99th percentile   cutoff   for our facility and is consistent with 3rd generation assay   performance.           TSH       5.279         UROBILINOGEN UA Negative               WBC, UA 1               WBC       8.99                                Significant Imaging: I have reviewed all pertinent imaging results/findings within the past 24 hours.

## 2024-06-10 NOTE — SUBJECTIVE & OBJECTIVE
HPI:  83 y.o. male with PMHx significant for HTN, dementia, prior strokes, CKD, prediabetes, liver disease presenting with confusion, speech difficulties, dysarthria, gait instability. He woke up around 0600 this morning and was seen as normal per family. Later found on the ground after a fall. Family does not think he hit his head. They noticed he wasn't speaking normally.      He is on aspirin 81mg. No AC medications.     Vitals:    06/10/24 0749   BP: (!) 157/109   Pulse: 62   Resp: 16   Temp: 97.7 °F (36.5 °C)     BG 92    Images personally reviewed and interpreted:  Study: Head CT  Study Interpretation: No acute intracranial hemorrhage or evidence of large territory ischemia.      Assessment and plan:  # Stroke determined by clinical assessment  -- Symptoms of dysarthria, aphasia, right-arm weakness. Given disabling symptoms and no contraindications to treatment, recommend IV TNK at this time. VAN+ exam. CTA head/neck pending treatment with IV lytics. If no LVO or high-grade stenosis, patient can remain at [facility] for close neuro-monitoring.     Lytics recommendation: Recommend IV Tenecteplase 0.25mg/kg IV push (max dose 25mg); If Tenecteplase is not available use Alteplase 0.9mg/kg IV bolus followed by infusion (max dose 90mg)     Additional Recommendations:   Neurological assessment and vital signs (except temperature) every 15 minutes x 2 hours, then every 30 minutes x 6 hours, then every hour x 16 hours..  Frequency of BP assessments may need to be increased if systolic BP stays >= 180 mm Hg or diastolic BP stays >= 105 mm Hg. Administer antihypertensive meds as ordered  Temperature every 4 hours or as required.  Follow hospital protocol for further orders re: post thrombolytic therapy patient management.  No antithrombotics or anticoagulants (including but not limited to: heparin, warfarin, aspirin, clopidigrel, or dipyridamole) for 24 hours, then start antithrombotics as ordered by treating  physician    Adapted from the American Heart Association/American Stroke Association (AHA/ASA) and American Association of Neuroscience Nurses (AANN) Guidelines.   Thrombectomy recommendation: Awaiting CTA results from [facility] for determination   Placement recommendation: pending further studies

## 2024-06-10 NOTE — TELEPHONE ENCOUNTER
Spoke with patients wife, trying to determine if she was requesting a visit, or just HH and a wheelchair. Wife states he is in the ER right now. She thinks he has had a stroke. I told her we will touch base after he is discharged. Verbalized understanding.

## 2024-06-11 LAB
ALBUMIN SERPL BCP-MCNC: 2.5 G/DL (ref 3.5–5.2)
ALP SERPL-CCNC: 169 U/L (ref 55–135)
ALT SERPL W/O P-5'-P-CCNC: 119 U/L (ref 10–44)
ANION GAP SERPL CALC-SCNC: 7 MMOL/L (ref 8–16)
ANION GAP SERPL CALC-SCNC: 8 MMOL/L (ref 8–16)
AST SERPL-CCNC: 151 U/L (ref 10–40)
BASOPHILS # BLD AUTO: 0.06 K/UL (ref 0–0.2)
BASOPHILS NFR BLD: 0.7 % (ref 0–1.9)
BILIRUB SERPL-MCNC: 2.3 MG/DL (ref 0.1–1)
BUN SERPL-MCNC: 28 MG/DL (ref 8–23)
BUN SERPL-MCNC: 29 MG/DL (ref 8–23)
CALCIUM SERPL-MCNC: 9.3 MG/DL (ref 8.7–10.5)
CALCIUM SERPL-MCNC: 9.4 MG/DL (ref 8.7–10.5)
CHLORIDE SERPL-SCNC: 115 MMOL/L (ref 95–110)
CHLORIDE SERPL-SCNC: 119 MMOL/L (ref 95–110)
CO2 SERPL-SCNC: 13 MMOL/L (ref 23–29)
CO2 SERPL-SCNC: 14 MMOL/L (ref 23–29)
CREAT SERPL-MCNC: 1.6 MG/DL (ref 0.5–1.4)
CREAT SERPL-MCNC: 1.6 MG/DL (ref 0.5–1.4)
DIFFERENTIAL METHOD BLD: ABNORMAL
EOSINOPHIL # BLD AUTO: 0.2 K/UL (ref 0–0.5)
EOSINOPHIL NFR BLD: 2.7 % (ref 0–8)
ERYTHROCYTE [DISTWIDTH] IN BLOOD BY AUTOMATED COUNT: 13.1 % (ref 11.5–14.5)
ERYTHROCYTE [DISTWIDTH] IN BLOOD BY AUTOMATED COUNT: 13.1 % (ref 11.5–14.5)
EST. GFR  (NO RACE VARIABLE): 43 ML/MIN/1.73 M^2
EST. GFR  (NO RACE VARIABLE): 43 ML/MIN/1.73 M^2
FIO2: 21 %
GLUCOSE SERPL-MCNC: 97 MG/DL (ref 70–110)
GLUCOSE SERPL-MCNC: 99 MG/DL (ref 70–110)
HAV IGM SERPL QL IA: NORMAL
HBV CORE IGM SERPL QL IA: NORMAL
HBV SURFACE AG SERPL QL IA: NORMAL
HCT VFR BLD AUTO: 31.3 % (ref 40–54)
HCT VFR BLD AUTO: 31.3 % (ref 40–54)
HCT VFR BLD CALC: 34.2 % (ref 36–54)
HCV AB SERPL QL IA: NORMAL
HGB BLD-MCNC: 10.1 G/DL (ref 14–18)
HGB BLD-MCNC: 10.1 G/DL (ref 14–18)
HGB BLD-MCNC: 11.2 G/DL (ref 9–18)
IMM GRANULOCYTES # BLD AUTO: 0.03 K/UL (ref 0–0.04)
IMM GRANULOCYTES NFR BLD AUTO: 0.4 % (ref 0–0.5)
LACTATE SERPL-SCNC: 1.9 MMOL/L (ref 0.5–2.2)
LYMPHOCYTES # BLD AUTO: 2.2 K/UL (ref 1–4.8)
LYMPHOCYTES NFR BLD: 26.3 % (ref 18–48)
MAGNESIUM SERPL-MCNC: 1.8 MG/DL (ref 1.6–2.6)
MCH RBC QN AUTO: 32.9 PG (ref 27–31)
MCH RBC QN AUTO: 32.9 PG (ref 27–31)
MCHC RBC AUTO-ENTMCNC: 32.3 G/DL (ref 32–36)
MCHC RBC AUTO-ENTMCNC: 32.3 G/DL (ref 32–36)
MCV RBC AUTO: 102 FL (ref 82–98)
MCV RBC AUTO: 102 FL (ref 82–98)
MONOCYTES # BLD AUTO: 1.2 K/UL (ref 0.3–1)
MONOCYTES NFR BLD: 13.7 % (ref 4–15)
NEUTROPHILS # BLD AUTO: 4.7 K/UL (ref 1.8–7.7)
NEUTROPHILS NFR BLD: 56.2 % (ref 38–73)
NRBC BLD-RTO: 0 /100 WBC
PCO2 BLDA: 23.8 MMHG (ref 35–45)
PH SMN: 7.46 [PH] (ref 7.35–7.45)
PHOSPHATE SERPL-MCNC: 3.5 MG/DL (ref 2.7–4.5)
PLATELET # BLD AUTO: 128 K/UL (ref 150–450)
PLATELET # BLD AUTO: 128 K/UL (ref 150–450)
PMV BLD AUTO: 9.2 FL (ref 9.2–12.9)
PMV BLD AUTO: 9.2 FL (ref 9.2–12.9)
PO2 BLDA: 46.7 MMHG (ref 40–60)
POC BASE DEFICIT: -5.3 MMOL/L (ref -2–2)
POC HCO3: 17 MMOL/L (ref 24–28)
POC IONIZED CALCIUM: 1.33 MMOL/L (ref 1.06–1.42)
POC PERFORMED BY: ABNORMAL
POC SATURATED O2: 84.4 % (ref 95–100)
POCT GLUCOSE: 104 MG/DL (ref 70–110)
POCT GLUCOSE: 115 MG/DL (ref 70–110)
POCT GLUCOSE: 80 MG/DL (ref 70–110)
POCT GLUCOSE: 95 MG/DL (ref 70–110)
POTASSIUM BLD-SCNC: 4.1 MMOL/L (ref 3.5–5.1)
POTASSIUM SERPL-SCNC: 4.1 MMOL/L (ref 3.5–5.1)
POTASSIUM SERPL-SCNC: 4.3 MMOL/L (ref 3.5–5.1)
PROT SERPL-MCNC: 6.5 G/DL (ref 6–8.4)
RBC # BLD AUTO: 3.07 M/UL (ref 4.6–6.2)
RBC # BLD AUTO: 3.07 M/UL (ref 4.6–6.2)
SODIUM BLD-SCNC: 143 MMOL/L (ref 136–145)
SODIUM SERPL-SCNC: 136 MMOL/L (ref 136–145)
SODIUM SERPL-SCNC: 140 MMOL/L (ref 136–145)
SPECIMEN SOURCE: ABNORMAL
WBC # BLD AUTO: 8.41 K/UL (ref 3.9–12.7)
WBC # BLD AUTO: 8.41 K/UL (ref 3.9–12.7)

## 2024-06-11 PROCEDURE — 36415 COLL VENOUS BLD VENIPUNCTURE: CPT | Mod: HCNC | Performed by: STUDENT IN AN ORGANIZED HEALTH CARE EDUCATION/TRAINING PROGRAM

## 2024-06-11 PROCEDURE — 83735 ASSAY OF MAGNESIUM: CPT | Mod: HCNC | Performed by: FAMILY MEDICINE

## 2024-06-11 PROCEDURE — 36620 INSERTION CATHETER ARTERY: CPT | Mod: HCNC

## 2024-06-11 PROCEDURE — 11000001 HC ACUTE MED/SURG PRIVATE ROOM: Mod: HCNC

## 2024-06-11 PROCEDURE — 97530 THERAPEUTIC ACTIVITIES: CPT | Mod: HCNC

## 2024-06-11 PROCEDURE — 25000003 PHARM REV CODE 250: Mod: HCNC | Performed by: INTERNAL MEDICINE

## 2024-06-11 PROCEDURE — 97165 OT EVAL LOW COMPLEX 30 MIN: CPT | Mod: HCNC

## 2024-06-11 PROCEDURE — 92610 EVALUATE SWALLOWING FUNCTION: CPT | Mod: HCNC

## 2024-06-11 PROCEDURE — 97161 PT EVAL LOW COMPLEX 20 MIN: CPT | Mod: HCNC

## 2024-06-11 PROCEDURE — 97535 SELF CARE MNGMENT TRAINING: CPT | Mod: HCNC

## 2024-06-11 PROCEDURE — 94761 N-INVAS EAR/PLS OXIMETRY MLT: CPT | Mod: HCNC

## 2024-06-11 PROCEDURE — 84100 ASSAY OF PHOSPHORUS: CPT | Mod: HCNC | Performed by: FAMILY MEDICINE

## 2024-06-11 PROCEDURE — 85025 COMPLETE CBC W/AUTO DIFF WBC: CPT | Mod: HCNC | Performed by: FAMILY MEDICINE

## 2024-06-11 PROCEDURE — 63600175 PHARM REV CODE 636 W HCPCS: Mod: HCNC

## 2024-06-11 PROCEDURE — 83605 ASSAY OF LACTIC ACID: CPT | Mod: HCNC | Performed by: STUDENT IN AN ORGANIZED HEALTH CARE EDUCATION/TRAINING PROGRAM

## 2024-06-11 PROCEDURE — 80053 COMPREHEN METABOLIC PANEL: CPT | Mod: HCNC | Performed by: FAMILY MEDICINE

## 2024-06-11 PROCEDURE — 99900035 HC TECH TIME PER 15 MIN (STAT): Mod: HCNC

## 2024-06-11 PROCEDURE — 25000003 PHARM REV CODE 250: Mod: HCNC

## 2024-06-11 PROCEDURE — 25000003 PHARM REV CODE 250: Mod: HCNC | Performed by: FAMILY MEDICINE

## 2024-06-11 PROCEDURE — 99499 UNLISTED E&M SERVICE: CPT | Mod: HCNC,95,, | Performed by: PSYCHIATRY & NEUROLOGY

## 2024-06-11 PROCEDURE — 80048 BASIC METABOLIC PNL TOTAL CA: CPT | Mod: HCNC,XB | Performed by: FAMILY MEDICINE

## 2024-06-11 RX ORDER — CLOPIDOGREL BISULFATE 75 MG/1
75 TABLET ORAL DAILY
Status: DISCONTINUED | OUTPATIENT
Start: 2024-06-11 | End: 2024-06-17 | Stop reason: HOSPADM

## 2024-06-11 RX ORDER — NAPROXEN SODIUM 220 MG/1
81 TABLET, FILM COATED ORAL DAILY
Status: DISCONTINUED | OUTPATIENT
Start: 2024-06-11 | End: 2024-06-17 | Stop reason: HOSPADM

## 2024-06-11 RX ORDER — LACTULOSE 10 G/15ML
10 SOLUTION ORAL DAILY
Status: DISCONTINUED | OUTPATIENT
Start: 2024-06-11 | End: 2024-06-17 | Stop reason: HOSPADM

## 2024-06-11 RX ORDER — ENOXAPARIN SODIUM 100 MG/ML
30 INJECTION SUBCUTANEOUS EVERY 24 HOURS
Status: DISCONTINUED | OUTPATIENT
Start: 2024-06-11 | End: 2024-06-16

## 2024-06-11 RX ORDER — ATORVASTATIN CALCIUM 40 MG/1
80 TABLET, FILM COATED ORAL DAILY
Status: DISCONTINUED | OUTPATIENT
Start: 2024-06-12 | End: 2024-06-12

## 2024-06-11 RX ORDER — ACETAMINOPHEN 325 MG/1
650 TABLET ORAL EVERY 8 HOURS PRN
Status: DISCONTINUED | OUTPATIENT
Start: 2024-06-11 | End: 2024-06-17 | Stop reason: HOSPADM

## 2024-06-11 RX ADMIN — MUPIROCIN: 20 OINTMENT TOPICAL at 08:06

## 2024-06-11 RX ADMIN — DORZOLAMIDE HYDROCHLORIDE TIMOLOL MALEATE 1 DROP: 20; 5 SOLUTION/ DROPS OPHTHALMIC at 08:06

## 2024-06-11 RX ADMIN — ASPIRIN 81 MG CHEWABLE TABLET 81 MG: 81 TABLET CHEWABLE at 11:06

## 2024-06-11 RX ADMIN — DORZOLAMIDE HYDROCHLORIDE TIMOLOL MALEATE 1 DROP: 20; 5 SOLUTION/ DROPS OPHTHALMIC at 09:06

## 2024-06-11 RX ADMIN — ENOXAPARIN SODIUM 30 MG: 30 INJECTION SUBCUTANEOUS at 04:06

## 2024-06-11 RX ADMIN — SPIRONOLACTONE 25 MG: 25 TABLET ORAL at 08:06

## 2024-06-11 RX ADMIN — CLOPIDOGREL BISULFATE 75 MG: 75 TABLET ORAL at 11:06

## 2024-06-11 RX ADMIN — LACTULOSE 10 G: 20 SOLUTION ORAL at 11:06

## 2024-06-11 RX ADMIN — MUPIROCIN: 20 OINTMENT TOPICAL at 09:06

## 2024-06-11 RX ADMIN — ATORVASTATIN CALCIUM 60 MG: 40 TABLET, FILM COATED ORAL at 10:06

## 2024-06-11 RX ADMIN — ATORVASTATIN CALCIUM 20 MG: 20 TABLET, FILM COATED ORAL at 08:06

## 2024-06-11 NOTE — NURSING
Received report from ICU nurse, pt arrived on unit. VS taken, NIH performed. Bed in lowest position, call bell within reach. Instructed to call for assistance.

## 2024-06-11 NOTE — PT/OT/SLP EVAL
Speech Language Pathology Evaluation  Bedside Swallow    Patient Name:  Dean Hahn   MRN:  [id number]  Admitting Diagnosis: weakness and speech difficulty    Recommendations:                 General Recommendations:  ongoing follow up on diet tolerance and speech/lang deficits   Diet recommendations:  Puree Diet - IDDSI Level 4, Thin liquids - IDDSI Level 0   Aspiration Precautions: upright for meals, slow rate, alternate sips and bites, crushed meds   General Precautions: Standard, fall, pureed diet  Communication strategies:  mumbled speech     Assessment:     Dean Hahn is a 83 y.o. male admitted with weakness who is safe for oral diet with pureed and thin liquids. Pt with mumbled speech when expressing self.     History per MD       Fall    Altered Mental Status       Pt w/ fall and AMS. Started this morning. Pt AOx1. CG says significant change since fall. Did not hit head or any other injuries.          HPI: 82 YO M with PMHx significant for HTN, dementia, HLD, carotid stenosis B/L CKD, was brought to the ER for new onset speech difficulty, right sided weakness, change in mental status. When seen in the ICU, patient was confused, alert, oriented to self and keep repeating the same thing. He was not able to give any history. 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 He fell and was found on the ground. His family found him on the floor at his house hours after fall, with dysphasia, weakness with limping, and confusion. EMS called and brought to the ER. Code stroke was called. He had CT head no acute process. Microvascular small vessel ischemic change. Labs in the ER with elevated LFT, elevated creatinine, elevated TSH with normal free T, elevated troponin. He was given TPA and post TPA transfer to ICU. In the unit he had another recurrent episode of worsening confusion, repeat CT head without acute evidence of acute process.         Past Medical History:   Diagnosis Date    Cataract     HLD (hyperlipidemia)     Hypertension        Past Surgical History:   Procedure Laterality Date    CHOLECYSTECTOMY      EYE SURGERY Right     cataract removal surgery       Social History: Patient lives with wife at home,dependent for ADLs.    Prior Intubation HX:  n/a    MRI: No acute abnormality.  No evidence of acute infarction.   Diffuse and confluent areas of increased T2/FLAIR signal abnormality of the supratentorial white matter which is nonspecific but may be seen with chronic microvascular ischemic changes or leukoencephalopathy.  Findings are similar to prior MRI from 2023     Chest X-Rays: The lungs are hyperexpanded and clear. No focal opacities are seen. The pleural spaces are clear.     Prior diet: pureed/thin at baseline     Subjective     Consult received for clinical swallow eval/speech/lang eval this date, SLP did communicate with RN prior to eval/treat.    Patient goals: none stated     Pain/Comfort:  Pain Rating 1: 0/10    Respiratory Status: Room air    Objective:   Pt seen in ICU, pt is awake, upright in bed. Pt oriented to self and  only.   Inconsistent command following and inconsistent following oral motor commands.     Oral Musculature Evaluation  Oral Musculature: general weakness, unable to assess due to poor participation/comprehension  Dentition: edentulous  Mucosal Quality: dry  Lingual Strength and Mobility: impaired strength  Volitional Cough: elicited  Volitional Swallow: slightly delayed  Voice Prior to PO Intake: low volume    Bedside Swallow Eval:   Consistencies Assessed:  Thin liquids tsp of water, cup sips of water and straw swallows of water   Puree 1/2 applesauce container consumed       Oral Phase:   WFL    Pharyngeal Phase:   no overt clinical signs/symptoms of aspiration  no overt clinical signs/symptoms of pharyngeal dysphagia  multiple spontaneous swallows    Compensatory Strategies  Oral  care  Assist with feeding for all meals.     Treatment: SLP provided patient education on SLP recommendations, SLP role, s/s and risks of aspiration, safe swallow precautions, and POC. The patient did not demonstrate true comprehension of all discussed. Will educate wife when she is available.       Goals:   Multidisciplinary Problems       SLP Goals          Problem: SLP    Goal Priority Disciplines Outcome   SLP Goal     SLP Progressing   Description: Short Term Goals:  1. Pt will participate in swallow eval to determine safest diet level.   2. Pt will tolerate pureed/thin diet with no audible dysphagia signs.   3. Ongoing eval of speech-lang to determine deficits.                        Plan:     Patient to be seen:  3 x/week   Plan of Care expires:  07/10/24  Plan of Care reviewed with:  patient   SLP Follow-Up:  Yes       Discharge recommendations:   (pending PT and OT recs)   Barriers to Discharge:  None    Time Tracking:     SLP Treatment Date:   06/11/24  Speech Start Time:  0951  Speech Stop Time:  1014     Speech Total Time (min):  23 min    Billable Minutes: Eval Swallow and Oral Function 13 and Self Care/Home Management Training 10    06/11/2024

## 2024-06-11 NOTE — PLAN OF CARE
Recommendation:  1. Add Boost Plus BID.   2. Encourage intake at meals as tolerated. 3. Monitor weight/labs.   4. RD to follow to monitor po intake    Goals:  Pt will tolerate diet with at least 50-75% intake at meals by RD follow up  Nutrition Goal Status: new

## 2024-06-11 NOTE — PLAN OF CARE
Care Plan    POC reviewed with Dean Hahn. Pt A/Ox1 which per son around baseline. No new changes in neuro assessment but pt does wax/wane in attentiveness to questions/directions. Neuro checks q1h per protocol. Questions and concerns addressed. No acute events today. Pt progressing toward goals. Will continue to monitor. See below and flowsheets for full assessment and VS info.       Neuro:  [place] Coma Scale  Best Eye Response: 4-->(E4) spontaneous  Best Motor Response: 6-->(M6) obeys commands  Best Verbal Response: 4-->(V4) confused  [place] Coma Scale Score: 14  Assessment Qualifiers: patient not sedated/intubated, no eye obstruction present  Pupil PERRLA: yes  24 hr Temp:  [97.5 °F (36.4 °C)-98.7 °F (37.1 °C)]      CV:  Rhythm: sinus bradycardia  DVT prophylaxis: VTE Required Core Measure: (SCDs) Sequential compression device initiated/maintained    Resp:          GI/:  GI prophylaxis: no  Diet/Nutrition Received: mechanical/dental soft  Last Bowel Movement: 06/11/24  Voiding Characteristics: external catheter, incontinence   Intake/Output Summary (Last 24 hours) at 6/11/2024 0545  Last data filed at 6/11/2024 0537  Gross per 24 hour   Intake 220 ml   Output 750 ml   Net -530 ml       Labs/Accuchecks:  Recent Labs   Lab 06/11/24  0349   WBC 8.41  8.41   RBC 3.07*  3.07*   HGB 10.1*  10.1*   HCT 31.3*  31.3*   *  128*      Recent Labs   Lab 06/11/24  0349     136   K 4.1  4.3   CO2 14*  13*   *  115*   BUN 29*  28*   CREATININE 1.6*  1.6*   ALKPHOS 169*   *   *   BILITOT 2.3*      Recent Labs   Lab 06/10/24  0847   INR 1.3*      Recent Labs   Lab 06/10/24  1405   TROPONINI 0.033*       Electrolytes: N/A - electrolytes WDL  Accuchecks: Q6H    Gtts/LDAs:      Lines/Drains/Airways       Drain  Duration             Male External Urinary Catheter 06/10/24 1100 <1 day              Peripheral Intravenous Line  Duration                  Peripheral IV -  Single Lumen 06/10/24 0812 20 G Right Antecubital <1 day                    Skin/Wounds  Bathing/Skin Care: bath, complete;dressed/undressed;incontinence care;linen changed (06/11/24 0515)  Wounds: Yes  Wound care consulted: No    Consults  Consults (From admission, onward)          Status Ordering Provider     Inpatient consult to Registered Dietitian/Nutritionist  Once        Provider:  (Not yet assigned)    Acknowledged CRYSTAL ESCOBAR     Inpatient consult to Vascular (Stroke) Neurology  Once        Provider:  Liz Sepulveda MD    Completed CRYSTAL ESCOBAR     Inpatient consult to Physical Medicine Rehab  Once        Provider:  (Not yet assigned)    Acknowledged CRYSTAL ESCOBAR     Inpatient consult to Social Work/Case Management  Once        Provider:  (Not yet assigned)    Completed CRYSTAL ESCOBAR     Consult to Telemedicine - Acute Stroke  Once        Provider:  (Not yet assigned)    Completed JARAD NUÑEZ

## 2024-06-11 NOTE — PLAN OF CARE
Problem: SLP  Goal: SLP Goal  Description: Short Term Goals:  1. Pt will participate in swallow eval to determine safest diet level.   2. Pt will tolerate pureed/thin diet with no audible dysphagia signs.   3. Ongoing eval of speech-lang to determine deficits.   Outcome: Progressing   Pt safe for pureed and thin liquids, baseline diet per wife's report. Pt with mumbled speech at times, command following is inconsistent. Will cont to follow up.

## 2024-06-11 NOTE — PLAN OF CARE
Problem: Occupational Therapy  Goal: Occupational Therapy Goal  Description: Goals to be met by: 7/11/24     Patient will increase functional independence with ADLs by performing:    Grooming while seated with Minimal Assistance.  Toileting from bedside commode with Minimal Assistance for hygiene and clothing management.   Sitting at edge of bed x10 minutes with Stand-by Assistance.  Supine to sit with Minimal Assistance.  Step transfer with Minimal Assistance  Toilet transfer to bedside commode with Minimal Assistance.  Increased functional strength to [facility] for self care skills and functional mobility .  Upper extremity exercise program x10 reps per handout, with independence.    Outcome: Progressing     Pt would benefit from cont OT services in order to maximize functional independence. Recommending moderate intensity therapy at d/c. Pt demo's impaired cognition and command following; possible aphasia as pt with difficulty expressing self. Pt currently requiring max A of 2 for all axs. Unknown PLOF at this time; will progress pt as able.

## 2024-06-11 NOTE — PROGRESS NOTES
Pulmonary & Critical Care Medicine - Resident Consult Note     Primary Attending:  Marisol Chung MD   Consultant Attending: Bg Gray MD   Consultant Fellow: Consultant Resident: MD Joey Doherty Do, MD     Date of Admit: 6/10/2024  Hospital day: 1        History of Present Illness:  Dean Hahn is a 83 y.o.  male with PMHx significant for HTN, dementia, HLD, carotid stenosis B/L CKD, was brought to the ER for new onset speech difficulty, right sided weakness. 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 He fell and was found in the ground. His family found him in the ground hour after fall, with dysphasia, weakness, and confusion. EMS called and brought to the ER. Code stroke was called. He had CT head no acute process. Microvascular small vessel ischemic change. Labs in the ER with elevated LFT, elevated creatinine, elevated TSH with normal free T, elevated troponin. He was given TNK and brought to the ICU for closer monitoring post-TNK.     Per the daughter she stated that she noticed that he was slurring his words approximately 1 week ago. They state that he is normally able to ambulate with a walker but over the past few weeks have needed to more help performing his ADL's. Per daughter his wife is his caregiver.     Per further chart review, LKN 6AM. Later found on ground by family after fall. Noted right side weakness and speech difficulty. Telestroke evaluated patient. CTH no hemmorhage. CTA negative for LVO or limiting stenosis. Did show bilateral proximal ICA stenosis and mild R M1 stenosis. Given TNK on 0906. Baseline, pt lives with wife. Family believes patient has dementia, but no documentation of diagnosis. Has been having memory difficultuies since 2017.     In the past, pt has had multifocal lacunar strokes affecting the L corona radiata, L temporal lobe, R splenium of the corpus callosum. Has also had remote  "bilateral cerebellar infarcts. Previously was following with [facility] Dr. Gonzalez but it appears he was lost to follow up. Unclear if he was compliant with ASA 81 mg daily at home     Interval Hx:  No acute events overnight. Echo positive for bubble study. HCO3 decreased to 14, AG 7. VBG pending. Lactate 1.9.     Past Medical and Surgical History:  Past Medical History:   Diagnosis Date    Cataract     HLD (hyperlipidemia)     Hypertension      Past Surgical History:   Procedure Laterality Date    CHOLECYSTECTOMY      EYE SURGERY Right     cataract removal surgery       Allergies:  Review of patient's allergies indicates:  No Known Allergies    Family History:  Family History   Problem Relation Name Age of Onset    Hypertension Mother Jania Hahn     Heart attack Father      Coronary artery disease Father      No Known Problems Sister x2     Hypertension Brother x3     No Known Problems Daughter x3     No Known Problems Son x2     Cirrhosis Neg Hx         Social History:  Social History     Tobacco Use    Smoking status: Never    Smokeless tobacco: Never   Substance Use Topics    Alcohol use: Yes    Drug use: Never       Review of Systems:  Negative unless otherwise specified in HPI.       Objective:   Last 24 Hour Vital Signs:  BP  Min: 100/57  Max: 165/70  Temp  Av °F (36.7 °C)  Min: 97.5 °F (36.4 °C)  Max: 98.7 °F (37.1 °C)  Pulse  Av.1  Min: 55  Max: 71  Resp  Av.7  Min: 8  Max: 23  SpO2  Av %  Min: 99 %  Max: 100 %  Height  Av' 3" (160 cm)  Min: 5' 3" (160 cm)  Max: 5' 3" (160 cm)  Weight  Av.1 kg (112 lb 11.8 oz)  Min: 48.1 kg (106 lb)  Max: 59 kg (130 lb)  Body mass index is 19.14 kg/m².  I & O (Last 24H):  Intake/Output Summary (Last 24 hours) at 2024 0853  Last data filed at 2024 0537  Gross per 24 hour   Intake 220 ml   Output 750 ml   Net -530 ml       General: frail, awake, cooperative, no acute distress  Head: Normocephalic, " atraumatic  Lungs: clear to auscultation bilaterally, respirations unlabored  Heart: regular rate and rhythm, no murmur appreciated  Abdomen: soft, non-tender, normal bowel sounds  Extremities: no joint deformity or tenderness noted  Skin: warm and dry, Ecchymosis noted bilateral extremities   Neuro: alert and oriented to name but not place or time.      Laboratory, Microbiology, ECG(s), and Imaging:  Reviewed.     Assessment & Plan:     NEUROLOGICAL:  Stroke s/p TNK  Dementia   - Patient received TNK around 0900 on 6/10 for stroke symptoms including R) sided weakness and dysarthria   - Neurological assessment and vital signs (except temperature) every 15 minutes x 2 hours, then every 30 minutes x 6 hours, then every hour x 16 hours..  - BP stays >= 180 mm Hg or diastolic BP stays >= 105 mm Hg for 24 hrs following thrombolytic therapy.  - No antithrombotics or anticoagulants (including but not limited to: heparin, warfarin, aspirin, clopidigrel, or dipyridamole) for 24 hours, then start antithrombotics as ordered by treating physician  - CT Head: Involutional change and small vessel ischemic change.   - CTA Head and Neck: Bilateral proximal ICA stenosis and a mild right M1 segment stenosis.   - MRI Brain - Diffuse and confluent areas of increased T2/FLAIR signal abnormality of the supratentorial white matter which is nonspecific but may be seen with chronic microvascular ischemic changes or leukoencephalopathy. Findings are similar to prior MRI from 01/24/2023   - ECHO positive for shunt  - Lipid panel wnl  - PT/OT/SLP eval  - Repeat CT Head at 24hrs post TNK   - Neurology consulted, appreciate assistance   - Start ASA, Plavix  - Atorvastatin for secondary prevention  - Renally dose Lovenox     CARDIOVASCULAR:  Chest x-ray: No acute abnormality   - ECHO 1/2023: EF: 60%, normal diastolic dysfunction  - BNP: 27  - Trop: 0.039>0.033  - Repeat ECHO positive for shunt  - Neuro consulted, appreciate recs.  - Consider  cardiology consult inpatient vs outpatient for shunt recommendations.   - Cards consult inpatient vs outpatient follow up?      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 Andrea  - In 2021, showed that he had a high IgG and a positive CHRIS with a signifiacnt titer. Although his AST/ALT in 2021 were only mildly elevated, I note that in 2016 his AST/ALT were around 150-200. 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 Bili: 2.5    - Home meds: Aldactone 25mg and lactulose 10mg; continue inpatient.     RENAL:   CKD3a  - Baseline around 1.5   - CTM with daily CMP  - Renally dose meds    Non-anion gap metabolic acidosis  HCO3 14, Cl 119 this AM  Likely from fluid resuscitation with NS  No diarrhea   Lactate 1.9 this AM  - VBG pending  - Urine studies  - Consider further workup pending abnormal VBG    HEMATOLOGY/ONCOLOGY:  Thrombocytopenia   - Likely 2/2 to cirrhosis      ENDOCRINE:  TSH: 5.279  T4: 1.10    A1C: 4.7 11 months ago  -Hypoglycemic precautions.     INFECTIOUS DISEASE:  Afebrile, WBC: 8  No concern for infection at this time    Feeding: Pureed Diet  Analgesia: Tylenol prn  Sedation: None   VTE Ppx: SCD's  Head of Bed: 30 degrees   Ulcer PPX: None  Glucose: Hypoglycemic precautions  SBT/SAT: None   Bowels: Bisacodyl suppository PRN  Indwelling Lines: PIV Right AC  Deescalation Abx: None     Code Status: Full     Dispo: PostTNK, neurochecks. Stable for Stepdown.     Rounded with Dr. Gray. Attestation to follow.    Joey Dias MD  [facility]  [facility]  - Pulmonary and Critical Care Service

## 2024-06-11 NOTE — PT/OT/SLP EVAL
Physical Therapy Evaluation and Treatment    Patient Name:  Dean Hahn   MRN:  [id number]    Recommendations:     Discharge Recommendations: Moderate Intensity Therapy   Discharge Equipment Recommendations: to be determined by next level of care   Barriers to discharge:  significant assist required, increased caregiver burden    Assessment:     Dean Hahn is a 83 y.o. male admitted with a medical diagnosis of <principal problem not specified>.  He presents with the following impairments/functional limitations: weakness, gait instability, impaired balance, impaired endurance, impaired self care skills, impaired functional mobility, decreased lower extremity function, decreased upper extremity function, decreased safety awareness, decreased coordination, impaired coordination, impaired cardiopulmonary response to activity, impaired cognition (impaired hearing) .Prior to this event, pt was ambulating with walker per  who spoke with pt's spouse. Pt is now unable to perform functional mobility without significant assistance. Pt requires MaxA x 2 for sit <> stand and side steps with RW. Recommending moderate intensity therapy due to impaired functional mobility from baseline and increased caregiver burden.    Rehab Prognosis: Fair; patient would benefit from acute skilled PT services to address these deficits and reach maximum level of function.    Recent Surgery: * No surgery found *      Plan:     During this hospitalization, patient to be seen 3 x/week to address the identified rehab impairments via gait training, therapeutic activities, therapeutic exercises, neuromuscular re-education, wheelchair management/training and progress toward the following goals:    Plan of Care Expires:  07/11/24    Subjective     Chief Complaint: none  Patient/Family Comments/goals: pt nods head yes and responds appropriately at times but inconsistent, minimal verbal responses  Pain/Comfort:  Pain  Rating 1: 0/10 (no signs of distress)  Pain Rating Post-Intervention 1: 0/10    Patients cultural, spiritual, Restoration conflicts given the current situation: no    Living Environment:  Pt lives with spouse  Prior to admission, patients level of function was ambulating with RW and mobile but requires assist with all ADL's; hx of fall.  Equipment used at home: walker, rolling, shower chair.  DME owned (not currently used): none.  Upon discharge, patient will have assistance from wife and son.    Objective:     Communicated with nsg prior to session.  Patient found HOB elevated with pulse ox (continuous), telemetry, blood pressure cuff (ICU monitoring)  upon PT entry to room.    General Precautions: Standard, fall, pureed diet  Orthopedic Precautions:N/A   Braces: N/A  Respiratory Status: Room air    Exams:  Cognitive Exam:  Patient is oriented to birth month, hx of dementia and prior CVAs; inconsistent command following and appropriate responses  Postural Exam:  Patient presented with the following abnormalities:    -       Rounded shoulders  Sensation: intact pinch test to B feet  Skin Integrity/Edema:      -       Skin integrity: Visible skin intact  -       Edema: None noted BLE  RLE ROM: WFL  LLE ROM: WFL  Unable to accurately assess strength due to impaired command following, pt lifts LLE and extends B knees but unable to fully assess      Functional Mobility:  Bed Mobility:     Scooting: maximal assistance and of 2 persons  Supine to Sit: maximal assistance and of 2 persons  Sit to Supine: maximal assistance and of 2 persons  Transfers:     Sit to Stand:  maximal assistance and of 2 persons with rolling walker  Gait: ~3 side steps L toward HOB with RW and MaxA x 2; MaxA to advance LLE laterally, significant posterior leaning against bed, pushing RW forward      AM-PAC 6 CLICK MOBILITY  Total Score:6       Treatment & Education:  Educated pt on role of PT/POC and no signs of distress  MaxA x 2 to transition  sitting EOB   Assessed sit > stand and pt with significant posterior lean and push onto bed  Stabilization required for RW to prevent pt from pushing too far forward  Attempted side steps as reported above with significant assist of 2 persons  Pt returned supine and SCD's donned; BLE elevated on pillow with heels floated    Patient left HOB elevated with all lines intact, call button in reach, and bed alarm on.    GOALS:   Multidisciplinary Problems       Physical Therapy Goals          Problem: Physical Therapy    Goal Priority Disciplines Outcome Goal Variances Interventions   Physical Therapy Goal     PT, PT/OT Progressing     Description: Goals to be met by: 24     Patient will increase functional independence with mobility by performin. Supine to sit with Moderate Assistance  2. Sit to supine with Moderate Assistance  3. Sit to stand transfer with Moderate Assistance  4. Bed to chair transfer with Moderate Assistance using Rolling Walker  5. Gait  x 25 feet with Moderate Assistance using Rolling Walker.                          History:     Past Medical History:   Diagnosis Date    Cataract     HLD (hyperlipidemia)     Hypertension        Past Surgical History:   Procedure Laterality Date    CHOLECYSTECTOMY      EYE SURGERY Right     cataract removal surgery       Time Tracking:     PT Received On: 24  PT Start Time: 1055     PT Stop Time: 1116  PT Total Time (min): 21 min cotx with OT    Billable Minutes: Evaluation 10 and Therapeutic Activity 11      2024

## 2024-06-11 NOTE — CONSULTS
"  [facility] - Telemetry  Adult Nutrition  Consult Note    SUMMARY     Recommendations    Recommendation:  1. Add Boost Plus BID.   2. Encourage intake at meals as tolerated. 3. Monitor weight/labs.   4. RD to follow to monitor po intake    Goals:  Pt will tolerate diet with at least 50-75% intake at meals by RD follow up  Nutrition Goal Status: new  Communication of RD Recs: reviewed with RN Leon)    Assessment and Plan  Nutrition Problem  Inadequate energy intake    Related to (etiology):   Hx/dx    Signs and Symptoms (as evidenced by):   Weight loss, difficulty chewing     Interventions:  Collaboration with other providers  Commercial beverage    Nutrition Diagnosis Status:   New     Malnutrition Assessment  Weight Loss (Malnutrition):  (10% x 6 months)       Reason for Assessment  Reason For Assessment: consult (evaluate)  Diagnosis:  (fall/AMS)  Relevant Medical History: HTN, HLD, cholecystectomy, carotid stenosis, CVA  General Information Comments: Pt on pureed diet with 100% intake at meals. Pt without dentures at this time. Noted 12lb weight loss x 6 months. Pt admitted with AMS. NFPE completed today 6/11-mild wasting of temples, clavicles and thoracic region. Alvin 13-skin intact.  Nutrition Discharge Planning: pt to d/c on pureed diet    Nutrition Risk Screen  Nutrition Risk Screen: unintentional loss of 10 lbs or more in the past 2 months    Nutrition/Diet History  Food Preferences: no Scientologist or cultrual food prefs identified  Spiritual, Cultural Beliefs, Rastafarian Practices, Values that Affect Care: no  Factors Affecting Nutritional Intake: chewing difficulties/inability to chew food    Anthropometrics  Temp: 98.3 °F (36.8 °C)  Height Method: Stated  Height: 5' 3" (160 cm)  Height (inches): 63 in  Weight Method: Bed Scale  Weight: 49 kg (108 lb 0.4 oz)  Weight (lb): 108.03 lb  Ideal Body Weight (IBW), Male: 124 lb  % Ideal Body Weight, Male (lb): 87.12 %  BMI (Calculated): 19.1  BMI Grade: 18.5-24.9 - " normal  Usual Body Weight (UBW), k.6 kg (12/12)  % Usual Body Weight: 89.93  % Weight Change From Usual Weight: -10.26 %     Lab/Procedures/Meds  Pertinent Labs Reviewed: reviewed  Pertinent Labs Comments: BUN 29H, Crea 1.6H, Alb 2.5L  Pertinent Medications Reviewed: reviewed  Pertinent Medications Comments: ergocalciferol    Estimated/Assessed Needs  Weight Used For Calorie Calculations: 49 kg (108 lb 0.4 oz)  Energy Calorie Requirements (kcal): 1715 (35 kcal/kg)  Energy Need Method: Kcal/kg  Protein Requirements: 58g (1.2g/kg)  Weight Used For Protein Calculations: 49 kg (108 lb 0.4 oz)  Estimated Fluid Requirement Method: RDA Method  RDA Method (mL): 1715     Nutrition Prescription Ordered  Current Diet Order: pureed    Evaluation of Received Nutrient/Fluid Intake  I/O: 220/750  Energy Calories Required: meeting needs  Protein Required: meeting needs  Fluid Required: meeting needs  Comments: LBM 6/11  % Intake of Estimated Energy Needs: 75 - 100 %  % Meal Intake: 75 - 100 %    Nutrition Risk  Level of Risk/Frequency of Follow-up:  (2xweekly)     Monitor and Evaluation  Food and Nutrient Intake: food and beverage intake  Food and Nutrient Adminstration: diet order  Physical Activity and Function: nutrition-related ADLs and IADLs  Anthropometric Measurements: weight  Biochemical Data, Medical Tests and Procedures: electrolyte and renal panel  Nutrition-Focused Physical Findings: overall appearance     Nutrition Related Social Determinants of Health: SDOH: Adequate food in home environment     Nutrition Follow-Up  RD Follow-up?: Yes

## 2024-06-11 NOTE — NURSING TRANSFER
Nursing Transfer Note      6/11/2024   11:41 AM    Nurse giving handoff: Saima  Nurse receiving handoff: Jose    Reason patient is being transferred: step down     Transfer To: [id number]    Transfer via bed    Transfer with cardiac monitoring    Transported by     Transfer Vital Signs:  Blood Pressure:110/51  Heart Rate:63  O2:100%  Temperature:98.1  Respirations:14    Telemetry: Box Number [id number]  Order for Tele Monitor? Yes    Additional Lines: none    4eyes on Skin: yes    Medicines sent: mupirocin ointment and dorzolamide-timolol eye drops       Patient belongings transferred with patient: Yes  pair of pants, tshirt, belt, hat, shoes     Chart send with patient: Yes    Notified: spouse    Upon arrival to floor: cardiac monitor applied, patient oriented to room, call bell in reach, and bed in lowest position

## 2024-06-11 NOTE — PLAN OF CARE
The sw met with the pt to complete the assessment but he wasn't able to communicate effectively so the sw spoke to Pati Hahn([phone] via phone to complete the assessment. The pt and his wife live in [place]([address] [place] [phone]). The pt's wife assist him with his ADL's and he has the dme listed below. The pt's wife and son Steve Barger transports himb to all his dr appt's and errands. One of them will transport the pt home at d/c. The pt's wife is requesting a w/c at d/ for the pt and hh if her d/c's home. The pt's wife is also open to snf at [facility] or [facility] if recommended. The pt's wife states it has become a bit challenging for her with the pt's care but refuses nhp for the pt. She states she is still able to bring the pt fishing and he loves to assist her outside with her gardening in the yard. She states she will never commit to allow the pt to go prison into a nh. She's very familiar with them b/c her mother was in one. The sw acknowledged the wife's feelings and encouraged her to call if she has any further questions or concerns. The sw completed the white board in the pt's room with her name and contact info. The sw left a d/c brochure at bedside for the pt's wife with her contact info on it. The sw will continue to follow the pt throughout his transitions of care and will assist with any d/c needs.

## 2024-06-11 NOTE — PT/OT/SLP EVAL
Occupational Therapy   Evaluation/treatment     Name: Dean Hahn  MRN: [id number]  Admitting Diagnosis: <principal problem not specified>  Recent Surgery: * No surgery found *      Recommendations:     Discharge Recommendations: Moderate Intensity Therapy  Discharge Equipment Recommendations:   (TBD)  Barriers to discharge:   (increased level of assist at this time)    Assessment:     Dean Hahn is a 83 y.o. male with a medical diagnosis of <principal problem not specified>.  He presents with The primary encounter diagnosis was Acute focal neurological deficit. 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  . Performance deficits affecting function: weakness, impaired joint extensibility, abnormal tone, impaired endurance, impaired cognition, decreased ROM, impaired coordination, impaired self care skills, decreased upper extremity function, decreased coordination, impaired fine motor, impaired functional mobility, decreased lower extremity function, decreased safety awareness, impaired balance, gait instability.      Pt would benefit from cont OT services in order to maximize functional independence. Recommending moderate intensity therapy at d/c. Pt demo's impaired cognition and command following; possible aphasia as pt with difficulty expressing self. Pt currently requiring max A of 2 for all axs. Unknown PLOF at this time; will progress pt as able.     Rehab Prognosis: Good; patient would benefit from acute skilled OT services to address these deficits and reach maximum level of function.       Plan:     Patient to be seen 3 x/week to address the above listed problems via self-care/home management, therapeutic activities, therapeutic exercises  Plan of Care Expires: 07/11/24  Plan of Care Reviewed with:  "patient    Subjective     Chief Complaint: pt does not verbalize complaints; limited verbalizations throughout session   Patient/Family Comments/goals: none stated     Occupational Profile:  Living Environment: pt unable to provide info; per SW note, pt lives with spouse   Previous level of function: per SW, pt with h/o dementia and has required assist from spouse for ADLs, but was ambulatory and enjoyed going outside   Equipment Used at Home: walker, rolling, shower chair (per SW notes)  Assistance upon Discharge: from spouse and family ; unknown level of physical assist they can provide     Pain/Comfort:  Pain Rating 1:  (does not appear in pain during session)    Patients cultural, spiritual, Hindu conflicts given the current situation:      Objective:     Communicated with: nsg prior to session.  Patient found supine with  (ICU monitoring) upon OT entry to room.    General Precautions: Standard, fall, pureed diet  Orthopedic Precautions: N/A  Braces: N/A  Respiratory Status: Room air    Occupational Performance:    Bed Mobility:    Patient completed Scooting/Bridging with maximal assistance and 2 persons  Patient completed Supine to Sit with maximal assistance and 2 persons  Patient completed Sit to Supine with maximal assistance and 2 persons    Functional Mobility/Transfers:  Patient completed Sit <> Stand Transfer with maximal assistance and of 2 persons  with  rolling walker   Functional Mobility: max A of 2 limited lateral steps to HOB with RW; assist with movement of LLE/advancement of LLE to side ; unable to manage RW; pt also with posterior leaning onto bed for support; does not follow commands for upright posture     Activities of Daily Living:  Lower Body Dressing: total assistance gianna socks     Cognitive/Visual Perceptual:  Cognitive/Psychosocial Skills:     -       Oriented to: pt with expressive aphasia ; repeats "10" when asked birthday; shakes head "yes" when asked his first name, but " answering yes to all questions during session    -       Follows Commands/attention:Easily distracted and inconsistent command following   -       Communication: expressive aphasia   -       Memory: Impaired STM, Impaired LTM, and Poor immediate recall  -       Safety awareness/insight to disability: impaired   -       Mood/Affect/Coping skills/emotional control: Appropriate to situation    Physical Exam:  Balance:    -       Poor sitting and standing   Postural examination/scapula alignment:    -       Rounded shoulders  -       Forward head  Dominant hand:    -       unknown at this time  Upper Extremity Range of Motion:   unable to accurately assess; pt with impaired command following; limited end shoulder range with AAROM/PROM for flexion   Upper Extremity Strength:  limited based on function; impaired ability to follow commands during assessment    Strength: diminished based on function    Fine Motor Coordination:  impaired ability to follow commands for performance     AMPAC 6 Click ADL:  AMPAC Total Score: 10    Treatment & Education:  Pt found supine; limited command following during session   Pt requires assist as above for bed mobility   Stood x 2 trials from EOB with RW; increased assist required; blocking required at B feet; pt also with posterior leaning on bed   Attempted lateral steps to HOB; increased assist   Returned to supine     Patient left supine with all lines intact, call button in reach, bed alarm on, and nsg notified    GOALS:   Multidisciplinary Problems       Occupational Therapy Goals          Problem: Occupational Therapy    Goal Priority Disciplines Outcome Interventions   Occupational Therapy Goal     OT, PT/OT Progressing    Description: Goals to be met by: 7/11/24     Patient will increase functional independence with ADLs by performing:    Grooming while seated with Minimal Assistance.  Toileting from bedside commode with Minimal Assistance for hygiene and clothing management.    Sitting at edge of bed x10 minutes with Stand-by Assistance.  Supine to sit with Minimal Assistance.  Step transfer with Minimal Assistance  Toilet transfer to bedside commode with Minimal Assistance.  Increased functional strength to WFL for self care skills and functional mobility .  Upper extremity exercise program x10 reps per handout, with independence.                         History:     Past Medical History:   Diagnosis Date    Cataract     HLD (hyperlipidemia)     Hypertension          Past Surgical History:   Procedure Laterality Date    CHOLECYSTECTOMY      EYE SURGERY Right     cataract removal surgery       Time Tracking:     OT Date of Treatment: 06/11/24  OT Start Time: 1055  OT Stop Time: 1116  OT Total Time (min): 21 min    Billable Minutes:Evaluation 10  Therapeutic Activity 11    6/11/2024

## 2024-06-11 NOTE — PLAN OF CARE
Problem: Physical Therapy  Goal: Physical Therapy Goal  Description: Goals to be met by: 24     Patient will increase functional independence with mobility by performin. Supine to sit with Moderate Assistance  2. Sit to supine with Moderate Assistance  3. Sit to stand transfer with Moderate Assistance  4. Bed to chair transfer with Moderate Assistance using Rolling Walker  5. Gait  x 25 feet with Moderate Assistance using Rolling Walker.     Outcome: Progressing     PT Eval completed, note to follow. Prior to this event, pt was ambulating with walker per  who spoke with pt's spouse. Pt is now unable to perform functional mobility without significant assistance. Pt requires MaxA x 2 for sit <> stand and side steps with RW. Recommending moderate intensity therapy due to impaired functional mobility from baseline and increased caregiver burden.

## 2024-06-11 NOTE — PROGRESS NOTES
NEUROLOGY Progress Note    Subjective:      Dean Hahn is a 83 y.o. M with a PMH of HTN, HLD, CKD, carotid stenosis (less than 50% stenosis of bilateral internal carotids), prior lacunar infarcts, and dementia who presented to [facility] on 6/10/24 with a chief complaint of speech difficulty and right sided weakness, LKN 6 AM on 6/10/24.    Today, pt still following commands intermittently. Family reports that he does not seem 100% back to baseline. They said normally he can participate in a back and forth conversation, however he speaks very slowly at baseline and can take some time to respond. He is dependent on all ADLs except he is able to feed himself. He uses a rollator for ambulation at home.    ROS: As per HPI    Histories:     Allergies:  Patient has no known allergies.    Current Medications:    Current Facility-Administered Medications   Medication Dose Route Frequency Provider Last Rate Last Admin    acetaminophen tablet 650 mg  650 mg Oral Q4H PRN Gómez Vee MD        atorvastatin tablet 60 mg  60 mg Oral Once Marisol Chung MD        [START ON 6/12/2024] atorvastatin tablet 80 mg  80 mg Oral Daily Marisol Chung MD        bisacodyL suppository 10 mg  10 mg Rectal Daily PRN Gómez Vee MD        dextrose 10% bolus 125 mL 125 mL  12.5 g Intravenous PRN Gómez Vee MD        dextrose 10% bolus 250 mL 250 mL  25 g Intravenous PRN Gómez Vee MD        dorzolamide-timolol 2-0.5% ophthalmic solution 1 drop  1 drop Both Eyes BID Gómez Vee MD   1 drop at 06/11/24 0822    ergocalciferol capsule 50,000 Units  50,000 Units Oral Q7 Days Gómez Vee MD   50,000 Units at 06/10/24 1348    glucagon (human recombinant) injection 1 mg  1 mg Intramuscular PRN Gómez Vee MD        glucose chewable tablet 16 g  16 g Oral PRN óGmez eVe MD        glucose chewable tablet 24 g  24 g Oral PRN Gómez Vee MD        mupirocin 2 % ointment   Nasal BID  Gómez Vee MD   Given at 06/11/24 0820    naloxone 0.4 mg/mL injection 0.02 mg  0.02 mg Intravenous PRN Gómez Vee MD        sodium chloride 0.9% flush 10 mL  10 mL Intravenous Q12H PRN Gómez Vee MD        sodium chloride 0.9% flush 10 mL  10 mL Intravenous PRN Gómez Vee MD        spironolactone tablet 25 mg  25 mg Oral Daily Gómez Vee MD   25 mg at 06/11/24 0820       Past Medical/Surgical/Family History:  Medical:   Past Medical History:   Diagnosis Date    Cataract     HLD (hyperlipidemia)     Hypertension       Surgeries:   Past Surgical History:   Procedure Laterality Date    CHOLECYSTECTOMY      EYE SURGERY Right     cataract removal surgery      Family:   Family History   Problem Relation Name Age of Onset    Hypertension Mother Jania Hahn     Heart attack Father      Coronary artery disease Father      No Known Problems Sister x2     Hypertension Brother x3     No Known Problems Daughter x3     No Known Problems Son x2     Cirrhosis Neg Hx         Social History:    Substance Abuse/Dependence History:  Pt unable to provide information      Current Evaluation:     Vital Signs:   Vitals:    06/11/24 0900   BP: (!) 111/59   Pulse: 63   Resp: 15   Temp:         Neurological Examination     [id number]  Eyes open spontaneously. Needs repeated encouragement to follow commands. Does not always understand what is being asked of him. Only oriented to name and birthday. Not oriented to location or year.     Language:  - Minimal verbal output, short answers to questions    Cranial Nerves:  CN II: PERRL, blinks to threat bilaterally  CN V1 and VII: Corneal blink reflex intact bilaterally  CN VIII: Oculocephalic reflex intact  CN IX, X: pt did not follow command to open his mouth    Motor and sensory:  Left UE: able to sustain against gravity with no drift  Left LE: able to sustain against gravity with no drift  Right UE:able to sustain against gravity with no drift  Right LE:able to  sustain against gravity with no drift    Reflexes:         Left       Right   Brachioradialis        2+         2+   Bicep        2+         2+   Tricep        2+         2+   Patellar        2+         2+                Unable to assess memory, coordination or gait due to the above neurological deficits     LABORATORY STUDIES:  CBC:   Recent Labs   Lab 06/10/24  0821 06/11/24  0349   WBC 8.99 8.41  8.41   HGB 11.7* 10.1*  10.1*   HCT 32.7* 31.3*  31.3*   * 128*  128*   MCV 93 102*  102*   RDW 13.3 13.1  13.1     BMP:   Recent Labs   Lab 06/10/24  0821 06/11/24  0349    140  136   K 4.6 4.1  4.3   * 119*  115*   CO2 15* 14*  13*   BUN 23 29*  28*   CREATININE 1.5* 1.6*  1.6*   GLU 85 99  97   CALCIUM 9.7 9.4  9.3   MG  --  1.8   PHOS  --  3.5     LFTs:   Recent Labs   Lab 06/10/24  0821 06/11/24 0349   PROT 7.6 6.5   ALBUMIN 2.7* 2.5*   BILITOT 2.5* 2.3*   * 151*   ALKPHOS 186* 169*   * 119*     Coags:   Recent Labs   Lab 06/10/24  0821 06/10/24  0847   INR 1.2 1.3*     FLP:   Recent Labs   Lab 06/10/24  0821   CHOL 136   LDLCALC 77.4   TRIG 88   CHOLHDL 30.1     Thyroid:   Recent Labs   Lab 06/10/24  0821   TSH 5.279*   FREET4 1.10     Cardiac:   Recent Labs   Lab 06/10/24  0821 06/10/24  1405   TROPONINI 0.039* 0.033*   BNP 27  --      Urinalysis:   Recent Labs   Lab 06/10/24  1232   COLORU Yellow   APPEARANCEUA Clear   PHUR 6.0   SPECGRAV >1.030*   PROTEINUA Trace*   GLUCUA Negative   KETONESU Negative   BILIRUBINUA Negative   OCCULTUA 1+*   NITRITE Negative   UROBILINOGEN Negative   LEUKOCYTESUR Negative     Urine Micro:  Recent Labs   Lab 06/10/24  1232   RBCUA 1   WBCUA 1   SQUAMEPITHEL 0   MICROCMT SEE COMMENT        RADIOLOGY STUDIES:  I have personally reviewed the images performed.     Prior MRI brain 12/21/2022  Impression:     1. Few punctate foci of DWI signal hyperintensity within the bilateral cerebral hemispheres.  Findings are concerning for subacute  lacunar type infarcts however cannot entirely exclude artifact.  Recommend clinical correlation.  2. Moderate degree chronic microvascular ischemic change.  3. Generalized cerebral volume loss.  This report was flagged in Epic as abnormal.    CTH non con 6/10/24  FINDINGS:  Comparison is 12/05/2023.     There is involutional change and microvascular small vessel ischemic change.  No acute bleed, mass, or mass effect seen.  The brain parenchyma demonstrates no acute stroke changes.  Skull and skull base demonstrate nothing unusual.  No fracture or trauma seen.     Impression:     No acute process seen.     Microvascular small vessel ischemic change.    CTA head and neck 6/10/24  Impression:     Bilateral proximal ICA stenosis and a mild right M1 segment stenosis.     Calcified pleural plaques.     Left apical pulmonary micronodule.  If there is a moderate-high risk factor for developing lung cancer a 12 month low-dose screening CT is recommended.     No abnormal enhancement seen and no acute process seen.  There is involutional change and small vessel ischemic change.     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    TTE 6/10/24    Left Ventricle: The left ventricle is normal in size. Normal wall thickness. There is normal systolic function with a visually estimated ejection fraction of 55 - 60%. There is normal diastolic function.    Right Ventricle: Normal right ventricular cavity size. Systolic function is normal. TAPSE is  cm. TDI S' is 12.0 cm/s.    IVC/SVC: Normal venous pressure at 3 mmHg.    Study is positive for intracardiac shunt.    MRI Brain wo contrast 6/11/24  There is questionable low ADC signal with possible increased DWI signal within the left michael. No definite abnormal T2 signal is visualized correspond. Findings may represent subtle recent infarction. Clinical correlation and continued follow-up is recommended.         Assessment:  VETO ALCANTARA  Selma is a 83 y.o. M with a PMH of HTN, HLD, CKD, carotid stenosis (less than 50% stenosis of bilateral internal carotids), prior multifocal lacunar infarcts (L corona radiata, L temporal lobe, R splenium of the corpus callosum. Has also had remote bilateral cerebellar infarcts), and dementia who presented to [facility] on 6/10/24 with a chief complaint of speech difficulty and right sided weakness, LKN 6 AM on 6/10/24. Received TNK at 0906 on 6/10/24    Impression:  Likely acute ischemic stroke of the L michael, which does correlate with pt's presentation of R sided weakness and speech difficulty. He has had multifocal ischemic strokes in the past (2023), unclear if he ever got evaluation with holter monitor as was recommended at the time. Given prior multifocal strokes in different vascular territories, do have concern for cardioembolic etiology. He would benefit from either holter monitor or loop recorder on discharge to screen for paroxysmal afib.    Recommendations  - No hemorrhage noted on MRI brain this AM, can cancel repeat CTH non con and start DAPT:  - Continue ASA 81 mg and Plavix 75 mg daily for 21 days, followed by ASA 81 mg daily monotherapy thereafter  - Decrease atorvastatin 80 mg nightly to 40 mg nightly, goal LDL <70  - A1c pending, LDL 77.4  - Normotension, normoglycemia  - PT/OT/SLP consulted; appreciate recommendations  - OK to resume chemical DVT ppx  - Holter monitor or loop recorder to screen for paroxysmal afib  - FU with [facility] after discharge, ideally within 4-6 weeks  - Outpatient dementia evaluation, pt has never been evaluated by an outpatient neurologist for dementia before    Case to be discussed with Dr. Amaya    Will sign off at this time, no further recommendations. Please do not hesitate to reach out with any further questions or concerns.    Liz Sepulveda MD  [employer] [facility], PGY-III

## 2024-06-11 NOTE — PLAN OF CARE
VN note: Progress notes, plan of care, labs, vital signs, and orders reviewed. Home medication reviewed with patient's spouse Pati Hahn.    Problem: Adult Inpatient Plan of Care  Goal: Plan of Care Review  Outcome: Progressing

## 2024-06-11 NOTE — PLAN OF CARE
The sw met with the pt's wife and she wants the pt to go to snf at d/c. Her first preference is [facility] and her second is [place]. The sw called the pt's LOCET in to [employer] and faxed the PASRR to [employer]. The sw faxed the pt's info to the snf's listed above via [employer].        06/11/24 2762   Post-Acute Status   Post-Acute Authorization Placement   Post-Acute Placement Status Referrals Sent   Discharge Plan   Discharge Plan A Skilled Nursing Facility   Discharge Plan B Other

## 2024-06-11 NOTE — PLAN OF CARE
The sw met with the pt to complete the assessment but he wasn't able to communicate effectively so the sw spoke to Pati Hahn(wife)[phone] via phone to complete the assessment. The pt and his wife live in [place]([address] [place] [address]). The pt's wife assist him with his ADL's and he has the dme listed below. The pt's wife and son Steve Barger transports himb to all his dr appt's and errands. One of them will transport the pt home at d/c. The pt's wife is requesting a w/c at d/ for the pt and hh if her d/c's home. The pt's wife is also open to snf at [facility] or [facility] if recommended. The pt's wife states it has become a bit challenging for her with the pt's care but refuses nhp for the pt. She states she is still able to bring the pt fishing and he loves to assist her outside with her gardening in the yard. She states she will never commit to allow the pt to go penitentiary into a nh. She's very familiar with them b/c her mother was in one. The sw acknowledged the wife's feelings and encouraged her to call if she has any further questions or concerns. The sw completed the white board in the pt's room with her name and contact info. The sw left a d/c brochure at bedside for the pt's wife with her contact info on it. The sw will continue to follow the pt throughout his transitions of care and will assist with any d/c needs.       06/11/24 1031   Discharge Assessment   Assessment Type Discharge Planning Assessment   Confirmed/corrected address, phone number and insurance Yes   Confirmed Demographics Correct on Facesheet  ([address] [facility] [facility] [place] [address](physical address))   Source of Information family   If unable to respond/provide information was family/caregiver contacted? Yes   Contact Name/Number Pati Hahn(wife)[phone]   When was your last doctors appointment? 05/27/24   Communicated LAMINE with patient/caregiver Date not available/Unable to  determine   Reason For Admission Stroke determined by clinical assessment   People in Home spouse   Do you expect to return to your current living situation? Yes   Do you have help at home or someone to help you manage your care at home? Yes   Who are your caregiver(s) and their phone number(s)? Pati Hahn(wife)[phone]   Prior to hospitilization cognitive status: Not Oriented to Time;Not Oriented to Place;Not Oriented to Person   Current cognitive status: Not Oriented to Time;Not Oriented to Place;Not Oriented to Person   Walking or Climbing Stairs Difficulty yes   Walking or Climbing Stairs ambulation difficulty, requires equipment;stair climbing difficulty, assistance 1 person;transferring difficulty, assistance 1 person   Dressing/Bathing Difficulty yes   Dressing/Bathing bathing difficulty, assistance 1 person   Home Accessibility wheelchair accessible   Home Layout Able to live on 1st floor   Equipment Currently Used at Home walker, rolling;shower chair   Readmission within 30 days? No   Patient currently being followed by outpatient case management? No   Do you currently have service(s) that help you manage your care at home? No   Do you take prescription medications? Yes   Do you have prescription coverage? Yes   Coverage [employer] Medicare/[facility] Indemnity   Do you have any problems affording any of your prescribed medications? No   Is the patient taking medications as prescribed?   (TB D)   Who is going to help you get home at discharge? Pati Hahn(wife)[phone]   How do you get to doctors appointments? car, drives self   Are you on dialysis? No   Do you take coumadin? No   Discharge Plan A Skilled Nursing Facility   Discharge Plan B Home Health   DME Needed Upon Discharge  wheelchair   Discharge Plan discussed with: Patient;Spouse/sig other   Name(s) and Number(s) Pati Hahn(wife)[phone]   Transition of Care Barriers None   Physical Activity   On average, how many days per week  do you engage in moderate to strenuous exercise (like a brisk walk)? 0 days   On average, how many minutes do you engage in exercise at this level? 0 min   Financial Resource Strain   How hard is it for you to pay for the very basics like food, housing, medical care, and heating? Somewhat   Housing Stability   In the last 12 months, was there a time when you were not able to pay the mortgage or rent on time? N   At any time in the past 12 months, were you homeless or living in a shelter (including now)? N   Transportation Needs   Has the lack of transportation kept you from medical appointments, meetings, work or from getting things needed for daily living? No   Food Insecurity   Within the past 12 months, you worried that your food would run out before you got the money to buy more. Never true   Within the past 12 months, the food you bought just didn't last and you didn't have money to get more. Never true   Stress   Do you feel stress - tense, restless, nervous, or anxious, or unable to sleep at night because your mind is troubled all the time - these days? Pt Unable   Social Isolation   How often do you feel lonely or isolated from those around you?  Never   Alcohol Use   Q1: How often do you have a drink containing alcohol? Never   Q2: How many drinks containing alcohol do you have on a typical day when you are drinking? None   Q3: How often do you have six or more drinks on one occasion? Never   Utilities   In the past 12 months has the electric, gas, oil, or water company threatened to shut off services in your home? No   Health Literacy   How often do you need to have someone help you when you read instructions, pamphlets, or other written material from your doctor or pharmacy? Always   OTHER   Name(s) of People in Home Pati Hahn(wife)[phone]

## 2024-06-12 LAB
ALBUMIN SERPL BCP-MCNC: 2.4 G/DL (ref 3.5–5.2)
ALP SERPL-CCNC: 184 U/L (ref 55–135)
ALT SERPL W/O P-5'-P-CCNC: 120 U/L (ref 10–44)
ANION GAP SERPL CALC-SCNC: 6 MMOL/L (ref 8–16)
ANION GAP SERPL CALC-SCNC: 8 MMOL/L (ref 8–16)
AST SERPL-CCNC: 145 U/L (ref 10–40)
BASOPHILS # BLD AUTO: 0.02 K/UL (ref 0–0.2)
BASOPHILS NFR BLD: 0.2 % (ref 0–1.9)
BILIRUB SERPL-MCNC: 2.2 MG/DL (ref 0.1–1)
BUN SERPL-MCNC: 34 MG/DL (ref 8–23)
BUN SERPL-MCNC: 35 MG/DL (ref 8–23)
CALCIUM SERPL-MCNC: 9.1 MG/DL (ref 8.7–10.5)
CALCIUM SERPL-MCNC: 9.2 MG/DL (ref 8.7–10.5)
CHLORIDE SERPL-SCNC: 116 MMOL/L (ref 95–110)
CHLORIDE SERPL-SCNC: 117 MMOL/L (ref 95–110)
CO2 SERPL-SCNC: 15 MMOL/L (ref 23–29)
CO2 SERPL-SCNC: 15 MMOL/L (ref 23–29)
CREAT SERPL-MCNC: 1.8 MG/DL (ref 0.5–1.4)
CREAT SERPL-MCNC: 1.8 MG/DL (ref 0.5–1.4)
DIFFERENTIAL METHOD BLD: ABNORMAL
EOSINOPHIL # BLD AUTO: 0.2 K/UL (ref 0–0.5)
EOSINOPHIL NFR BLD: 2.2 % (ref 0–8)
ERYTHROCYTE [DISTWIDTH] IN BLOOD BY AUTOMATED COUNT: 13.2 % (ref 11.5–14.5)
ERYTHROCYTE [DISTWIDTH] IN BLOOD BY AUTOMATED COUNT: 13.2 % (ref 11.5–14.5)
EST. GFR  (NO RACE VARIABLE): 37 ML/MIN/1.73 M^2
EST. GFR  (NO RACE VARIABLE): 37 ML/MIN/1.73 M^2
GLUCOSE SERPL-MCNC: 108 MG/DL (ref 70–110)
GLUCOSE SERPL-MCNC: 110 MG/DL (ref 70–110)
HCT VFR BLD AUTO: 27.3 % (ref 40–54)
HCT VFR BLD AUTO: 27.3 % (ref 40–54)
HGB BLD-MCNC: 9.7 G/DL (ref 14–18)
HGB BLD-MCNC: 9.7 G/DL (ref 14–18)
IMM GRANULOCYTES # BLD AUTO: 0.05 K/UL (ref 0–0.04)
IMM GRANULOCYTES NFR BLD AUTO: 0.5 % (ref 0–0.5)
LYMPHOCYTES # BLD AUTO: 2.1 K/UL (ref 1–4.8)
LYMPHOCYTES NFR BLD: 21.6 % (ref 18–48)
MAGNESIUM SERPL-MCNC: 1.8 MG/DL (ref 1.6–2.6)
MCH RBC QN AUTO: 32.8 PG (ref 27–31)
MCH RBC QN AUTO: 32.8 PG (ref 27–31)
MCHC RBC AUTO-ENTMCNC: 35.5 G/DL (ref 32–36)
MCHC RBC AUTO-ENTMCNC: 35.5 G/DL (ref 32–36)
MCV RBC AUTO: 92 FL (ref 82–98)
MCV RBC AUTO: 92 FL (ref 82–98)
MONOCYTES # BLD AUTO: 1.4 K/UL (ref 0.3–1)
MONOCYTES NFR BLD: 13.7 % (ref 4–15)
NEUTROPHILS # BLD AUTO: 6.1 K/UL (ref 1.8–7.7)
NEUTROPHILS NFR BLD: 61.8 % (ref 38–73)
NRBC BLD-RTO: 0 /100 WBC
OHS QRS DURATION: 70 MS
OHS QTC CALCULATION: 479 MS
PHOSPHATE SERPL-MCNC: 3.1 MG/DL (ref 2.7–4.5)
PLATELET # BLD AUTO: 131 K/UL (ref 150–450)
PLATELET # BLD AUTO: 131 K/UL (ref 150–450)
PMV BLD AUTO: 9.4 FL (ref 9.2–12.9)
PMV BLD AUTO: 9.4 FL (ref 9.2–12.9)
POCT GLUCOSE: 104 MG/DL (ref 70–110)
POCT GLUCOSE: 122 MG/DL (ref 70–110)
POCT GLUCOSE: 136 MG/DL (ref 70–110)
POCT GLUCOSE: 163 MG/DL (ref 70–110)
POTASSIUM SERPL-SCNC: 4 MMOL/L (ref 3.5–5.1)
POTASSIUM SERPL-SCNC: 4 MMOL/L (ref 3.5–5.1)
PROT SERPL-MCNC: 6.4 G/DL (ref 6–8.4)
RBC # BLD AUTO: 2.96 M/UL (ref 4.6–6.2)
RBC # BLD AUTO: 2.96 M/UL (ref 4.6–6.2)
SODIUM SERPL-SCNC: 138 MMOL/L (ref 136–145)
SODIUM SERPL-SCNC: 139 MMOL/L (ref 136–145)
WBC # BLD AUTO: 9.87 K/UL (ref 3.9–12.7)
WBC # BLD AUTO: 9.87 K/UL (ref 3.9–12.7)

## 2024-06-12 PROCEDURE — 63600175 PHARM REV CODE 636 W HCPCS: Mod: HCNC

## 2024-06-12 PROCEDURE — 94761 N-INVAS EAR/PLS OXIMETRY MLT: CPT | Mod: HCNC

## 2024-06-12 PROCEDURE — 25000003 PHARM REV CODE 250: Mod: HCNC

## 2024-06-12 PROCEDURE — 80048 BASIC METABOLIC PNL TOTAL CA: CPT | Mod: HCNC,XB | Performed by: FAMILY MEDICINE

## 2024-06-12 PROCEDURE — 25000003 PHARM REV CODE 250: Mod: HCNC | Performed by: INTERNAL MEDICINE

## 2024-06-12 PROCEDURE — 85025 COMPLETE CBC W/AUTO DIFF WBC: CPT | Mod: HCNC | Performed by: FAMILY MEDICINE

## 2024-06-12 PROCEDURE — 80053 COMPREHEN METABOLIC PANEL: CPT | Mod: HCNC | Performed by: FAMILY MEDICINE

## 2024-06-12 PROCEDURE — 11000001 HC ACUTE MED/SURG PRIVATE ROOM: Mod: HCNC

## 2024-06-12 PROCEDURE — 99900035 HC TECH TIME PER 15 MIN (STAT): Mod: HCNC

## 2024-06-12 PROCEDURE — 97530 THERAPEUTIC ACTIVITIES: CPT | Mod: HCNC,CO

## 2024-06-12 PROCEDURE — 83735 ASSAY OF MAGNESIUM: CPT | Mod: HCNC | Performed by: FAMILY MEDICINE

## 2024-06-12 PROCEDURE — 92526 ORAL FUNCTION THERAPY: CPT | Mod: HCNC

## 2024-06-12 PROCEDURE — 97530 THERAPEUTIC ACTIVITIES: CPT | Mod: HCNC,CQ

## 2024-06-12 PROCEDURE — 84100 ASSAY OF PHOSPHORUS: CPT | Mod: HCNC | Performed by: FAMILY MEDICINE

## 2024-06-12 PROCEDURE — 97110 THERAPEUTIC EXERCISES: CPT | Mod: HCNC,CO

## 2024-06-12 PROCEDURE — 25000003 PHARM REV CODE 250: Mod: HCNC | Performed by: FAMILY MEDICINE

## 2024-06-12 PROCEDURE — 36415 COLL VENOUS BLD VENIPUNCTURE: CPT | Mod: HCNC | Performed by: FAMILY MEDICINE

## 2024-06-12 RX ORDER — ATORVASTATIN CALCIUM 40 MG/1
40 TABLET, FILM COATED ORAL DAILY
Status: DISCONTINUED | OUTPATIENT
Start: 2024-06-13 | End: 2024-06-13

## 2024-06-12 RX ADMIN — DORZOLAMIDE HYDROCHLORIDE TIMOLOL MALEATE 1 DROP: 20; 5 SOLUTION/ DROPS OPHTHALMIC at 09:06

## 2024-06-12 RX ADMIN — DORZOLAMIDE HYDROCHLORIDE TIMOLOL MALEATE 1 DROP: 20; 5 SOLUTION/ DROPS OPHTHALMIC at 08:06

## 2024-06-12 RX ADMIN — ATORVASTATIN CALCIUM 80 MG: 40 TABLET, FILM COATED ORAL at 08:06

## 2024-06-12 RX ADMIN — ASPIRIN 81 MG CHEWABLE TABLET 81 MG: 81 TABLET CHEWABLE at 08:06

## 2024-06-12 RX ADMIN — MUPIROCIN: 20 OINTMENT TOPICAL at 09:06

## 2024-06-12 RX ADMIN — MUPIROCIN: 20 OINTMENT TOPICAL at 08:06

## 2024-06-12 RX ADMIN — SPIRONOLACTONE 25 MG: 25 TABLET ORAL at 08:06

## 2024-06-12 RX ADMIN — CLOPIDOGREL BISULFATE 75 MG: 75 TABLET ORAL at 08:06

## 2024-06-12 RX ADMIN — LACTULOSE 10 G: 20 SOLUTION ORAL at 08:06

## 2024-06-12 RX ADMIN — ENOXAPARIN SODIUM 30 MG: 30 INJECTION SUBCUTANEOUS at 04:06

## 2024-06-12 NOTE — PT/OT/SLP PROGRESS
Physical Therapy Treatment    Patient Name:  Dean Hahn   MRN:  [id number]    Recommendations:     Discharge Recommendations: Moderate Intensity Therapy  Discharge Equipment Recommendations:  (TBD next level of care)  Barriers to discharge:  decreased mobility,strength and endurance    Assessment:     Dean Hahn is a 83 y.o. male admitted with a medical diagnosis of <principal problem not specified>.  He presents with the following impairments/functional limitations: weakness, impaired endurance, impaired functional mobility, impaired balance, decreased upper extremity function, decreased lower extremity function, decreased ROM, impaired coordination (impaired hearing),pt with good participation and requires increased assistance with all mobility at this time,pt will benefit from moderate intensity therapy upon discharge.    Rehab Prognosis: Fair; patient would benefit from acute skilled PT services to address these deficits and reach maximum level of function.    Recent Surgery: * No surgery found *      Plan:     During this hospitalization, patient to be seen 3 x/week to address the identified rehab impairments via gait training, therapeutic activities, therapeutic exercises, neuromuscular re-education and progress toward the following goals:    Plan of Care Expires:  07/11/24    Subjective     Chief Complaint: n/a  Patient/Family Comments/goals: pt w/o c/o's.  Pain/Comfort:  Pain Rating 1: 0/10      Objective:     Communicated with nsg prior to session.  Patient found supine with bed alarm, peripheral IV, telemetry upon PT entry to room.     General Precautions: Standard, fall  Orthopedic Precautions: N/A  Braces: N/A  Respiratory Status: Room air     Functional Mobility:  Bed Mobility:     Supine to Sit: maximal assistance and of 2 persons  Sit to Supine: maximal assistance and of 2 persons  Transfers:     Sit to Stand:  maximal assistance, of 2 persons, and X 3 trials once HHA and X 2  with RW with rolling walker  Balance: poor standing balance      AM-PAC 6 CLICK MOBILITY  Turning over in bed (including adjusting bedclothes, sheets and blankets)?: 2  Sitting down on and standing up from a chair with arms (e.g., wheelchair, bedside commode, etc.): 2  Moving from lying on back to sitting on the side of the bed?: 2  Moving to and from a bed to a chair (including a wheelchair)?: 2  Need to walk in hospital room?: 1  Climbing 3-5 steps with a railing?: 1  Basic Mobility Total Score: 10       Treatment & Education: pt with increased posterior lean and increased extensor tone at L le,pt received AA trunk rotation and stretches in sitting with A X 2.      Patient left supine with all lines intact, call button in reach, bed alarm on, and daughter present..    GOALS: see general POC  Multidisciplinary Problems       Physical Therapy Goals          Problem: Physical Therapy    Goal Priority Disciplines Outcome Goal Variances Interventions   Physical Therapy Goal     PT, PT/OT Progressing     Description: Goals to be met by: 24     Patient will increase functional independence with mobility by performin. Supine to sit with Moderate Assistance  2. Sit to supine with Moderate Assistance  3. Sit to stand transfer with Moderate Assistance  4. Bed to chair transfer with Moderate Assistance using Rolling Walker  5. Gait  x 25 feet with Moderate Assistance using Rolling Walker.                          Time Tracking:     PT Received On: 24  PT Start Time: 1100     PT Stop Time: 1125  PT Total Time (min): 25 min     Billable Minutes: Therapeutic Activity 25       PT/PTA: PTA     Number of PTA visits since last PT visit: 2024

## 2024-06-12 NOTE — PT/OT/SLP PROGRESS
Speech Language Pathology Treatment    Patient Name:  Dean Hahn   MRN:  [id number]  Admitting Diagnosis: AMS     Recommendations:               Diet recommendations:  Puree Diet - IDDSI Level 4, Liquid Diet Level: Thin liquids - IDDSI Level 0   Aspiration Precautions: upright for meals, slow rate, alternate sips and bites, crush meds   General Precautions: Standard, fall  Communication strategies:  reorient, repeat, needs hearing aid     Assessment:     Dean Hahn is a 83 y.o. male admitted with AMS who is able to tolerate pureed and thin liquids.       Subjective     Pt seen at bedside for direct dysphagia tx.   Patient goals: none stated      Pain/Comfort:  Pain Rating 1: 0/10    Respiratory Status: room air     Objective:     Has the patient been evaluated by SLP for swallowing?   Yes  Keep patient NPO? No     Swallowing: pt seen this date in room, pt consumed bites of applesauce with fair oral motor control and timely swallow. No oral residue post swallow. Pt tolerated sips of juice via straw with no coughing or choking across trials. Pt had consumed 1/2 boost with timely swallow. Pt at baseline with swallow function.     Goals:   Multidisciplinary Problems       SLP Goals          Problem: SLP    Goal Priority Disciplines Outcome   SLP Goal     SLP Progressing   Description: Short Term Goals:  1. Pt will participate in swallow eval to determine safest diet level.   2. Pt will tolerate pureed/thin diet with no audible dysphagia signs.   3. Ongoing eval of speech-lang to determine deficits.                        Plan:       Plan of Care expires:  07/10/24  Plan of Care reviewed with:  patient, spouse   SLP Follow-Up:  No       Discharge recommendations:  Moderate Intensity Therapy   Barriers to Discharge:  none    Time Tracking:     SLP Treatment Date:   06/12/24  Speech Start Time:  1042  Speech Stop Time:  1055     Speech Total Time (min):  13 min    Billable Minutes: Treatment  Swallowing Dysfunction 13    06/12/2024

## 2024-06-12 NOTE — PT/OT/SLP PROGRESS
Occupational Therapy   Treatment    Name: Dean Hahn  MRN: [id number]  Admitting Diagnosis:  <principal problem not specified>       Recommendations:     Discharge Recommendations: Moderate Intensity Therapy  Discharge Equipment Recommendations:  to be determined by next level of care  Barriers to discharge:  Other (Comment) (increased level of assistance)    Assessment:     Dean Hahn is a 83 y.o. male with a medical diagnosis of <principal problem not specified>.  Performance deficits affecting function are weakness, impaired endurance, impaired self care skills, impaired functional mobility, gait instability, impaired balance, impaired cognition, decreased upper extremity function, decreased lower extremity function, decreased safety awareness, abnormal tone, decreased ROM, impaired coordination, impaired fine motor, impaired skin.    Pt found in bed, agreeable to therapy.  Pt non verbal however smiling during session and cooperative.  No command following noted possibly due to hearing impairment and/or cognitive deficit.  Pt requires Max A x 2 for bed mobility and BADL tasks.  Continue OT services to address functional goals, progressing as able.      Rehab Prognosis:  Fair; patient would benefit from acute skilled OT services to address these deficits and reach maximum level of function.       Plan:     Patient to be seen 3 x/week to address the above listed problems via self-care/home management, therapeutic activities, therapeutic exercises, neuromuscular re-education  Plan of Care Expires: 07/11/24  Plan of Care Reviewed with: patient, daughter    Subjective     Chief Complaint: no complaints   Patient/Family Comments/goals: to feed self per dtr report   Pain/Comfort:  Pain Rating 1: 0/10 (no s/s of pain)    Objective:     Communicated with: Nurse prior to session.  Patient found HOB elevated with bed alarm, peripheral IV, telemetry upon OT entry to room.    General Precautions:  Standard, fall    Orthopedic Precautions:N/A  Braces: N/A  Respiratory Status: Room air     Occupational Performance:     Bed Mobility:    Patient completed Rolling/Turning to Right with maximal assistance  Patient completed Scooting/Bridging with maximal assistance to scoot seated to EOB  Patient completed Supine to Sit with maximal assistance and 2 persons  Patient completed Sit to Supine with maximal assistance and 2 persons     Functional Mobility/Transfers:  Patient completed Sit <> Stand Transfer with maximal assistance and of 2 persons  with  hand-held assist and then rolling walker x 3 trials.  Pt with heavy posterior lean in standing, unable to achieve midline.   Functional Mobility: Pt required Max A x 2 to take one step to R side.  Pt with heavy posterior lean and required Max A to move RLE laterally.      Activities of Daily Living:  Feeding:  maximal assistance pt holds cup when placed in B hands. He requires Max A to bring to mouth to take a sip.  Encouraged dtr to assist pt with self feeding using hand over hand assist as able.  Dtr agreed.         [facility] 6 Click ADL: 10    Treatment & Education:  Pt sat EOB ~15 min with SBA, when arms propped at side, to Min A 2/2 posterior and R lateral drift.  Pt requires assist to maintain B feet in floor.    Pt with increased extensor tone in sitting and standing, LUE/LE greater than R.   Pt participated in dynamic sitting balance activity, reaching in various planes with BUE requiring H-O-H assist to reach and Max A at trunk to increase flexion and rotation, to improve trunk mobility, sitting balance and midline posture.   Pt returned to supine and performed BUE AAROM for shld flex and elbow f/e x 10 reps.         Patient left HOB elevated with all lines intact, call button in reach, bed alarm on, and dtr present    GOALS:   Multidisciplinary Problems       Occupational Therapy Goals          Problem: Occupational Therapy    Goal Priority Disciplines Outcome  Interventions   Occupational Therapy Goal     OT, PT/OT Progressing    Description: Goals to be met by: 7/11/24     Patient will increase functional independence with ADLs by performing:    Grooming while seated with Minimal Assistance.  Toileting from bedside commode with Minimal Assistance for hygiene and clothing management.   Sitting at edge of bed x10 minutes with Stand-by Assistance.  Supine to sit with Minimal Assistance.  Step transfer with Minimal Assistance  Toilet transfer to bedside commode with Minimal Assistance.  Increased functional strength to WFL for self care skills and functional mobility .  Upper extremity exercise program x10 reps per handout, with independence.                         Time Tracking:     OT Date of Treatment: 06/12/24  OT Start Time: 1100  OT Stop Time: 1125  OT Total Time (min): 25 min    Billable Minutes:Therapeutic Activity 17  Therapeutic Exercise 8            6/12/2024

## 2024-06-12 NOTE — HOSPITAL COURSE
6/12/24 Wife at bedside, agreeable to SNF placement  6/13/24 Stable  6/14/24 GI consulted for transaminitis  6/15/24 Stable  6/16/24 Stable, pending placement

## 2024-06-12 NOTE — PLAN OF CARE
Problem: SLP  Goal: SLP Goal  Description: Short Term Goals:  1. Pt will participate in swallow eval to determine safest diet level.   2. Pt will tolerate pureed/thin diet with no audible dysphagia signs.   3. Ongoing eval of speech-lang to determine deficits.   Outcome: Progressing   Pt seen in room, wife present. Pt tolerating current diet level with no outward dysphagia signs.

## 2024-06-12 NOTE — ASSESSMENT & PLAN NOTE
Will get acute hepatitis panel and monitor  US Abd  Reduced dose of Atorvastatin to 40mg daily  If LFTS trending up will need to dc statin

## 2024-06-12 NOTE — PLAN OF CARE
Problem: Physical Therapy  Goal: Physical Therapy Goal  Description: Goals to be met by: 24     Patient will increase functional independence with mobility by performin. Supine to sit with Moderate Assistance  2. Sit to supine with Moderate Assistance  3. Sit to stand transfer with Moderate Assistance  4. Bed to chair transfer with Moderate Assistance using Rolling Walker  5. Gait  x 25 feet with Moderate Assistance using Rolling Walker.     Outcome: Progressing

## 2024-06-12 NOTE — PROGRESS NOTES
[facility]  Progress Note    Patient Name: Dean Hahn  MRN: [id number]  Patient Class: IP- Inpatient   Admission Date: 6/10/2024  Length of Stay: 2 days  Attending Physician: Marisol Chung MD  Primary Care Provider: Angel Luis Tobias III, MD        Subjective:     Principal Problem:<principal problem not specified>        HPI:  84 YO M with PMHx significant for HTN, dementia, HLD, carotid stenosis B/L CKD, was brought to the ER for new onset speech difficulty, right sided weakness, change in mental status. When seen in the ICU, patient was confused, alert, oriented to self and keep repeating the same thing. He was not able to give any history. 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 He fell and was found on the ground. His family found him on the floor at his house hours after fall, with dysphasia, weakness with limping, and confusion. EMS called and brought to the ER. Code stroke was called. He had CT head no acute process. Microvascular small vessel ischemic change. Labs in the ER with elevated LFT, elevated creatinine, elevated TSH with normal free T, elevated troponin. He was given TPA and post TPA transfer to ICU. In the unit he had another recurrent episode of worsening confusion, repeat CT head without acute evidence of acute process.     Overview/Hospital Course:  6/12/24 Wife at bedside, agreeable to SNF placement    Interval History: Alert, doesn't respond to questions, wife at bedside said that his hearing aide doesn't work very well    Review of Systems   Unable to perform ROS: Mental status change   Respiratory:  Negative for shortness of breath.    Cardiovascular:  Negative for chest pain.   Neurological:  Positive for speech difficulty and weakness.   Psychiatric/Behavioral:  Negative for agitation and confusion.      Objective:     Vital Signs (Most Recent):  Temp: 98.3 °F (36.8 °C) (06/12/24  1241)  Pulse: 69 (06/12/24 1244)  Resp: 18 (06/12/24 1241)  BP: (!) 99/55 (06/12/24 1244)  SpO2: 98 % (06/12/24 1535) Vital Signs (24h Range):  Temp:  [96.8 °F (36 °C)-98.8 °F (37.1 °C)] 98.3 °F (36.8 °C)  Pulse:  [] 69  Resp:  [16-18] 18  SpO2:  [98 %-100 %] 98 %  BP: ()/(52-89) 99/55     Weight: 53.6 kg (118 lb 2.7 oz)  Body mass index is 20.93 kg/m².    Intake/Output Summary (Last 24 hours) at 6/12/2024 1553  Last data filed at 6/12/2024 1259  Gross per 24 hour   Intake 305 ml   Output 570 ml   Net -265 ml         Physical Exam  Vitals reviewed.   Constitutional:       Appearance: He is not ill-appearing or toxic-appearing.   HENT:      Head: Normocephalic.      Nose: No congestion.   Cardiovascular:      Rate and Rhythm: Normal rate.   Pulmonary:      Effort: Pulmonary effort is normal. No respiratory distress.      Breath sounds: No wheezing or rales.   Chest:      Chest wall: No tenderness.   Abdominal:      General: Bowel sounds are normal. There is no distension.      Tenderness: There is no abdominal tenderness. There is no guarding or rebound.   Musculoskeletal:      Cervical back: No rigidity or tenderness.      Right lower leg: No edema.      Left lower leg: No edema.   Neurological:      Mental Status: He is alert. He is disoriented.      Cranial Nerves: Cranial nerve deficit present.      Motor: Weakness present.      Gait: Gait abnormal.             Significant Labs: All pertinent labs within the past 24 hours have been reviewed.  CBC:   Recent Labs   Lab 06/11/24  0349 06/11/24  1036 06/12/24 0221   WBC 8.41  8.41  --  9.87  9.87   HGB 10.1*  10.1*  --  9.7*  9.7*   HCT 31.3*  31.3* 34.2* 27.3*  27.3*   *  128*  --  131*  131*     CMP:   Recent Labs   Lab 06/11/24  0349 06/12/24 0221     136 139  138   K 4.1  4.3 4.0  4.0   *  115* 116*  117*   CO2 14*  13* 15*  15*   GLU 99  97 108  110   BUN 29*  28* 35*  34*   CREATININE 1.6*  1.6* 1.8*  1.8*    CALCIUM 9.4  9.3 9.1  9.2   PROT 6.5 6.4   ALBUMIN 2.5* 2.4*   BILITOT 2.3* 2.2*   ALKPHOS 169* 184*   * 145*   * 120*   ANIONGAP 7*  8 8  6*       Significant Imaging: I have reviewed all pertinent imaging results/findings within the past 24 hours.    Assessment/Plan:      Stroke determined by clinical assessment    Antithrombotics for secondary stroke prevention: Antiplatelets: Clopidogrel: 75 mg daily    Statins for secondary stroke prevention and hyperlipidemia, if present:   Statins: Atorvastatin- 40 mg daily    Aggressive risk factor modification: HTN, Diet, Exercise, CAD     Rehab efforts: The patient has been evaluated by a stroke team provider and the therapy needs have been fully considered based off the presenting complaints and exam findings. The following therapy evaluations are needed: PT evaluate and treat, OT evaluate and treat, SLP evaluate and treat, PM&R evaluate for appropriate placement    Diagnostics ordered/pending: CTA Head to assess vasculature , CTA Neck/Arch to assess vasculature, HgbA1C to assess blood glucose levels, Lipid Profile to assess cholesterol levels, MRI head without contrast to assess brain parenchyma, TSH to assess thyroid function    VTE prophylaxis: None: Reason for No Pharmacological VTE Prophylaxis: S/P TpA    BP parameters: carotid stenosis: defer to neuro surgery        Thrombocytopenia  Patient was found to have thrombocytopenia, the likely etiology is secondary to cirrhosis/portal hypertension, will monitor the platelets Daily. Will transfuse if platelet count is <100k (if undergoing neurosurgery). Hold DVT prophylaxis if platelets are <50k. The patient's platelet results have been reviewed and are listed below.  Recent Labs   Lab 06/10/24  0821   *         Stage 3a chronic kidney disease  Creatine stable for now. BMP reviewed- noted Estimated Creatinine Clearance: 24.4 mL/min (A) (based on SCr of 1.8 mg/dL (H)). according to latest data.  Based on current GFR, CKD stage is stage 3 - GFR 30-59.  Monitor UOP and serial BMP and adjust therapy as needed. Renally dose meds. Avoid nephrotoxic medications and procedures.    Bilateral carotid artery disease  Defer to neuro surgery      LFT elevation  Will get acute hepatitis panel and monitor  US Abd  Reduced dose of Atorvastatin to 40mg daily  If LFTS trending up will need to dc statin      Pure hypercholesterolemia  Cont statin      Essential hypertension  Chronic, controlled. Latest blood pressure and vitals reviewed-     Temp:  [97.5 °F (36.4 °C)-98.6 °F (37 °C)]   Pulse:  [56-65]   Resp:  [10-20]   BP: (108-161)/()   SpO2:  [100 %] .   Home meds for hypertension were reviewed and noted below.   Hypertension Medications               metoprolol succinate (TOPROL-XL) 25 MG 24 hr tablet TAKE 1 TABLET BY MOUTH EVERY DAY IN THE EVENING    olmesartan (BENICAR) 20 MG tablet Take 1 tablet (20 mg total) by mouth once daily.    spironolactone (ALDACTONE) 25 MG tablet Take 1 tablet (25 mg total) by mouth once daily.            While in the hospital, will manage blood pressure as follows; Adjust home antihypertensive regimen as follows- hold for BP     Will utilize p.r.n. blood pressure medication only if patient's blood pressure greater than 180/110 and he develops symptoms such as worsening chest pain or shortness of breath.      VTE Risk Mitigation (From admission, onward)           Ordered     enoxaparin injection 30 mg  Every 24 hours         06/11/24 1016     IP VTE HIGH RISK PATIENT  Once         06/10/24 1233     Place sequential compression device  Until discontinued         06/10/24 1233     Reason for No Pharmacological VTE Prophylaxis  Once        Question:  Reasons:  Answer:  Risk of Bleeding    06/10/24 1233                    Discharge Planning   LAMINE:      Code Status: Full Code   Is the patient medically ready for discharge?:     Reason for patient still in hospital (select all that apply): PT  / OT recommendations and Pending disposition  Discharge Plan A: Skilled Nursing Facility   Discharge Delays: None known at this time              Marisol Chung MD  Department of Hospital Medicine   [facility]

## 2024-06-12 NOTE — ASSESSMENT & PLAN NOTE
Creatine stable for now. BMP reviewed- noted Estimated Creatinine Clearance: 24.4 mL/min (A) (based on SCr of 1.8 mg/dL (H)). according to latest data. Based on current GFR, CKD stage is stage 3 - GFR 30-59.  Monitor UOP and serial BMP and adjust therapy as needed. Renally dose meds. Avoid nephrotoxic medications and procedures.

## 2024-06-12 NOTE — SUBJECTIVE & OBJECTIVE
Interval History: Alert, doesn't respond to questions, wife at bedside said that his hearing aide doesn't work very well    Review of Systems   Unable to perform ROS: Mental status change   Respiratory:  Negative for shortness of breath.    Cardiovascular:  Negative for chest pain.   Neurological:  Positive for speech difficulty and weakness.   Psychiatric/Behavioral:  Negative for agitation and confusion.      Objective:     Vital Signs (Most Recent):  Temp: 98.3 °F (36.8 °C) (06/12/24 1241)  Pulse: 69 (06/12/24 1244)  Resp: 18 (06/12/24 1241)  BP: (!) 99/55 (06/12/24 1244)  SpO2: 98 % (06/12/24 1535) Vital Signs (24h Range):  Temp:  [96.8 °F (36 °C)-98.8 °F (37.1 °C)] 98.3 °F (36.8 °C)  Pulse:  [] 69  Resp:  [16-18] 18  SpO2:  [98 %-100 %] 98 %  BP: ()/(52-89) 99/55     Weight: 53.6 kg (118 lb 2.7 oz)  Body mass index is 20.93 kg/m².    Intake/Output Summary (Last 24 hours) at 6/12/2024 1553  Last data filed at 6/12/2024 1259  Gross per 24 hour   Intake 305 ml   Output 570 ml   Net -265 ml         Physical Exam  Vitals reviewed.   Constitutional:       Appearance: He is not ill-appearing or toxic-appearing.   HENT:      Head: Normocephalic.      Nose: No congestion.   Cardiovascular:      Rate and Rhythm: Normal rate.   Pulmonary:      Effort: Pulmonary effort is normal. No respiratory distress.      Breath sounds: No wheezing or rales.   Chest:      Chest wall: No tenderness.   Abdominal:      General: Bowel sounds are normal. There is no distension.      Tenderness: There is no abdominal tenderness. There is no guarding or rebound.   Musculoskeletal:      Cervical back: No rigidity or tenderness.      Right lower leg: No edema.      Left lower leg: No edema.   Neurological:      Mental Status: He is alert. He is disoriented.      Cranial Nerves: Cranial nerve deficit present.      Motor: Weakness present.      Gait: Gait abnormal.             Significant Labs: All pertinent labs within the past 24  hours have been reviewed.  CBC:   Recent Labs   Lab 06/11/24  0349 06/11/24  1036 06/12/24 0221   WBC 8.41  8.41  --  9.87  9.87   HGB 10.1*  10.1*  --  9.7*  9.7*   HCT 31.3*  31.3* 34.2* 27.3*  27.3*   *  128*  --  131*  131*     CMP:   Recent Labs   Lab 06/11/24  0349 06/12/24 0221     136 139  138   K 4.1  4.3 4.0  4.0   *  115* 116*  117*   CO2 14*  13* 15*  15*   GLU 99  97 108  110   BUN 29*  28* 35*  34*   CREATININE 1.6*  1.6* 1.8*  1.8*   CALCIUM 9.4  9.3 9.1  9.2   PROT 6.5 6.4   ALBUMIN 2.5* 2.4*   BILITOT 2.3* 2.2*   ALKPHOS 169* 184*   * 145*   * 120*   ANIONGAP 7*  8 8  6*       Significant Imaging: I have reviewed all pertinent imaging results/findings within the past 24 hours.

## 2024-06-12 NOTE — PLAN OF CARE
Problem: Occupational Therapy  Goal: Occupational Therapy Goal  Description: Goals to be met by: 7/11/24     Patient will increase functional independence with ADLs by performing:    Grooming while seated with Minimal Assistance.  Toileting from bedside commode with Minimal Assistance for hygiene and clothing management.   Sitting at edge of bed x10 minutes with Stand-by Assistance.  Supine to sit with Minimal Assistance.  Step transfer with Minimal Assistance  Toilet transfer to bedside commode with Minimal Assistance.  Increased functional strength to [facility] for self care skills and functional mobility .  Upper extremity exercise program x10 reps per handout, with independence.    Outcome: Progressing   Dean Hahn is a 83 y.o. male with a medical diagnosis of <principal problem not specified>.  Performance deficits affecting function are weakness, impaired endurance, impaired self care skills, impaired functional mobility, gait instability, impaired balance, impaired cognition, decreased upper extremity function, decreased lower extremity function, decreased safety awareness, abnormal tone, decreased ROM, impaired coordination, impaired fine motor, impaired skin.    Pt found in bed, agreeable to therapy.  Pt non verbal however smiling during session and cooperative.  No command following noted possibly due to hearing impairment and/or cognitive deficit.  Pt requires Max A x 2 for bed mobility and BADL tasks.  Continue OT services to address functional goals, progressing as able.

## 2024-06-12 NOTE — PLAN OF CARE
" met with patient and spouse at bedside. Discharge plans discussed. Previous SW working on SNF placement. Family is still agreeable. Dominick Johnson declined patient due to insurance being "out of network."  still awaiting response from [facility]. I provided spouse with [facility] SNF list to review, will follow-up on preferences. Spouse also asked about HH being set after SNF placement and I explained to her process. Spouse encouraged to call with any questions or concerns.  will continue to follow patient through transitions of care and assist with any discharge needs.     I contacted [facility] and  will review patient. --Update--They are not in network.    Additional SNF referrals [facility].    [id number]-- contacted patient's spouse to update her many facilities out of network with patient's insurance. The only facilities so far that have stated able to accept are [facility] and [facility]. I provided her with my information to call me back regarding preferences.     Recipient Location First Sent On Respond By Date and Time Provider Can Take Patient First Response Received Last Response Received Last Response     [facility] Phone: [phone]    24 Hours Fever Free  * COVID-19: Positive patients under care,* COVID-19: Willing/Equipped to accept COVID-19 positive patients,* High-Risk Isolation Patients: Willing/Equipped to accept High-Risk Isolation Patients [address] [facility] [place] [address] 6/12/2024 12:14 (CT)  6/12/2024 14:12 (CT)  -  6/12/2024 14:11 (CT)  6/12/2024 14:11 (CT)  Response: Yes, willing to accept patient  Waiting for you     [facility] Phone: [phone]      * COVID-19: Willing/Equipped to accept COVID-19 positive patients,* High-Risk Isolation Patients: Willing/Equipped to accept High-Risk Isolation Patients [address] [place] [address] 6/12/2024 12:14 (CT)  " 6/12/2024 14:12 (CT)  -  6/12/2024 14:11 (CT)  6/12/2024 14:11 (CT)  Response: Yes, willing to accept patient  Waiting for you     [facility] Phone: [phone]    covid test required  * COVID-19: Willing/Equipped to accept COVID-19 positive patients [address] [facility] [place] [address] 6/12/2024 12:14 (CT)  6/12/2024 14:12 (CT)  -  6/12/2024 13:47 (CT)  6/12/2024 13:47 (CT)  Response: No, unable to accept patient  Reason: Out of Network  Waiting for [facility] Phone: [phone]    1 negative within 24 hrs of admit covid test  * COVID-19: Willing/Equipped to accept COVID-19 positive patients,* High-Risk Isolation Patients: Willing/Equipped to accept High-Risk Isolation Patients [address] [place] [address] 6/12/2024 12:14 (CT)  6/12/2024 14:12 (CT)  -  6/12/2024 13:47 (CT)  6/12/2024 13:47 (CT)  Response: No, unable to accept patient  Reason: Out of Network  Waiting for you     [facility] Phone: [phone]      * COVID-19: NOT able to accept COVID-19 positive patients [address] [facility] [place] [address] 6/12/2024 12:14 (CT)  6/12/2024 14:12 (CT)  -  6/12/2024 12:17 (CT)  6/12/2024 12:17 (CT)  Response: No, unable to accept patient  Reason: Out of Network  Waiting for you     [facility] Phone: [phone]    1 negative test in last 72 hours  * COVID-19: NOT able to accept COVID-19 positive patients [address] [place] [address] 6/11/2024 16:04 (CT)  6/12/2024 9:05 (CT)  -  6/12/2024 8:26 (CT)  6/12/2024 11:58 (CT)  Response: No, unable to accept patient  Reason: Out of Network  Waiting for you     [facility] Phone: [phone]    covid test within 72 hours  * COVID-19: NOT able to accept COVID-19 positive patients,* High-Risk Isolation Patients: Willing/Equipped to accept High-Risk Isolation Patients [address] [address] 6/11/2024 16:04 (CT)   6/12/2024 9:05 (CT)  -  6/12/2024 8:21 (CT)  6/12/2024 8:21 (CT)  Response: No, unable to accept patient  Reason: Out of Network  Comments: from what i can verify his [employer] plan is primary. and we are out of network   Waiting for you     [facility] Phone: [phone]    2 negative test prior to entry  * COVID-19: NOT able to accept COVID-19 positive patients [address] [address] 6/12/2024 12:14 (CT)  6/12/2024 14:12 (CT)  -  6/12/2024 12:17 (CT)  6/12/2024 12:17 (CT)  Response: No, unable to accept patient  Reason: Out of Network  Waiting for you     [facility] Phone: [phone]      * COVID-19: Services not specified,* High-Risk Isolation Patients: Willing/Equipped to accept High-Risk Isolation Patients [address] [place] [address] 6/12/2024 12:14 (CT)  6/12/2024 14:12 (CT)  -  6/12/2024 13:05 (CT)  6/12/2024 13:05 (CT)  Response: Referral Received  Comments: RANCHO KRAUSE [phone]  Waiting for recipient     [facility] Phone: [phone]    RAPID TEST DAY OF DC.  * COVID-19: Positive patients under care,* COVID-19: Willing/Equipped to accept COVID-19 positive patients [address] [place] [address] 6/12/2024 12:14 (CT)  6/12/2024 14:12 (CT)  -  6/12/2024 12:35 (CT)  6/12/2024 12:35 (CT)  Response: Referral Received  Waiting for recipient     [facility] Phone: [phone]    1 negative within 24/48 hours of admission  * COVID-19: NOT able to accept COVID-19 positive patients [address] [place] [address] 6/12/2024 12:14 (CT)  6/12/2024 14:12 (CT)  -  -  -  -  Waiting for recipient     [facility] Phone: [phone]    NONE  * COVID-19: NOT able to accept COVID-19 positive patients,* COVID-19: Positive patients under care [address] [place] [place] [address] 6/12/2024 12:14 (CT)  6/12/2024 14:12  (CT)  -  -  -  -  Waiting for recipient     Patient Contacts    Name Relation Home Work Mobile   Pati Hahn Spouse [phone]   Fide Maldonado Friend   [phone]   Lindsay Carter Daughter   [phone]      06/12/24 1213   Discharge Reassessment   Assessment Type Discharge Planning Assessment   Did the patient's condition or plan change since previous assessment? Yes   Discharge Plan discussed with: Spouse/sig other;Patient   Discharge Plan A Skilled Nursing Facility   Discharge Plan B Home with family;Home Health   DME Needed Upon Discharge  other (see comments)  (TBD)   Transition of Care Barriers None   Why the patient remains in the hospital Requires continued medical care   Post-Acute Status   Post-Acute Authorization Placement   Post-Acute Placement Status Referrals Sent   Hospital Resources/Appts/Education Provided Appointments scheduled and added to AVS   Discharge Delays None known at this time     Toña Urrutia RN    [phone]

## 2024-06-12 NOTE — PLAN OF CARE
"  Problem: Adult Inpatient Plan of Care  Goal: Plan of Care Review  Outcome: Progressing     Problem: Adult Inpatient Plan of Care  Goal: Optimal Comfort and Wellbeing  Outcome: Progressing     Problem: Stroke, Ischemic (Includes Transient Ischemic Attack)  Goal: Optimal Coping  Outcome: Progressing     Problem: Confusion Chronic  Goal: Optimal Cognitive Function  Outcome: Progressing     .Plan of care reviewed with the patient. Scheduled medicines given and the patient tolerated well. Fall and safety precautions taken and the standard interventions are in place. On Telemetry monitoring with NSR, no true "red alarms' noted, no acute distress reported either on the shift. Patient's Accu Check was 115 mg/dl. Advised the patient to call for assistance. Continued monitoring the patient.   "

## 2024-06-13 PROBLEM — H90.3 SENSORINEURAL HEARING LOSS (SNHL) OF BOTH EARS: Status: ACTIVE | Noted: 2024-06-13

## 2024-06-13 LAB
ALBUMIN SERPL BCP-MCNC: 2.4 G/DL (ref 3.5–5.2)
ALP SERPL-CCNC: 178 U/L (ref 55–135)
ALT SERPL W/O P-5'-P-CCNC: 114 U/L (ref 10–44)
ANION GAP SERPL CALC-SCNC: 3 MMOL/L (ref 8–16)
ANION GAP SERPL CALC-SCNC: 4 MMOL/L (ref 8–16)
AST SERPL-CCNC: 142 U/L (ref 10–40)
BASOPHILS # BLD AUTO: 0.03 K/UL (ref 0–0.2)
BASOPHILS NFR BLD: 0.4 % (ref 0–1.9)
BILIRUB SERPL-MCNC: 2.6 MG/DL (ref 0.1–1)
BUN SERPL-MCNC: 33 MG/DL (ref 8–23)
BUN SERPL-MCNC: 33 MG/DL (ref 8–23)
CALCIUM SERPL-MCNC: 9.1 MG/DL (ref 8.7–10.5)
CALCIUM SERPL-MCNC: 9.2 MG/DL (ref 8.7–10.5)
CHLORIDE SERPL-SCNC: 117 MMOL/L (ref 95–110)
CHLORIDE SERPL-SCNC: 118 MMOL/L (ref 95–110)
CO2 SERPL-SCNC: 16 MMOL/L (ref 23–29)
CO2 SERPL-SCNC: 18 MMOL/L (ref 23–29)
CREAT SERPL-MCNC: 1.5 MG/DL (ref 0.5–1.4)
CREAT SERPL-MCNC: 1.6 MG/DL (ref 0.5–1.4)
DIFFERENTIAL METHOD BLD: ABNORMAL
EOSINOPHIL # BLD AUTO: 0.2 K/UL (ref 0–0.5)
EOSINOPHIL NFR BLD: 2.5 % (ref 0–8)
ERYTHROCYTE [DISTWIDTH] IN BLOOD BY AUTOMATED COUNT: 13.5 % (ref 11.5–14.5)
ERYTHROCYTE [DISTWIDTH] IN BLOOD BY AUTOMATED COUNT: 13.5 % (ref 11.5–14.5)
EST. GFR  (NO RACE VARIABLE): 43 ML/MIN/1.73 M^2
EST. GFR  (NO RACE VARIABLE): 46 ML/MIN/1.73 M^2
GLUCOSE SERPL-MCNC: 92 MG/DL (ref 70–110)
GLUCOSE SERPL-MCNC: 92 MG/DL (ref 70–110)
HCT VFR BLD AUTO: 30.1 % (ref 40–54)
HCT VFR BLD AUTO: 30.1 % (ref 40–54)
HGB BLD-MCNC: 9.9 G/DL (ref 14–18)
HGB BLD-MCNC: 9.9 G/DL (ref 14–18)
IMM GRANULOCYTES # BLD AUTO: 0.02 K/UL (ref 0–0.04)
IMM GRANULOCYTES NFR BLD AUTO: 0.2 % (ref 0–0.5)
LYMPHOCYTES # BLD AUTO: 2.1 K/UL (ref 1–4.8)
LYMPHOCYTES NFR BLD: 24.9 % (ref 18–48)
MAGNESIUM SERPL-MCNC: 1.9 MG/DL (ref 1.6–2.6)
MCH RBC QN AUTO: 32.8 PG (ref 27–31)
MCH RBC QN AUTO: 32.8 PG (ref 27–31)
MCHC RBC AUTO-ENTMCNC: 32.9 G/DL (ref 32–36)
MCHC RBC AUTO-ENTMCNC: 32.9 G/DL (ref 32–36)
MCV RBC AUTO: 100 FL (ref 82–98)
MCV RBC AUTO: 100 FL (ref 82–98)
MONOCYTES # BLD AUTO: 1.2 K/UL (ref 0.3–1)
MONOCYTES NFR BLD: 13.5 % (ref 4–15)
NEUTROPHILS # BLD AUTO: 5 K/UL (ref 1.8–7.7)
NEUTROPHILS NFR BLD: 58.5 % (ref 38–73)
NRBC BLD-RTO: 0 /100 WBC
PHOSPHATE SERPL-MCNC: 2.8 MG/DL (ref 2.7–4.5)
PLATELET # BLD AUTO: 119 K/UL (ref 150–450)
PLATELET # BLD AUTO: 119 K/UL (ref 150–450)
PMV BLD AUTO: 9.4 FL (ref 9.2–12.9)
PMV BLD AUTO: 9.4 FL (ref 9.2–12.9)
POCT GLUCOSE: 116 MG/DL (ref 70–110)
POCT GLUCOSE: 151 MG/DL (ref 70–110)
POCT GLUCOSE: 90 MG/DL (ref 70–110)
POCT GLUCOSE: 96 MG/DL (ref 70–110)
POTASSIUM SERPL-SCNC: 4.1 MMOL/L (ref 3.5–5.1)
POTASSIUM SERPL-SCNC: 4.2 MMOL/L (ref 3.5–5.1)
PROT SERPL-MCNC: 6.5 G/DL (ref 6–8.4)
RBC # BLD AUTO: 3.02 M/UL (ref 4.6–6.2)
RBC # BLD AUTO: 3.02 M/UL (ref 4.6–6.2)
SODIUM SERPL-SCNC: 137 MMOL/L (ref 136–145)
SODIUM SERPL-SCNC: 139 MMOL/L (ref 136–145)
WBC # BLD AUTO: 8.53 K/UL (ref 3.9–12.7)
WBC # BLD AUTO: 8.53 K/UL (ref 3.9–12.7)

## 2024-06-13 PROCEDURE — 97110 THERAPEUTIC EXERCISES: CPT | Mod: HCNC,CO

## 2024-06-13 PROCEDURE — 25000003 PHARM REV CODE 250: Mod: HCNC | Performed by: INTERNAL MEDICINE

## 2024-06-13 PROCEDURE — 99900035 HC TECH TIME PER 15 MIN (STAT): Mod: HCNC

## 2024-06-13 PROCEDURE — 80053 COMPREHEN METABOLIC PANEL: CPT | Mod: HCNC | Performed by: FAMILY MEDICINE

## 2024-06-13 PROCEDURE — 11000001 HC ACUTE MED/SURG PRIVATE ROOM: Mod: HCNC

## 2024-06-13 PROCEDURE — 84100 ASSAY OF PHOSPHORUS: CPT | Mod: HCNC | Performed by: FAMILY MEDICINE

## 2024-06-13 PROCEDURE — 85025 COMPLETE CBC W/AUTO DIFF WBC: CPT | Mod: HCNC | Performed by: FAMILY MEDICINE

## 2024-06-13 PROCEDURE — 36415 COLL VENOUS BLD VENIPUNCTURE: CPT | Mod: HCNC | Performed by: FAMILY MEDICINE

## 2024-06-13 PROCEDURE — 63600175 PHARM REV CODE 636 W HCPCS: Mod: HCNC

## 2024-06-13 PROCEDURE — 97530 THERAPEUTIC ACTIVITIES: CPT | Mod: HCNC,CQ

## 2024-06-13 PROCEDURE — 94761 N-INVAS EAR/PLS OXIMETRY MLT: CPT | Mod: HCNC

## 2024-06-13 PROCEDURE — 25000003 PHARM REV CODE 250: Mod: HCNC

## 2024-06-13 PROCEDURE — 83735 ASSAY OF MAGNESIUM: CPT | Mod: HCNC | Performed by: FAMILY MEDICINE

## 2024-06-13 PROCEDURE — 80048 BASIC METABOLIC PNL TOTAL CA: CPT | Mod: HCNC,XB | Performed by: FAMILY MEDICINE

## 2024-06-13 PROCEDURE — 97530 THERAPEUTIC ACTIVITIES: CPT | Mod: HCNC,CO

## 2024-06-13 PROCEDURE — 25000003 PHARM REV CODE 250: Mod: HCNC | Performed by: FAMILY MEDICINE

## 2024-06-13 RX ADMIN — ENOXAPARIN SODIUM 30 MG: 30 INJECTION SUBCUTANEOUS at 05:06

## 2024-06-13 RX ADMIN — ASPIRIN 81 MG CHEWABLE TABLET 81 MG: 81 TABLET CHEWABLE at 08:06

## 2024-06-13 RX ADMIN — MUPIROCIN: 20 OINTMENT TOPICAL at 09:06

## 2024-06-13 RX ADMIN — CLOPIDOGREL BISULFATE 75 MG: 75 TABLET ORAL at 08:06

## 2024-06-13 RX ADMIN — DORZOLAMIDE HYDROCHLORIDE TIMOLOL MALEATE 1 DROP: 20; 5 SOLUTION/ DROPS OPHTHALMIC at 09:06

## 2024-06-13 RX ADMIN — SPIRONOLACTONE 25 MG: 25 TABLET ORAL at 08:06

## 2024-06-13 RX ADMIN — DORZOLAMIDE HYDROCHLORIDE TIMOLOL MALEATE 1 DROP: 20; 5 SOLUTION/ DROPS OPHTHALMIC at 08:06

## 2024-06-13 RX ADMIN — ATORVASTATIN CALCIUM 40 MG: 40 TABLET, FILM COATED ORAL at 08:06

## 2024-06-13 RX ADMIN — MUPIROCIN: 20 OINTMENT TOPICAL at 08:06

## 2024-06-13 RX ADMIN — LACTULOSE 10 G: 20 SOLUTION ORAL at 08:06

## 2024-06-13 NOTE — ASSESSMENT & PLAN NOTE
Will get acute hepatitis panel and monitor  US Abd  Reduced dose of Atorvastatin to 40mg daily-Hold until LFTs normalized

## 2024-06-13 NOTE — PT/OT/SLP PROGRESS
Physical Therapy Treatment    Patient Name:  Dean Hahn   MRN:  [id number]    Recommendations:     Discharge Recommendations: Moderate Intensity Therapy  Discharge Equipment Recommendations:  (TBD next level of care)  Barriers to discharge:  decreased mobility,strength and endurance    Assessment:     Dean Hahn is a 83 y.o. male admitted with a medical diagnosis of <principal problem not specified>.  He presents with the following impairments/functional limitations: weakness, impaired endurance, impaired functional mobility, impaired balance, decreased upper extremity function, decreased lower extremity function, decreased safety awareness, decreased ROM, impaired coordination, impaired joint extensibility,pt requires increased assistance and motivation required and will benefit from moderate intensity therapy upon discharge.    Rehab Prognosis: Fair; patient would benefit from acute skilled PT services to address these deficits and reach maximum level of function.    Recent Surgery: * No surgery found *      Plan:     During this hospitalization, patient to be seen 3 x/week to address the identified rehab impairments via gait training, therapeutic activities, therapeutic exercises, neuromuscular re-education and progress toward the following goals:    Plan of Care Expires:  07/11/24    Subjective     Chief Complaint: n/a  Patient/Family Comments/goals: agreeable to participate in rx.  Pain/Comfort:  Pain Rating 1:  (no signs of pain,non verbal with therapy)      Objective:     Communicated with nsg prior to session.  Patient found supine with bed alarm, peripheral IV, telemetry upon PT entry to room.     General Precautions: Standard, fall  Orthopedic Precautions: N/A  Braces: N/A  Respiratory Status: Room air     Functional Mobility:  Bed Mobility:     Supine to Sit: maximal assistance and of 2 persons  Sit to Supine: maximal assistance and of 2 persons  Transfers:     Sit to Stand:   maximal assistance and of 2 persons with hand-held assist and X 2 trials  Balance: poor sitting/standing balance      AM-PAC 6 CLICK MOBILITY  Turning over in bed (including adjusting bedclothes, sheets and blankets)?: 2  Sitting down on and standing up from a chair with arms (e.g., wheelchair, bedside commode, etc.): 2  Moving from lying on back to sitting on the side of the bed?: 2  Moving to and from a bed to a chair (including a wheelchair)?: 2  Need to walk in hospital room?: 1  Climbing 3-5 steps with a railing?: 1  Basic Mobility Total Score: 10       Treatment & Education: trunk rotation/flexion in sitting with A X 2,sidesteps to HOB with HHA/Max A X 2.      Patient left supine with all lines intact, call button in reach, bed alarm on, and family present..    GOALS: see general POC  Multidisciplinary Problems       Physical Therapy Goals          Problem: Physical Therapy    Goal Priority Disciplines Outcome Goal Variances Interventions   Physical Therapy Goal     PT, PT/OT Progressing     Description: Goals to be met by: 24     Patient will increase functional independence with mobility by performin. Supine to sit with Moderate Assistance  2. Sit to supine with Moderate Assistance  3. Sit to stand transfer with Moderate Assistance  4. Bed to chair transfer with Moderate Assistance using Rolling Walker  5. Gait  x 25 feet with Moderate Assistance using Rolling Walker.                          Time Tracking:     PT Received On: 24  PT Start Time: 1054     PT Stop Time: 1117  PT Total Time (min): 23 min     Billable Minutes: Therapeutic Activity 23       PT/PTA: PTA     Number of PTA visits since last PT visit: 2     2024

## 2024-06-13 NOTE — PROGRESS NOTES
[facility] Medicine  Progress Note    Patient Name: Dean Hahn  MRN: [id number]  Patient Class: IP- Inpatient   Admission Date: 6/10/2024  Length of Stay: 3 days  Attending Physician: Marisol Chung MD  Primary Care Provider: Angel Luis Tobias III, MD        Subjective:     Principal Problem:<principal problem not specified>        HPI:  82 YO M with PMHx significant for HTN, dementia, HLD, carotid stenosis B/L CKD, was brought to the ER for new onset speech difficulty, right sided weakness, change in mental status. When seen in the ICU, patient was confused, alert, oriented to self and keep repeating the same thing. He was not able to give any history. 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 He fell and was found on the ground. His family found him on the floor at his house hours after fall, with dysphasia, weakness with limping, and confusion. EMS called and brought to the ER. Code stroke was called. He had CT head no acute process. Microvascular small vessel ischemic change. Labs in the ER with elevated LFT, elevated creatinine, elevated TSH with normal free T, elevated troponin. He was given TPA and post TPA transfer to ICU. In the unit he had another recurrent episode of worsening confusion, repeat CT head without acute evidence of acute process.     Overview/Hospital Course:  6/12/24 Wife at bedside, agreeable to SNF placement  6/13/24 Stable    Interval History: Wife at bedside feeding patient [employer] breakfast. Severe hearing loss noted, is not able to follow commands    LFTs still elevated    Review of Systems   Unable to perform ROS: Mental status change   Respiratory:  Negative for shortness of breath.    Cardiovascular:  Negative for chest pain.   Neurological:  Positive for speech difficulty and weakness.   Psychiatric/Behavioral:  Negative for agitation and confusion.      Objective:     Vital Signs  (Most Recent):  Temp: 98.4 °F (36.9 °C) (06/13/24 1146)  Pulse: 61 (06/13/24 1210)  Resp: 18 (06/13/24 1146)  BP: (!) 110/59 (06/13/24 1146)  SpO2: 98 % (06/13/24 1146) Vital Signs (24h Range):  Temp:  [97.7 °F (36.5 °C)-98.4 °F (36.9 °C)] 98.4 °F (36.9 °C)  Pulse:  [61-73] 61  Resp:  [18-20] 18  SpO2:  [98 %-100 %] 98 %  BP: (110-158)/(59-72) 110/59     Weight: 53.9 kg (118 lb 13.3 oz)  Body mass index is 21.05 kg/m².    Intake/Output Summary (Last 24 hours) at 6/13/2024 1425  Last data filed at 6/13/2024 0145  Gross per 24 hour   Intake --   Output 150 ml   Net -150 ml         Physical Exam  Vitals reviewed.   Constitutional:       Appearance: He is not ill-appearing or toxic-appearing.   HENT:      Head: Normocephalic.      Nose: No congestion.   Cardiovascular:      Rate and Rhythm: Normal rate.   Pulmonary:      Effort: Pulmonary effort is normal. No respiratory distress.      Breath sounds: No wheezing or rales.   Chest:      Chest wall: No tenderness.   Abdominal:      General: Bowel sounds are normal. There is no distension.      Tenderness: There is no abdominal tenderness. There is no guarding or rebound.   Musculoskeletal:      Cervical back: No rigidity or tenderness.      Right lower leg: No edema.      Left lower leg: No edema.   Neurological:      Mental Status: He is alert. He is disoriented.      Cranial Nerves: Cranial nerve deficit present.      Motor: Weakness present.      Gait: Gait abnormal.             Significant Labs: All pertinent labs within the past 24 hours have been reviewed.  CBC:   Recent Labs   Lab 06/12/24 0221 06/13/24 0221   WBC 9.87  9.87 8.53  8.53   HGB 9.7*  9.7* 9.9*  9.9*   HCT 27.3*  27.3* 30.1*  30.1*   *  131* 119*  119*     CMP:   Recent Labs   Lab 06/12/24 0221 06/13/24 0221 06/13/24  0507     138 137 139   K 4.0  4.0 4.2 4.1   *  117* 118* 117*   CO2 15*  15* 16* 18*     110 92 92   BUN 35*  34* 33* 33*   CREATININE 1.8*   1.8* 1.5* 1.6*   CALCIUM 9.1  9.2 9.2 9.1   PROT 6.4  --  6.5   ALBUMIN 2.4*  --  2.4*   BILITOT 2.2*  --  2.6*   ALKPHOS 184*  --  178*   *  --  142*   *  --  114*   ANIONGAP 8  6* 3* 4*       Significant Imaging: I have reviewed all pertinent imaging results/findings within the past 24 hours.    Assessment/Plan:      Sensorineural hearing loss (SNHL) of both ears  Will need ENT eval for hearing aides      Stroke determined by clinical assessment  Neurology consult:    Impression:  Likely acute ischemic stroke of the L michael, which does correlate with pt's presentation of R sided weakness and speech difficulty. He has had multifocal ischemic strokes in the past (2023), unclear if he ever got evaluation with holter monitor as was recommended at the time. Given prior multifocal strokes in different vascular territories, do have concern for cardioembolic etiology. He would benefit from either holter monitor or loop recorder on discharge to screen for paroxysmal afib.     Recommendations  - No hemorrhage noted on MRI brain this AM, can cancel repeat CTH non con and start DAPT:  - Continue ASA 81 mg and Plavix 75 mg daily for 21 days, followed by ASA 81 mg daily monotherapy thereafter  - Decrease atorvastatin 80 mg nightly to 40 mg nightly, goal LDL <70  - A1c pending, LDL 77.4  - Normotension, normoglycemia  - PT/OT/SLP consulted; appreciate recommendations  - OK to resume chemical DVT ppx  - Holter monitor or loop recorder to screen for paroxysmal afib  - FU with [facility] after discharge, ideally within 4-6 weeks  - Outpatient dementia evaluation, pt has never been evaluated by an outpatient neurologist for dementia before        Thrombocytopenia  Patient was found to have thrombocytopenia, the likely etiology is secondary to cirrhosis/portal hypertension, will monitor the platelets Daily. Will transfuse if platelet count is <100k (if undergoing neurosurgery). Hold DVT prophylaxis if  platelets are <50k. The patient's platelet results have been reviewed and are listed below.  Recent Labs   Lab 06/10/24  0821   *         Stage 3a chronic kidney disease  Creatine stable for now. BMP reviewed- noted Estimated Creatinine Clearance: 27.6 mL/min (A) (based on SCr of 1.6 mg/dL (H)). according to latest data. Based on current GFR, CKD stage is stage 3 - GFR 30-59.  Monitor UOP and serial BMP and adjust therapy as needed. Renally dose meds. Avoid nephrotoxic medications and procedures.    Bilateral carotid artery disease  Defer to neuro surgery      LFT elevation  Will get acute hepatitis panel and monitor  US Abd  Reduced dose of Atorvastatin to 40mg daily-Hold until LFTs normalized      Pure hypercholesterolemia  Cont statin      Essential hypertension  Chronic, controlled. Latest blood pressure and vitals reviewed-     Temp:  [97.5 °F (36.4 °C)-98.6 °F (37 °C)]   Pulse:  [56-65]   Resp:  [10-20]   BP: (108-161)/()   SpO2:  [100 %] .   Home meds for hypertension were reviewed and noted below.   Hypertension Medications               metoprolol succinate (TOPROL-XL) 25 MG 24 hr tablet TAKE 1 TABLET BY MOUTH EVERY DAY IN THE EVENING    olmesartan (BENICAR) 20 MG tablet Take 1 tablet (20 mg total) by mouth once daily.    spironolactone (ALDACTONE) 25 MG tablet Take 1 tablet (25 mg total) by mouth once daily.            While in the hospital, will manage blood pressure as follows; Adjust home antihypertensive regimen as follows- hold for BP     Will utilize p.r.n. blood pressure medication only if patient's blood pressure greater than 180/110 and he develops symptoms such as worsening chest pain or shortness of breath.      VTE Risk Mitigation (From admission, onward)           Ordered     enoxaparin injection 30 mg  Every 24 hours         06/11/24 1016     IP VTE HIGH RISK PATIENT  Once         06/10/24 1233     Place sequential compression device  Until discontinued         06/10/24 1233      Reason for No Pharmacological VTE Prophylaxis  Once        Question:  Reasons:  Answer:  Risk of Bleeding    06/10/24 1233                    Discharge Planning   LAMINE:      Code Status: Full Code   Is the patient medically ready for discharge?:     Reason for patient still in hospital (select all that apply): Patient trending condition, PT / OT recommendations, and Pending disposition  Discharge Plan A: Skilled Nursing Facility   Discharge Delays: None known at this time              Marisol Chung MD  Department of [facility]

## 2024-06-13 NOTE — ASSESSMENT & PLAN NOTE
Neurology consult:    Impression:  Likely acute ischemic stroke of the L michael, which does correlate with pt's presentation of R sided weakness and speech difficulty. He has had multifocal ischemic strokes in the past (2023), unclear if he ever got evaluation with holter monitor as was recommended at the time. Given prior multifocal strokes in different vascular territories, do have concern for cardioembolic etiology. He would benefit from either holter monitor or loop recorder on discharge to screen for paroxysmal afib.     Recommendations  - No hemorrhage noted on MRI brain this AM, can cancel repeat CTH non con and start DAPT:  - Continue ASA 81 mg and Plavix 75 mg daily for 21 days, followed by ASA 81 mg daily monotherapy thereafter  - Decrease atorvastatin 80 mg nightly to 40 mg nightly, goal LDL <70  - A1c pending, LDL 77.4  - Normotension, normoglycemia  - PT/OT/SLP consulted; appreciate recommendations  - OK to resume chemical DVT ppx  - Holter monitor or loop recorder to screen for paroxysmal afib  - FU with [facility] after discharge, ideally within 4-6 weeks  - Outpatient dementia evaluation, pt has never been evaluated by an outpatient neurologist for dementia before

## 2024-06-13 NOTE — ASSESSMENT & PLAN NOTE
Creatine stable for now. BMP reviewed- noted Estimated Creatinine Clearance: 27.6 mL/min (A) (based on SCr of 1.6 mg/dL (H)). according to latest data. Based on current GFR, CKD stage is stage 3 - GFR 30-59.  Monitor UOP and serial BMP and adjust therapy as needed. Renally dose meds. Avoid nephrotoxic medications and procedures.

## 2024-06-13 NOTE — PLAN OF CARE
"  Problem: Adult Inpatient Plan of Care  Goal: Plan of Care Review  Outcome: Progressing     Problem: Adult Inpatient Plan of Care  Goal: Optimal Comfort and Wellbeing  Outcome: Progressing     Problem: Stroke, Ischemic (Includes Transient Ischemic Attack)  Goal: Optimal Coping  Outcome: Progressing     Problem: Stroke, Ischemic (Includes Transient Ischemic Attack)  Goal: Optimal Cerebral Tissue Perfusion  Outcome: Progressing     .Plan of care reviewed with the patient. Scheduled medicines given and the patient tolerated well. Fall and safety precautions taken and the standard interventions are in place. On Telemetry monitoring with NSR, no true "red alarms' noted, no acute distress reported either on the shift. Patient's Accu Check was 122 mg/dl. Advised the patient to call for assistance. Continued monitoring the patient.   "

## 2024-06-13 NOTE — PLAN OF CARE
went to meet with patient and spouse at bedside. Dr. Chung also in room. Wife reports need to have patient's hearing aids charged. We discussed discharge plans again, and I asked her preferences for placement. Wife wants [facility] as first preference.  will message [facility] via [employer] and discuss with MD team when ready. Patient/Spouse encouraged to call with any questions or concerns.  will continue to follow patient through transitions of care and assist with any discharge needs.     Updated clinicals sent to [facility] with wife's information to contact her for placement.    Patient Contacts    Name Relation Home Work Mobile   Pait Hahn Spouse [phone]   Fide Maldonado Friend   [phone]   Lindsay Carter Daughter   [phone]     Future Appointments   Date Time Provider Department Center   6/19/2024  7:30 AM Ronda Baez MD [facility]   7/9/2024  4:00 PM Angel Luis Tobias III, MD [facility]   7/23/2024  8:00 AM Angel Luis Tobias III, MD [facility]         06/13/24 0956   Discharge Reassessment   Assessment Type Discharge Planning Reassessment   Did the patient's condition or plan change since previous assessment? No   Discharge Plan discussed with: Spouse/sig other;Patient   Discharge Plan A Skilled Nursing Facility   Discharge Plan B Home with family;Home Health   DME Needed Upon Discharge  other (see comments)  (TBD)   Transition of Care Barriers None   Why the patient remains in the hospital Requires continued medical care   Post-Acute Status   Post-Acute Authorization Placement   Post-Acute Placement Status Referrals Sent   Discharge Delays None known at this time     Toña Urrutia RN    [phone]

## 2024-06-13 NOTE — SUBJECTIVE & OBJECTIVE
Interval History: Wife at bedside feeding patient [employer] breakfast. Severe hearing loss noted, is not able to follow commands    LFTs still elevated    Review of Systems   Unable to perform ROS: Mental status change   Respiratory:  Negative for shortness of breath.    Cardiovascular:  Negative for chest pain.   Neurological:  Positive for speech difficulty and weakness.   Psychiatric/Behavioral:  Negative for agitation and confusion.      Objective:     Vital Signs (Most Recent):  Temp: 98.4 °F (36.9 °C) (06/13/24 1146)  Pulse: 61 (06/13/24 1210)  Resp: 18 (06/13/24 1146)  BP: (!) 110/59 (06/13/24 1146)  SpO2: 98 % (06/13/24 1146) Vital Signs (24h Range):  Temp:  [97.7 °F (36.5 °C)-98.4 °F (36.9 °C)] 98.4 °F (36.9 °C)  Pulse:  [61-73] 61  Resp:  [18-20] 18  SpO2:  [98 %-100 %] 98 %  BP: (110-158)/(59-72) 110/59     Weight: 53.9 kg (118 lb 13.3 oz)  Body mass index is 21.05 kg/m².    Intake/Output Summary (Last 24 hours) at 6/13/2024 1425  Last data filed at 6/13/2024 0145  Gross per 24 hour   Intake --   Output 150 ml   Net -150 ml         Physical Exam  Vitals reviewed.   Constitutional:       Appearance: He is not ill-appearing or toxic-appearing.   HENT:      Head: Normocephalic.      Nose: No congestion.   Cardiovascular:      Rate and Rhythm: Normal rate.   Pulmonary:      Effort: Pulmonary effort is normal. No respiratory distress.      Breath sounds: No wheezing or rales.   Chest:      Chest wall: No tenderness.   Abdominal:      General: Bowel sounds are normal. There is no distension.      Tenderness: There is no abdominal tenderness. There is no guarding or rebound.   Musculoskeletal:      Cervical back: No rigidity or tenderness.      Right lower leg: No edema.      Left lower leg: No edema.   Neurological:      Mental Status: He is alert. He is disoriented.      Cranial Nerves: Cranial nerve deficit present.      Motor: Weakness present.      Gait: Gait abnormal.             Significant Labs: All  pertinent labs within the past 24 hours have been reviewed.  CBC:   Recent Labs   Lab 06/12/24 0221 06/13/24 0221   WBC 9.87  9.87 8.53  8.53   HGB 9.7*  9.7* 9.9*  9.9*   HCT 27.3*  27.3* 30.1*  30.1*   *  131* 119*  119*     CMP:   Recent Labs   Lab 06/12/24 0221 06/13/24 0221 06/13/24  0507     138 137 139   K 4.0  4.0 4.2 4.1   *  117* 118* 117*   CO2 15*  15* 16* 18*     110 92 92   BUN 35*  34* 33* 33*   CREATININE 1.8*  1.8* 1.5* 1.6*   CALCIUM 9.1  9.2 9.2 9.1   PROT 6.4  --  6.5   ALBUMIN 2.4*  --  2.4*   BILITOT 2.2*  --  2.6*   ALKPHOS 184*  --  178*   *  --  142*   *  --  114*   ANIONGAP 8  6* 3* 4*       Significant Imaging: I have reviewed all pertinent imaging results/findings within the past 24 hours.

## 2024-06-13 NOTE — PT/OT/SLP PROGRESS
Occupational Therapy   Treatment    Name: Dean Hahn  MRN: [id number]  Admitting Diagnosis:  <principal problem not specified>       Recommendations:     Discharge Recommendations: Moderate Intensity Therapy  Discharge Equipment Recommendations:  to be determined by next level of care  Barriers to discharge:  Other (Comment) (Increased level of assistance)    Assessment:     Dean Hahn is a 83 y.o. male with a medical diagnosis of <principal problem not specified>.  Performance deficits affecting function are weakness, impaired endurance, impaired functional mobility, impaired self care skills, gait instability, impaired balance, impaired cognition, decreased upper extremity function, decreased lower extremity function, decreased safety awareness, abnormal tone, decreased ROM, impaired coordination, impaired fine motor, impaired skin.    Pt found in bed, agreeable to therapy.  Pt with fair tolerance of therapy. Continue OT services to address functional goals, progressing as able.      Rehab Prognosis:  Fair; patient would benefit from acute skilled OT services to address these deficits and reach maximum level of function.       Plan:     Patient to be seen 3 x/week to address the above listed problems via self-care/home management, therapeutic activities, therapeutic exercises, neuromuscular re-education  Plan of Care Expires: 07/11/24  Plan of Care Reviewed with: patient, spouse    Subjective     Chief Complaint: no complaints  Patient/Family Comments/goals: unable to state  Pain/Comfort:  Pain Rating 1:  (no s/s of pain)    Objective:     Communicated with: nurse prior to session.  Patient found HOB elevated with bed alarm, peripheral IV, telemetry upon OT entry to room.    General Precautions: Standard, fall, hearing impaired    Orthopedic Precautions:N/A  Braces: N/A  Respiratory Status: Room air     Occupational Performance:     Bed Mobility:    Patient completed Rolling/Turning to  Right with maximal assistance and with side rail  Patient completed Scooting/Bridging with dependent and 2 persons supine to HOB   Patient completed Supine to Sit with maximal assistance and 2 persons  Patient completed Sit to Supine with maximal assistance and 2 persons     Functional Mobility/Transfers:  Patient completed Sit <> Stand Transfer with maximal assistance and of 2 persons  with  hand-held assist x 2 trials   Functional Mobility: Pt took 2 lateral steps to R side with Tot A x 2.  Pt required Tot A to move BLEs to side. Pt with heavy posterior lean in standing.         [facility] 6 Click ADL: 10    Treatment & Education:  Pt sat EOB ~15 min with SBA, when arms propped at side, to Min A 2/2 posterior and R lateral drift.  Pt requires assist to maintain B feet in floor.    Pt with increased extensor tone in sitting and standing, LUE/LE greater than R.   Pt participated in dynamic sitting balance activity, reaching in various planes with BUE requiring H-O-H assist to reach and Max A at trunk to increase flexion and rotation, to improve trunk mobility, sitting balance and midline posture.   Pt performed BUE PROM x 10 reps in sitting for shld flexion and rows.    Pt nonverbal and smiling throughout session. He recognizes wife when she walks into room.       Patient left HOB elevated with all lines intact, call button in reach, bed alarm on, and family  present    GOALS:   Multidisciplinary Problems       Occupational Therapy Goals          Problem: Occupational Therapy    Goal Priority Disciplines Outcome Interventions   Occupational Therapy Goal     OT, PT/OT Progressing    Description: Goals to be met by: 7/11/24     Patient will increase functional independence with ADLs by performing:    Grooming while seated with Minimal Assistance.  Toileting from bedside commode with Minimal Assistance for hygiene and clothing management.   Sitting at edge of bed x10 minutes with Stand-by Assistance.  Supine to sit with  Minimal Assistance.  Step transfer with Minimal Assistance  Toilet transfer to bedside commode with Minimal Assistance.  Increased functional strength to WFL for self care skills and functional mobility .  Upper extremity exercise program x10 reps per handout, with independence.                         Time Tracking:     OT Date of Treatment: 06/13/24  OT Start Time: 1054  OT Stop Time: 1113  OT Total Time (min): 19 min    Billable Minutes:Therapeutic Activity 11  Therapeutic Exercise 8            6/13/2024

## 2024-06-13 NOTE — PLAN OF CARE
Problem: Occupational Therapy  Goal: Occupational Therapy Goal  Description: Goals to be met by: 7/11/24     Patient will increase functional independence with ADLs by performing:    Grooming while seated with Minimal Assistance.  Toileting from bedside commode with Minimal Assistance for hygiene and clothing management.   Sitting at edge of bed x10 minutes with Stand-by Assistance.  Supine to sit with Minimal Assistance.  Step transfer with Minimal Assistance  Toilet transfer to bedside commode with Minimal Assistance.  Increased functional strength to [facility] for self care skills and functional mobility .  Upper extremity exercise program x10 reps per handout, with independence.    Outcome: Not Progressing   Dean Hahn is a 83 y.o. male with a medical diagnosis of <principal problem not specified>.  Performance deficits affecting function are weakness, impaired endurance, impaired functional mobility, impaired self care skills, gait instability, impaired balance, impaired cognition, decreased upper extremity function, decreased lower extremity function, decreased safety awareness, abnormal tone, decreased ROM, impaired coordination, impaired fine motor, impaired skin.    Pt found in bed, agreeable to therapy.  Pt with fair tolerance of therapy. Continue OT services to address functional goals, progressing as able.

## 2024-06-14 LAB
ALBUMIN SERPL BCP-MCNC: 2.4 G/DL (ref 3.5–5.2)
ALP SERPL-CCNC: 185 U/L (ref 55–135)
ALT SERPL W/O P-5'-P-CCNC: 125 U/L (ref 10–44)
ANION GAP SERPL CALC-SCNC: 10 MMOL/L (ref 8–16)
AST SERPL-CCNC: 151 U/L (ref 10–40)
BILIRUB SERPL-MCNC: 2.6 MG/DL (ref 0.1–1)
BUN SERPL-MCNC: 32 MG/DL (ref 8–23)
CALCIUM SERPL-MCNC: 9.1 MG/DL (ref 8.7–10.5)
CHLORIDE SERPL-SCNC: 114 MMOL/L (ref 95–110)
CO2 SERPL-SCNC: 14 MMOL/L (ref 23–29)
CREAT SERPL-MCNC: 1.5 MG/DL (ref 0.5–1.4)
EST. GFR  (NO RACE VARIABLE): 46 ML/MIN/1.73 M^2
GLUCOSE SERPL-MCNC: 91 MG/DL (ref 70–110)
MAGNESIUM SERPL-MCNC: 2 MG/DL (ref 1.6–2.6)
PHOSPHATE SERPL-MCNC: 2.7 MG/DL (ref 2.7–4.5)
POCT GLUCOSE: 101 MG/DL (ref 70–110)
POCT GLUCOSE: 103 MG/DL (ref 70–110)
POCT GLUCOSE: 127 MG/DL (ref 70–110)
POCT GLUCOSE: 141 MG/DL (ref 70–110)
POTASSIUM SERPL-SCNC: 4.3 MMOL/L (ref 3.5–5.1)
PROT SERPL-MCNC: 6.6 G/DL (ref 6–8.4)
SODIUM SERPL-SCNC: 138 MMOL/L (ref 136–145)

## 2024-06-14 PROCEDURE — 63600175 PHARM REV CODE 636 W HCPCS: Mod: HCNC

## 2024-06-14 PROCEDURE — 25000003 PHARM REV CODE 250: Mod: HCNC | Performed by: INTERNAL MEDICINE

## 2024-06-14 PROCEDURE — 25000003 PHARM REV CODE 250: Mod: HCNC

## 2024-06-14 PROCEDURE — 11000001 HC ACUTE MED/SURG PRIVATE ROOM: Mod: HCNC

## 2024-06-14 PROCEDURE — 84100 ASSAY OF PHOSPHORUS: CPT | Mod: HCNC | Performed by: FAMILY MEDICINE

## 2024-06-14 PROCEDURE — 36415 COLL VENOUS BLD VENIPUNCTURE: CPT | Mod: HCNC | Performed by: FAMILY MEDICINE

## 2024-06-14 PROCEDURE — 94761 N-INVAS EAR/PLS OXIMETRY MLT: CPT | Mod: HCNC

## 2024-06-14 PROCEDURE — 99900035 HC TECH TIME PER 15 MIN (STAT): Mod: HCNC

## 2024-06-14 PROCEDURE — 99223 1ST HOSP IP/OBS HIGH 75: CPT | Mod: HCNC,,, | Performed by: INTERNAL MEDICINE

## 2024-06-14 PROCEDURE — 80053 COMPREHEN METABOLIC PANEL: CPT | Mod: HCNC | Performed by: FAMILY MEDICINE

## 2024-06-14 PROCEDURE — 83735 ASSAY OF MAGNESIUM: CPT | Mod: HCNC | Performed by: FAMILY MEDICINE

## 2024-06-14 PROCEDURE — 25000003 PHARM REV CODE 250: Mod: HCNC | Performed by: FAMILY MEDICINE

## 2024-06-14 RX ADMIN — MUPIROCIN: 20 OINTMENT TOPICAL at 08:06

## 2024-06-14 RX ADMIN — LACTULOSE 10 G: 20 SOLUTION ORAL at 08:06

## 2024-06-14 RX ADMIN — SPIRONOLACTONE 25 MG: 25 TABLET ORAL at 08:06

## 2024-06-14 RX ADMIN — DORZOLAMIDE HYDROCHLORIDE TIMOLOL MALEATE 1 DROP: 20; 5 SOLUTION/ DROPS OPHTHALMIC at 08:06

## 2024-06-14 RX ADMIN — CLOPIDOGREL BISULFATE 75 MG: 75 TABLET ORAL at 08:06

## 2024-06-14 RX ADMIN — MUPIROCIN: 20 OINTMENT TOPICAL at 10:06

## 2024-06-14 RX ADMIN — ENOXAPARIN SODIUM 30 MG: 30 INJECTION SUBCUTANEOUS at 05:06

## 2024-06-14 RX ADMIN — ASPIRIN 81 MG CHEWABLE TABLET 81 MG: 81 TABLET CHEWABLE at 08:06

## 2024-06-14 RX ADMIN — DORZOLAMIDE HYDROCHLORIDE TIMOLOL MALEATE 1 DROP: 20; 5 SOLUTION/ DROPS OPHTHALMIC at 10:06

## 2024-06-14 NOTE — SUBJECTIVE & OBJECTIVE
Past Medical History:   Diagnosis Date    Cataract     HLD (hyperlipidemia)     Hypertension        Past Surgical History:   Procedure Laterality Date    CHOLECYSTECTOMY      EYE SURGERY Right     cataract removal surgery       Review of patient's allergies indicates:  No Known Allergies  Family History       Problem Relation (Age of Onset)    Coronary artery disease Father    Heart attack Father    Hypertension Mother, Brother    No Known Problems Sister, Daughter, Son          Tobacco Use    Smoking status: Never    Smokeless tobacco: Never   Substance and Sexual Activity    Alcohol use: Yes    Drug use: Never    Sexual activity: Not Currently     Partners: Female     Birth control/protection: None     Comment: wife     Review of Systems   Constitutional:  Positive for fatigue.   Gastrointestinal:  Negative for abdominal distention, abdominal pain, constipation and diarrhea.   Neurological:  Positive for weakness.     Objective:     Vital Signs (Most Recent):  Temp: 97.4 °F (36.3 °C) (06/14/24 1121)  Pulse: 70 (06/14/24 1214)  Resp: 18 (06/14/24 1121)  BP: 139/78 (06/14/24 1121)  SpO2: 100 % (06/14/24 1149) Vital Signs (24h Range):  Temp:  [97.4 °F (36.3 °C)-98.6 °F (37 °C)] 97.4 °F (36.3 °C)  Pulse:  [53-76] 70  Resp:  [18] 18  SpO2:  [100 %] 100 %  BP: (118-145)/(58-84) 139/78     Weight: 53.9 kg (118 lb 13.3 oz) (06/14/24 1121)  Body mass index is 21.05 kg/m².      Intake/Output Summary (Last 24 hours) at 6/14/2024 1232  Last data filed at 6/14/2024 1008  Gross per 24 hour   Intake 240 ml   Output 300 ml   Net -60 ml       Lines/Drains/Airways       Drain  Duration             Male External Urinary Catheter 06/10/24 1100 4 days              Peripheral Intravenous Line  Duration                  Peripheral IV - Single Lumen 06/10/24 0812 20 G Right Antecubital 4 days                     Physical Exam  Vitals and nursing note reviewed.   Constitutional:       General: He is not in acute distress.     Appearance:  He is well-developed. He is ill-appearing. He is not toxic-appearing.   HENT:      Head: Normocephalic and atraumatic.   Eyes:      General: No scleral icterus.     Pupils: Pupils are equal, round, and reactive to light.   Cardiovascular:      Rate and Rhythm: Normal rate and regular rhythm.      Heart sounds: Normal heart sounds.   Pulmonary:      Effort: Pulmonary effort is normal. No respiratory distress.      Breath sounds: Normal breath sounds.   Abdominal:      General: Bowel sounds are normal. There is no distension.      Palpations: Abdomen is soft.      Tenderness: There is no abdominal tenderness.   Musculoskeletal:         General: Normal range of motion.      Cervical back: Normal range of motion and neck supple.   Skin:     General: Skin is warm and dry.      Findings: No erythema or rash.   Neurological:      Mental Status: He is alert and oriented to person, place, and time.      Comments: No asterixis   Psychiatric:         Behavior: Behavior normal.          Significant Labs:  Recent Lab Results  (Last 5 results in the past 24 hours)        06/14/24  1119   06/14/24  0542   06/14/24  0227   06/14/24  0016   06/13/24  2354        Albumin     2.4           ALP     185           ALT     125           Anion Gap     10           AST     151           BILIRUBIN TOTAL     2.6  Comment: For infants and newborns, interpretation of results should be based  on gestational age, weight and in agreement with clinical  observations.    Premature Infant recommended reference ranges:  Up to 24 hours.............<8.0 mg/dL  Up to 48 hours............<12.0 mg/dL  3-5 days..................<15.0 mg/dL  6-29 days.................<15.0 mg/dL             BUN     32           Calcium     9.1           Chloride     114           CO2     14           Creatinine     1.5           eGFR     46           Glucose     91           Magnesium      2.0           Phosphorus Level     2.7           POCT Glucose 141   101     103   96        Potassium     4.3           PROTEIN TOTAL     6.6           Sodium     138                                  Significant Imaging:  Imaging results within the past 24 hours have been reviewed.

## 2024-06-14 NOTE — ASSESSMENT & PLAN NOTE
Likely associated with underlying liver disease.   US findings reviewed with patient/wife at bedside. Discussed cross-sectional imaging however wife would prefer not exploring further which is not unreasonable. Could consider readdressing at f/u with hepatology  Trend chemistries. Could consider starting steroids, however uncertain subjective benefit

## 2024-06-14 NOTE — PLAN OF CARE
Recommendation:  1. Encourage intake of meals & Boost as tolerated.   2. Monitor weight/labs.   3. RD to continue to follow to monitor po intake    Goals:   Pt will tolerate diet with at least 50-75% intake at meals by RD follow up  Nutrition Goal Status: progressing towards goal

## 2024-06-14 NOTE — HPI
82 YO M with PMHx significant for HTN, dementia, HLD, carotid stenosis B/L CKD, was brought to the ER for new onset speech difficulty, right sided weakness, change in mental status. When seen in the ICU, patient was confused, alert, oriented to self and keep repeating the same thing. He was not able to give any history. 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 He fell and was found on the ground. His family found him on the floor at his house hours after fall, with dysphasia, weakness with limping, and confusion. EMS called and brought to the ER. Code stroke was called. He had CT head no acute process. Microvascular small vessel ischemic change. Labs in the ER with elevated LFT, elevated creatinine, elevated TSH with normal free T, elevated troponin. He was given TPA and post TPA transfer to ICU. In the unit he had another recurrent episode of worsening confusion, repeat CT head without acute evidence of acute process.     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 He has been seen in hepatology clinic as recent as March of this year. Steroids were discussed but not started.

## 2024-06-14 NOTE — CONSULTS
[facility] - Telemetry  Gastroenterology  Consult Note    Patient Name: Dean Hahn  MRN: [id number]  Admission Date: 6/10/2024  Hospital Length of Stay: 4 days  Code Status: Full Code   Attending Provider: Marisol Chung MD   Consulting Provider: Saran Ca MD  Primary Care Physician: Angel Luis Tobias III, MD  Principal Problem:<principal problem not specified>    [facility]  Consult performed by: Saran Ca MD  Consult ordered by: Marisol Chung MD        Subjective:     HPI:  82 YO M with PMHx significant for HTN, dementia, HLD, carotid stenosis B/L CKD, was brought to the ER for new onset speech difficulty, right sided weakness, change in mental status. When seen in the ICU, patient was confused, alert, oriented to self and keep repeating the same thing. He was not able to give any history. 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 He fell and was found on the ground. His family found him on the floor at his house hours after fall, with dysphasia, weakness with limping, and confusion. EMS called and brought to the ER. Code stroke was called. He had CT head no acute process. Microvascular small vessel ischemic change. Labs in the ER with elevated LFT, elevated creatinine, elevated TSH with normal free T, elevated troponin. He was given TPA and post TPA transfer to ICU. In the unit he had another recurrent episode of worsening confusion, repeat CT head without acute evidence of acute process.     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 He has been seen in hepatology clinic as recent as March of this year. Steroids were discussed but not started.    Past Medical History:   Diagnosis Date    Cataract     HLD (hyperlipidemia)     Hypertension        Past Surgical History:   Procedure Laterality Date    CHOLECYSTECTOMY      EYE  SURGERY Right     cataract removal surgery       Review of patient's allergies indicates:  No Known Allergies  Family History       Problem Relation (Age of Onset)    Coronary artery disease Father    Heart attack Father    Hypertension Mother, Brother    No Known Problems Sister, Daughter, Son          Tobacco Use    Smoking status: Never    Smokeless tobacco: Never   Substance and Sexual Activity    Alcohol use: Yes    Drug use: Never    Sexual activity: Not Currently     Partners: Female     Birth control/protection: None     Comment: wife     Review of Systems   Constitutional:  Positive for fatigue.   Gastrointestinal:  Negative for abdominal distention, abdominal pain, constipation and diarrhea.   Neurological:  Positive for weakness.     Objective:     Vital Signs (Most Recent):  Temp: 97.4 °F (36.3 °C) (06/14/24 1121)  Pulse: 70 (06/14/24 1214)  Resp: 18 (06/14/24 1121)  BP: 139/78 (06/14/24 1121)  SpO2: 100 % (06/14/24 1149) Vital Signs (24h Range):  Temp:  [97.4 °F (36.3 °C)-98.6 °F (37 °C)] 97.4 °F (36.3 °C)  Pulse:  [53-76] 70  Resp:  [18] 18  SpO2:  [100 %] 100 %  BP: (118-145)/(58-84) 139/78     Weight: 53.9 kg (118 lb 13.3 oz) (06/14/24 1121)  Body mass index is 21.05 kg/m².      Intake/Output Summary (Last 24 hours) at 6/14/2024 1232  Last data filed at 6/14/2024 1008  Gross per 24 hour   Intake 240 ml   Output 300 ml   Net -60 ml       Lines/Drains/Airways       Drain  Duration             Male External Urinary Catheter 06/10/24 1100 4 days              Peripheral Intravenous Line  Duration                  Peripheral IV - Single Lumen 06/10/24 0812 20 G Right Antecubital 4 days                     Physical Exam  Vitals and nursing note reviewed.   Constitutional:       General: He is not in acute distress.     Appearance: He is well-developed. He is ill-appearing. He is not toxic-appearing.   HENT:      Head: Normocephalic and atraumatic.   Eyes:      General: No scleral icterus.     Pupils: Pupils  are equal, round, and reactive to light.   Cardiovascular:      Rate and Rhythm: Normal rate and regular rhythm.      Heart sounds: Normal heart sounds.   Pulmonary:      Effort: Pulmonary effort is normal. No respiratory distress.      Breath sounds: Normal breath sounds.   Abdominal:      General: Bowel sounds are normal. There is no distension.      Palpations: Abdomen is soft.      Tenderness: There is no abdominal tenderness.   Musculoskeletal:         General: Normal range of motion.      Cervical back: Normal range of motion and neck supple.   Skin:     General: Skin is warm and dry.      Findings: No erythema or rash.   Neurological:      Mental Status: He is alert and oriented to person, place, and time.      Comments: No asterixis   Psychiatric:         Behavior: Behavior normal.          Significant Labs:  Recent Lab Results  (Last 5 results in the past 24 hours)        06/14/24  1119   06/14/24  0542   06/14/24  0227   06/14/24  0016   06/13/24  2354        Albumin     2.4           ALP     185           ALT     125           Anion Gap     10           AST     151           BILIRUBIN TOTAL     2.6  Comment: For infants and newborns, interpretation of results should be based  on gestational age, weight and in agreement with clinical  observations.    Premature Infant recommended reference ranges:  Up to 24 hours.............<8.0 mg/dL  Up to 48 hours............<12.0 mg/dL  3-5 days..................<15.0 mg/dL  6-29 days.................<15.0 mg/dL             BUN     32           Calcium     9.1           Chloride     114           CO2     14           Creatinine     1.5           eGFR     46           Glucose     91           Magnesium      2.0           Phosphorus Level     2.7           POCT Glucose 141   101     103   96       Potassium     4.3           PROTEIN TOTAL     6.6           Sodium     138                                  Significant Imaging:  Imaging results within the past 24 hours have  been reviewed.  Assessment/Plan:     GI  LFT elevation  Likely associated with underlying liver disease.   US findings reviewed with patient/wife at bedside. Discussed cross-sectional imaging however wife would prefer not exploring further which is not unreasonable. Could consider readdressing at f/u with hepatology  Trend chemistries. Could consider starting steroids, however uncertain subjective benefit        Thank you for your consult. I will sign off. Please contact us if you have any additional questions.    Saran Ca MD  Gastroenterology  [place] [facility]

## 2024-06-14 NOTE — ASSESSMENT & PLAN NOTE
Will get acute hepatitis panel and monitor  US Abd  Reduced dose of Atorvastatin to 40mg daily-Hold until LFTs normalized  GI consulted:  Likely associated with underlying liver disease.   US findings reviewed with patient/wife at bedside. Discussed cross-sectional imaging however wife would prefer not exploring further which is not unreasonable. Could consider readdressing at f/u with hepatology  Trend chemistries. Could consider starting steroids, however uncertain subjective benefit

## 2024-06-14 NOTE — PLAN OF CARE
went to meet with patient on MD rounds. I also spoke with friend Fide at bedside. Patient had a Neurology follow-up previously scheduled on 6/19. Patient still admitted, likely will not be ready until next week sometime.  spoke with  Sallie. She stated will schedule a Vascular Neurology follow-up for stroke. The Neurologist can refer him to other specialties if needed. Updated clinicals sent to [facility] via [facility]. Patient is not medically ready to discharge yet. Patient more attentive today and following commands. I tried to call patient's spouse, unable to reach. Friend tells me wife is working. Patient/Friend encouraged to call with any questions or concerns.  will continue to follow patient through transitions of care and assist with any discharge needs.     -- contacted patient's wife Pati. I reviewed follow-up information and all questions answered.    Patient Contacts    Name Relation Home Work Mobile   Pati Hahn Spouse [phone]   Fide Maldonado Friend   [phone]   Lindsay Carter Daughter   [phone]     Future Appointments   Date Time Provider Department Center   7/9/2024  4:00 PM Angel Luis Tobias III, MD [facility] [place]   7/23/2024  8:00 AM Angel Luis Tobias III, MD [facility]                  Name Relationship Specialty Phone Fax Address Order                [facility] VASCULAR NEUROLOGY  Vascular Neurology [phone]  [address] [facility] [address]     Next Steps: Follow up  Instructions: Vascular Neurology Follow-Up               06/14/24 1110   Discharge Reassessment   Assessment Type Discharge Planning Reassessment   Did the patient's condition or plan change since previous assessment? No   Discharge Plan discussed with: Friend;Patient   Discharge Plan A Skilled Nursing Facility   Discharge Plan B Home with family;Home Health   DME Needed Upon Discharge  other (see  comments)  (Facility to supply equipment.)   Transition of Care Barriers None   Why the patient remains in the hospital Requires continued medical care   Post-Acute Status   Post-Acute Authorization Placement   Post-Acute Placement Status Pending post-acute provider review/more information requested   Hospital Resources/Appts/Education Provided Appointments scheduled and added to AVS   Discharge Delays None known at this time     Toña Urrutia RN    [phone]

## 2024-06-14 NOTE — PROGRESS NOTES
"[place] - Telemetry  Adult Nutrition  Progress Note    SUMMARY       Recommendations    Recommendation:  1. Encourage intake of meals & Boost as tolerated.   2. Monitor weight/labs.   3. RD to continue to follow to monitor po intake    Goals:   Pt will tolerate diet with at least 50-75% intake at meals by RD follow up  Nutrition Goal Status: progressing towards goal  Communication of RD Recs: reviewed with RN (Saima)    Assessment and Plan  Nutrition Problem  Inadequate energy intake    Related to (etiology):   Hx/dx    Signs and Symptoms (as evidenced by):   Weight loss, difficulty chewing     Interventions:  Collaboration with other providers  Sangart beverage    Nutrition Diagnosis Status:   Continues      Malnutrition Assessment  Weight Loss (Malnutrition):  (1% x 6 months)   Thoracic and Lumbar Region: mild depletion   Sabianist Region (Muscle Loss): mild depletion  Clavicle Bone Region (Muscle Loss): mild depletion       Reason for Assessment  Reason For Assessment: RD follow-up  Diagnosis:  (fall/AMS)  Relevant Medical History: HTN, HLD, cholecystectomy, carotid stenosis, CVA  General Information Comments: Pt on pureed diet with Boost Plus. Wife reports good intake at meals. Pt without dentures at this time. Noted 2lb weight loss x 6 months. Alvin 14-skin intact. NFPE completed 6/11- mild wasting of temples, clavicles, and thoracic region.  Nutrition Discharge Planning: pt to d/c on pureed diet    Nutrition Risk Screen  Nutrition Risk Screen: difficulty chewing/swallowing    Nutrition/Diet History  Food Preferences: no Jain or cultrual food prefs identified  Spiritual, Cultural Beliefs, Adventist Practices, Values that Affect Care: no  Factors Affecting Nutritional Intake: chewing difficulties/inability to chew food    Anthropometrics  Temp: 97.4 °F (36.3 °C)  Height Method: Stated  Height: 5' 3" (160 cm)  Height (inches): 63 in  Weight Method: Bed Scale  Weight: 53.9 kg (118 lb 13.3 oz)  Weight (lb): " 118.83 lb  Ideal Body Weight (IBW), Male: 124 lb  % Ideal Body Weight, Male (lb): 95.83 %  BMI (Calculated): 21.1  BMI Grade: 18.5-24.9 - normal  Usual Body Weight (UBW), k.6 kg (/12)  % Usual Body Weight: 98.92  % Weight Change From Usual Weight: -1.28 %     Lab/Procedures/Meds  Pertinent Labs Reviewed: reviewed  Pertinent Labs Comments: BUN 32H, Crea 1.5H, Alb 2.4L  Pertinent Medications Reviewed: reviewed  Pertinent Medications Comments: aspirin, ergocalciferol, lactulose    Estimated/Assessed Needs  Weight Used For Calorie Calculations: 53.9 kg (118 lb 13.3 oz)  Energy Calorie Requirements (kcal): 1886 (35 kcal/kg)  Energy Need Method: Kcal/kg  Protein Requirements: 64g (1.2g/kg)  Weight Used For Protein Calculations: 53.9 kg (118 lb 13.3 oz)  Estimated Fluid Requirement Method: RDA Method  RDA Method (mL): 1886       Nutrition Prescription Ordered  Current Diet Order: pureed  Oral Nutrition Supplement: Boost Plus    Evaluation of Received Nutrient/Fluid Intake  I/O: 0/300  Energy Calories Required: meeting needs  Protein Required: meeting needs  Fluid Required: meeting needs  Comments: LBM 6/13  % Intake of Estimated Energy Needs: 75 - 100 %  % Meal Intake: 75 - 100 %    Nutrition Risk  Level of Risk/Frequency of Follow-up:  (2xweekly)     Monitor and Evaluation  Food and Nutrient Intake: food and beverage intake  Food and Nutrient Adminstration: diet order  Physical Activity and Function: nutrition-related ADLs and IADLs  Anthropometric Measurements: weight  Biochemical Data, Medical Tests and Procedures: electrolyte and renal panel  Nutrition-Focused Physical Findings: overall appearance     Nutrition Related Social Determinants of Health: SDOH: Adequate food in home environment     Nutrition Follow-Up  RD Follow-up?: Yes

## 2024-06-14 NOTE — NURSING
Pt had blood noted on his penis. Pt has been wearing a female purewick. This was removed due to this may being the cause of some breakdown on his skin and a diaper was placed. Dr. Chung was notified. No new orders given. Will continue plan of care and monitor for bleeding.

## 2024-06-14 NOTE — PROGRESS NOTES
[facility] Medicine  Progress Note    Patient Name: Dean Hahn  MRN: [id number]  Patient Class: IP- Inpatient   Admission Date: 6/10/2024  Length of Stay: 4 days  Attending Physician: Marisol Chung MD  Primary Care Provider: Angel Luis Tobias III, MD        Subjective:     Principal Problem:<principal problem not specified>        HPI:  82 YO M with PMHx significant for HTN, dementia, HLD, carotid stenosis B/L CKD, was brought to the ER for new onset speech difficulty, right sided weakness, change in mental status. When seen in the ICU, patient was confused, alert, oriented to self and keep repeating the same thing. He was not able to give any history. 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 He fell and was found on the ground. His family found him on the floor at his house hours after fall, with dysphasia, weakness with limping, and confusion. EMS called and brought to the ER. Code stroke was called. He had CT head no acute process. Microvascular small vessel ischemic change. Labs in the ER with elevated LFT, elevated creatinine, elevated TSH with normal free T, elevated troponin. He was given TPA and post TPA transfer to ICU. In the unit he had another recurrent episode of worsening confusion, repeat CT head without acute evidence of acute process.     Overview/Hospital Course:  6/12/24 Wife at bedside, agreeable to SNF placement  6/13/24 Stable  6/14/24 GI consulted for transaminitis    Interval History: Patient more alert and in good spirits, answers some questions appropriately    Review of Systems   Constitutional:  Negative for fatigue.   Gastrointestinal:  Negative for abdominal distention, abdominal pain, constipation and diarrhea.   Neurological:  Negative for weakness.     Objective:     Vital Signs (Most Recent):  Temp: 97.4 °F (36.3 °C) (06/14/24 1121)  Pulse: 70 (06/14/24 1214)  Resp: 18 (06/14/24  1121)  BP: 139/78 (06/14/24 1121)  SpO2: 100 % (06/14/24 1149) Vital Signs (24h Range):  Temp:  [97.4 °F (36.3 °C)-98.6 °F (37 °C)] 97.4 °F (36.3 °C)  Pulse:  [53-76] 70  Resp:  [18] 18  SpO2:  [100 %] 100 %  BP: (118-145)/(58-84) 139/78     Weight: 53.9 kg (118 lb 13.3 oz)  Body mass index is 21.05 kg/m².    Intake/Output Summary (Last 24 hours) at 6/14/2024 1342  Last data filed at 6/14/2024 1008  Gross per 24 hour   Intake 240 ml   Output 300 ml   Net -60 ml         Physical Exam  Vitals and nursing note reviewed.   Constitutional:       General: He is not in acute distress.     Appearance: He is well-developed. He is ill-appearing. He is not toxic-appearing.   HENT:      Head: Normocephalic and atraumatic.   Eyes:      General: No scleral icterus.     Pupils: Pupils are equal, round, and reactive to light.   Cardiovascular:      Rate and Rhythm: Normal rate and regular rhythm.      Heart sounds: Normal heart sounds.   Pulmonary:      Effort: Pulmonary effort is normal. No respiratory distress.      Breath sounds: Normal breath sounds.   Abdominal:      General: Bowel sounds are normal. There is no distension.      Palpations: Abdomen is soft.      Tenderness: There is no abdominal tenderness.   Musculoskeletal:         General: Normal range of motion.      Cervical back: Normal range of motion and neck supple.   Skin:     General: Skin is warm and dry.      Findings: No erythema or rash.   Neurological:      Mental Status: He is alert and oriented to person, place, and time.      Comments: RUE, RLE weakness   Psychiatric:         Behavior: Behavior normal.             Significant Labs: All pertinent labs within the past 24 hours have been reviewed.  CBC:   Recent Labs   Lab 06/13/24 0221   WBC 8.53  8.53   HGB 9.9*  9.9*   HCT 30.1*  30.1*   *  119*     CMP:   Recent Labs   Lab 06/13/24  0221 06/13/24  0507 06/14/24 0227    139 138   K 4.2 4.1 4.3   * 117* 114*   CO2 16* 18* 14*   GLU 92  92 91   BUN 33* 33* 32*   CREATININE 1.5* 1.6* 1.5*   CALCIUM 9.2 9.1 9.1   PROT  --  6.5 6.6   ALBUMIN  --  2.4* 2.4*   BILITOT  --  2.6* 2.6*   ALKPHOS  --  178* 185*   AST  --  142* 151*   ALT  --  114* 125*   ANIONGAP 3* 4* 10       Significant Imaging: I have reviewed all pertinent imaging results/findings within the past 24 hours.    Assessment/Plan:      Sensorineural hearing loss (SNHL) of both ears  Will need ENT eval for hearing aides      Stroke determined by clinical assessment  Neurology consult:    Impression:  Likely acute ischemic stroke of the L michael, which does correlate with pt's presentation of R sided weakness and speech difficulty. He has had multifocal ischemic strokes in the past (2023), unclear if he ever got evaluation with holter monitor as was recommended at the time. Given prior multifocal strokes in different vascular territories, do have concern for cardioembolic etiology. He would benefit from either holter monitor or loop recorder on discharge to screen for paroxysmal afib.     Recommendations  - No hemorrhage noted on MRI brain this AM, can cancel repeat CTH non con and start DAPT:  - Continue ASA 81 mg and Plavix 75 mg daily for 21 days, followed by ASA 81 mg daily monotherapy thereafter  - Decrease atorvastatin 80 mg nightly to 40 mg nightly, goal LDL <70  - A1c pending, LDL 77.4  - Normotension, normoglycemia  - PT/OT/SLP consulted; appreciate recommendations  - OK to resume chemical DVT ppx  - Holter monitor or loop recorder to screen for paroxysmal afib  - FU with [facility] after discharge, ideally within 4-6 weeks  - Outpatient dementia evaluation, pt has never been evaluated by an outpatient neurologist for dementia before        Thrombocytopenia  Patient was found to have thrombocytopenia, the likely etiology is secondary to cirrhosis/portal hypertension, will monitor the platelets Daily. Will transfuse if platelet count is <100k (if undergoing  neurosurgery). Hold DVT prophylaxis if platelets are <50k. The patient's platelet results have been reviewed and are listed below.  Recent Labs   Lab 06/10/24  0821   *         Stage 3a chronic kidney disease  Creatine stable for now. BMP reviewed- noted Estimated Creatinine Clearance: 27.6 mL/min (A) (based on SCr of 1.6 mg/dL (H)). according to latest data. Based on current GFR, CKD stage is stage 3 - GFR 30-59.  Monitor UOP and serial BMP and adjust therapy as needed. Renally dose meds. Avoid nephrotoxic medications and procedures.    Bilateral carotid artery disease  Defer to neuro surgery      LFT elevation  Will get acute hepatitis panel and monitor  US Abd  Reduced dose of Atorvastatin to 40mg daily-Hold until LFTs normalized  GI consulted:  Likely associated with underlying liver disease.   US findings reviewed with patient/wife at bedside. Discussed cross-sectional imaging however wife would prefer not exploring further which is not unreasonable. Could consider readdressing at f/u with hepatology  Trend chemistries. Could consider starting steroids, however uncertain subjective benefit       Pure hypercholesterolemia  Cont statin      Essential hypertension  Chronic, controlled. Latest blood pressure and vitals reviewed-     Temp:  [97.5 °F (36.4 °C)-98.6 °F (37 °C)]   Pulse:  [56-65]   Resp:  [10-20]   BP: (108-161)/()   SpO2:  [100 %] .   Home meds for hypertension were reviewed and noted below.   Hypertension Medications               metoprolol succinate (TOPROL-XL) 25 MG 24 hr tablet TAKE 1 TABLET BY MOUTH EVERY DAY IN THE EVENING    olmesartan (BENICAR) 20 MG tablet Take 1 tablet (20 mg total) by mouth once daily.    spironolactone (ALDACTONE) 25 MG tablet Take 1 tablet (25 mg total) by mouth once daily.            While in the hospital, will manage blood pressure as follows; Adjust home antihypertensive regimen as follows- hold for BP     Will utilize p.r.n. blood pressure medication  only if patient's blood pressure greater than 180/110 and he develops symptoms such as worsening chest pain or shortness of breath.      VTE Risk Mitigation (From admission, onward)           Ordered     enoxaparin injection 30 mg  Every 24 hours         06/11/24 1016     IP VTE HIGH RISK PATIENT  Once         06/10/24 1233     Place sequential compression device  Until discontinued         06/10/24 1233     Reason for No Pharmacological VTE Prophylaxis  Once        Question:  Reasons:  Answer:  Risk of Bleeding    06/10/24 1233                    Discharge Planning   LAMINE:      Code Status: Full Code   Is the patient medically ready for discharge?:     Reason for patient still in hospital (select all that apply): PT / OT recommendations and Pending disposition  Discharge Plan A: Skilled Nursing Facility   Discharge Delays: None known at this time              Marisol Chung MD  Department of [facility] Medicine   [facility]

## 2024-06-15 LAB
ALBUMIN SERPL BCP-MCNC: 2.4 G/DL (ref 3.5–5.2)
ALP SERPL-CCNC: 189 U/L (ref 55–135)
ALT SERPL W/O P-5'-P-CCNC: 131 U/L (ref 10–44)
ANION GAP SERPL CALC-SCNC: 9 MMOL/L (ref 8–16)
AST SERPL-CCNC: 154 U/L (ref 10–40)
BILIRUB SERPL-MCNC: 2.7 MG/DL (ref 0.1–1)
BUN SERPL-MCNC: 30 MG/DL (ref 8–23)
CALCIUM SERPL-MCNC: 9.2 MG/DL (ref 8.7–10.5)
CHLORIDE SERPL-SCNC: 115 MMOL/L (ref 95–110)
CO2 SERPL-SCNC: 13 MMOL/L (ref 23–29)
CREAT SERPL-MCNC: 1.3 MG/DL (ref 0.5–1.4)
EST. GFR  (NO RACE VARIABLE): 55 ML/MIN/1.73 M^2
GLUCOSE SERPL-MCNC: 96 MG/DL (ref 70–110)
MAGNESIUM SERPL-MCNC: 1.9 MG/DL (ref 1.6–2.6)
PHOSPHATE SERPL-MCNC: 2.9 MG/DL (ref 2.7–4.5)
POCT GLUCOSE: 111 MG/DL (ref 70–110)
POCT GLUCOSE: 114 MG/DL (ref 70–110)
POCT GLUCOSE: 93 MG/DL (ref 70–110)
POCT GLUCOSE: 96 MG/DL (ref 70–110)
POCT GLUCOSE: 98 MG/DL (ref 70–110)
POTASSIUM SERPL-SCNC: 4.1 MMOL/L (ref 3.5–5.1)
PROT SERPL-MCNC: 6.9 G/DL (ref 6–8.4)
SODIUM SERPL-SCNC: 137 MMOL/L (ref 136–145)

## 2024-06-15 PROCEDURE — 94761 N-INVAS EAR/PLS OXIMETRY MLT: CPT | Mod: HCNC

## 2024-06-15 PROCEDURE — 25000003 PHARM REV CODE 250: Mod: HCNC

## 2024-06-15 PROCEDURE — 11000001 HC ACUTE MED/SURG PRIVATE ROOM: Mod: HCNC

## 2024-06-15 PROCEDURE — 25000003 PHARM REV CODE 250: Mod: HCNC | Performed by: INTERNAL MEDICINE

## 2024-06-15 PROCEDURE — 99900035 HC TECH TIME PER 15 MIN (STAT): Mod: HCNC

## 2024-06-15 PROCEDURE — 84100 ASSAY OF PHOSPHORUS: CPT | Mod: HCNC | Performed by: FAMILY MEDICINE

## 2024-06-15 PROCEDURE — 83735 ASSAY OF MAGNESIUM: CPT | Mod: HCNC | Performed by: FAMILY MEDICINE

## 2024-06-15 PROCEDURE — 36415 COLL VENOUS BLD VENIPUNCTURE: CPT | Mod: HCNC | Performed by: FAMILY MEDICINE

## 2024-06-15 PROCEDURE — 25000003 PHARM REV CODE 250: Mod: HCNC | Performed by: FAMILY MEDICINE

## 2024-06-15 PROCEDURE — 63600175 PHARM REV CODE 636 W HCPCS: Mod: HCNC

## 2024-06-15 PROCEDURE — 80053 COMPREHEN METABOLIC PANEL: CPT | Mod: HCNC | Performed by: FAMILY MEDICINE

## 2024-06-15 RX ADMIN — DORZOLAMIDE HYDROCHLORIDE TIMOLOL MALEATE 1 DROP: 20; 5 SOLUTION/ DROPS OPHTHALMIC at 08:06

## 2024-06-15 RX ADMIN — LACTULOSE 10 G: 20 SOLUTION ORAL at 08:06

## 2024-06-15 RX ADMIN — DORZOLAMIDE HYDROCHLORIDE TIMOLOL MALEATE 1 DROP: 20; 5 SOLUTION/ DROPS OPHTHALMIC at 10:06

## 2024-06-15 RX ADMIN — MUPIROCIN: 20 OINTMENT TOPICAL at 08:06

## 2024-06-15 RX ADMIN — SPIRONOLACTONE 25 MG: 25 TABLET ORAL at 08:06

## 2024-06-15 RX ADMIN — CLOPIDOGREL BISULFATE 75 MG: 75 TABLET ORAL at 08:06

## 2024-06-15 RX ADMIN — ENOXAPARIN SODIUM 30 MG: 30 INJECTION SUBCUTANEOUS at 04:06

## 2024-06-15 RX ADMIN — ASPIRIN 81 MG CHEWABLE TABLET 81 MG: 81 TABLET CHEWABLE at 08:06

## 2024-06-15 NOTE — PROGRESS NOTES
[facility] Medicine  Progress Note    Patient Name: Dean Hahn  MRN: [id number]  Patient Class: IP- Inpatient   Admission Date: 6/10/2024  Length of Stay: 5 days  Attending Physician: Marisol Chung MD  Primary Care Provider: Angel Luis Tobias III, MD        Subjective:     Principal Problem:<principal problem not specified>        HPI:  84 YO M with PMHx significant for HTN, dementia, HLD, carotid stenosis B/L CKD, was brought to the ER for new onset speech difficulty, right sided weakness, change in mental status. When seen in the ICU, patient was confused, alert, oriented to self and keep repeating the same thing. He was not able to give any history. 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 He fell and was found on the ground. His family found him on the floor at his house hours after fall, with dysphasia, weakness with limping, and confusion. EMS called and brought to the ER. Code stroke was called. He had CT head no acute process. Microvascular small vessel ischemic change. Labs in the ER with elevated LFT, elevated creatinine, elevated TSH with normal free T, elevated troponin. He was given TPA and post TPA transfer to ICU. In the unit he had another recurrent episode of worsening confusion, repeat CT head without acute evidence of acute process.     Overview/Hospital Course:  6/12/24 Wife at bedside, agreeable to SNF placement  6/13/24 Stable  6/14/24 GI consulted for transaminitis  6/15/24 Stable    Interval History: In good spirits. Says he's doing well    Review of Systems   Constitutional:  Negative for fatigue.   HENT:  Positive for hearing loss.    Gastrointestinal:  Negative for abdominal distention, abdominal pain, constipation and diarrhea.   Neurological:  Negative for weakness.     Objective:     Vital Signs (Most Recent):  Temp: 98.3 °F (36.8 °C) (06/15/24 1600)  Pulse: 60 (06/15/24 1603)  Resp: 18  "(06/15/24 1600)  BP: 128/69 (06/15/24 1600)  SpO2: 99 % (06/15/24 1600) Vital Signs (24h Range):  Temp:  [97.5 °F (36.4 °C)-98.3 °F (36.8 °C)] 98.3 °F (36.8 °C)  Pulse:  [] 60  Resp:  [16-20] 18  SpO2:  [94 %-100 %] 99 %  BP: (125-152)/(66-88) 128/69     Weight: 52.6 kg (115 lb 15.4 oz)  Body mass index is 20.54 kg/m².    Intake/Output Summary (Last 24 hours) at 6/15/2024 1608  Last data filed at 6/15/2024 1345  Gross per 24 hour   Intake 580 ml   Output 675 ml   Net -95 ml         Physical Exam  Vitals and nursing note reviewed.   Constitutional:       General: He is not in acute distress.     Appearance: He is well-developed. He is ill-appearing. He is not toxic-appearing.   HENT:      Head: Normocephalic and atraumatic.   Eyes:      General: No scleral icterus.     Pupils: Pupils are equal, round, and reactive to light.   Cardiovascular:      Rate and Rhythm: Normal rate and regular rhythm.      Heart sounds: Normal heart sounds.   Pulmonary:      Effort: Pulmonary effort is normal. No respiratory distress.      Breath sounds: Normal breath sounds.   Abdominal:      General: Bowel sounds are normal. There is no distension.      Palpations: Abdomen is soft.      Tenderness: There is no abdominal tenderness.   Musculoskeletal:         General: Normal range of motion.      Cervical back: Normal range of motion and neck supple.   Skin:     General: Skin is warm and dry.      Findings: Bruising present. No erythema or rash.   Neurological:      Mental Status: He is alert.      Comments: RUE, RLE weakness   Psychiatric:         Behavior: Behavior normal.             Significant Labs: All pertinent labs within the past 24 hours have been reviewed.  CBC: No results for input(s): "WBC", "HGB", "HCT", "PLT" in the last 48 hours.  CMP:   Recent Labs   Lab 06/14/24  0227 06/15/24  0346    137   K 4.3 4.1   * 115*   CO2 14* 13*   GLU 91 96   BUN 32* 30*   CREATININE 1.5* 1.3   CALCIUM 9.1 9.2   PROT 6.6 6.9 "   ALBUMIN 2.4* 2.4*   BILITOT 2.6* 2.7*   ALKPHOS 185* 189*   * 154*   * 131*   ANIONGAP 10 9       Significant Imaging: I have reviewed all pertinent imaging results/findings within the past 24 hours.    Assessment/Plan:      Sensorineural hearing loss (SNHL) of both ears  Will need ENT eval for hearing aides      Stroke determined by clinical assessment  Neurology consult:    Impression:  Likely acute ischemic stroke of the L michael, which does correlate with pt's presentation of R sided weakness and speech difficulty. He has had multifocal ischemic strokes in the past (2023), unclear if he ever got evaluation with holter monitor as was recommended at the time. Given prior multifocal strokes in different vascular territories, do have concern for cardioembolic etiology. He would benefit from either holter monitor or loop recorder on discharge to screen for paroxysmal afib.     Recommendations  - No hemorrhage noted on MRI brain this AM, can cancel repeat CTH non con and start DAPT:  - Continue ASA 81 mg and Plavix 75 mg daily for 21 days, followed by ASA 81 mg daily monotherapy thereafter  - Decrease atorvastatin 80 mg nightly to 40 mg nightly, goal LDL <70  - A1c pending, LDL 77.4  - Normotension, normoglycemia  - PT/OT/SLP consulted; appreciate recommendations  - OK to resume chemical DVT ppx  - Holter monitor or loop recorder to screen for paroxysmal afib  - FU with [facility] after discharge, ideally within 4-6 weeks  - Outpatient dementia evaluation, pt has never been evaluated by an outpatient neurologist for dementia before        Thrombocytopenia  Patient was found to have thrombocytopenia, the likely etiology is secondary to cirrhosis/portal hypertension, will monitor the platelets Daily. Will transfuse if platelet count is <100k (if undergoing neurosurgery). Hold DVT prophylaxis if platelets are <50k. The patient's platelet results have been reviewed and are listed  below.  Recent Labs   Lab 06/10/24  0821   *         Stage 3a chronic kidney disease  Creatine stable for now. BMP reviewed- noted Estimated Creatinine Clearance: 33.2 mL/min (based on SCr of 1.3 mg/dL). according to latest data. Based on current GFR, CKD stage is stage 3 - GFR 30-59.  Monitor UOP and serial BMP and adjust therapy as needed. Renally dose meds. Avoid nephrotoxic medications and procedures.    Bilateral carotid artery disease  Defer to neuro surgery      LFT elevation  Will get acute hepatitis panel and monitor  US Abd  Reduced dose of Atorvastatin to 40mg daily-Hold until LFTs normalized  GI consulted:  Likely associated with underlying liver disease.   US findings reviewed with patient/wife at bedside. Discussed cross-sectional imaging however wife would prefer not exploring further which is not unreasonable. Could consider readdressing at f/u with hepatology  Trend chemistries. Could consider starting steroids, however uncertain subjective benefit       Pure hypercholesterolemia  Cont statin      Essential hypertension  Chronic, controlled. Latest blood pressure and vitals reviewed-     Temp:  [97.5 °F (36.4 °C)-98.6 °F (37 °C)]   Pulse:  [56-65]   Resp:  [10-20]   BP: (108-161)/()   SpO2:  [100 %] .   Home meds for hypertension were reviewed and noted below.   Hypertension Medications               metoprolol succinate (TOPROL-XL) 25 MG 24 hr tablet TAKE 1 TABLET BY MOUTH EVERY DAY IN THE EVENING    olmesartan (BENICAR) 20 MG tablet Take 1 tablet (20 mg total) by mouth once daily.    spironolactone (ALDACTONE) 25 MG tablet Take 1 tablet (25 mg total) by mouth once daily.            While in the hospital, will manage blood pressure as follows; Adjust home antihypertensive regimen as follows- hold for BP     Will utilize p.r.n. blood pressure medication only if patient's blood pressure greater than 180/110 and he develops symptoms such as worsening chest pain or shortness of  breath.      VTE Risk Mitigation (From admission, onward)           Ordered     enoxaparin injection 30 mg  Every 24 hours         06/11/24 1016     IP VTE HIGH RISK PATIENT  Once         06/10/24 1233     Place sequential compression device  Until discontinued         06/10/24 1233     Reason for No Pharmacological VTE Prophylaxis  Once        Question:  Reasons:  Answer:  Risk of Bleeding    06/10/24 1233                    Discharge Planning   LAMINE:      Code Status: Full Code   Is the patient medically ready for discharge?:     Reason for patient still in hospital (select all that apply): Pending disposition  Discharge Plan A: Skilled Nursing Facility   Discharge Delays: None known at this time              Marisol Chung MD  Department of Hospital Medicine   [facility]

## 2024-06-15 NOTE — SUBJECTIVE & OBJECTIVE
Interval History: In good spirits. Says he's doing well    Review of Systems   Constitutional:  Negative for fatigue.   HENT:  Positive for hearing loss.    Gastrointestinal:  Negative for abdominal distention, abdominal pain, constipation and diarrhea.   Neurological:  Negative for weakness.     Objective:     Vital Signs (Most Recent):  Temp: 98.3 °F (36.8 °C) (06/15/24 1600)  Pulse: 60 (06/15/24 1603)  Resp: 18 (06/15/24 1600)  BP: 128/69 (06/15/24 1600)  SpO2: 99 % (06/15/24 1600) Vital Signs (24h Range):  Temp:  [97.5 °F (36.4 °C)-98.3 °F (36.8 °C)] 98.3 °F (36.8 °C)  Pulse:  [] 60  Resp:  [16-20] 18  SpO2:  [94 %-100 %] 99 %  BP: (125-152)/(66-88) 128/69     Weight: 52.6 kg (115 lb 15.4 oz)  Body mass index is 20.54 kg/m².    Intake/Output Summary (Last 24 hours) at 6/15/2024 1608  Last data filed at 6/15/2024 1345  Gross per 24 hour   Intake 580 ml   Output 675 ml   Net -95 ml         Physical Exam  Vitals and nursing note reviewed.   Constitutional:       General: He is not in acute distress.     Appearance: He is well-developed. He is ill-appearing. He is not toxic-appearing.   HENT:      Head: Normocephalic and atraumatic.   Eyes:      General: No scleral icterus.     Pupils: Pupils are equal, round, and reactive to light.   Cardiovascular:      Rate and Rhythm: Normal rate and regular rhythm.      Heart sounds: Normal heart sounds.   Pulmonary:      Effort: Pulmonary effort is normal. No respiratory distress.      Breath sounds: Normal breath sounds.   Abdominal:      General: Bowel sounds are normal. There is no distension.      Palpations: Abdomen is soft.      Tenderness: There is no abdominal tenderness.   Musculoskeletal:         General: Normal range of motion.      Cervical back: Normal range of motion and neck supple.   Skin:     General: Skin is warm and dry.      Findings: Bruising present. No erythema or rash.   Neurological:      Mental Status: He is alert.      Comments: TIM QUEEN  "weakness   Psychiatric:         Behavior: Behavior normal.             Significant Labs: All pertinent labs within the past 24 hours have been reviewed.  CBC: No results for input(s): "WBC", "HGB", "HCT", "PLT" in the last 48 hours.  CMP:   Recent Labs   Lab 06/14/24  0227 06/15/24  0346    137   K 4.3 4.1   * 115*   CO2 14* 13*   GLU 91 96   BUN 32* 30*   CREATININE 1.5* 1.3   CALCIUM 9.1 9.2   PROT 6.6 6.9   ALBUMIN 2.4* 2.4*   BILITOT 2.6* 2.7*   ALKPHOS 185* 189*   * 154*   * 131*   ANIONGAP 10 9       Significant Imaging: I have reviewed all pertinent imaging results/findings within the past 24 hours.  "

## 2024-06-15 NOTE — PLAN OF CARE
Virtual Nurse note: Patient chart, labs, and vitals reviewed. Care plan and goals updated as needed.   VN to continue to be available as needed.     Problem: Adult Inpatient Plan of Care  Goal: Plan of Care Review  Outcome: Progressing  Goal: Patient-Specific Goal (Individualized)  Outcome: Progressing

## 2024-06-15 NOTE — ASSESSMENT & PLAN NOTE
Creatine stable for now. BMP reviewed- noted Estimated Creatinine Clearance: 33.2 mL/min (based on SCr of 1.3 mg/dL). according to latest data. Based on current GFR, CKD stage is stage 3 - GFR 30-59.  Monitor UOP and serial BMP and adjust therapy as needed. Renally dose meds. Avoid nephrotoxic medications and procedures.

## 2024-06-16 LAB
ALBUMIN SERPL BCP-MCNC: 2.5 G/DL (ref 3.5–5.2)
ALP SERPL-CCNC: 207 U/L (ref 55–135)
ALT SERPL W/O P-5'-P-CCNC: 133 U/L (ref 10–44)
ANION GAP SERPL CALC-SCNC: 10 MMOL/L (ref 8–16)
AST SERPL-CCNC: 145 U/L (ref 10–40)
BILIRUB SERPL-MCNC: 3.1 MG/DL (ref 0.1–1)
BUN SERPL-MCNC: 32 MG/DL (ref 8–23)
CALCIUM SERPL-MCNC: 9.4 MG/DL (ref 8.7–10.5)
CHLORIDE SERPL-SCNC: 114 MMOL/L (ref 95–110)
CO2 SERPL-SCNC: 14 MMOL/L (ref 23–29)
CREAT SERPL-MCNC: 1.4 MG/DL (ref 0.5–1.4)
EST. GFR  (NO RACE VARIABLE): 50 ML/MIN/1.73 M^2
GLUCOSE SERPL-MCNC: 89 MG/DL (ref 70–110)
MAGNESIUM SERPL-MCNC: 2 MG/DL (ref 1.6–2.6)
PHOSPHATE SERPL-MCNC: 3.1 MG/DL (ref 2.7–4.5)
POCT GLUCOSE: 124 MG/DL (ref 70–110)
POCT GLUCOSE: 93 MG/DL (ref 70–110)
POCT GLUCOSE: 97 MG/DL (ref 70–110)
POTASSIUM SERPL-SCNC: 4.3 MMOL/L (ref 3.5–5.1)
PROT SERPL-MCNC: 7 G/DL (ref 6–8.4)
SODIUM SERPL-SCNC: 138 MMOL/L (ref 136–145)

## 2024-06-16 PROCEDURE — 25000003 PHARM REV CODE 250: Mod: HCNC

## 2024-06-16 PROCEDURE — 63600175 PHARM REV CODE 636 W HCPCS: Mod: HCNC | Performed by: INTERNAL MEDICINE

## 2024-06-16 PROCEDURE — 11000001 HC ACUTE MED/SURG PRIVATE ROOM: Mod: HCNC

## 2024-06-16 PROCEDURE — 99900035 HC TECH TIME PER 15 MIN (STAT): Mod: HCNC

## 2024-06-16 PROCEDURE — 80053 COMPREHEN METABOLIC PANEL: CPT | Mod: HCNC | Performed by: FAMILY MEDICINE

## 2024-06-16 PROCEDURE — 25000003 PHARM REV CODE 250: Mod: HCNC | Performed by: FAMILY MEDICINE

## 2024-06-16 PROCEDURE — 25000003 PHARM REV CODE 250: Mod: HCNC | Performed by: INTERNAL MEDICINE

## 2024-06-16 PROCEDURE — 83735 ASSAY OF MAGNESIUM: CPT | Mod: HCNC | Performed by: FAMILY MEDICINE

## 2024-06-16 PROCEDURE — 84100 ASSAY OF PHOSPHORUS: CPT | Mod: HCNC | Performed by: FAMILY MEDICINE

## 2024-06-16 PROCEDURE — 94761 N-INVAS EAR/PLS OXIMETRY MLT: CPT | Mod: HCNC

## 2024-06-16 PROCEDURE — 36415 COLL VENOUS BLD VENIPUNCTURE: CPT | Mod: HCNC | Performed by: FAMILY MEDICINE

## 2024-06-16 RX ORDER — ENOXAPARIN SODIUM 100 MG/ML
30 INJECTION SUBCUTANEOUS EVERY 24 HOURS
Status: DISCONTINUED | OUTPATIENT
Start: 2024-06-16 | End: 2024-06-17 | Stop reason: HOSPADM

## 2024-06-16 RX ADMIN — DORZOLAMIDE HYDROCHLORIDE TIMOLOL MALEATE 1 DROP: 20; 5 SOLUTION/ DROPS OPHTHALMIC at 08:06

## 2024-06-16 RX ADMIN — ENOXAPARIN SODIUM 30 MG: 30 INJECTION SUBCUTANEOUS at 04:06

## 2024-06-16 RX ADMIN — LACTULOSE 10 G: 20 SOLUTION ORAL at 08:06

## 2024-06-16 RX ADMIN — SPIRONOLACTONE 25 MG: 25 TABLET ORAL at 08:06

## 2024-06-16 RX ADMIN — DORZOLAMIDE HYDROCHLORIDE TIMOLOL MALEATE 1 DROP: 20; 5 SOLUTION/ DROPS OPHTHALMIC at 09:06

## 2024-06-16 RX ADMIN — CLOPIDOGREL BISULFATE 75 MG: 75 TABLET ORAL at 08:06

## 2024-06-16 RX ADMIN — ASPIRIN 81 MG CHEWABLE TABLET 81 MG: 81 TABLET CHEWABLE at 08:06

## 2024-06-16 NOTE — PROGRESS NOTES
Pharmacist Renal Dose Adjustment Note    Dean Hahn is a 83 y.o. male being treated with the medication Enoxaparin     Patient Data:    Vital Signs (Most Recent):  Temp: 97.5 °F (36.4 °C) (06/16/24 1149)  Pulse: 70 (06/16/24 1211)  Resp: 18 (06/16/24 1149)  BP: (!) 107/59 (06/16/24 1149)  SpO2: 100 % (06/16/24 1149) Vital Signs (72h Range):  Temp:  [97.4 °F (36.3 °C)-98.6 °F (37 °C)]   Pulse:  []   Resp:  [16-20]   BP: (107-169)/(58-94)   SpO2:  [94 %-100 %]      Recent Labs   Lab 06/14/24  0227 06/15/24  0346 06/16/24  0344   CREATININE 1.5* 1.3 1.4     Serum creatinine: 1.4 mg/dL 06/16/24 0344  Estimated creatinine clearance: 30.8 mL/min    Medication: Enoxaparin dose: 30mg frequency q24 will be changed to medication:Enoxaparin dose:40mg frequency:q24    Pharmacist's Name: Yancy Becker

## 2024-06-16 NOTE — ASSESSMENT & PLAN NOTE
"Patient was found to have thrombocytopenia, the likely etiology is secondary to cirrhosis/portal hypertension, will monitor the platelets Daily. Will transfuse if platelet count is <100k (if undergoing neurosurgery). Hold DVT prophylaxis if platelets are <50k. The patient's platelet results have been reviewed and are listed below.  No results for input(s): "PLT" in the last 24 hours.      "

## 2024-06-16 NOTE — PROGRESS NOTES
[facility] Medicine  Progress Note    Patient Name: Dean Hahn  MRN: [id number]  Patient Class: IP- Inpatient   Admission Date: 6/10/2024  Length of Stay: 6 days  Attending Physician: Marisol Chung MD  Primary Care Provider: Angel Luis Tobias III, MD        Subjective:     Principal Problem:<principal problem not specified>        HPI:  84 YO M with PMHx significant for HTN, dementia, HLD, carotid stenosis B/L CKD, was brought to the ER for new onset speech difficulty, right sided weakness, change in mental status. When seen in the ICU, patient was confused, alert, oriented to self and keep repeating the same thing. He was not able to give any history. 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 He fell and was found on the ground. His family found him on the floor at his house hours after fall, with dysphasia, weakness with limping, and confusion. EMS called and brought to the ER. Code stroke was called. He had CT head no acute process. Microvascular small vessel ischemic change. Labs in the ER with elevated LFT, elevated creatinine, elevated TSH with normal free T, elevated troponin. He was given TPA and post TPA transfer to ICU. In the unit he had another recurrent episode of worsening confusion, repeat CT head without acute evidence of acute process.     Overview/Hospital Course:  6/12/24 Wife at bedside, agreeable to SNF placement  6/13/24 Stable  6/14/24 GI consulted for transaminitis  6/15/24 Stable  6/16/24 Stable, pending placement    Interval History: Friend at bedside, brought him a smoothie to drink    Review of Systems   Constitutional:  Negative for fatigue.   HENT:  Positive for hearing loss.    Gastrointestinal:  Negative for abdominal distention, abdominal pain, constipation and diarrhea.   Neurological:  Negative for weakness.     Objective:     Vital Signs (Most Recent):  Temp: 98.2 °F (36.8 °C) (06/16/24  "1616)  Pulse: 73 (06/16/24 1616)  Resp: 18 (06/16/24 1616)  BP: 113/63 (06/16/24 1616)  SpO2: 100 % (06/16/24 1616) Vital Signs (24h Range):  Temp:  [97.5 °F (36.4 °C)-98.4 °F (36.9 °C)] 98.2 °F (36.8 °C)  Pulse:  [63-82] 73  Resp:  [16-20] 18  SpO2:  [97 %-100 %] 100 %  BP: (107-169)/(59-94) 113/63     Weight: 52.6 kg (115 lb 15.4 oz)  Body mass index is 20.54 kg/m².    Intake/Output Summary (Last 24 hours) at 6/16/2024 1620  Last data filed at 6/16/2024 0012  Gross per 24 hour   Intake 247 ml   Output 700 ml   Net -453 ml         Physical Exam  Vitals and nursing note reviewed.   Constitutional:       General: He is not in acute distress.     Appearance: He is well-developed. He is not ill-appearing or toxic-appearing.   HENT:      Head: Normocephalic and atraumatic.   Eyes:      General: No scleral icterus.     Pupils: Pupils are equal, round, and reactive to light.   Cardiovascular:      Rate and Rhythm: Normal rate and regular rhythm.      Heart sounds: Normal heart sounds.   Pulmonary:      Effort: Pulmonary effort is normal. No respiratory distress.      Breath sounds: Normal breath sounds.   Abdominal:      General: Bowel sounds are normal. There is no distension.      Palpations: Abdomen is soft.      Tenderness: There is no abdominal tenderness.   Musculoskeletal:         General: Normal range of motion.      Cervical back: Normal range of motion and neck supple.   Skin:     General: Skin is warm and dry.      Findings: Bruising present. No erythema or rash.   Neurological:      Mental Status: He is alert.      Comments: RUE, RLE weakness   Psychiatric:         Behavior: Behavior normal.             Significant Labs: All pertinent labs within the past 24 hours have been reviewed.  CBC: No results for input(s): "WBC", "HGB", "HCT", "PLT" in the last 48 hours.  CMP:   Recent Labs   Lab 06/15/24  0346 06/16/24  0344    138   K 4.1 4.3   * 114*   CO2 13* 14*   GLU 96 89   BUN 30* 32*   CREATININE " 1.3 1.4   CALCIUM 9.2 9.4   PROT 6.9 7.0   ALBUMIN 2.4* 2.5*   BILITOT 2.7* 3.1*   ALKPHOS 189* 207*   * 145*   * 133*   ANIONGAP 9 10       Significant Imaging: I have reviewed all pertinent imaging results/findings within the past 24 hours.    Assessment/Plan:      Sensorineural hearing loss (SNHL) of both ears  Will need ENT eval for hearing aides      Stroke determined by clinical assessment  Neurology consult:    Impression:  Likely acute ischemic stroke of the L michael, which does correlate with pt's presentation of R sided weakness and speech difficulty. He has had multifocal ischemic strokes in the past (2023), unclear if he ever got evaluation with holter monitor as was recommended at the time. Given prior multifocal strokes in different vascular territories, do have concern for cardioembolic etiology. He would benefit from either holter monitor or loop recorder on discharge to screen for paroxysmal afib.     Recommendations  - No hemorrhage noted on MRI brain this AM, can cancel repeat CTH non con and start DAPT:  - Continue ASA 81 mg and Plavix 75 mg daily for 21 days, followed by ASA 81 mg daily monotherapy thereafter  - Decrease atorvastatin 80 mg nightly to 40 mg nightly, goal LDL <70  - A1c pending, LDL 77.4  - Normotension, normoglycemia  - PT/OT/SLP consulted; appreciate recommendations  - OK to resume chemical DVT ppx  - Holter monitor or loop recorder to screen for paroxysmal afib  - FU with [facility] after discharge, ideally within 4-6 weeks  - Outpatient dementia evaluation, pt has never been evaluated by an outpatient neurologist for dementia before        Thrombocytopenia  Patient was found to have thrombocytopenia, the likely etiology is secondary to cirrhosis/portal hypertension, will monitor the platelets Daily. Will transfuse if platelet count is <100k (if undergoing neurosurgery). Hold DVT prophylaxis if platelets are <50k. The patient's platelet results have  "been reviewed and are listed below.  No results for input(s): "PLT" in the last 24 hours.        Stage 3a chronic kidney disease  Creatine stable for now. BMP reviewed- noted Estimated Creatinine Clearance: 33.2 mL/min (based on SCr of 1.3 mg/dL). according to latest data. Based on current GFR, CKD stage is stage 3 - GFR 30-59.  Monitor UOP and serial BMP and adjust therapy as needed. Renally dose meds. Avoid nephrotoxic medications and procedures.    Bilateral carotid artery disease  Defer to neuro surgery      LFT elevation  Will get acute hepatitis panel and monitor  US Abd  Reduced dose of Atorvastatin to 40mg daily-Hold until LFTs normalized  GI consulted:  Likely associated with underlying liver disease.   US findings reviewed with patient/wife at bedside. Discussed cross-sectional imaging however wife would prefer not exploring further which is not unreasonable. Could consider readdressing at f/u with hepatology  Trend chemistries. Could consider starting steroids, however uncertain subjective benefit       Pure hypercholesterolemia  Cont statin      Essential hypertension  Chronic, controlled. Latest blood pressure and vitals reviewed-     Temp:  [97.5 °F (36.4 °C)-98.6 °F (37 °C)]   Pulse:  [56-65]   Resp:  [10-20]   BP: (108-161)/()   SpO2:  [100 %] .   Home meds for hypertension were reviewed and noted below.   Hypertension Medications               metoprolol succinate (TOPROL-XL) 25 MG 24 hr tablet TAKE 1 TABLET BY MOUTH EVERY DAY IN THE EVENING    olmesartan (BENICAR) 20 MG tablet Take 1 tablet (20 mg total) by mouth once daily.    spironolactone (ALDACTONE) 25 MG tablet Take 1 tablet (25 mg total) by mouth once daily.            While in the hospital, will manage blood pressure as follows; Adjust home antihypertensive regimen as follows- hold for BP     Will utilize p.r.n. blood pressure medication only if patient's blood pressure greater than 180/110 and he develops symptoms such as worsening " chest pain or shortness of breath.      VTE Risk Mitigation (From admission, onward)           Ordered     enoxaparin injection 30 mg  Every 24 hours         06/16/24 1439     IP VTE HIGH RISK PATIENT  Once         06/10/24 1233     Place sequential compression device  Until discontinued         06/10/24 1233     Reason for No Pharmacological VTE Prophylaxis  Once        Question:  Reasons:  Answer:  Risk of Bleeding    06/10/24 1233                    Discharge Planning   LAMINE:      Code Status: Full Code   Is the patient medically ready for discharge?:     Reason for patient still in hospital (select all that apply): Pending disposition  Discharge Plan A: Skilled Nursing Facility   Discharge Delays: None known at this time              Marisol Chung MD  Department of [facility] Medicine   [facility]

## 2024-06-16 NOTE — SUBJECTIVE & OBJECTIVE
Interval History: Friend at bedside, brought him a smoothie to drink    Review of Systems   Constitutional:  Negative for fatigue.   HENT:  Positive for hearing loss.    Gastrointestinal:  Negative for abdominal distention, abdominal pain, constipation and diarrhea.   Neurological:  Negative for weakness.     Objective:     Vital Signs (Most Recent):  Temp: 98.2 °F (36.8 °C) (06/16/24 1616)  Pulse: 73 (06/16/24 1616)  Resp: 18 (06/16/24 1616)  BP: 113/63 (06/16/24 1616)  SpO2: 100 % (06/16/24 1616) Vital Signs (24h Range):  Temp:  [97.5 °F (36.4 °C)-98.4 °F (36.9 °C)] 98.2 °F (36.8 °C)  Pulse:  [63-82] 73  Resp:  [16-20] 18  SpO2:  [97 %-100 %] 100 %  BP: (107-169)/(59-94) 113/63     Weight: 52.6 kg (115 lb 15.4 oz)  Body mass index is 20.54 kg/m².    Intake/Output Summary (Last 24 hours) at 6/16/2024 1620  Last data filed at 6/16/2024 0012  Gross per 24 hour   Intake 247 ml   Output 700 ml   Net -453 ml         Physical Exam  Vitals and nursing note reviewed.   Constitutional:       General: He is not in acute distress.     Appearance: He is well-developed. He is not ill-appearing or toxic-appearing.   HENT:      Head: Normocephalic and atraumatic.   Eyes:      General: No scleral icterus.     Pupils: Pupils are equal, round, and reactive to light.   Cardiovascular:      Rate and Rhythm: Normal rate and regular rhythm.      Heart sounds: Normal heart sounds.   Pulmonary:      Effort: Pulmonary effort is normal. No respiratory distress.      Breath sounds: Normal breath sounds.   Abdominal:      General: Bowel sounds are normal. There is no distension.      Palpations: Abdomen is soft.      Tenderness: There is no abdominal tenderness.   Musculoskeletal:         General: Normal range of motion.      Cervical back: Normal range of motion and neck supple.   Skin:     General: Skin is warm and dry.      Findings: Bruising present. No erythema or rash.   Neurological:      Mental Status: He is alert.      Comments: BERNICE  "RLE weakness   Psychiatric:         Behavior: Behavior normal.             Significant Labs: All pertinent labs within the past 24 hours have been reviewed.  CBC: No results for input(s): "WBC", "HGB", "HCT", "PLT" in the last 48 hours.  CMP:   Recent Labs   Lab 06/15/24  0346 06/16/24  0344    138   K 4.1 4.3   * 114*   CO2 13* 14*   GLU 96 89   BUN 30* 32*   CREATININE 1.3 1.4   CALCIUM 9.2 9.4   PROT 6.9 7.0   ALBUMIN 2.4* 2.5*   BILITOT 2.7* 3.1*   ALKPHOS 189* 207*   * 145*   * 133*   ANIONGAP 9 10       Significant Imaging: I have reviewed all pertinent imaging results/findings within the past 24 hours.  "

## 2024-06-17 VITALS
HEART RATE: 73 BPM | DIASTOLIC BLOOD PRESSURE: 66 MMHG | SYSTOLIC BLOOD PRESSURE: 124 MMHG | HEIGHT: 63 IN | BODY MASS INDEX: 20.54 KG/M2 | WEIGHT: 115.94 LBS | OXYGEN SATURATION: 100 % | RESPIRATION RATE: 18 BRPM | TEMPERATURE: 98 F

## 2024-06-17 LAB
ALBUMIN SERPL BCP-MCNC: 2.4 G/DL (ref 3.5–5.2)
ALP SERPL-CCNC: 197 U/L (ref 55–135)
ALT SERPL W/O P-5'-P-CCNC: 122 U/L (ref 10–44)
ANION GAP SERPL CALC-SCNC: 11 MMOL/L (ref 8–16)
AST SERPL-CCNC: 126 U/L (ref 10–40)
BILIRUB SERPL-MCNC: 2.9 MG/DL (ref 0.1–1)
BUN SERPL-MCNC: 44 MG/DL (ref 8–23)
CALCIUM SERPL-MCNC: 9.3 MG/DL (ref 8.7–10.5)
CHLORIDE SERPL-SCNC: 114 MMOL/L (ref 95–110)
CO2 SERPL-SCNC: 14 MMOL/L (ref 23–29)
CREAT SERPL-MCNC: 1.5 MG/DL (ref 0.5–1.4)
EST. GFR  (NO RACE VARIABLE): 46 ML/MIN/1.73 M^2
GLUCOSE SERPL-MCNC: 98 MG/DL (ref 70–110)
MAGNESIUM SERPL-MCNC: 2.1 MG/DL (ref 1.6–2.6)
PHOSPHATE SERPL-MCNC: 3 MG/DL (ref 2.7–4.5)
POCT GLUCOSE: 173 MG/DL (ref 70–110)
POCT GLUCOSE: 177 MG/DL (ref 70–110)
POCT GLUCOSE: 94 MG/DL (ref 70–110)
POTASSIUM SERPL-SCNC: 4.2 MMOL/L (ref 3.5–5.1)
PROT SERPL-MCNC: 6.8 G/DL (ref 6–8.4)
SODIUM SERPL-SCNC: 139 MMOL/L (ref 136–145)

## 2024-06-17 PROCEDURE — 99900035 HC TECH TIME PER 15 MIN (STAT): Mod: HCNC

## 2024-06-17 PROCEDURE — 36415 COLL VENOUS BLD VENIPUNCTURE: CPT | Mod: HCNC | Performed by: FAMILY MEDICINE

## 2024-06-17 PROCEDURE — 94761 N-INVAS EAR/PLS OXIMETRY MLT: CPT | Mod: HCNC

## 2024-06-17 PROCEDURE — 83735 ASSAY OF MAGNESIUM: CPT | Mod: HCNC | Performed by: FAMILY MEDICINE

## 2024-06-17 PROCEDURE — 97535 SELF CARE MNGMENT TRAINING: CPT | Mod: HCNC

## 2024-06-17 PROCEDURE — 97530 THERAPEUTIC ACTIVITIES: CPT | Mod: HCNC

## 2024-06-17 PROCEDURE — 97112 NEUROMUSCULAR REEDUCATION: CPT | Mod: HCNC

## 2024-06-17 PROCEDURE — 80053 COMPREHEN METABOLIC PANEL: CPT | Mod: HCNC | Performed by: FAMILY MEDICINE

## 2024-06-17 PROCEDURE — 25000003 PHARM REV CODE 250: Mod: HCNC

## 2024-06-17 PROCEDURE — 25000003 PHARM REV CODE 250: Mod: HCNC | Performed by: INTERNAL MEDICINE

## 2024-06-17 PROCEDURE — 84100 ASSAY OF PHOSPHORUS: CPT | Mod: HCNC | Performed by: FAMILY MEDICINE

## 2024-06-17 PROCEDURE — 25000003 PHARM REV CODE 250: Mod: HCNC | Performed by: FAMILY MEDICINE

## 2024-06-17 RX ORDER — CLOPIDOGREL BISULFATE 75 MG/1
75 TABLET ORAL DAILY
Qty: 14 TABLET | Refills: 0 | Status: ON HOLD | OUTPATIENT
Start: 2024-06-18 | End: 2024-07-02

## 2024-06-17 RX ADMIN — LACTULOSE 10 G: 20 SOLUTION ORAL at 09:06

## 2024-06-17 RX ADMIN — ERGOCALCIFEROL 50000 UNITS: 1.25 CAPSULE ORAL at 09:06

## 2024-06-17 RX ADMIN — DORZOLAMIDE HYDROCHLORIDE TIMOLOL MALEATE 1 DROP: 20; 5 SOLUTION/ DROPS OPHTHALMIC at 09:06

## 2024-06-17 RX ADMIN — ASPIRIN 81 MG CHEWABLE TABLET 81 MG: 81 TABLET CHEWABLE at 09:06

## 2024-06-17 RX ADMIN — SPIRONOLACTONE 25 MG: 25 TABLET ORAL at 09:06

## 2024-06-17 RX ADMIN — CLOPIDOGREL BISULFATE 75 MG: 75 TABLET ORAL at 09:06

## 2024-06-17 NOTE — PLAN OF CARE
Patient seen for physical therapy session on this date, co-treatment with occupational therapy.  Patient oriented to self only, requires max verbal and tactile cues for command following.  Noted with max tone/stiffness throughout trunk and extremities, requires max assist with all functional tasks and mobility.  Full report to follow.  Continue to recommend moderate intensity therapy at discharge.         Problem: Physical Therapy  Goal: Physical Therapy Goal  Description: Goals to be met by: 24     Patient will increase functional independence with mobility by performin. Supine to sit with Moderate Assistance  2. Sit to supine with Moderate Assistance  3. Sit to stand transfer with Moderate Assistance  4. Bed to chair transfer with Moderate Assistance using Rolling Walker  5. Gait  x 25 feet with Moderate Assistance using Rolling Walker.     Outcome: Progressing

## 2024-06-17 NOTE — NURSING
Patient discharging to [facility] SNF. All discharge instructions placed in packet. IV and telemetry removed, patient tolerated well. Attempted to call report x 2, will attempt again. Awaiting transport to arrive, [facility] ETA 6:30 PM.

## 2024-06-17 NOTE — PLAN OF CARE
Problem: Adult Inpatient Plan of Care  Goal: Plan of Care Review  6/17/2024 1531 by Tiffany Storey RN  Outcome: Met  6/17/2024 0802 by Tiffany Storey RN  Outcome: Progressing  Goal: Patient-Specific Goal (Individualized)  Outcome: Met  Goal: Absence of Hospital-Acquired Illness or Injury  Outcome: Met  Goal: Optimal Comfort and Wellbeing  Outcome: Met  Goal: Readiness for Transition of Care  Outcome: Met     Problem: Fall Injury Risk  Goal: Absence of Fall and Fall-Related Injury  Outcome: Met     Problem: Skin Injury Risk Increased  Goal: Skin Health and Integrity  Outcome: Met     Problem: Stroke, Ischemic (Includes Transient Ischemic Attack)  Goal: Optimal Coping  Outcome: Met  Goal: Effective Bowel Elimination  Outcome: Met  Goal: Optimal Cerebral Tissue Perfusion  Outcome: Met  Goal: Optimal Cognitive Function  Outcome: Met  Goal: Improved Communication Skills  Outcome: Met  Goal: Optimal Functional Ability  Outcome: Met  Goal: Optimal Nutrition Intake  Outcome: Met  Goal: Effective Oxygenation and Ventilation  Outcome: Met  Goal: Improved Sensorimotor Function  Outcome: Met  Goal: Safe and Effective Swallow  Outcome: Met  Goal: Effective Urinary Elimination  Outcome: Met     Problem: Confusion Chronic  Goal: Optimal Cognitive Function  Outcome: Met     Problem: SLP  Goal: SLP Goal  Description: Short Term Goals:  1. Pt will participate in swallow eval to determine safest diet level.   2. Pt will tolerate pureed/thin diet with no audible dysphagia signs.   3. Ongoing eval of speech-lang to determine deficits.   Outcome: Met     Problem: Occupational Therapy  Goal: Occupational Therapy Goal  Description: Goals to be met by: 7/11/24     Patient will increase functional independence with ADLs by performing:    Grooming while seated with Minimal Assistance.  Toileting from bedside commode with Minimal Assistance for hygiene and clothing management.   Sitting at edge of bed x10 minutes with Stand-by Assistance.  Supine  to sit with Minimal Assistance.  Step transfer with Minimal Assistance  Toilet transfer to bedside commode with Minimal Assistance.  Increased functional strength to WFL for self care skills and functional mobility .  Upper extremity exercise program x10 reps per handout, with independence.    Outcome: Met     Problem: Physical Therapy  Goal: Physical Therapy Goal  Description: Goals to be met by: 24     Patient will increase functional independence with mobility by performin. Supine to sit with Moderate Assistance  2. Sit to supine with Moderate Assistance  3. Sit to stand transfer with Moderate Assistance  4. Bed to chair transfer with Moderate Assistance using Rolling Walker  5. Gait  x 25 feet with Moderate Assistance using Rolling Walker.     Outcome: Met     Problem: Comorbidity Management  Goal: Blood Pressure in Desired Range  Outcome: Met

## 2024-06-17 NOTE — PT/OT/SLP PROGRESS
"Physical Therapy Treatment    Patient Name:  Dean Hahn   MRN:  [id number]    Recommendations:     Discharge Recommendations: Moderate Intensity Therapy  Discharge Equipment Recommendations: to be determined by next level of care  Barriers to discharge:  requires assist with all functional mobility and ADLs, decreased cognitive status    Assessment:     Dean Hahn is a 83 y.o. male admitted with a medical diagnosis of Stroke determined by clinical assessment.  He presents with the following impairments/functional limitations: weakness, gait instability, decreased upper extremity function, decreased ROM, impaired endurance, impaired balance, decreased lower extremity function, decreased safety awareness, impaired fine motor, impaired self care skills, impaired cognition, pain, impaired skin, impaired functional mobility, decreased coordination, abnormal tone, impaired coordination     Patient seen for physical therapy session on this date, co-treatment with occupational therapy.  Patient oriented to self only, requires max verbal and tactile cues for command following.  Noted with max tone/stiffness throughout trunk and extremities, requires max assist with all functional tasks and mobility.  Full report to follow.  Continue to recommend moderate intensity therapy at discharge.  .    Rehab Prognosis: Fair; patient would benefit from acute skilled PT services to address these deficits and reach maximum level of function.    Recent Surgery: * No surgery found *      Plan:     During this hospitalization, patient to be seen 3 x/week to address the identified rehab impairments via gait training, therapeutic activities, therapeutic exercises, neuromuscular re-education, wheelchair management/training and progress toward the following goals:    Plan of Care Expires:  07/11/24    Subjective     Chief Complaint: states "ouch" when therapist touches left heel, noted heel bandage  Patient/Family " "Comments/goals: to get stronger  Pain/Comfort:  Pain Rating 1:  (states "ouch" when therapist touches left heel, noted bandage to heel, placed B LE on pillows floating heels at end of session)  Location - Side 1: Left  Location - Orientation 1: generalized  Location 1: heel  Pain Addressed 1: Distraction, Cessation of Activity, Nurse notified, Reposition  Pain Rating Post-Intervention 1:  (No signs of pain at rest)      Objective:     Communicated with nurse Mcclain prior to session.  Patient found supine with bed alarm, peripheral IV, telemetry, PureWick (telesitter) upon PT entry to room.     General Precautions: Standard, fall, hearing impaired, aspiration  Orthopedic Precautions: N/A  Braces: N/A  Respiratory Status: Room air     Functional Mobility:  Bed Mobility:   increased cueing for sequencing and initiation.  Patient with poor command following.  Noted with max stiffness/increased tone throughout trunk and B UE and Les, requires max assist to increase knee flexion and trunk flexion.  Patient with posterior pelvic tilt, posterior lean, max truncal tone with sitting EOB.  Poor ability to isolate movements for functional activities.  Rolling Right: maximal assistance and of 2 persons  Supine to Sit: maximal assistance and of 2 persons  Sit to Supine: maximal assistance and of 2 persons  Transfers:   x 2 trials from EOB with HHA, increased verbal and tactile cueing for anterior weight shift and anterior pelvic tilt prior to stand.  Patient unable to achieve full stand, partial squat with B hips and knees in flexion.    Sit to Stand:  maximal assistance and of 2 persons with hand-held assist  Balance: Luiztent sat at EOB x ~ 20 minutes with min to max assist for sitting balance, posterior lean with posterior pelvic tilt and max tone/stiffness throughout.  Patient participated with OT for functional ADL task, requiring min to max assist for maintaining midline.  Emphasis on forward reaching and increasing flexion " "at trunk and use of Ue's for functional task.        AM-PAC 6 CLICK MOBILITY  Turning over in bed (including adjusting bedclothes, sheets and blankets)?: 2  Sitting down on and standing up from a chair with arms (e.g., wheelchair, bedside commode, etc.): 2  Moving from lying on back to sitting on the side of the bed?: 2  Moving to and from a bed to a chair (including a wheelchair)?: 1  Need to walk in hospital room?: 1  Climbing 3-5 steps with a railing?: 1  Basic Mobility Total Score: 9       Treatment & Education:  Patient oriented to self, states name and .  Spouse applies hearing aide.  Patient with limited verbalizations throughout session, requires increased time for verbal and tactile cueing.  Patient noted with "chewing" and found to have food in mouth, able to take sips with OT to clear food, but requires max verbal and tactile cues.  Sitting balance is poor with overall posterior lean, emphasis on multi-directional reaching tasks with mod to max assist at trunk and B Ues.   Patient limited in standing balance as well, unable to achieve full standing due to max flexion at trunk, hips, and knees.  Educated on use of call light to call for assist.  Max assist x 2 to return back to supine, B Heels floated on pillows.    Patient left supine with all lines intact, call button in reach, bed alarm on, and nurse notified..    GOALS:   Multidisciplinary Problems       Physical Therapy Goals          Problem: Physical Therapy    Goal Priority Disciplines Outcome Goal Variances Interventions   Physical Therapy Goal     PT, PT/OT Progressing     Description: Goals to be met by: 24     Patient will increase functional independence with mobility by performin. Supine to sit with Moderate Assistance  2. Sit to supine with Moderate Assistance  3. Sit to stand transfer with Moderate Assistance  4. Bed to chair transfer with Moderate Assistance using Rolling Walker  5. Gait  x 25 feet with Moderate Assistance " using Rolling Walker.                          Time Tracking:     PT Received On: 06/17/24  PT Start Time: 1155     PT Stop Time: 1224  PT Total Time (min): 29 min     Billable Minutes: Therapeutic Activity 15 and Neuromuscular Re-education 14    Treatment Type: Treatment  PT/PTA: PT     Number of PTA visits since last PT visit: 0     06/17/2024

## 2024-06-17 NOTE — PLAN OF CARE
Patient is oriented to himself. AVAsys camera at the bedside. Pure wick was changed. Had episode of BM. Pericare was rendered. BG was checked, WDNL.Nightly and PRN medication administered per MAR. On going monitoring.  Problem: Adult Inpatient Plan of Care  Goal: Plan of Care Review  Outcome: Progressing     Problem: Adult Inpatient Plan of Care  Goal: Absence of Hospital-Acquired Illness or Injury  Outcome: Progressing     Problem: Adult Inpatient Plan of Care  Goal: Readiness for Transition of Care  Outcome: Progressing     Problem: Fall Injury Risk  Goal: Absence of Fall and Fall-Related Injury  Outcome: Progressing     Problem: Skin Injury Risk Increased  Goal: Skin Health and Integrity  Outcome: Progressing     Problem: Stroke, Ischemic (Includes Transient Ischemic Attack)  Goal: Effective Bowel Elimination  Outcome: Progressing     Problem: Stroke, Ischemic (Includes Transient Ischemic Attack)  Goal: Optimal Cognitive Function  Outcome: Progressing     Problem: Comorbidity Management  Goal: Blood Pressure in Desired Range  Outcome: Progressing

## 2024-06-17 NOTE — PLAN OF CARE
went to meet with patient and spouse this morning. Spouse reports she did not receive paperwork yet from [facility]. I spoke with Adriane and sent her updated clinicals. Adriane will submit for insurance authorization. Nursing staff updated on discharge plans. Patient and Spouse encouraged to call with any questions or concerns.  will continue to follow patient through transitions of care and assist with any discharge needs.     Patient Contacts    Name Relation Home Work Mobile   Pati Hahn Spouse [phone]   Fide Maldonado Friend   [phone]   Lindsay Carter Daughter   [phone]     Future Appointments   Date Time Provider Department Center   7/9/2024  4:00 PM Angel Luis Tobias III, MD [facility]  [facility]   7/23/2024  8:00 AM Angel Luis Tobias III, MD [facility]  Vascular Neurology [phone]  [address] [facility] [address]      Next Steps: Follow up  Instructions: Vascular Neurology Follow-Up Requested.    [facility]  Nursing Home Agency SNF Agency [phone] [address] [facility] [address]     Next Steps: Follow up  Instructions: Skilled Nursing Facility        06/17/24 0937   Discharge Reassessment   Assessment Type Discharge Planning Reassessment   Did the patient's condition or plan change since previous assessment? No   Discharge Plan discussed with: Spouse/sig other;Patient   Discharge Plan A Skilled Nursing Facility   Discharge Plan B   (senior care vs. HH?)   DME Needed Upon Discharge  other (see comments)  (TBD)   Transition of Care Barriers None   Why the patient remains in the hospital Requires continued medical care   Post-Acute Status   Post-Acute Authorization Placement   Post-Acute Placement Status Pending payor review/awaiting authorization (if required)   Hospital Resources/Appts/Education Provided Appointments scheduled and added to AVS   Discharge Delays None  known at this time     Toña Urrutia RN    [phone]

## 2024-06-17 NOTE — PLAN OF CARE
spoke with Adriane at [facility] SN[facility]. She reported to  [facility] auth obtained. She never received family signed paperwork. Adriane will send email again to spouse. I met with patient and spouse to update them. I told spouse to check her email again since she resent docusign. Need paperwork done prior to transfer. Awaiting facility orders to send.     1212--Facility orders sent. Adriane updated. She tells me she knows wife received email she can see she opened it. She will update me once signed.    1240--Per Adriane Only completed 1 packet, needs other packet done. Just resent ....Ty Adriane      ---1400 Adriane stated wife signed paperwork. I told her orders already sent in [facility]. She will review.    --1500 Adriaen stated orders looked good. Nurse can call report to Report: [phone] Room 910. [facility] will be set up for 1630 to [facility]. Patient is max assistance, unable to transport via wheelchair. Nursing staff updated and packet given.    I contacted patient's wife Pati and she was updated on transport set up. All questions answered. Vascular Neurology follow-up previously requested. Will send follow-up information when scheduled.     Patient Contacts    Name Relation Home Work Mobile   Pati Hahn Spouse [phone]   JoelFide Friend   [phone]   Raul,Lindsay Daughter   [phone]     Future Appointments   Date Time Provider Department Center   7/9/2024  4:00 PM Angel Luis Tobias III, MD [facility] [place]   7/23/2024  8:00 AM Angel Luis Tobias III, MD [facility]       06/17/24 1018   Final Note   Assessment Type Final Discharge Note   Anticipated Discharge Disposition SNF   Hospital Resources/Appts/Education Provided Appointments scheduled and added to AVS   Post-Acute Status   Post-Acute Authorization Placement   Post-Acute Placement Status Pending post-acute provider review/more information requested  (SNF auth  obtained, awaiting facility orders to send.)   Discharge Delays (!) Ambulance Transport/Facility Transport  (SNF orders/Family to sign papers)     Toña Urrutia RN    [phone]

## 2024-06-17 NOTE — PLAN OF CARE
[facility]    FACILITY TRANSFER ORDERS      Patient Name: Dean Hahn  YOB: 1940    PCP: Angel Luis Tobias III, MD   PCP Address: [address] [place] [address] / [place] [address]  PCP Phone Number: [phone]  PCP Fax: [phone]    Encounter Date: 06/17/2024    Admit to: SNF    Vital Signs:  Routine    Diagnoses:   Active Hospital Problems    Diagnosis  POA    *Stroke determined by clinical assessment [I63.9]  Yes    Sensorineural hearing loss (SNHL) of both ears [H90.3]  Yes    Thrombocytopenia [D69.6]  Yes     On review of his investigations. I see taht he has recently developed thrombocytopenia and has an US that suggests cirrhosis. The tests that were initiated in 2021, showed that he had a high IgG and a positive CHRIS with a signifiacnt titer. Although his AST/ALT in 2021 were only mildly elevated, I note that in 2016 his AST/ALT were around 150-200.         Stage 3a chronic kidney disease [N18.31]  Yes       Signed on 09/28/23 at 08:21   Normal total protein.   Increased gamma globulin, polyclonal.   No paraprotein bands identified.   Electronically reviewed and signed by:   Karoline Montero MD   Signed on 09/28/23 at 08:21   No monoclonal peaks identified     Impression:     Borderline small but otherwise normal sonographic appearance of bilateral kidneys.  No hydronephrosis        Electronically signed by:Emir Bah MD  Date:                                            01/17/2024      Bilateral carotid artery disease [I77.9]  Yes     Stenosis as seen on CTA imaging 2/3/2023      LFT elevation [R79.89]  Yes    Pure hypercholesterolemia [E78.00]  Yes    Essential hypertension [I10]  Yes      Resolved Hospital Problems   No resolved problems to display.       Allergies:Review of patient's allergies indicates:  No Known Allergies    Diet: cardiac diet, PLEASE GIVE CRUSHED MEDS IN PUDDING OR APPLE SAUCE    Activities: Activity as tolerated    Goals of Care Treatment  Preferences:  Code Status: Full Code      Nursing:      Labs: CBC and BMP Once     CONSULTS:    Physical Therapy to evaluate and treat. , Occupational Therapy to evaluate and treat., and Speech Therapy to evaluate and treat for Language and Cognition.    MISCELLANEOUS CARE:  Routine Skin for Bedridden Patients: Apply moisture barrier cream to all skin folds and wet areas in perineal area daily and after baths and all bowel movements.    WOUND CARE ORDERS  None    Medications: Review discharge medications with patient and family and provide education.      Current Discharge Medication List        START taking these medications    Details   clopidogreL (PLAVIX) 75 mg tablet Take 1 tablet (75 mg total) by mouth once daily. for 14 doses  Qty: 14 tablet, Refills: 0           CONTINUE these medications which have NOT CHANGED    Details   aspirin 81 MG Chew Take 1 tablet (81 mg total) by mouth once daily.  Qty: 90 tablet, Refills: 1    Associated Diagnoses: Stroke with cerebral ischemia      atorvastatin (LIPITOR) 20 MG tablet Take 1 tablet (20 mg total) by mouth once daily.  Qty: 90 tablet, Refills: 1    Associated Diagnoses: Stroke with cerebral ischemia      ciclopirox (PENLAC) 8 % Soln Apply topically nightly.  Qty: 6.6 mL, Refills: 6    Associated Diagnoses: Nail fungus      cyanocobalamin (VITAMIN B-12) 100 MCG tablet Take 1 tablet (100 mcg total) by mouth once daily.  Qty: 90 tablet, Refills: 1    Associated Diagnoses: B12 deficiency      donepeziL (ARICEPT) 5 MG tablet Take 1 tablet (5 mg total) by mouth every evening.  Qty: 90 tablet, Refills: 1    Associated Diagnoses: Chronic ischemic multifocal multiple vascular territories stroke      dorzolamide-timolol 2-0.5% (COSOPT) 22.3-6.8 mg/mL ophthalmic solution Place 1 drop into both eyes 2 (two) times daily.  Qty: 10 mL, Refills: 1    Associated Diagnoses: Cataract, unspecified cataract type, unspecified laterality      ergocalciferol (ERGOCALCIFEROL) 50,000 unit  Cap Take 1 capsule (50,000 Units total) by mouth every 7 days.  Qty: 12 capsule, Refills: 1    Associated Diagnoses: Vitamin D deficiency      lactulose (CHRONULAC) 20 gram/30 mL Soln Take 15 mLs (10 g total) by mouth once daily.  Qty: 300 mL, Refills: 1    Associated Diagnoses: Other cirrhosis of liver      metoprolol succinate (TOPROL-XL) 25 MG 24 hr tablet TAKE 1 TABLET BY MOUTH EVERY DAY IN THE EVENING  Qty: 90 tablet, Refills: 3    Comments: .  Associated Diagnoses: Essential hypertension      multivitamin capsule Take 1 capsule by mouth once daily.      olmesartan (BENICAR) 20 MG tablet Take 1 tablet (20 mg total) by mouth once daily.  Qty: 90 tablet, Refills: 3    Comments: .  Associated Diagnoses: Essential hypertension      omega-3 fatty acids/fish oil (FISH OIL-OMEGA-3 FATTY ACIDS) 300-1,000 mg capsule Take 1 capsule by mouth once daily.  Qty: 90 capsule, Refills: 1    Associated Diagnoses: Stroke with cerebral ischemia      spironolactone (ALDACTONE) 25 MG tablet Take 1 tablet (25 mg total) by mouth once daily.  Qty: 90 tablet, Refills: 1    Comments: .  Associated Diagnoses: Other cirrhosis of liver           STOP taking these medications       potassium chloride (KLOR-CON 10) 10 MEQ TbSR Comments:   Reason for Stopping:                  Immunizations Administered as of 6/17/2024       Name Date Dose VIS Date Route Exp Date    COVID-19, MRNA, LN-S, PF (Moderna) 4/1/2021 0.5 mL 12/1/2020 Intramuscular --    Site: Left arm     : Moderna [employer]     Lot: [id number]     Comment: Adminis     COVID-19, MRNA, LN-S, PF (Moderna) 3/4/2021 0.5 mL 2/27/2021 Intramuscular --    Site: Left arm     : Moderna [employer]     Lot: [id number]     Comment: Adminis     COVID-19, MRNA, LN-S, PF (Pfizer) (Purple Cap) 12/8/2021 0.3 mL 10/29/2021 Intramuscular --    Site: Left arm     : Pfizer Inc     Lot: [id number]     Comment: Adminis             This patient has had both covid vaccinations    Some  patients may experience side effects after vaccination.  These may include fever, headache, muscle or joint aches.  Most symptoms resolve with 24-48 hours and do not require urgent medical evaluation unless they persist for more than 72 hours or symptoms are concerning for an unrelated medical condition.          _________________________________  Marisol Chung MD  06/17/2024

## 2024-06-17 NOTE — NURSING
[facility] arrived to transport patient to [facility]. Report called to Grazyna HERRERA RN.

## 2024-06-17 NOTE — PT/OT/SLP PROGRESS
Occupational Therapy   Treatment    Name: Dean Hahn  MRN: [id number]  Admitting Diagnosis:  Stroke determined by clinical assessment       Recommendations:     Discharge Recommendations: Moderate Intensity Therapy  Discharge Equipment Recommendations:  to be determined by next level of care  Barriers to discharge:   (current functional level)    Assessment:   Requiring MAX A of 2 persons for supine<>sit and increased manual, verbal, tactile cuing for increased hip flexion and forward posture as Pt. With increased stiffness and posterior trunk lean noted.  Attempted stand twice; able to achieve half squat X 2 trials with MAX A X 2 persons HHA and increased verbal/tactile cuing for anterior hips and upright trunk with minimal follow through.  HOHA required for washing face and reaching straw to mouth when taking sips 5 times.  Progressing towards goals.  Continued recommendation of post acute Moderate Intensity therapy.  To benefit from continued acute care OT services to increase independence in self-care/functional transfers.  Continue POC.     Dean Hahn is a 83 y.o. male with a medical diagnosis of Stroke determined by clinical assessment.  He presents with below deficits decreasing independence in self-care/functional transfers. Performance deficits affecting function are weakness, impaired endurance, impaired self care skills, decreased safety awareness, impaired functional mobility, gait instability, impaired balance, pain, decreased upper extremity function, decreased lower extremity function, decreased coordination, impaired cognition, decreased ROM.     Rehab Prognosis:  Good; patient would benefit from acute skilled OT services to address these deficits and reach maximum level of function.       Plan:     Patient to be seen 3 x/week to address the above listed problems via self-care/home management, therapeutic activities, therapeutic exercises, neuromuscular re-education  Plan of  "Care Expires: 07/11/24  Plan of Care Reviewed with: patient    Subjective     Chief Complaint: With c/o LLE heel pain towards end of session when repositioning in bed.   Patient/Family Comments/goals: No goals stated at this time.    Pain/Comfort:  Pain Rating 1:  (States "ouch" while managing LE with Pt. stating "my heel.")  Location - Side 1: Left  Location 1: heel  Pain Addressed 1: Distraction, Cessation of Activity, Nurse notified, Reposition  Pain Rating Post-Intervention 1: 0/10 (No signs of pain at rest.)    Objective:     Communicated with: Sarahi MANN LPN prior to session.  Patient found HOB elevated with bed alarm, peripheral IV, telemetry (AVASYS/telesitter; male external catheter) with spouse present upon OT entry to room.    General Precautions: Standard, fall ([place])    Orthopedic Precautions:N/A  Braces: N/A  Respiratory Status: Room air     Occupational Performance:     Bed Mobility:    Supine<>sit with MAX A of 2 persons with increased cuing for task sequencing and initiation.  Pt. Noted to have increased stiffness with extending trunk and LE requiring manual assist increased knee/hip flexion and more forward trunk.  Tolerated static sitting EOB with MAX A X 15-minutes.     Functional Mobility/Transfers:  Sit<>stand attempted X 2 trials from EOB with HHA and MAX A of 2 persons with increased tactile/verbal cuing for anterior hips and upright trunk though unable to achieve full stand though Pt. Clearing bed and reaching half squat position.      Activities of Daily Living:  Pt. Able to demos functional elbow flexion when bring LUE up to bring straw and drink to mouth though requiring assist for accuracy with reaching mouth d/t decreased coordination; Pt. Taking sips X 5 this day.  Requiring HOHA for washing face this day and assist with cleaning eyes thoroughly all while seated EOB.        [facility] 6 Click ADL: 9    Treatment & Education:  Supine<>sit with MAX A of 2 persons with increased cuing for task " sequencing and initiation.  Pt. Noted to have increased stiffness with extending trunk and LE requiring manual assist increased knee/hip flexion and more forward trunk.  Tolerated static sitting EOB with MAX to MOD A X 15-minutes.     Sit<>stand attempted X 2 trials from EOB with HHA and MAX A of 2 persons with increased tactile/verbal cuing for anterior hips and upright trunk though unable to achieve full stand though Pt. Clearing bed and reaching half squat position.    Pt. Able to demos functional elbow flexion when bring LUE up to bring straw and drink to mouth though requiring assist for accuracy with reaching mouth d/t decreased coordination; Pt. Taking sips X 5 this day.  Requiring HOHA for washing face this day and assist with cleaning eyes thoroughly all while seated EOB.    Participated in multi-directional reaching task while seated EOB with MOD to MAX A for maintaining balance and HOHA for full UE extension to reach target.  Received call light review and importance of calling for assist as needed.     Patient left HOB elevated with all lines intact, call button in reach, bed alarm on, nursing notified, and AVASYS/telesitter in place.     GOALS:   Multidisciplinary Problems       Occupational Therapy Goals          Problem: Occupational Therapy    Goal Priority Disciplines Outcome Interventions   Occupational Therapy Goal     OT, PT/OT Not Progressing    Description: Goals to be met by: 7/11/24     Patient will increase functional independence with ADLs by performing:    Grooming while seated with Minimal Assistance.  Toileting from bedside commode with Minimal Assistance for hygiene and clothing management.   Sitting at edge of bed x10 minutes with Stand-by Assistance.  Supine to sit with Minimal Assistance.  Step transfer with Minimal Assistance  Toilet transfer to bedside commode with Minimal Assistance.  Increased functional strength to [facility] for self care skills and functional mobility .  Upper  extremity exercise program x10 reps per handout, with independence.                         Time Tracking:     OT Date of Treatment: 06/17/24  OT Start Time: 1156  OT Stop Time: 1224  OT Total Time (min): 28 min    Billable Minutes:Self Care/Home Management 9  Therapeutic Activity 19    Overlap with PT for portions of session due to complex nature of patient, tolerance for therapeutic modalities, and safety with mobility to decrease fall risk for patient and caregiver injury requiring two skilled therapists to provide interventions.    OT/ELLYN: OT     Number of ELLYN visits since last OT visit: 0    6/17/2024

## 2024-06-18 ENCOUNTER — HOSPITAL ENCOUNTER (INPATIENT)
Facility: HOSPITAL | Age: 84
LOS: 7 days | Discharge: NURSING, REHAB OR LONG-TERM CARE | DRG: 556 | End: 2024-06-25
Attending: EMERGENCY MEDICINE | Admitting: EMERGENCY MEDICINE
Payer: MEDICARE

## 2024-06-18 DIAGNOSIS — K74.69 OTHER CIRRHOSIS OF LIVER: ICD-10-CM

## 2024-06-18 DIAGNOSIS — I48.91 ATRIAL FIBRILLATION: ICD-10-CM

## 2024-06-18 DIAGNOSIS — R07.9 CHEST PAIN: ICD-10-CM

## 2024-06-18 DIAGNOSIS — I63.9 STROKE WITH CEREBRAL ISCHEMIA: ICD-10-CM

## 2024-06-18 DIAGNOSIS — R55 SYNCOPE AND COLLAPSE: Primary | ICD-10-CM

## 2024-06-18 DIAGNOSIS — R00.0 TACHYCARDIA: ICD-10-CM

## 2024-06-18 DIAGNOSIS — R40.20 LOC (LOSS OF CONSCIOUSNESS): ICD-10-CM

## 2024-06-18 DIAGNOSIS — I10 ESSENTIAL HYPERTENSION: ICD-10-CM

## 2024-06-18 PROBLEM — A41.9 SEVERE SEPSIS: Status: ACTIVE | Noted: 2024-06-18

## 2024-06-18 PROBLEM — D53.9 MACROCYTIC ANEMIA: Status: ACTIVE | Noted: 2024-06-18

## 2024-06-18 PROBLEM — R65.20 SEVERE SEPSIS: Status: ACTIVE | Noted: 2024-06-18

## 2024-06-18 LAB
ABO + RH BLD: NORMAL
ALBUMIN SERPL BCP-MCNC: 2.4 G/DL (ref 3.5–5.2)
ALLENS TEST: ABNORMAL
ALP SERPL-CCNC: 194 U/L (ref 55–135)
ALT SERPL W/O P-5'-P-CCNC: 112 U/L (ref 10–44)
ANION GAP SERPL CALC-SCNC: 10 MMOL/L (ref 8–16)
AST SERPL-CCNC: 108 U/L (ref 10–40)
BACTERIA #/AREA URNS AUTO: ABNORMAL /HPF
BASOPHILS # BLD AUTO: 0.05 K/UL (ref 0–0.2)
BASOPHILS NFR BLD: 0.3 % (ref 0–1.9)
BILIRUB SERPL-MCNC: 3.1 MG/DL (ref 0.1–1)
BILIRUB UR QL STRIP: NEGATIVE
BLD GP AB SCN CELLS X3 SERPL QL: NORMAL
BNP SERPL-MCNC: 34 PG/ML (ref 0–99)
BUN SERPL-MCNC: 46 MG/DL (ref 8–23)
BURR CELLS BLD QL SMEAR: ABNORMAL
CALCIUM SERPL-MCNC: 9.6 MG/DL (ref 8.7–10.5)
CHLORIDE SERPL-SCNC: 113 MMOL/L (ref 95–110)
CK SERPL-CCNC: 387 U/L (ref 20–200)
CLARITY UR REFRACT.AUTO: ABNORMAL
CO2 SERPL-SCNC: 13 MMOL/L (ref 23–29)
COLOR UR AUTO: YELLOW
CREAT SERPL-MCNC: 1.6 MG/DL (ref 0.5–1.4)
DIFFERENTIAL METHOD BLD: ABNORMAL
EOSINOPHIL # BLD AUTO: 0.1 K/UL (ref 0–0.5)
EOSINOPHIL NFR BLD: 0.7 % (ref 0–8)
ERYTHROCYTE [DISTWIDTH] IN BLOOD BY AUTOMATED COUNT: 13.8 % (ref 11.5–14.5)
EST. GFR  (NO RACE VARIABLE): 43 ML/MIN/1.73 M^2
ESTIMATED AVG GLUCOSE: 82 MG/DL (ref 68–131)
FERRITIN SERPL-MCNC: 96 NG/ML (ref 20–300)
FOLATE SERPL-MCNC: 10.1 NG/ML (ref 4–24)
GLUCOSE SERPL-MCNC: 134 MG/DL (ref 70–110)
GLUCOSE UR QL STRIP: NEGATIVE
HBA1C MFR BLD: 4.5 % (ref 4–5.6)
HCO3 UR-SCNC: 15.3 MMOL/L (ref 24–28)
HCT VFR BLD AUTO: 32.6 % (ref 40–54)
HGB BLD-MCNC: 11.2 G/DL (ref 14–18)
HGB UR QL STRIP: ABNORMAL
HYALINE CASTS UR QL AUTO: 82 /LPF
IMM GRANULOCYTES # BLD AUTO: 0.15 K/UL (ref 0–0.04)
IMM GRANULOCYTES NFR BLD AUTO: 0.9 % (ref 0–0.5)
INFLUENZA A, MOLECULAR: NEGATIVE
INFLUENZA B, MOLECULAR: NEGATIVE
IRON SERPL-MCNC: 57 UG/DL (ref 45–160)
KETONES UR QL STRIP: NEGATIVE
LACTATE SERPL-SCNC: 1.8 MMOL/L (ref 0.5–2.2)
LACTATE SERPL-SCNC: 2.7 MMOL/L (ref 0.5–2.2)
LACTATE SERPL-SCNC: 3.5 MMOL/L (ref 0.5–2.2)
LEUKOCYTE ESTERASE UR QL STRIP: NEGATIVE
LYMPHOCYTES # BLD AUTO: 2.2 K/UL (ref 1–4.8)
LYMPHOCYTES NFR BLD: 13.3 % (ref 18–48)
MCH RBC QN AUTO: 34.6 PG (ref 27–31)
MCHC RBC AUTO-ENTMCNC: 34.4 G/DL (ref 32–36)
MCV RBC AUTO: 101 FL (ref 82–98)
MICROSCOPIC COMMENT: ABNORMAL
MONOCYTES # BLD AUTO: 2.1 K/UL (ref 0.3–1)
MONOCYTES NFR BLD: 13.1 % (ref 4–15)
NEUTROPHILS # BLD AUTO: 11.6 K/UL (ref 1.8–7.7)
NEUTROPHILS NFR BLD: 71.7 % (ref 38–73)
NITRITE UR QL STRIP: NEGATIVE
NRBC BLD-RTO: 0 /100 WBC
OHS QRS DURATION: 62 MS
OHS QRS DURATION: 66 MS
OHS QTC CALCULATION: 460 MS
OHS QTC CALCULATION: 471 MS
PCO2 BLDA: 22.9 MMHG (ref 35–45)
PH SMN: 7.43 [PH] (ref 7.35–7.45)
PH UR STRIP: 6 [PH] (ref 5–8)
PLATELET # BLD AUTO: 176 K/UL (ref 150–450)
PMV BLD AUTO: 9.4 FL (ref 9.2–12.9)
PO2 BLDA: 60 MMHG (ref 40–60)
POC BE: -9 MMOL/L
POC SATURATED O2: 92 % (ref 95–100)
POC TCO2: 16 MMOL/L (ref 24–29)
POIKILOCYTOSIS BLD QL SMEAR: SLIGHT
POTASSIUM SERPL-SCNC: 4.8 MMOL/L (ref 3.5–5.1)
PROT SERPL-MCNC: 7 G/DL (ref 6–8.4)
PROT UR QL STRIP: ABNORMAL
RBC # BLD AUTO: 3.24 M/UL (ref 4.6–6.2)
RBC #/AREA URNS AUTO: 45 /HPF (ref 0–4)
SAMPLE: ABNORMAL
SATURATED IRON: 22 % (ref 20–50)
SITE: ABNORMAL
SODIUM SERPL-SCNC: 136 MMOL/L (ref 136–145)
SP GR UR STRIP: 1.03 (ref 1–1.03)
SPECIMEN OUTDATE: NORMAL
SPECIMEN SOURCE: NORMAL
SQUAMOUS #/AREA URNS AUTO: 3 /HPF
TOTAL IRON BINDING CAPACITY: 255 UG/DL (ref 250–450)
TRANSFERRIN SERPL-MCNC: 172 MG/DL (ref 200–375)
TROPONIN I SERPL DL<=0.01 NG/ML-MCNC: 0.07 NG/ML (ref 0–0.03)
URN SPEC COLLECT METH UR: ABNORMAL
VIT B12 SERPL-MCNC: >2000 PG/ML (ref 210–950)
WBC # BLD AUTO: 16.13 K/UL (ref 3.9–12.7)
WBC #/AREA URNS AUTO: 6 /HPF (ref 0–5)

## 2024-06-18 PROCEDURE — 99285 EMERGENCY DEPT VISIT HI MDM: CPT | Mod: 25,HCNC

## 2024-06-18 PROCEDURE — 99900035 HC TECH TIME PER 15 MIN (STAT)

## 2024-06-18 PROCEDURE — 12000002 HC ACUTE/MED SURGE SEMI-PRIVATE ROOM

## 2024-06-18 PROCEDURE — 80053 COMPREHEN METABOLIC PANEL: CPT

## 2024-06-18 PROCEDURE — 63600175 PHARM REV CODE 636 W HCPCS

## 2024-06-18 PROCEDURE — 82525 ASSAY OF COPPER: CPT | Mod: HCNC

## 2024-06-18 PROCEDURE — 83880 ASSAY OF NATRIURETIC PEPTIDE: CPT

## 2024-06-18 PROCEDURE — 93005 ELECTROCARDIOGRAM TRACING: CPT

## 2024-06-18 PROCEDURE — 86900 BLOOD TYPING SEROLOGIC ABO: CPT

## 2024-06-18 PROCEDURE — 82607 VITAMIN B-12: CPT | Mod: HCNC

## 2024-06-18 PROCEDURE — 85025 COMPLETE CBC W/AUTO DIFF WBC: CPT

## 2024-06-18 PROCEDURE — 86850 RBC ANTIBODY SCREEN: CPT

## 2024-06-18 PROCEDURE — 96365 THER/PROPH/DIAG IV INF INIT: CPT | Mod: HCNC

## 2024-06-18 PROCEDURE — 83036 HEMOGLOBIN GLYCOSYLATED A1C: CPT | Mod: HCNC

## 2024-06-18 PROCEDURE — 25000003 PHARM REV CODE 250

## 2024-06-18 PROCEDURE — 82728 ASSAY OF FERRITIN: CPT | Mod: HCNC

## 2024-06-18 PROCEDURE — 82746 ASSAY OF FOLIC ACID SERUM: CPT | Mod: HCNC

## 2024-06-18 PROCEDURE — 84484 ASSAY OF TROPONIN QUANT: CPT

## 2024-06-18 PROCEDURE — 82803 BLOOD GASES ANY COMBINATION: CPT

## 2024-06-18 PROCEDURE — 87502 INFLUENZA DNA AMP PROBE: CPT

## 2024-06-18 PROCEDURE — 83540 ASSAY OF IRON: CPT | Mod: HCNC

## 2024-06-18 PROCEDURE — 83605 ASSAY OF LACTIC ACID: CPT

## 2024-06-18 PROCEDURE — 87040 BLOOD CULTURE FOR BACTERIA: CPT | Mod: 59

## 2024-06-18 PROCEDURE — 93010 ELECTROCARDIOGRAM REPORT: CPT | Mod: ,,, | Performed by: INTERNAL MEDICINE

## 2024-06-18 PROCEDURE — 82550 ASSAY OF CK (CPK): CPT | Mod: HCNC

## 2024-06-18 PROCEDURE — 81001 URINALYSIS AUTO W/SCOPE: CPT | Mod: HCNC

## 2024-06-18 PROCEDURE — 82800 BLOOD PH: CPT

## 2024-06-18 PROCEDURE — 96367 TX/PROPH/DG ADDL SEQ IV INF: CPT | Mod: HCNC

## 2024-06-18 PROCEDURE — 83605 ASSAY OF LACTIC ACID: CPT | Mod: 91,HCNC

## 2024-06-18 RX ORDER — NAPROXEN SODIUM 220 MG/1
81 TABLET, FILM COATED ORAL DAILY
Status: DISCONTINUED | OUTPATIENT
Start: 2024-06-19 | End: 2024-06-21

## 2024-06-18 RX ORDER — LACTULOSE 10 G/15ML
10 SOLUTION ORAL DAILY
Status: DISCONTINUED | OUTPATIENT
Start: 2024-06-19 | End: 2024-06-25 | Stop reason: HOSPADM

## 2024-06-18 RX ORDER — CLOPIDOGREL BISULFATE 75 MG/1
75 TABLET ORAL DAILY
Status: DISCONTINUED | OUTPATIENT
Start: 2024-06-19 | End: 2024-06-21

## 2024-06-18 RX ORDER — SODIUM CHLORIDE 0.9 % (FLUSH) 0.9 %
10 SYRINGE (ML) INJECTION EVERY 12 HOURS PRN
Status: DISCONTINUED | OUTPATIENT
Start: 2024-06-18 | End: 2024-06-25 | Stop reason: HOSPADM

## 2024-06-18 RX ORDER — IBUPROFEN 200 MG
16 TABLET ORAL
Status: DISCONTINUED | OUTPATIENT
Start: 2024-06-18 | End: 2024-06-25 | Stop reason: HOSPADM

## 2024-06-18 RX ORDER — IBUPROFEN 200 MG
24 TABLET ORAL
Status: DISCONTINUED | OUTPATIENT
Start: 2024-06-18 | End: 2024-06-25 | Stop reason: HOSPADM

## 2024-06-18 RX ORDER — CYANOCOBALAMIN (VITAMIN B-12) 250 MCG
250 TABLET ORAL DAILY
Status: DISCONTINUED | OUTPATIENT
Start: 2024-06-19 | End: 2024-06-25 | Stop reason: HOSPADM

## 2024-06-18 RX ORDER — GLUCAGON 1 MG
1 KIT INJECTION
Status: DISCONTINUED | OUTPATIENT
Start: 2024-06-18 | End: 2024-06-25 | Stop reason: HOSPADM

## 2024-06-18 RX ORDER — HEPARIN SODIUM 5000 [USP'U]/ML
5000 INJECTION, SOLUTION INTRAVENOUS; SUBCUTANEOUS EVERY 8 HOURS
Status: DISCONTINUED | OUTPATIENT
Start: 2024-06-18 | End: 2024-06-20

## 2024-06-18 RX ORDER — TALC
6 POWDER (GRAM) TOPICAL NIGHTLY PRN
Status: DISCONTINUED | OUTPATIENT
Start: 2024-06-18 | End: 2024-06-25 | Stop reason: HOSPADM

## 2024-06-18 RX ORDER — ALUMINUM HYDROXIDE, MAGNESIUM HYDROXIDE, AND SIMETHICONE 1200; 120; 1200 MG/30ML; MG/30ML; MG/30ML
30 SUSPENSION ORAL 4 TIMES DAILY PRN
Status: DISCONTINUED | OUTPATIENT
Start: 2024-06-18 | End: 2024-06-25 | Stop reason: HOSPADM

## 2024-06-18 RX ORDER — SIMETHICONE 80 MG
1 TABLET,CHEWABLE ORAL 4 TIMES DAILY PRN
Status: DISCONTINUED | OUTPATIENT
Start: 2024-06-18 | End: 2024-06-25 | Stop reason: HOSPADM

## 2024-06-18 RX ORDER — NALOXONE HCL 0.4 MG/ML
0.02 VIAL (ML) INJECTION
Status: DISCONTINUED | OUTPATIENT
Start: 2024-06-18 | End: 2024-06-25 | Stop reason: HOSPADM

## 2024-06-18 RX ORDER — ATORVASTATIN CALCIUM 40 MG/1
40 TABLET, FILM COATED ORAL DAILY
Status: DISCONTINUED | OUTPATIENT
Start: 2024-06-20 | End: 2024-06-18

## 2024-06-18 RX ORDER — POLYETHYLENE GLYCOL 3350 17 G/17G
17 POWDER, FOR SOLUTION ORAL DAILY
Status: DISCONTINUED | OUTPATIENT
Start: 2024-06-19 | End: 2024-06-18

## 2024-06-18 RX ORDER — ONDANSETRON 8 MG/1
8 TABLET, ORALLY DISINTEGRATING ORAL EVERY 8 HOURS PRN
Status: DISCONTINUED | OUTPATIENT
Start: 2024-06-18 | End: 2024-06-25 | Stop reason: HOSPADM

## 2024-06-18 RX ADMIN — SODIUM CHLORIDE, POTASSIUM CHLORIDE, SODIUM LACTATE AND CALCIUM CHLORIDE 1000 ML: 600; 310; 30; 20 INJECTION, SOLUTION INTRAVENOUS at 02:06

## 2024-06-18 RX ADMIN — CEFTRIAXONE 2 G: 2 INJECTION, POWDER, FOR SOLUTION INTRAMUSCULAR; INTRAVENOUS at 07:06

## 2024-06-18 RX ADMIN — HEPARIN SODIUM 5000 UNITS: 5000 INJECTION INTRAVENOUS; SUBCUTANEOUS at 09:06

## 2024-06-18 RX ADMIN — SODIUM CHLORIDE, POTASSIUM CHLORIDE, SODIUM LACTATE AND CALCIUM CHLORIDE 1000 ML: 600; 310; 30; 20 INJECTION, SOLUTION INTRAVENOUS at 06:06

## 2024-06-18 RX ADMIN — PIPERACILLIN SODIUM AND TAZOBACTAM SODIUM 4.5 G: 4; .5 INJECTION, POWDER, FOR SOLUTION INTRAVENOUS at 02:06

## 2024-06-18 RX ADMIN — VANCOMYCIN HYDROCHLORIDE 1250 MG: 1.25 INJECTION, POWDER, LYOPHILIZED, FOR SOLUTION INTRAVENOUS at 03:06

## 2024-06-18 NOTE — ED NOTES
Nurses Note -- 4 Eyes      6/18/2024   3:03 PM      Skin assessed during: Daily Assessment      [] No Altered Skin Integrity Present    []Prevention Measures Documented      [x] Yes- Altered Skin Integrity Present or Discovered   [x] LDA Added if Not in Epic (Describe Wound)   [x] New Altered Skin Integrity was Present on Admit and Documented in LDA   [] Wound Image Taken    Wound Care Consulted? No    Attending Nurse:  Meka Meyer RN/Staff Member:   Samy     The adult diaper that the patient arrived with was removed. Mepilex Border Flex self adherent soft silicone foam dressing applied to left heel right heel and right elbow area

## 2024-06-18 NOTE — ED PROVIDER NOTES
"Encounter Date: 6/18/2024       History     Chief Complaint   Patient presents with    Loss of Consciousness     Went unresponsive during PT at [facility]; recent CVA; hypotensive in 50s initially; awake and alert upon arrival to ED, patient is hard of hearing, following some commands     83M recently discharged from [facility] after admission for concern for CVA (s/p TNK) with a PMHx of HTN, HLD, CKD, carotid stenosis (less than 50% stenosis of bilateral internal carotids), prior lacunar infarcts, and dementia who presents to the ED from [facility] for LOC. Per EMS reports he was lost consciousness during PT, did not hit his head. Spoke with [facility] nursing staff who confirmed he became unresponsive while seated during PT session. Nursing attempted sternal rub, he did not respond. He became cyanotic and apneic for approx 10 seconds with a thready pulse. Nursing staff went to place in trendelenburg and almost called a code until he rapidly regained consciousness.    He is on Aspirin and Plavix. Initial SBP in the field was in the 50s. He was hypoxic and placed on 15L non-rebreather. Improvement on arrival to [facility], stable on RA with improvement in MAPs. Pt reports feeling cold otherwise reports feeling "fine." He is conversing minimally which is near his baseline per family at bedside. He denies memory of the event. He denies fevers, chest pain, cough, SOB, abdominal pain, difficulty urinating/dysuria, arthralgias/myalgias.    [email or web address] an [facility]. Family was not present at time of event and thus unable to provide history.        The history is provided by the EMS personnel, a relative and the spouse.     Review of patient's allergies indicates:  No Known Allergies  Past Medical History:   Diagnosis Date    Cataract     HLD (hyperlipidemia)     Hypertension      Past Surgical History:   Procedure Laterality Date    CHOLECYSTECTOMY      EYE SURGERY Right     cataract " removal surgery     Family History   Problem Relation Name Age of Onset    Hypertension Mother Jania Hahn     Heart attack Father      Coronary artery disease Father      No Known Problems Sister x2     Hypertension Brother x3     No Known Problems Daughter x3     No Known Problems Son x2     Cirrhosis Neg Hx       Social History     Tobacco Use    Smoking status: Never    Smokeless tobacco: Never   Substance Use Topics    Alcohol use: Yes    Drug use: Never     Review of Systems   Constitutional:  Positive for chills. Negative for fever.   Respiratory:  Negative for cough and shortness of breath.    Cardiovascular:  Negative for chest pain.   Gastrointestinal:  Negative for abdominal pain, nausea and vomiting.   Genitourinary:  Negative for difficulty urinating and dysuria.   Musculoskeletal:  Negative for arthralgias and myalgias.   Neurological:  Negative for headaches.       Physical Exam     Initial Vitals [06/18/24 1256]   BP Pulse Resp Temp SpO2   (!) 103/45 76 (!) 26 97.4 °F (36.3 °C) 100 %      MAP       --         Physical Exam    HENT:   Head: Normocephalic and atraumatic.   Eyes: Pupils are equal, round, and reactive to light.   Cardiovascular:  Normal rate, regular rhythm and normal heart sounds.           Pulmonary/Chest: Breath sounds normal. No respiratory distress.   Abdominal: Abdomen is soft. There is no abdominal tenderness.   Musculoskeletal:         General: No edema.     Neurological:   Oriented to self, date of birth         ED Course   Procedures  Labs Reviewed   COMPREHENSIVE METABOLIC PANEL - Abnormal; Notable for the following components:       Result Value    Chloride 113 (*)     CO2 13 (*)     Glucose 134 (*)     BUN 46 (*)     Creatinine 1.6 (*)     Albumin 2.4 (*)     Total Bilirubin 3.1 (*)     Alkaline Phosphatase 194 (*)      (*)      (*)     eGFR 43.0 (*)     All other components within normal limits   CBC W/ AUTO DIFFERENTIAL - Abnormal; Notable for the  following components:    WBC 16.13 (*)     RBC 3.24 (*)     Hemoglobin 11.2 (*)     Hematocrit 32.6 (*)      (*)     MCH 34.6 (*)     Immature Granulocytes 0.9 (*)     Gran # (ANC) 11.6 (*)     Immature Grans (Abs) 0.15 (*)     Mono # 2.1 (*)     Lymph % 13.3 (*)     All other components within normal limits   TROPONIN I - Abnormal; Notable for the following components:    Troponin I 0.071 (*)     All other components within normal limits   URINALYSIS, REFLEX TO URINE CULTURE - Abnormal; Notable for the following components:    Appearance, UA Hazy (*)     Protein, UA 1+ (*)     Occult Blood UA 2+ (*)     All other components within normal limits    Narrative:     Specimen Source->Urine   LACTIC ACID, PLASMA - Abnormal; Notable for the following components:    Lactate (Lactic Acid) 3.5 (*)     All other components within normal limits   URINALYSIS MICROSCOPIC - Abnormal; Notable for the following components:    RBC, UA 45 (*)     WBC, UA 6 (*)     Hyaline Casts, UA 82 (*)     All other components within normal limits    Narrative:     Specimen Source->Urine   LACTIC ACID, PLASMA - Abnormal; Notable for the following components:    Lactate (Lactic Acid) 2.7 (*)     All other components within normal limits   VITAMIN B12 - Abnormal; Notable for the following components:    Vitamin B-12 >2000 (*)     All other components within normal limits    Narrative:     Add on CPK per Dr. Garcia @ 18:10 pm to order # [id number]   IRON AND TIBC - Abnormal; Notable for the following components:    Transferrin 172 (*)     All other components within normal limits    Narrative:     Add on CPK per Dr. Garcia @ 18:10 pm to order # [id number]   CK - Abnormal; Notable for the following components:     (*)     All other components within normal limits    Narrative:     Add on CPK per Dr. Garcia @ 18:10 pm to order # [id number]   ISTAT PROCEDURE - Abnormal; Notable for the following components:    POC PCO2 22.9 (*)     POC  HCO3 15.3 (*)     POC BE -9 (*)     POC TCO2 16 (*)     All other components within normal limits   INFLUENZA A & B BY MOLECULAR   B-TYPE NATRIURETIC PEPTIDE   HEMOGLOBIN A1C   CK   LACTIC ACID, PLASMA   FOLATE    Narrative:     Add on CPK per Dr. Garcia @ 18:10 pm to order # [id number]   FERRITIN    Narrative:     Add on CPK per Dr. Garcia @ 18:10 pm to order # [id number]   HEMOGLOBIN A1C    Narrative:     add on GHGB per Panfilo Garcia,111,MD  06/18/2024  18:29    COPPER, SERUM   TYPE & SCREEN     EKG Readings: (Independently Interpreted)   Sinus rhythm,. Rate 80, no acute ischemic changes     ECG Results              EKG 12-lead (Final result)        Collection Time Result Time QRS Duration OHS QTC Calculation    06/18/24 14:43:50 06/18/24 14:47:05 62 471                     Final result by Interface, Lab In [facility] (06/18/24 14:47:14)                   Narrative:    Test Reason : R40.20,    Vent. Rate : 081 BPM     Atrial Rate : 081 BPM     P-R Int : 168 ms          QRS Dur : 062 ms      QT Int : 406 ms       P-R-T Axes : 046 072 102 degrees     QTc Int : 471 ms    Normal sinus rhythm  Septal infarct ,age undetermined  Abnormal ECG  When compared with ECG of 18-JUN-2024 13:11,  No significant change was found  Confirmed by VANDANA LAMAR MD ([id number]) on 6/18/2024 2:47:00 PM    Referred By: [email or web address]   SELF           Confirmed By:VANDANA LAMAR MD                                  Imaging Results              CT Head Without Contrast (Final result)  Result time 06/18/24 16:51:19      Final result by Scott Hackett MD (06/18/24 16:51:19)                   Impression:      No acute intracranial hemorrhage/process.      Electronically signed by: Scott Hackett  Date:    06/18/2024  Time:    16:51               Narrative:    EXAMINATION:  CT HEAD WITHOUT CONTRAST    CLINICAL HISTORY:  Loss of conciousness, unclear if he hit head;    TECHNIQUE:  Low dose axial images were obtained through the head.  Coronal  and sagittal reformations were also performed. Contrast was not administered.    COMPARISON:  MRI 06/11/2024, CT 06/10/2024    FINDINGS:  Volume loss with no evidence of hydrocephalus mass effect intracranial hemorrhage parenchymal contusion or acute territorial infarct.    Decreased attenuation within the periventricular white matter is nonspecific but may reflect moderate chronic small vessel ischemic change, similar to the prior study.  Chronic left thalamic lacunar infarct again noted.    The calvarium is intact.  The visualized sinuses and mastoid air cells are clear.    Prominent atherosclerotic vascular calcifications at the skull base.                                       X-Ray Chest AP Portable (Final result)  Result time 06/18/24 15:40:16      Final result by True Merino MD (06/18/24 15:40:16)                   Impression:      1. No acute cardiopulmonary process, stable chronic findings.      Electronically signed by: True Merino MD  Date:    06/18/2024  Time:    15:40               Narrative:    EXAMINATION:  XR CHEST AP PORTABLE    CLINICAL HISTORY:  LOC, sepsis;    TECHNIQUE:  Single frontal view of the chest was performed.    COMPARISON:  06/10/2024    FINDINGS:  The cardiomediastinal silhouette is not enlarged.  There is no pleural effusion.  The trachea is midline.  The lungs are symmetrically expanded bilaterally with biapical atelectasis or scarring..  No large focal consolidation seen.  There is no pneumothorax.  The osseous structures are remarkable for degenerative change..                                    X-Rays:   Independently Interpreted Readings:   Chest X-Ray: Normal heart size.  No infiltrates.  No acute abnormalities.   Head CT: No hemorrhage.     Medications   sodium chloride 0.9% flush 10 mL (has no administration in time range)   naloxone 0.4 mg/mL injection 0.02 mg (has no administration in time range)   glucose chewable tablet 16 g (has no administration in time  range)   glucose chewable tablet 24 g (has no administration in time range)   glucagon (human recombinant) injection 1 mg (has no administration in time range)   ondansetron disintegrating tablet 8 mg (has no administration in time range)   melatonin tablet 6 mg (has no administration in time range)   simethicone chewable tablet 80 mg (has no administration in time range)   aluminum-magnesium hydroxide-simethicone 200-200-20 mg/5 mL suspension 30 mL (has no administration in time range)   dextrose 10% bolus 125 mL 125 mL (has no administration in time range)   dextrose 10% bolus 250 mL 250 mL (has no administration in time range)   cyanocobalamin tablet 250 mcg (250 mcg Oral Given 6/21/24 0902)   lactulose 20 gram/30 mL solution Soln 10 g (10 g Oral Given 6/21/24 0902)   lactated ringers infusion ( Intravenous Back Association 6/19/24 1900)   balsam peru-castor oiL Oint ( Topical (Top) Given 6/21/24 2100)   0.9%  NaCl infusion (for blood administration) (has no administration in time range)   pantoprazole injection 40 mg (40 mg Intravenous Given 6/21/24 2106)   cefTRIAXone (ROCEPHIN) 2 g in D5W 100 mL IVPB (MB+) (0 g Intravenous Stopped 6/21/24 1225)   lactated ringers bolus 500 mL ( Intravenous Restarted 6/21/24 1500)   0.9%  NaCl infusion (for blood administration) (has no administration in time range)   lactated ringers bolus 1,000 mL (0 mLs Intravenous Stopped 6/18/24 1558)   vancomycin 1,250 mg in dextrose 5 % (D5W) 250 mL IVPB (Vial-Mate) (0 mg Intravenous Stopped 6/18/24 1712)   lactated ringers bolus 1,000 mL (0 mLs Intravenous Stopped 6/18/24 2230)   pantoprazole injection 80 mg (80 mg Intravenous Given 6/20/24 1945)   lactated ringers bolus 500 mL (0 mLs Intravenous Stopped 6/21/24 1312)   iohexoL (OMNIPAQUE 350) injection 100 mL (100 mLs Intravenous Given 6/21/24 1411)     Medical Decision Making  83M presenting for unresponsiveness/LOC at nursing home. Known prior CVAs and carotid stenosis. Differential  "includes CVA, vasovagal syncope, hypotensive episode, occult infection.    WBC elevated and increased Lactic concerning for sepsis. Full septic w/u initiated. Given loading dose of Vanc and started Zosyn. 1L LR bolus.    Elevated Tbili/LFTs. Prior Abdominal US 6/12 with "Echogenic liver with diffusely heterogeneous echotexture, which could represent chronic liver disease or infiltrative neoplasm.  Consider liver mass protocol CT or MRI for further evaluation." Could also be exacerbated by hypovolemia in the setting of sepsis.    UA with 45 RBCs, 82 Hyaline Casts, 6 WBCs, Occasional Bacteria   CT Head with no acute intracranial abnormality     Will admit patient to hospital medicine.    Amount and/or Complexity of Data Reviewed  Labs: ordered. Decision-making details documented in ED Course.  Radiology: ordered and independent interpretation performed. Decision-making details documented in ED Course.  ECG/medicine tests: ordered and independent interpretation performed. Decision-making details documented in ED Course.    Risk  Decision regarding hospitalization.                                      Clinical Impression:  Final diagnoses:  [R40.20] LOC (loss of consciousness)          ED Disposition Condition    Admit Stable                Scott Walker MD  Resident  06/18/24 1700       Connie Varghese MD  06/21/24 [id number]    "

## 2024-06-18 NOTE — ASSESSMENT & PLAN NOTE
Patient's anemia is currently controlled. Has not received any PRBCs to date. Etiology likely d/t chronic disease due to Chronic liver disease and B12 deficiency  Current CBC reviewed-   Lab Results   Component Value Date    HGB 11.2 (L) 06/18/2024    HCT 32.6 (L) 06/18/2024     Monitor serial CBC and transfuse if patient becomes hemodynamically unstable, symptomatic or H/H drops below 7/21.

## 2024-06-18 NOTE — ASSESSMENT & PLAN NOTE
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 Follows with Hepatology as OP.     - Consider abdominal MRI to assess further for cirrhosis  - Would avoid triple phase CT to prevent contrast load

## 2024-06-18 NOTE — H&P
"  [facility] - Emergency Dept  [facility]  History & Physical    Patient Name: Dean Hahn  MRN: [id number]  Patient Class: IP- Inpatient  Admission Date: 6/18/2024  Attending Physician: Panfilo Garcia III*   Primary Care Provider: Angel Luis Tobias III, MD         Patient information was obtained from patient, caregiver / friend, past medical records, and ER records.     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 History primarily obtained via spouse and chart review. ED staff contacted [facility] nursing staff who confirmed he became unresponsive while seated during PT session. Nursing attempted sternal rub, he did not respond. He became cyanotic and apneic for approx 10 seconds with a thready pulse. Nursing staff went to place in trendelenburg and almost called a code until he rapidly regained consciousness. Initial SBP in the field was in the 50s. He was hypoxic and placed on 15L non-rebreather. Improvement on arrival to [facility], stable on RA with improvement in MAPs. Pt reports feeling cold otherwise reports feeling "fine." He is conversing minimally which is near his baseline per family at bedside. He denies memory of the event. He denies fevers, chest pain, cough, SOB, abdominal pain, difficulty urinating/dysuria. Per the patient's wife, prior to stroke 6/10, patient ambulatory and seems to have waxing/waning mentation " (Aox3 but sometimes confused). Previously noted that patient dependent in ADLs except feeding himself.     In ED, patient normotensive, not tachycardic, afebrile, and on RA. Did require tactile stimulation to participate in interview, minimally conversant upon awakening. CBC with leukocytosis (WBC 16), stable anemia (Hb 11). CMP without electrolyte derangements, Cr 1.6 (baseline appears 1.4-1.6), AST//112, T bili 3.1 (LFTs stable compared to 6/17). LA 3.5 -> 2.7 following 1L LR. Modest troponin increase to 0.71. CT head with no acute intracranial process. EKG w/o Afib or ST elevation/depression, however noted possible PVC and fusion complexes. Received Vanc/Zosyn in ED. VBG: pH: 7.435, pco2: 22.9, Beecf: -9 HCO3: 15.3    Past Medical History:   Diagnosis Date    Cataract     HLD (hyperlipidemia)     Hypertension        Past Surgical History:   Procedure Laterality Date    CHOLECYSTECTOMY      EYE SURGERY Right     cataract removal surgery       Review of patient's allergies indicates:  No Known Allergies    Current Facility-Administered Medications on File Prior to Encounter   Medication    [DISCONTINUED] acetaminophen tablet 650 mg    [DISCONTINUED] aspirin chewable tablet 81 mg    [DISCONTINUED] bisacodyL suppository 10 mg    [DISCONTINUED] clopidogreL tablet 75 mg    [DISCONTINUED] dextrose 10% bolus 125 mL 125 mL    [DISCONTINUED] dextrose 10% bolus 250 mL 250 mL    [DISCONTINUED] dorzolamide-timolol 2-0.5% ophthalmic solution 1 drop    [DISCONTINUED] enoxaparin injection 30 mg    [DISCONTINUED] ergocalciferol capsule 50,000 Units    [DISCONTINUED] glucagon (human recombinant) injection 1 mg    [DISCONTINUED] glucose chewable tablet 16 g    [DISCONTINUED] glucose chewable tablet 24 g    [DISCONTINUED] lactulose 20 gram/30 mL solution Soln 10 g    [DISCONTINUED] naloxone 0.4 mg/mL injection 0.02 mg    [DISCONTINUED] sodium chloride 0.9% flush 10 mL    [DISCONTINUED] sodium chloride 0.9% flush 10 mL     [DISCONTINUED] spironolactone tablet 25 mg     Current Outpatient Medications on File Prior to Encounter   Medication Sig    aspirin 81 MG Chew Take 1 tablet (81 mg total) by mouth once daily.    atorvastatin (LIPITOR) 20 MG tablet Take 1 tablet (20 mg total) by mouth once daily.    ciclopirox (PENLAC) 8 % Soln Apply topically nightly.    clopidogreL (PLAVIX) 75 mg tablet Take 1 tablet (75 mg total) by mouth once daily. for 14 doses    cyanocobalamin (VITAMIN B-12) 100 MCG tablet Take 1 tablet (100 mcg total) by mouth once daily.    donepeziL (ARICEPT) 5 MG tablet Take 1 tablet (5 mg total) by mouth every evening.    dorzolamide-timolol 2-0.5% (COSOPT) 22.3-6.8 mg/mL ophthalmic solution Place 1 drop into both eyes 2 (two) times daily.    ergocalciferol (ERGOCALCIFEROL) 50,000 unit Cap Take 1 capsule (50,000 Units total) by mouth every 7 days.    lactulose (CHRONULAC) 20 gram/30 mL Soln Take 15 mLs (10 g total) by mouth once daily.    metoprolol succinate (TOPROL-XL) 25 MG 24 hr tablet TAKE 1 TABLET BY MOUTH EVERY DAY IN THE EVENING    multivitamin capsule Take 1 capsule by mouth once daily.    olmesartan (BENICAR) 20 MG tablet Take 1 tablet (20 mg total) by mouth once daily.    omega-3 fatty acids/fish oil (FISH OIL-OMEGA-3 FATTY ACIDS) 300-1,000 mg capsule Take 1 capsule by mouth once daily.    spironolactone (ALDACTONE) 25 MG tablet Take 1 tablet (25 mg total) by mouth once daily.     Family History       Problem Relation (Age of Onset)    Coronary artery disease Father    Heart attack Father    Hypertension Mother, Brother    No Known Problems Sister, Daughter, Son          Tobacco Use    Smoking status: Never    Smokeless tobacco: Never   Substance and Sexual Activity    Alcohol use: Yes    Drug use: Never    Sexual activity: Not Currently     Partners: Female     Birth control/protection: None     Comment: wife     Review of Systems   Constitutional:  Negative for chills and fever.   Respiratory:  Negative for  shortness of breath.    Cardiovascular:  Negative for chest pain.   Gastrointestinal:  Negative for abdominal pain.   Genitourinary:  Negative for dysuria.   Musculoskeletal:  Negative for arthralgias.   Psychiatric/Behavioral:  Positive for decreased concentration.      Objective:     Vital Signs (Most Recent):  Temp: 97.2 °F (36.2 °C) (06/18/24 1701)  Pulse: 77 (06/18/24 1701)  Resp: 16 (06/18/24 1701)  BP: (!) 120/58 (06/18/24 1701)  SpO2: 98 % (06/18/24 1701) Vital Signs (24h Range):  Temp:  [97.2 °F (36.2 °C)-97.4 °F (36.3 °C)] 97.2 °F (36.2 °C)  Pulse:  [75-77] 77  Resp:  [15-26] 16  SpO2:  [95 %-100 %] 98 %  BP: (103-158)/(45-88) 120/58     Weight: 49.9 kg (110 lb)  Body mass index is 19.49 kg/m².     Physical Exam  Constitutional:       Comments: Thin appearing, somnolent requiring tactile and verbal stimulation to wake up   HENT:      Head: Normocephalic and atraumatic.      Mouth/Throat:      Mouth: Mucous membranes are dry.      Pharynx: Oropharynx is clear.   Eyes:      Extraocular Movements: Extraocular movements intact.   Cardiovascular:      Rate and Rhythm: Normal rate and regular rhythm.      Pulses: Normal pulses.      Heart sounds: Normal heart sounds.   Pulmonary:      Effort: Pulmonary effort is normal.      Breath sounds: Normal breath sounds.   Abdominal:      General: Abdomen is flat.      Palpations: Abdomen is soft.      Comments: Slight wincing to palpation, but denied pain    Musculoskeletal:         General: Tenderness present. No swelling.      Comments: Bilateral lower extremities highly tender to light palpation. No erythema, no wounds noted.    Skin:     General: Skin is warm and dry.      Findings: No bruising.   Neurological:      Comments: AO to self  Raspy voice, some dysarthria   Psychiatric:      Comments: Decreased concentration. No agitation.                Significant Labs: All pertinent labs within the past 24 hours have been reviewed.  CBC:   Recent Labs   Lab  06/18/24  1346   WBC 16.13*   HGB 11.2*   HCT 32.6*        CMP:   Recent Labs   Lab 06/17/24  0327 06/18/24  1346    136   K 4.2 4.8   * 113*   CO2 14* 13*   GLU 98 134*   BUN 44* 46*   CREATININE 1.5* 1.6*   CALCIUM 9.3 9.6   PROT 6.8 7.0   ALBUMIN 2.4* 2.4*   BILITOT 2.9* 3.1*   ALKPHOS 197* 194*   * 108*   * 112*   ANIONGAP 11 10     Troponin:   Recent Labs   Lab 06/18/24  1346   TROPONINI 0.071*       Significant Imaging: I have reviewed all pertinent imaging results/findings within the past 24 hours.  Assessment/Plan:     * Syncope and collapse  DDX includes autonomic dysfunction, hypovolemia in setting of BP regimen, arrhythmia such as Afib, seizure, stroke. CT head w/o acute intracranial process to suggest hemorrhagic stroke. Has not missed secondary preventative medications such as Plavix/ASA. Concern for cardiac etiology given LOC while seated. Fluid resuscitated in ED with dramatic improvement in BP compared to BP noted at SNF. Seizure less likely, but unknown if patient post-ictal.     - IVF  - F/u LA  - Restart ASA/Plavix  - EEG  - Telemetry  - CTX      Macrocytic anemia  Patient's anemia is currently controlled. Has not received any PRBCs to date. Etiology likely d/t chronic disease due to Chronic liver disease and B12 deficiency  Current CBC reviewed-   Lab Results   Component Value Date    HGB 11.2 (L) 06/18/2024    HCT 32.6 (L) 06/18/2024     Monitor serial CBC and transfuse if patient becomes hemodynamically unstable, symptomatic or H/H drops below 7/21.    Severe sepsis  This patient does not have evidence of infective focus  My overall impression is  possible sepsis, tachycardic and leukocytosis .  Source: Urinary Tract and Abdominal  Antibiotics given-   Antibiotics (72h ago, onward)      Start     Stop Route Frequency Ordered    06/18/24 1430  piperacillin-tazobactam (ZOSYN) 4.5 g in dextrose 5 % in water (D5W) 100 mL IVPB (MB+)  (ED Adult Sepsis Treatment)          06/19/24 1429 IV Every 8 hours (non-standard times) 06/18/24 1424          Latest lactate reviewed-  Recent Labs   Lab 06/18/24  1700   LACTATE 2.7*     Organ dysfunction indicated by Encephalopathy    Fluid challenge: 2L for approx 30cc/kg    Post- resuscitation assessment No - Post resuscitation assessment not needed       Will Not start Pressors- Levophed for MAP of 65  Source control achieved by: CTX 2g q24h    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     Aggressive risk factor modification: HTN     Rehab efforts: The patient has been evaluated by a stroke team provider and the therapy needs have been fully considered based off the presenting complaints and exam findings. 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 Co-morbidities are present and inclusive of malnutrition.  MELD-Na score calculated; MELD 3.0: 19 at 6/12/2024  2:21 AM  MELD-Na: 17 at 6/12/2024  2:21 AM  Calculated from:  Serum Creatinine: 1.8 mg/dL at 6/12/2024  2:21 AM  Serum Sodium: 138 mmol/L (Using max of 137 mmol/L) at 6/12/2024  2:21 AM  Total Bilirubin: 2.2 mg/dL at 6/12/2024  2:21 AM  Serum Albumin: 2.4 g/dL at 6/12/2024  2:21 AM  INR(ratio): 1.2 at 6/10/2024  8:21 AM  Age at listing  (hypothetical): 83 years  Sex: Male at 6/12/2024  2:21 AM      Continue chronic meds. 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 Will avoid any hepatotoxic meds, and monitor CBC/CMP/INR for synthetic function.     Stage 3a chronic kidney disease  Creatine stable for now. BMP reviewed- noted Estimated Creatinine Clearance: 25.6 mL/min (A) (based on SCr of 1.6 mg/dL (H)). according to latest data. Based on current GFR, CKD stage is stage 4 - GFR 15-29.  Monitor UOP and serial BMP and adjust therapy as needed. Renally dose meds. Avoid nephrotoxic medications and procedures.    Prediabetes  Will recheck A1c, LDSSI if needed, hold for now to avoid hypoglycemia      Pure hypercholesterolemia  Can continue given recent stroke and stabilization of LFTs during last admission while on statin.       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 Follows with Hepatology as OP.     - Consider abdominal MRI to assess further for cirrhosis  - Would avoid triple phase CT to prevent contrast load      Essential hypertension  Chronic, controlled. Latest blood pressure and vitals reviewed-     Temp:  [97.2 °F (36.2 °C)-97.4 °F (36.3 °C)]   Pulse:  [75-77]   Resp:  [15-26]   BP: (103-158)/(45-88)   SpO2:  [95 %-100 %] .   Home meds for hypertension were reviewed and noted below.   Hypertension Medications               metoprolol succinate (TOPROL-XL) 25 MG 24 hr tablet TAKE 1 TABLET BY MOUTH EVERY DAY IN THE EVENING    olmesartan (BENICAR) 20 MG tablet Take 1 tablet (20 mg total) by mouth once daily.    spironolactone (ALDACTONE) 25 MG tablet Take 1 tablet (25 mg total) by mouth once daily.            While in the hospital, will manage blood pressure as follows; Adjust home antihypertensive regimen as follows- Hold while ruling out sepsis    Will utilize p.r.n. blood pressure medication only if patient's blood pressure greater than 220/110 and he develops symptoms such as worsening chest pain or shortness of breath.      VTE  Risk Mitigation (From admission, onward)           Ordered     heparin (porcine) injection 5,000 Units  Every 8 hours         06/18/24 1809     IP VTE HIGH RISK PATIENT  Once         06/18/24 1809     Place sequential compression device  Until discontinued         06/18/24 1809                                    Bryanna Teixeira MD  Department of Hospital Medicine  [facility] - Emergency Dept

## 2024-06-18 NOTE — ED TRIAGE NOTES
Patient arrived via EMS for the chief complaints of a syncopal episode. The patient was at [facility] performing PT when he syncopated. The patient was unresponsive and initially hypotensive.

## 2024-06-18 NOTE — ASSESSMENT & PLAN NOTE
DDX includes autonomic dysfunction, hypovolemia in setting of BP regimen, arrhythmia such as Afib, seizure, stroke. CT head w/o acute intracranial process to suggest hemorrhagic stroke. Has not missed secondary preventative medications such as Plavix/ASA. Concern for cardiac etiology given LOC while seated. Fluid resuscitated in ED with dramatic improvement in BP compared to BP noted at SNF. Seizure less likely, but unknown if patient post-ictal.     - IVF  - F/u LA  - Restart ASA/Plavix  - EEG  - Telemetry  - CTX

## 2024-06-18 NOTE — ASSESSMENT & PLAN NOTE
Noted 1/2023:  L corona radiata, L temporal lobe, R splenium of the corpus callosum    Antithrombotics for secondary stroke prevention: Antiplatelets: Aspirin: 81 mg daily  Clopidogrel: 75 mg daily    Statins for secondary stroke prevention and hyperlipidemia, if present:   Statins: Atorvastatin- 40 mg daily (holding to trend LFT, can restart if remaining stable).     Aggressive risk factor modification: HTN     Rehab efforts: The patient has been evaluated by a stroke team provider and the therapy needs have been fully considered based off the presenting complaints and exam findings. The following therapy evaluations are needed: PT evaluate and treat, OT evaluate and treat    Diagnostics ordered/pending: CT scan of head without contrast to asses brain parenchyma, HgbA1C to assess blood glucose levels    VTE prophylaxis: Heparin 5000 units SQ every 8 hours    BP parameters: Infarct: No intervention, SBP <220

## 2024-06-18 NOTE — SUBJECTIVE & OBJECTIVE
Past Medical History:   Diagnosis Date    Cataract     HLD (hyperlipidemia)     Hypertension        Past Surgical History:   Procedure Laterality Date    CHOLECYSTECTOMY      EYE SURGERY Right     cataract removal surgery       Review of patient's allergies indicates:  No Known Allergies    Current Facility-Administered Medications on File Prior to Encounter   Medication    [DISCONTINUED] acetaminophen tablet 650 mg    [DISCONTINUED] aspirin chewable tablet 81 mg    [DISCONTINUED] bisacodyL suppository 10 mg    [DISCONTINUED] clopidogreL tablet 75 mg    [DISCONTINUED] dextrose 10% bolus 125 mL 125 mL    [DISCONTINUED] dextrose 10% bolus 250 mL 250 mL    [DISCONTINUED] dorzolamide-timolol 2-0.5% ophthalmic solution 1 drop    [DISCONTINUED] enoxaparin injection 30 mg    [DISCONTINUED] ergocalciferol capsule 50,000 Units    [DISCONTINUED] glucagon (human recombinant) injection 1 mg    [DISCONTINUED] glucose chewable tablet 16 g    [DISCONTINUED] glucose chewable tablet 24 g    [DISCONTINUED] lactulose 20 gram/30 mL solution Soln 10 g    [DISCONTINUED] naloxone 0.4 mg/mL injection 0.02 mg    [DISCONTINUED] sodium chloride 0.9% flush 10 mL    [DISCONTINUED] sodium chloride 0.9% flush 10 mL    [DISCONTINUED] spironolactone tablet 25 mg     Current Outpatient Medications on File Prior to Encounter   Medication Sig    aspirin 81 MG Chew Take 1 tablet (81 mg total) by mouth once daily.    atorvastatin (LIPITOR) 20 MG tablet Take 1 tablet (20 mg total) by mouth once daily.    ciclopirox (PENLAC) 8 % Soln Apply topically nightly.    clopidogreL (PLAVIX) 75 mg tablet Take 1 tablet (75 mg total) by mouth once daily. for 14 doses    cyanocobalamin (VITAMIN B-12) 100 MCG tablet Take 1 tablet (100 mcg total) by mouth once daily.    donepeziL (ARICEPT) 5 MG tablet Take 1 tablet (5 mg total) by mouth every evening.    dorzolamide-timolol 2-0.5% (COSOPT) 22.3-6.8 mg/mL ophthalmic solution Place 1 drop into both eyes 2 (two) times  daily.    ergocalciferol (ERGOCALCIFEROL) 50,000 unit Cap Take 1 capsule (50,000 Units total) by mouth every 7 days.    lactulose (CHRONULAC) 20 gram/30 mL Soln Take 15 mLs (10 g total) by mouth once daily.    metoprolol succinate (TOPROL-XL) 25 MG 24 hr tablet TAKE 1 TABLET BY MOUTH EVERY DAY IN THE EVENING    multivitamin capsule Take 1 capsule by mouth once daily.    olmesartan (BENICAR) 20 MG tablet Take 1 tablet (20 mg total) by mouth once daily.    omega-3 fatty acids/fish oil (FISH OIL-OMEGA-3 FATTY ACIDS) 300-1,000 mg capsule Take 1 capsule by mouth once daily.    spironolactone (ALDACTONE) 25 MG tablet Take 1 tablet (25 mg total) by mouth once daily.     Family History       Problem Relation (Age of Onset)    Coronary artery disease Father    Heart attack Father    Hypertension Mother, Brother    No Known Problems Sister, Daughter, Son          Tobacco Use    Smoking status: Never    Smokeless tobacco: Never   Substance and Sexual Activity    Alcohol use: Yes    Drug use: Never    Sexual activity: Not Currently     Partners: Female     Birth control/protection: None     Comment: wife     Review of Systems   Constitutional:  Negative for chills and fever.   Respiratory:  Negative for shortness of breath.    Cardiovascular:  Negative for chest pain.   Gastrointestinal:  Negative for abdominal pain.   Genitourinary:  Negative for dysuria.   Musculoskeletal:  Negative for arthralgias.   Psychiatric/Behavioral:  Positive for decreased concentration.      Objective:     Vital Signs (Most Recent):  Temp: 97.2 °F (36.2 °C) (06/18/24 1701)  Pulse: 77 (06/18/24 1701)  Resp: 16 (06/18/24 1701)  BP: (!) 120/58 (06/18/24 1701)  SpO2: 98 % (06/18/24 1701) Vital Signs (24h Range):  Temp:  [97.2 °F (36.2 °C)-97.4 °F (36.3 °C)] 97.2 °F (36.2 °C)  Pulse:  [75-77] 77  Resp:  [15-26] 16  SpO2:  [95 %-100 %] 98 %  BP: (103-158)/(45-88) 120/58     Weight: 49.9 kg (110 lb)  Body mass index is 19.49 kg/m².     Physical  Exam  Constitutional:       Comments: Thin appearing, somnolent requiring tactile and verbal stimulation to wake up   HENT:      Head: Normocephalic and atraumatic.      Mouth/Throat:      Mouth: Mucous membranes are dry.      Pharynx: Oropharynx is clear.   Eyes:      Extraocular Movements: Extraocular movements intact.   Cardiovascular:      Rate and Rhythm: Normal rate and regular rhythm.      Pulses: Normal pulses.      Heart sounds: Normal heart sounds.   Pulmonary:      Effort: Pulmonary effort is normal.      Breath sounds: Normal breath sounds.   Abdominal:      General: Abdomen is flat.      Palpations: Abdomen is soft.      Comments: Slight wincing to palpation, but denied pain    Musculoskeletal:         General: Tenderness present. No swelling.      Comments: Bilateral lower extremities highly tender to light palpation. No erythema, no wounds noted.    Skin:     General: Skin is warm and dry.      Findings: No bruising.   Neurological:      Comments: AO to self  Raspy voice, some dysarthria   Psychiatric:      Comments: Decreased concentration. No agitation.                Significant Labs: All pertinent labs within the past 24 hours have been reviewed.  CBC:   Recent Labs   Lab 06/18/24  1346   WBC 16.13*   HGB 11.2*   HCT 32.6*        CMP:   Recent Labs   Lab 06/17/24  0327 06/18/24  1346    136   K 4.2 4.8   * 113*   CO2 14* 13*   GLU 98 134*   BUN 44* 46*   CREATININE 1.5* 1.6*   CALCIUM 9.3 9.6   PROT 6.8 7.0   ALBUMIN 2.4* 2.4*   BILITOT 2.9* 3.1*   ALKPHOS 197* 194*   * 108*   * 112*   ANIONGAP 11 10     Troponin:   Recent Labs   Lab 06/18/24  1346   TROPONINI 0.071*       Significant Imaging: I have reviewed all pertinent imaging results/findings within the past 24 hours.

## 2024-06-18 NOTE — ASSESSMENT & PLAN NOTE
This patient does not have evidence of infective focus  My overall impression is  possible sepsis, tachycardic and leukocytosis .  Source: Urinary Tract and Abdominal  Antibiotics given-   Antibiotics (72h ago, onward)      Start     Stop Route Frequency Ordered    06/18/24 1430  piperacillin-tazobactam (ZOSYN) 4.5 g in dextrose 5 % in water (D5W) 100 mL IVPB (MB+)  (ED Adult Sepsis Treatment)         06/19/24 1429 IV Every 8 hours (non-standard times) 06/18/24 1424          Latest lactate reviewed-  Recent Labs   Lab 06/18/24  1700   LACTATE 2.7*     Organ dysfunction indicated by Encephalopathy    Fluid challenge: 2L for approx 30cc/kg    Post- resuscitation assessment No - Post resuscitation assessment not needed       Will Not start Pressors- Levophed for MAP of 65  Source control achieved by: CTX 2g q24h

## 2024-06-18 NOTE — ASSESSMENT & PLAN NOTE
Can continue given recent stroke and stabilization of LFTs during last admission while on statin.

## 2024-06-18 NOTE — HPI
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 History primarily obtained via spouse and chart review. [facility] who confirmed he became unresponsive while seated during PT session. Nursing attempted sternal rub, he did not respond. He became cyanotic and apneic for approx 10 seconds with a thready pulse. Nursing staff went to place in trendelenburg and almost called a code until he rapidly regained consciousness. Initial SBP in the field was in the 50s. He was hypoxic and placed on 15L non-rebreather. Improvement on arrival to [facility], stable on RA with improvement in MAPs. Pt reports feeling cold otherwise reports feeling "fine." He is conversing minimally which is near his baseline per family at bedside. He denies memory of the event. He denies fevers, chest pain, cough, SOB, abdominal pain, difficulty urinating/dysuria. Per the patient's wife, prior to stroke 6/10, patient ambulatory and seems to have waxing/waning mentation (Aox3 but sometimes confused). Previously noted that patient dependent in ADLs except feeding himself.     In ED, patient normotensive, not tachycardic, afebrile, and on RA. Did require tactile stimulation to participate in interview, minimally conversant upon awakening. CBC with leukocytosis (WBC 16), stable anemia (Hb 11). CMP without electrolyte derangements, Cr 1.6 (baseline appears 1.4-1.6), AST//112, T bili 3.1 (LFTs stable compared to 6/17). LA 3.5 -> 2.7 following 1L LR. Modest troponin increase to 0.71. CT head with no acute intracranial process. EKG w/o Afib or ST elevation/depression, however noted possible PVC and fusion complexes. Received Vanc/Zosyn in ED. VBG: pH: 7.435, pco2: 22.9, Beecf: -9 HCO3: 15.3  "

## 2024-06-18 NOTE — ASSESSMENT & PLAN NOTE
Creatine stable for now. BMP reviewed- noted Estimated Creatinine Clearance: 25.6 mL/min (A) (based on SCr of 1.6 mg/dL (H)). according to latest data. Based on current GFR, CKD stage is stage 4 - GFR 15-29.  Monitor UOP and serial BMP and adjust therapy as needed. Renally dose meds. Avoid nephrotoxic medications and procedures.

## 2024-06-18 NOTE — ASSESSMENT & PLAN NOTE
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 Co-morbidities are present and inclusive of malnutrition.  MELD-Na score calculated; MELD 3.0: 19 at 6/12/2024  2:21 AM  MELD-Na: 17 at 6/12/2024  2:21 AM  Calculated from:  Serum Creatinine: 1.8 mg/dL at 6/12/2024  2:21 AM  Serum Sodium: 138 mmol/L (Using max of 137 mmol/L) at 6/12/2024  2:21 AM  Total Bilirubin: 2.2 mg/dL at 6/12/2024  2:21 AM  Serum Albumin: 2.4 g/dL at 6/12/2024  2:21 AM  INR(ratio): 1.2 at 6/10/2024  8:21 AM  Age at listing (hypothetical): 83 years  Sex: Male at 6/12/2024  2:21 AM      Continue chronic meds. 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 Will avoid any hepatotoxic meds, and monitor CBC/CMP/INR for synthetic function.

## 2024-06-18 NOTE — ASSESSMENT & PLAN NOTE
Chronic, controlled. Latest blood pressure and vitals reviewed-     Temp:  [97.2 °F (36.2 °C)-97.4 °F (36.3 °C)]   Pulse:  [75-77]   Resp:  [15-26]   BP: (103-158)/(45-88)   SpO2:  [95 %-100 %] .   Home meds for hypertension were reviewed and noted below.   Hypertension Medications               metoprolol succinate (TOPROL-XL) 25 MG 24 hr tablet TAKE 1 TABLET BY MOUTH EVERY DAY IN THE EVENING    olmesartan (BENICAR) 20 MG tablet Take 1 tablet (20 mg total) by mouth once daily.    spironolactone (ALDACTONE) 25 MG tablet Take 1 tablet (25 mg total) by mouth once daily.            While in the hospital, will manage blood pressure as follows; Adjust home antihypertensive regimen as follows- Hold while ruling out sepsis    Will utilize p.r.n. blood pressure medication only if patient's blood pressure greater than 220/110 and he develops symptoms such as worsening chest pain or shortness of breath.

## 2024-06-19 LAB
ADENOVIRUS: NOT DETECTED
ALBUMIN SERPL BCP-MCNC: 2 G/DL (ref 3.5–5.2)
ALP SERPL-CCNC: 155 U/L (ref 55–135)
ALT SERPL W/O P-5'-P-CCNC: 89 U/L (ref 10–44)
AMMONIA PLAS-SCNC: 42 UMOL/L (ref 10–50)
ANION GAP SERPL CALC-SCNC: 8 MMOL/L (ref 8–16)
AST SERPL-CCNC: 101 U/L (ref 10–40)
BASOPHILS # BLD AUTO: 0.02 K/UL (ref 0–0.2)
BASOPHILS NFR BLD: 0.1 % (ref 0–1.9)
BILIRUB SERPL-MCNC: 2.3 MG/DL (ref 0.1–1)
BORDETELLA PARAPERTUSSIS (IS1001): NOT DETECTED
BORDETELLA PERTUSSIS (PTXP): NOT DETECTED
BUN SERPL-MCNC: 46 MG/DL (ref 8–23)
CALCIUM SERPL-MCNC: 8.6 MG/DL (ref 8.7–10.5)
CHLAMYDIA PNEUMONIAE: NOT DETECTED
CHLORIDE SERPL-SCNC: 112 MMOL/L (ref 95–110)
CK SERPL-CCNC: 904 U/L (ref 20–200)
CO2 SERPL-SCNC: 15 MMOL/L (ref 23–29)
CORONAVIRUS 229E, COMMON COLD VIRUS: NOT DETECTED
CORONAVIRUS HKU1, COMMON COLD VIRUS: NOT DETECTED
CORONAVIRUS NL63, COMMON COLD VIRUS: NOT DETECTED
CORONAVIRUS OC43, COMMON COLD VIRUS: NOT DETECTED
CREAT SERPL-MCNC: 1.6 MG/DL (ref 0.5–1.4)
DIFFERENTIAL METHOD BLD: ABNORMAL
EOSINOPHIL # BLD AUTO: 0 K/UL (ref 0–0.5)
EOSINOPHIL NFR BLD: 0.1 % (ref 0–8)
ERYTHROCYTE [DISTWIDTH] IN BLOOD BY AUTOMATED COUNT: 13.9 % (ref 11.5–14.5)
EST. GFR  (NO RACE VARIABLE): 43 ML/MIN/1.73 M^2
FLUBV RNA NPH QL NAA+NON-PROBE: NOT DETECTED
GLUCOSE SERPL-MCNC: 96 MG/DL (ref 70–110)
HCT VFR BLD AUTO: 25.9 % (ref 40–54)
HGB BLD-MCNC: 9 G/DL (ref 14–18)
HPIV1 RNA NPH QL NAA+NON-PROBE: NOT DETECTED
HPIV2 RNA NPH QL NAA+NON-PROBE: NOT DETECTED
HPIV3 RNA NPH QL NAA+NON-PROBE: NOT DETECTED
HPIV4 RNA NPH QL NAA+NON-PROBE: NOT DETECTED
HUMAN METAPNEUMOVIRUS: NOT DETECTED
IMM GRANULOCYTES # BLD AUTO: 0.16 K/UL (ref 0–0.04)
IMM GRANULOCYTES NFR BLD AUTO: 0.8 % (ref 0–0.5)
INFLUENZA A (SUBTYPES H1,H1-2009,H3): NOT DETECTED
LYMPHOCYTES # BLD AUTO: 2.4 K/UL (ref 1–4.8)
LYMPHOCYTES NFR BLD: 12.5 % (ref 18–48)
MAGNESIUM SERPL-MCNC: 2.1 MG/DL (ref 1.6–2.6)
MCH RBC QN AUTO: 33.2 PG (ref 27–31)
MCHC RBC AUTO-ENTMCNC: 34.7 G/DL (ref 32–36)
MCV RBC AUTO: 96 FL (ref 82–98)
MONOCYTES # BLD AUTO: 2.1 K/UL (ref 0.3–1)
MONOCYTES NFR BLD: 10.9 % (ref 4–15)
MYCOPLASMA PNEUMONIAE: NOT DETECTED
NEUTROPHILS # BLD AUTO: 14.6 K/UL (ref 1.8–7.7)
NEUTROPHILS NFR BLD: 75.6 % (ref 38–73)
NRBC BLD-RTO: 0 /100 WBC
PHOSPHATE SERPL-MCNC: 3 MG/DL (ref 2.7–4.5)
PLATELET # BLD AUTO: 131 K/UL (ref 150–450)
PMV BLD AUTO: 9.3 FL (ref 9.2–12.9)
POTASSIUM SERPL-SCNC: 4.3 MMOL/L (ref 3.5–5.1)
PROCALCITONIN SERPL IA-MCNC: 0.61 NG/ML
PROT SERPL-MCNC: 6 G/DL (ref 6–8.4)
RBC # BLD AUTO: 2.71 M/UL (ref 4.6–6.2)
RESPIRATORY INFECTION PANEL SOURCE: NORMAL
RSV RNA NPH QL NAA+NON-PROBE: NOT DETECTED
RV+EV RNA NPH QL NAA+NON-PROBE: NOT DETECTED
SARS-COV-2 RNA RESP QL NAA+PROBE: NOT DETECTED
SODIUM SERPL-SCNC: 135 MMOL/L (ref 136–145)
WBC # BLD AUTO: 19.33 K/UL (ref 3.9–12.7)

## 2024-06-19 PROCEDURE — 63600175 PHARM REV CODE 636 W HCPCS: Mod: HCNC

## 2024-06-19 PROCEDURE — 87581 M.PNEUMON DNA AMP PROBE: CPT | Mod: HCNC

## 2024-06-19 PROCEDURE — 80053 COMPREHEN METABOLIC PANEL: CPT | Mod: HCNC

## 2024-06-19 PROCEDURE — 25000003 PHARM REV CODE 250

## 2024-06-19 PROCEDURE — 97162 PT EVAL MOD COMPLEX 30 MIN: CPT | Mod: HCNC

## 2024-06-19 PROCEDURE — 82140 ASSAY OF AMMONIA: CPT | Mod: HCNC | Performed by: FAMILY MEDICINE

## 2024-06-19 PROCEDURE — 95720 EEG PHY/QHP EA INCR W/VEEG: CPT | Mod: HCNC,,, | Performed by: PSYCHIATRY & NEUROLOGY

## 2024-06-19 PROCEDURE — 36415 COLL VENOUS BLD VENIPUNCTURE: CPT | Mod: HCNC

## 2024-06-19 PROCEDURE — 84100 ASSAY OF PHOSPHORUS: CPT | Mod: HCNC

## 2024-06-19 PROCEDURE — 87633 RESP VIRUS 12-25 TARGETS: CPT | Mod: HCNC

## 2024-06-19 PROCEDURE — 21400001 HC TELEMETRY ROOM: Mod: HCNC

## 2024-06-19 PROCEDURE — 84145 PROCALCITONIN (PCT): CPT | Mod: HCNC

## 2024-06-19 PROCEDURE — 97112 NEUROMUSCULAR REEDUCATION: CPT | Mod: HCNC

## 2024-06-19 PROCEDURE — 97530 THERAPEUTIC ACTIVITIES: CPT | Mod: HCNC

## 2024-06-19 PROCEDURE — 95714 VEEG EA 12-26 HR UNMNTR: CPT | Mod: HCNC

## 2024-06-19 PROCEDURE — 95700 EEG CONT REC W/VID EEG TECH: CPT | Mod: HCNC

## 2024-06-19 PROCEDURE — 85025 COMPLETE CBC W/AUTO DIFF WBC: CPT | Mod: HCNC

## 2024-06-19 PROCEDURE — 97165 OT EVAL LOW COMPLEX 30 MIN: CPT | Mod: HCNC

## 2024-06-19 PROCEDURE — 82550 ASSAY OF CK (CPK): CPT | Mod: HCNC

## 2024-06-19 PROCEDURE — 83735 ASSAY OF MAGNESIUM: CPT | Mod: HCNC

## 2024-06-19 RX ORDER — SODIUM CHLORIDE, SODIUM LACTATE, POTASSIUM CHLORIDE, CALCIUM CHLORIDE 600; 310; 30; 20 MG/100ML; MG/100ML; MG/100ML; MG/100ML
INJECTION, SOLUTION INTRAVENOUS CONTINUOUS
Status: ACTIVE | OUTPATIENT
Start: 2024-06-19 | End: 2024-06-20

## 2024-06-19 RX ADMIN — HEPARIN SODIUM 5000 UNITS: 5000 INJECTION INTRAVENOUS; SUBCUTANEOUS at 09:06

## 2024-06-19 RX ADMIN — ASPIRIN 81 MG CHEWABLE TABLET 81 MG: 81 TABLET CHEWABLE at 09:06

## 2024-06-19 RX ADMIN — CEFTRIAXONE 2 G: 2 INJECTION, POWDER, FOR SOLUTION INTRAMUSCULAR; INTRAVENOUS at 06:06

## 2024-06-19 RX ADMIN — HEPARIN SODIUM 5000 UNITS: 5000 INJECTION INTRAVENOUS; SUBCUTANEOUS at 01:06

## 2024-06-19 RX ADMIN — CYANOCOBALAMIN TAB 250 MCG 250 MCG: 250 TAB at 09:06

## 2024-06-19 RX ADMIN — CLOPIDOGREL BISULFATE 75 MG: 75 TABLET ORAL at 09:06

## 2024-06-19 RX ADMIN — SODIUM CHLORIDE, POTASSIUM CHLORIDE, SODIUM LACTATE AND CALCIUM CHLORIDE: 600; 310; 30; 20 INJECTION, SOLUTION INTRAVENOUS at 04:06

## 2024-06-19 RX ADMIN — HEPARIN SODIUM 5000 UNITS: 5000 INJECTION INTRAVENOUS; SUBCUTANEOUS at 05:06

## 2024-06-19 RX ADMIN — LACTULOSE 10 G: 20 SOLUTION ORAL at 09:06

## 2024-06-19 NOTE — ASSESSMENT & PLAN NOTE
Patient's anemia is currently controlled. Has not received any PRBCs to date. Etiology likely d/t chronic disease due to Chronic liver disease and B12 deficiency  Current CBC reviewed-   Lab Results   Component Value Date    HGB 9.0 (L) 06/19/2024    HCT 25.9 (L) 06/19/2024     Monitor serial CBC and transfuse if patient becomes hemodynamically unstable, symptomatic or H/H drops below 7/21.

## 2024-06-19 NOTE — PLAN OF CARE
SW attempted DPA at the bedside. Patient not seen at that time secondary to off unit for diagnostics.     SW will re-attempt.                SIDDHARTHA Amin, LMSW  [facility]  Case Management  Ext. [phone]

## 2024-06-19 NOTE — HOSPITAL COURSE
Admitted to [facility] 6/18 for syncopal episode a/w brief hypoxia, apnea, and hypotension. Patient arrived to ED with slight tachypnea and found to have leukocytosis and slightly elevated LA, so received fluids and CTX per sepsis protocol. LA normalized w/ fluids. Ct head NAICP. CXR NAIPP. Performing EEG to assess for seizure. Telemetry to detect arrhythmia.  Infectious workup unrevealing and patient afebrile/HDS for 48 hrs, stopped antibiotics. Found to have psoas hematoma 6/21. Stopped DAPT. Hb stabilized with continued normotension. GOC with family 6/23 to initiate discussion of hospice care given strokes, cirrhosis, risk of re-bleeding given psoas hematoma and possible GI bleed, as well as overall debility. Family opted to deliberate further outside of hospital regarding decision for hospice, however did decide to change code status to DNR 6/24. Deemed medically stable for discharge 6/24. Holding DAPT. Elected to not start Eliquis at this time due to risk of re-bleeding. Holding BP medicine until seen by PCP, would indefinitely hold if pursuing hospice.

## 2024-06-19 NOTE — ED NOTES
Received bedside report from YOLI Gould   Pt resting comfortably eating via assistance from family at bedside, NAD, respirations E/UL, updated on POC, wheels locked and in low position, call bell with in reach, Comfort positioning and restroom needs were addressed. Necessary items were placed with in reach and was advised when a reassessment would take place.

## 2024-06-19 NOTE — PLAN OF CARE
Problem: Adult Inpatient Plan of Care  Goal: Plan of Care Review  Outcome: Progressing  Goal: Patient-Specific Goal (Individualized)  Outcome: Progressing  Goal: Absence of Hospital-Acquired Illness or Injury  Outcome: Progressing  Goal: Optimal Comfort and Wellbeing  Outcome: Progressing  Goal: Readiness for Transition of Care  Outcome: Progressing     Problem: Sepsis/Septic Shock  Goal: Optimal Coping  Outcome: Progressing  Goal: Absence of Bleeding  Outcome: Progressing  Goal: Blood Glucose Level Within Targeted Range  Outcome: Progressing  Goal: Absence of Infection Signs and Symptoms  Outcome: Progressing  Goal: Optimal Nutrition Intake  Outcome: Progressing     Problem: Wound  Goal: Optimal Coping  Outcome: Progressing  Goal: Optimal Functional Ability  Outcome: Progressing  Goal: Absence of Infection Signs and Symptoms  Outcome: Progressing  Goal: Improved Oral Intake  Outcome: Progressing  Goal: Optimal Pain Control and Function  Outcome: Progressing  Goal: Skin Health and Integrity  Outcome: Progressing  Goal: Optimal Wound Healing  Outcome: Progressing     Problem: Skin Injury Risk Increased  Goal: Skin Health and Integrity  Outcome: Progressing

## 2024-06-19 NOTE — ASSESSMENT & PLAN NOTE
DDX includes autonomic dysfunction, hypovolemia in setting of BP regimen, arrhythmia such as Afib, seizure, stroke. CT head w/o acute intracranial process to suggest hemorrhagic stroke. Has not missed secondary preventative medications such as Plavix/ASA. Concern for cardiac etiology given LOC while seated. Fluid resuscitated in ED with dramatic improvement in BP compared to BP noted at SNF. Seizure less likely, but unknown if patient post-ictal.     - IVF  - Orthostatics  - Restart ASA/Plavix  - EEG  - Telemetry  - CTX

## 2024-06-19 NOTE — ASSESSMENT & PLAN NOTE
This patient does not have evidence of infective focus  My overall impression is  possible sepsis, tachycardic and leukocytosis .  Source: Urinary Tract and Abdominal  Antibiotics given-   Antibiotics (72h ago, onward)      Start     Stop Route Frequency Ordered    06/18/24 1930  cefTRIAXone (ROCEPHIN) 2 g in dextrose 5 % in water (D5W) 100 mL IVPB (MB+)         -- IV Every 24 hours (non-standard times) 06/18/24 1823          Latest lactate reviewed-  Recent Labs   Lab 06/18/24 1958   LACTATE 1.8       Organ dysfunction indicated by Encephalopathy    Fluid challenge: 2L for approx 30cc/kg    Post- resuscitation assessment No - Post resuscitation assessment not needed       Will Not start Pressors- Levophed for MAP of 65  Source control achieved by: CTX 2g q24h    Obtaining Procalcitonin - if negative, will stop CTX

## 2024-06-19 NOTE — PROGRESS NOTES
"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 History primarily obtained via spouse and chart review. [facility] who confirmed he became unresponsive while seated during PT session. Nursing attempted sternal rub, he did not respond. He became cyanotic and apneic for approx 10 seconds with a thready pulse. Nursing staff went to place in trendelenburg and almost called a code until he rapidly regained consciousness. Initial SBP in the field was in the 50s. He was hypoxic and placed on 15L non-rebreather. Improvement on arrival to [facility], stable on RA with improvement in MAPs. Pt reports feeling cold otherwise reports feeling "fine." He is conversing minimally which is near his baseline per family at bedside. He denies memory of the event. He denies fevers, chest pain, cough, SOB, abdominal pain, difficulty urinating/dysuria. Per the patient's wife, prior to stroke 6/10, patient ambulatory and seems to have waxing/waning mentation (Aox3 but sometimes confused). Previously noted that patient dependent in ADLs except feeding himself.     In ED, patient normotensive, not tachycardic, afebrile, and on RA. Did require tactile stimulation to participate in interview, minimally conversant upon awakening. CBC with leukocytosis (WBC 16), stable anemia (Hb 11). CMP without electrolyte " derangements, Cr 1.6 (baseline appears 1.4-1.6), AST//112, T bili 3.1 (LFTs stable compared to 6/17). LA 3.5 -> 2.7 following 1L LR. Modest troponin increase to 0.71. CT head with no acute intracranial process. EKG w/o Afib or ST elevation/depression, however noted possible PVC and fusion complexes. Received Vanc/Zosyn in ED. VBG: pH: 7.435, pco2: 22.9, Beecf: -9 HCO3: 15.3    Overview/Hospital Course:  Admitted to [facility] 6/18 for syncopal episode a/w brief hypoxia, apnea, and hypotension. Patient arrived to ED with slight tachypnea and found to have leukocytosis and slightly elevated LA, so received fluids and CTX per sepsis protocol. LA normalized w/ fluids. Ct head NAICP. CXR NAIPP. Performing EEG to assess for seizure. Telemetry to detect arrhythmia.      Interval History: NAEON. More alert this AM, though still not very conversational. Denies pain, fever, or chills.     Review of Systems   Constitutional:  Negative for chills and fever.   Respiratory:  Negative for shortness of breath.    Cardiovascular:  Negative for chest pain.   Gastrointestinal:  Negative for abdominal pain.   Genitourinary:  Negative for dysuria.   Musculoskeletal:  Negative for arthralgias.   Psychiatric/Behavioral:  Positive for decreased concentration.      Objective:     Vital Signs (Most Recent):  Temp: 98.3 °F (36.8 °C) (06/19/24 0756)  Pulse: 83 (06/19/24 0756)  Resp: 17 (06/19/24 0756)  BP: 107/70 (06/19/24 0756)  SpO2: 100 % (06/19/24 0900) Vital Signs (24h Range):  Temp:  [97.2 °F (36.2 °C)-98.5 °F (36.9 °C)] 98.3 °F (36.8 °C)  Pulse:  [69-84] 83  Resp:  [14-26] 17  SpO2:  [94 %-100 %] 100 %  BP: (103-158)/(45-88) 107/70     Weight: 49.9 kg (110 lb)  Body mass index is 19.49 kg/m².    Intake/Output Summary (Last 24 hours) at 6/19/2024 1056  Last data filed at 6/18/2024 2230  Gross per 24 hour   Intake 987.5 ml   Output --   Net 987.5 ml         Physical Exam  Constitutional:       Comments: Thin appearing, somnolent requiring  tactile and verbal stimulation to wake up   HENT:      Head: Normocephalic and atraumatic.      Mouth/Throat:      Mouth: Mucous membranes are dry.      Pharynx: Oropharynx is clear.   Eyes:      Extraocular Movements: Extraocular movements intact.   Cardiovascular:      Rate and Rhythm: Normal rate and regular rhythm.      Pulses: Normal pulses.      Heart sounds: Normal heart sounds.   Pulmonary:      Effort: Pulmonary effort is normal.      Breath sounds: Normal breath sounds.   Abdominal:      General: Abdomen is flat.      Palpations: Abdomen is soft.      Comments: Slight wincing to palpation, but denied pain    Musculoskeletal:         General: Tenderness present. No swelling.      Comments: Bilateral lower extremities highly tender to light palpation. No erythema, no wounds noted.    Skin:     General: Skin is warm and dry.      Findings: No bruising.   Neurological:      Comments: AO to self  Raspy voice, some dysarthria   Psychiatric:      Comments: Decreased concentration. No agitation.             Significant Labs: All pertinent labs within the past 24 hours have been reviewed.  CBC:   Recent Labs   Lab 06/18/24  1346 06/19/24  0301   WBC 16.13* 19.33*   HGB 11.2* 9.0*   HCT 32.6* 25.9*    131*     CMP:   Recent Labs   Lab 06/18/24  1346 06/19/24  0301    135*   K 4.8 4.3   * 112*   CO2 13* 15*   * 96   BUN 46* 46*   CREATININE 1.6* 1.6*   CALCIUM 9.6 8.6*   PROT 7.0 6.0   ALBUMIN 2.4* 2.0*   BILITOT 3.1* 2.3*   ALKPHOS 194* 155*   * 101*   * 89*   ANIONGAP 10 8     Lactic Acid:   Recent Labs   Lab 06/18/24  1346 06/18/24  1700 06/18/24  1958   LACTATE 3.5* 2.7* 1.8     Magnesium:   Recent Labs   Lab 06/19/24  0301   MG 2.1       Significant Imaging: I have reviewed all pertinent imaging results/findings within the past 24 hours.    Assessment/Plan:      * Syncope and collapse  DDX includes autonomic dysfunction, hypovolemia in setting of BP regimen, arrhythmia  such as Afib, seizure, stroke. CT head w/o acute intracranial process to suggest hemorrhagic stroke. Has not missed secondary preventative medications such as Plavix/ASA. Concern for cardiac etiology given LOC while seated. Fluid resuscitated in ED with dramatic improvement in BP compared to BP noted at SNF. Seizure less likely, but unknown if patient post-ictal.     - IVF  - Orthostatics  - Restart ASA/Plavix  - EEG  - Telemetry  - CTX      Macrocytic anemia  Patient's anemia is currently controlled. Has not received any PRBCs to date. Etiology likely d/t chronic disease due to Chronic liver disease and B12 deficiency  Current CBC reviewed-   Lab Results   Component Value Date    HGB 9.0 (L) 06/19/2024    HCT 25.9 (L) 06/19/2024     Monitor serial CBC and transfuse if patient becomes hemodynamically unstable, symptomatic or H/H drops below 7/21.    Severe sepsis  This patient does not have evidence of infective focus  My overall impression is  possible sepsis, tachycardic and leukocytosis .  Source: Urinary Tract and Abdominal  Antibiotics given-   Antibiotics (72h ago, onward)      Start     Stop Route Frequency Ordered    06/18/24 1930  cefTRIAXone (ROCEPHIN) 2 g in dextrose 5 % in water (D5W) 100 mL IVPB (MB+)         -- IV Every 24 hours (non-standard times) 06/18/24 1823          Latest lactate reviewed-  Recent Labs   Lab 06/18/24 1958   LACTATE 1.8       Organ dysfunction indicated by Encephalopathy    Fluid challenge: 2L for approx 30cc/kg    Post- resuscitation assessment No - Post resuscitation assessment not needed       Will Not start Pressors- Levophed for MAP of 65  Source control achieved by: CTX 2g q24h    Obtaining Procalcitonin - if negative, will stop CTX    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     Aggressive risk factor modification: HTN     Rehab efforts: The patient has been evaluated by a stroke team provider and the therapy needs have been fully considered based off the presenting complaints and exam findings. 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 Co-morbidities are present and inclusive of malnutrition.  MELD-Na score calculated; MELD 3.0: 19 at 6/12/2024  2:21 AM  MELD-Na: 17 at 6/12/2024  2:21 AM  Calculated from:  Serum Creatinine: 1.8 mg/dL at 6/12/2024  2:21 AM  Serum Sodium: 138 mmol/L (Using max of 137 mmol/L) at 6/12/2024  2:21 AM  Total Bilirubin: 2.2 mg/dL at 6/12/2024  2:21 AM  Serum Albumin: 2.4 g/dL at 6/12/2024  2:21 AM  INR(ratio): 1.2 at 6/10/2024  8:21 AM  Age at listing (hypothetical): 83 years  Sex: Male at 6/12/2024  2:21 AM      Continue chronic meds. 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 Will avoid any hepatotoxic meds, and monitor CBC/CMP/INR for synthetic function.     Stage 3a chronic kidney disease  Creatine stable for now. BMP reviewed- noted Estimated Creatinine Clearance: 25.6 mL/min (A) (based on SCr of 1.6 mg/dL (H)). according to latest data. Based on current GFR, CKD stage is stage 4 - GFR 15-29.  Monitor UOP and serial BMP and adjust therapy as needed. Renally dose meds. Avoid nephrotoxic medications and procedures.    Prediabetes  Will recheck A1c, LDSSI if needed, hold for now to avoid hypoglycemia      Pure  hypercholesterolemia  Can continue given recent stroke and stabilization of LFTs during last admission while on statin.       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 Follows with Hepatology as OP.     - Consider abdominal MRI to assess further for cirrhosis  - Would avoid triple phase CT to prevent contrast load      Essential hypertension  Chronic, controlled. Latest blood pressure and vitals reviewed-     Temp:  [97.2 °F (36.2 °C)-97.4 °F (36.3 °C)]   Pulse:  [75-77]   Resp:  [15-26]   BP: (103-158)/(45-88)   SpO2:  [95 %-100 %] .   Home meds for hypertension were reviewed and noted below.   Hypertension Medications               metoprolol succinate (TOPROL-XL) 25 MG 24 hr tablet TAKE 1 TABLET BY MOUTH EVERY DAY IN THE EVENING    olmesartan (BENICAR) 20 MG tablet Take 1 tablet (20 mg total) by mouth once daily.    spironolactone (ALDACTONE) 25 MG tablet Take 1 tablet (25 mg total) by mouth once daily.            While in the hospital, will manage blood pressure as follows; Adjust home antihypertensive regimen as follows- Hold while ruling out sepsis    Will utilize p.r.n. blood pressure medication only if patient's blood pressure greater than 220/110 and he develops symptoms such as worsening chest pain or shortness of breath.      VTE Risk Mitigation (From admission, onward)           Ordered     heparin (porcine) injection 5,000 Units  Every 8 hours         06/18/24 1809     IP VTE HIGH RISK PATIENT  Once         06/18/24 1809     Place sequential compression device  Until discontinued         06/18/24 1809                    Discharge Planning   LAMINE:      Code Status: Full Code   Is the patient medically ready for discharge?:     Reason for patient still in hospital (select all that apply): Patient trending condition                     Bryanna Teixeira MD  Department of Hospital Medicine   [facility] - [facility] Surg ([place]

## 2024-06-19 NOTE — CLINICAL REVIEW
Sepsis Screen (most recent)       Sepsis Screen (IP) - 06/19/24 7042       Is the patient's history or complaint suggestive of a possible infection? Yes  -    Are there at least two of the following signs and symptoms present? Yes   WBCs increased -    Sepsis signs/symptoms - WBC WBC < 4,000 or WBC > 12,000  -    Sepsis signs/symptoms - Altered Mental Status Altered Mental Status  -    Are any of the following organ dysfunction criteria present and not considered to be due to a chronic condition? Yes  -    Organ Dysfunction Criteria Total Bilirubin > 2.0 Platelet count < 100,000  -    Initiate Sepsis Protocol No  -    Reason sepsis not considered Pt. receiving appropriate management   neg BC, on abx -              User Key  (r) = Recorded By, (t) = Taken By, (c) = Cosigned By      Initials Name    Vandana Aguillon RN

## 2024-06-19 NOTE — SUBJECTIVE & OBJECTIVE
Interval History: NAEON. More alert this AM, though still not very conversational. Denies pain, fever, or chills.     Review of Systems   Constitutional:  Negative for chills and fever.   Respiratory:  Negative for shortness of breath.    Cardiovascular:  Negative for chest pain.   Gastrointestinal:  Negative for abdominal pain.   Genitourinary:  Negative for dysuria.   Musculoskeletal:  Negative for arthralgias.   Psychiatric/Behavioral:  Positive for decreased concentration.      Objective:     Vital Signs (Most Recent):  Temp: 98.3 °F (36.8 °C) (06/19/24 0756)  Pulse: 83 (06/19/24 0756)  Resp: 17 (06/19/24 0756)  BP: 107/70 (06/19/24 0756)  SpO2: 100 % (06/19/24 0900) Vital Signs (24h Range):  Temp:  [97.2 °F (36.2 °C)-98.5 °F (36.9 °C)] 98.3 °F (36.8 °C)  Pulse:  [69-84] 83  Resp:  [14-26] 17  SpO2:  [94 %-100 %] 100 %  BP: (103-158)/(45-88) 107/70     Weight: 49.9 kg (110 lb)  Body mass index is 19.49 kg/m².    Intake/Output Summary (Last 24 hours) at 6/19/2024 1056  Last data filed at 6/18/2024 2230  Gross per 24 hour   Intake 987.5 ml   Output --   Net 987.5 ml         Physical Exam  Constitutional:       Comments: Thin appearing, somnolent requiring tactile and verbal stimulation to wake up   HENT:      Head: Normocephalic and atraumatic.      Mouth/Throat:      Mouth: Mucous membranes are dry.      Pharynx: Oropharynx is clear.   Eyes:      Extraocular Movements: Extraocular movements intact.   Cardiovascular:      Rate and Rhythm: Normal rate and regular rhythm.      Pulses: Normal pulses.      Heart sounds: Normal heart sounds.   Pulmonary:      Effort: Pulmonary effort is normal.      Breath sounds: Normal breath sounds.   Abdominal:      General: Abdomen is flat.      Palpations: Abdomen is soft.      Comments: Slight wincing to palpation, but denied pain    Musculoskeletal:         General: Tenderness present. No swelling.      Comments: Bilateral lower extremities highly tender to light palpation. No  erythema, no wounds noted.    Skin:     General: Skin is warm and dry.      Findings: No bruising.   Neurological:      Comments: AO to self  Raspy voice, some dysarthria   Psychiatric:      Comments: Decreased concentration. No agitation.             Significant Labs: All pertinent labs within the past 24 hours have been reviewed.  CBC:   Recent Labs   Lab 06/18/24  1346 06/19/24  0301   WBC 16.13* 19.33*   HGB 11.2* 9.0*   HCT 32.6* 25.9*    131*     CMP:   Recent Labs   Lab 06/18/24  1346 06/19/24  0301    135*   K 4.8 4.3   * 112*   CO2 13* 15*   * 96   BUN 46* 46*   CREATININE 1.6* 1.6*   CALCIUM 9.6 8.6*   PROT 7.0 6.0   ALBUMIN 2.4* 2.0*   BILITOT 3.1* 2.3*   ALKPHOS 194* 155*   * 101*   * 89*   ANIONGAP 10 8     Lactic Acid:   Recent Labs   Lab 06/18/24  1346 06/18/24  1700 06/18/24  1958   LACTATE 3.5* 2.7* 1.8     Magnesium:   Recent Labs   Lab 06/19/24  0301   MG 2.1       Significant Imaging: I have reviewed all pertinent imaging results/findings within the past 24 hours.

## 2024-06-19 NOTE — PT/OT/SLP EVAL
"Occupational Therapy   Co-Evaluation    Name: Dean Hahn  MRN: [id number]  Admitting Diagnosis: Syncope and collapse  Recent Surgery: * No surgery found *      Recommendations:     Discharge Recommendations: Moderate Intensity Therapy  Discharge Equipment Recommendations:  hospital bed, lift device, wheelchair  Barriers to discharge:   (increased skilled assistance required)    Assessment:     Dean Hahn is a 83 y.o. male with a medical diagnosis of Syncope and collapse.  He presents with the following performance deficits affecting function: weakness, impaired endurance, impaired self care skills, impaired functional mobility, gait instability, impaired balance, decreased upper extremity function, decreased lower extremity function, decreased safety awareness, impaired cognition, decreased coordination, decreased ROM, impaired coordination, impaired fine motor, impaired cardiopulmonary response to activity, visual deficits, abnormal tone. Pt able to participate and tolerated fairly overall. Pt was able to perform functional mobility and ADLs assessed with significant assistance. Pt unable to follow majority of motor commands with verbal and tactile cueing but able to respond to questions with one word answers intermittently.      Rehab Prognosis: Fair; patient would benefit from acute skilled OT services to address these deficits and reach maximum level of function.       Plan:     Patient to be seen 4 x/week to address the above listed problems via self-care/home management, therapeutic activities, therapeutic exercises, neuromuscular re-education  Plan of Care Expires: 07/19/24  Plan of Care Reviewed with: patient    Subjective     Chief Complaint: none stated  Patient/Family Comments/goals: "No." Pt response when asked if dizzy upon sitting eob    Occupational Profile:  Living Environment: per chart review, lives c/ spouse in home  Previous level of function: ambulatory and assistance " c/ ADLs from spouse prior to CVA in June per chart review  Roles and Routines:   Equipment Used at Home: walker, rolling, shower chair  Assistance upon Discharge: unclear at this time    Pain/Comfort:  Pain Rating 1:  (not rated, no apparent sign of pain)    Patients cultural, spiritual, Sikhism conflicts given the current situation: no    Objective:     Communicated with: RN prior to session.  Patient found supine with bed alarm, peripheral IV, telemetry, EEG upon OT entry to room.    General Precautions: Standard, fall, hearing impaired  Orthopedic Precautions: N/A  Braces: N/A  Respiratory Status: Room air    Occupational Performance:    Bed Mobility:    Patient completed Rolling/Turning to Right with total assistance  Patient completed Scooting/Bridging with total assistance  Patient completed Supine to Sit with total assistance  Patient completed Sit to Supine with total assistance    Functional Mobility/Transfers:  Patient completed Sit <> Stand Transfer with maximal assistance and of 2 persons  with  hand-held assist   Functional Mobility: not attempted 2/2 to pt safety    Activities of Daily Living:  Toileting: total assistance pt c/ episode of incontinence at bed level; assistance c/ perineal care, pads changed    Cognitive/Visual Perceptual:  Cognitive/Psychosocial Skills:     -       Oriented to: Person   -       Follows Commands/attention:Inattentive and Easily distracted  -       Safety awareness/insight to disability: impaired     Physical Exam:  Balance:    -       static sitting balance: ranged from CGA to Max A c/ posterior lean and L lean; sat eob ~12 min  Upper Extremity Range of Motion:     -       Right Upper Extremity: increased tone, PROM WFL  -       Left Upper Extremity: PROM WFL  Upper Extremity Strength:    -       Right Upper Extremity: unable to formally test, appears ~2/5 based on therapist observation  -       Left Upper Extremity: same as RUE   Strength:    -       Right  Upper Extremity: not tested 2/2 to command following  -       Left Upper Extremity: not tested 2/2 to command following  Fine Motor Coordination:    -       Impaired  not formally tested 2/2 to command following, however pt able to make composite grasp    During session L eye was closed, pt unable to open; not appearing in pain  Pt unable to track finger across midline c/ max cueing  Pt unable to locate therapist hand in left field of view c/ max cueing      AMPAC 6 Click ADL:  AMPAC Total Score: 6    Treatment & Education:  Pt edu on role of OT, POC, safety when performing self care tasks , benefit of performing OOB activity, and safety when performing functional transfers and mobility.  - White board updated  - Self care tasks completed-- as noted above      Patient left supine with all lines intact, call button in reach, bed alarm on, and RN notified    Co-eval with PT to have 2 skilled therapists present to safely assess pt's functional mobility.     GOALS:   Multidisciplinary Problems       Occupational Therapy Goals          Problem: Occupational Therapy    Goal Priority Disciplines Outcome Interventions   Occupational Therapy Goal     OT, PT/OT Progressing    Description: Goals to be met by: 6/26/2024     Patient will increase functional independence with ADLs by performing:    UE Dressing with Moderate Assistance.  LE Dressing with Maximum Assistance.  Grooming while seated with Moderate Assistance.  Toileting from bedside commode with Moderate Assistance for hygiene and clothing management.   Sitting at edge of bed x10 minutes with Minimal Assistance.  Toilet transfer to bedside commode with Moderate Assistance.                         History:     Past Medical History:   Diagnosis Date    Cataract     HLD (hyperlipidemia)     Hypertension          Past Surgical History:   Procedure Laterality Date    CHOLECYSTECTOMY      EYE SURGERY Right     cataract removal surgery       Time Tracking:     OT Date of  Treatment: 06/19/24  OT Start Time: 1040  OT Stop Time: 1102  OT Total Time (min): 22 min    Billable Minutes:Evaluation 8  Therapeutic Activity 14    6/19/2024

## 2024-06-19 NOTE — PLAN OF CARE
Problem: Physical Therapy  Goal: Physical Therapy Goal  Description: Goals to be met by:      Patient will increase functional independence with mobility by performin. Supine to sit with Moderate Assistance  2. Sit to supine with Moderate Assistance  3. Sit to stand transfer with Maximum Assistance  4. Bed to chair transfer with Maximum Assistance using LRAD  5. Gait  x 10 feet with Moderate Assistance using LRAD.     Outcome: Progressing   Evaluation completed, initiated plan of care.   Bhavya Andrews, PT  2024

## 2024-06-19 NOTE — PT/OT/SLP EVAL
Physical Therapy Evaluation    Patient Name:  Dean Hahn   MRN:  [id number]    Recommendations:     Discharge Recommendations: Moderate Intensity Therapy   Discharge Equipment Recommendations: lift device, wheelchair, hospital bed   Barriers to discharge: Inaccessible home and Decreased caregiver support    Assessment:     Dean Hahn is a 83 y.o. male admitted with a medical diagnosis of Syncope and collapse.  He presents with the following impairments/functional limitations: weakness, impaired endurance, impaired self care skills, impaired functional mobility, gait instability, impaired balance, decreased lower extremity function, decreased upper extremity function, decreased coordination, impaired cognition, impaired fine motor, impaired coordination, visual deficits, decreased safety awareness, decreased ROM, abnormal tone. The patient presents from SNF with recent CVA 6/11/24. He was lethargic, confused, occasionally following simple one step commands. His ability to actively participate with mobility is limited by cognition. He required total assistance for supine<>sit, he sat edge of bed ~12 min with maximum assistance and posterior and L lean. Prior to CVA, he was independent with mobility. Patient currently demonstrates a need for moderate intensity therapy on a daily basis post acute secondary to a decline in functional status due to illness and injury     Rehab Prognosis: Fair; patient would benefit from acute skilled PT services to address these deficits and reach maximum level of function.    Recent Surgery: * No surgery found *      Plan:     During this hospitalization, patient to be seen 4 x/week to address the identified rehab impairments via gait training, therapeutic activities, therapeutic exercises, neuromuscular re-education and progress toward the following goals:    Plan of Care Expires:  07/19/24    Subjective     Chief Complaint: unable to state   Patient/Family  "Comments/goals: unable to state  Pain/Comfort:  Pain Rating 1:  (no evidence of pain, unable to rate)    Patients cultural, spiritual, Scientologist conflicts given the current situation: no    Living Environment:  Per chart review, patient lives with his wife.   Prior to admission, patients level of function was independent with gait prior to CVA 6/11/24.  Equipment used at home: walker, rolling, shower chair.  DME owned (not currently used): none.  Upon discharge, patient will have assistance from wife.    Objective:     Communicated with RN prior to session.  Patient found HOB elevated with bed alarm, peripheral IV, telemetry, EEG  upon PT entry to room.    General Precautions: Standard, fall, hearing impaired  Orthopedic Precautions:N/A   Braces: N/A  Respiratory Status: Room air    Orthostatic Hypotension positive in sitting, unable to tolerate standing long enough for BP to read        Position Blood Pressure MAP   Supine HOB 30 deg 176/72 *RN alerted 102   Sitting 5 min  135/55 *denied dizziness 79   Return to supine 1 min 126/93 106            Exams:    Cognitive Exam  Patient is A&O xself, stated year was "2010" and follows occasional one -step commands; NIC UE closing in sitting- R eye remained closed even with cues, inattentive to R side   Fine Motor Coordination   -       Impaired      Postural Exam Patient presented with the following abnormalities:    -       Rounded shoulders  -       Forward head  -       Kyphosis  -       Posterior pelvic tilt  -       Weight shift posterior and L lean   Sensation    -       Light touch intact    Skin Integrity/Edema     -       Skin integrity: visibly intact  -       Edema: NA   R LE ROM Tightness in hamstrings, hip flexors, gastroc/soleus    R LE Strength 2/5 hip flexion, knee ext/flex, and ankle DF/PF;   L LE ROM Tightness in hamstrings, hip flexors, gastroc/soleus    L LE Strength  2/5 hip flexion, knee ext/flex, and ankle DF/PF       Balance   Static Sitting " maximum assistance to total assistance    Dynamic Sitting total assistance    Static Standing maximum assistance x2   Dynamic Standing       NA   ,     Functional Mobility:    Bed Mobility  Rolling to R: total assistance   Supine to Sit on the R side:  total assistance x2  Sit to supine: total assistance x2  Scoot to EOB in sitting: total assistance    Transfers Sit to Stand:  maximum assistance x2, partial stand, for pericare with RN assist          AM-PAC 6 CLICK MOBILITY  Total Score:10       Treatment & Education:  Patient educated on:  -role of therapy  -goals of session  -PT POC  -benefits of out of bed mobility and consequences of immobility  -calling for staff assist to mobilize safely  Patient agreeable to mobilize with therapy.      Sitting edge of bed 12 minutes, maximum assistance to total, weight shift maximum assistance to total assistance, brief moments of contact guard assist with attempts at anterior reaching  -Posterior pelvic tilt, kyphosis, cervical flexion  -Visual/verbal/manual cues for midline orientation, thoracic and cervical extension    Stood for pericare and to attempt orthostatics. RN assisted with cleaning BM and linen change.     Standing balance, maximum assistance x2, for 2 minutes  -Demonstrates trunk/hip/knee flexion in standing with posterior lean  -Manual/verbal cues and facilitation for hip extension, trunk extension, cues to look up  -Manual/verbal cues for quad and glute engagement  -Blocking NIC feet and knees    Patient would benefit from Q2H turns, positioning for pressure relief.       Patient left HOB elevated with all lines intact, call button in reach, and bed alarm on.    GOALS:   Multidisciplinary Problems       Physical Therapy Goals          Problem: Physical Therapy    Goal Priority Disciplines Outcome Goal Variances Interventions   Physical Therapy Goal     PT, PT/OT Progressing     Description: Goals to be met by: 6/29     Patient will increase functional  independence with mobility by performin. Supine to sit with Moderate Assistance  2. Sit to supine with Moderate Assistance  3. Sit to stand transfer with Maximum Assistance  4. Bed to chair transfer with Maximum Assistance using LRAD  5. Gait  x 10 feet with Moderate Assistance using LRAD.                          History:     Past Medical History:   Diagnosis Date    Cataract     HLD (hyperlipidemia)     Hypertension        Past Surgical History:   Procedure Laterality Date    CHOLECYSTECTOMY      EYE SURGERY Right     cataract removal surgery       Time Tracking:     PT Received On: 24  PT Start Time: 1040     PT Stop Time: 1102  PT Total Time (min): 22 min     Billable Minutes: Evaluation 11 and Neuromuscular Re-education 11      2024

## 2024-06-19 NOTE — PLAN OF CARE
Problem: Occupational Therapy  Goal: Occupational Therapy Goal  Description: Goals to be met by: 6/26/2024     Patient will increase functional independence with ADLs by performing:    UE Dressing with Moderate Assistance.  LE Dressing with Maximum Assistance.  Grooming while seated with Moderate Assistance.  Toileting from bedside commode with Moderate Assistance for hygiene and clothing management.   Sitting at edge of bed x10 minutes with Minimal Assistance.  Toilet transfer to bedside commode with Moderate Assistance.    Outcome: Progressing

## 2024-06-19 NOTE — PROCEDURES
DATE: 6/19/24    EEG NUMBER:   FH -1    REFERRING PHYSICIAN:Dr. Garcia     This EEG was performed to assess for evidence of underlying epilepsy.     ELECTROENCEPHALOGRAM REPORT     METHODOLOGY:  Electroencephalographic (EEG) is recorded with electrodes placed according to the International 10-20 placement system.  Thirty two (32) channels of digital signal (sampling rate of 512/sec), including T1 and T2, were simultaneously recorded from the scalp and may include EKG, EMG, and/or eye monitors.  Recording band pass was 0.1 to 512 Hz.  Digital video recording of the patient is simultaneously recorded with the EEG. The patient is instructed to report clinical symptoms which may occur during the recording session.  EEG and video recording are stored and archived in digital format.  Activation procedures, which include photic stimulation, hyperventilation and instructing patients to perform simple tasks, are done in selected patients.     The EEG is displayed on a monitor screen and can be reviewed using different montages.  Computer-assisted analysis is employed to detect spike and electrographic seizure activity.  The entire record is submitted for computer analysis.  The entire recording is visually reviewed, and the times identified by computer analysis as being spikes or seizures are reviewed again.     Compressed spectral analysis (CSA) is also performed on the activity recorded from each individual channel.  This is displayed as a power display of frequencies from 0 to 30 Hz over time.  The CSA is reviewed looking for asymmetries in power between homologous areas of the scalp, then compared with the original EEG recording.     [employer] software was also utilized in the review of this study.  This software suite analyzes the EEG recording in multiple domains.  Coherence and rhythmicity are computed to identify EEG sections which may contain organized seizures.  Each channel undergoes analysis to detect the  presence of spike and sharp waves which have special and morphological characteristics of epileptic activity.  The routine EEG recording is converted from special into frequency domain.  This is then displayed comparing homologous areas to identify areas of significant asymmetry.  Algorithm to identify non-cortically generated artifact is used to separate artifact from the EEG.     Recording times  Start on June 19, 2024 hour 9 minute 20 seconds 32   End on June 20, 2024 at hours 7 minute 0 seconds 19  The total time of EEG recording for the study was 21 hours and 23 min     EEG FINDINGS:  The recording was obtained with a number of standard bipolar and referential montages during wakefulness, drowsiness and sleep.  In the alert state, the posterior background rhythm was a symmetric, well-modulated 7-8 Hz activity. Activation procedures were not performed.  Mixed theta and occasional delta range activity was noted throughout the record which was diffuse.  Occasional triphasic waves were noted. During drowsiness, the background rhythm waxed and waned and there were periods of slowing.  During stage II sleep, symmetric V waves and sleep spindles were noted.  There were no focal abnormalities.  There were no interictal epileptiform abnormalities and no clinical or electrographic seizures were recorded.     The EKG channel revealed a sinus rhythm.     IMPRESSION:  This is an abnormal EEG during wakefulness, drowsiness and sleep.  Diffuse slowing of the background was noted and occasional triphasic waves were seen     CLINICAL CORRELATION:  The patient is an 83-year-old male with a history of pontine stroke and hepatic dysfunction who is being evaluated for confusion.  He has currently not maintained on any antiseizure medications This is an abnormal EEG during wakefulness, drowsiness and sleep. The overall degree of slowing along with triphasic waves is suggestive of a mild to moderate degree of encephalopathy likely a  toxic metabolic encephalopathy.  There is no evidence of an epileptic process on this recording.  No seizures were recorded during this study.

## 2024-06-20 LAB
ALBUMIN SERPL BCP-MCNC: 1.9 G/DL (ref 3.5–5.2)
ALP SERPL-CCNC: 141 U/L (ref 55–135)
ALT SERPL W/O P-5'-P-CCNC: 82 U/L (ref 10–44)
ANION GAP SERPL CALC-SCNC: 6 MMOL/L (ref 8–16)
ANISOCYTOSIS BLD QL SMEAR: SLIGHT
ANISOCYTOSIS BLD QL SMEAR: SLIGHT
AST SERPL-CCNC: 104 U/L (ref 10–40)
BASOPHILS # BLD AUTO: 0.02 K/UL (ref 0–0.2)
BASOPHILS # BLD AUTO: 0.02 K/UL (ref 0–0.2)
BASOPHILS # BLD AUTO: 0.03 K/UL (ref 0–0.2)
BASOPHILS NFR BLD: 0.1 % (ref 0–1.9)
BILIRUB SERPL-MCNC: 2.1 MG/DL (ref 0.1–1)
BLD PROD TYP BPU: NORMAL
BLOOD UNIT EXPIRATION DATE: NORMAL
BLOOD UNIT TYPE CODE: 7300
BLOOD UNIT TYPE: NORMAL
BUN SERPL-MCNC: 45 MG/DL (ref 8–23)
BURR CELLS BLD QL SMEAR: ABNORMAL
CALCIUM SERPL-MCNC: 8.6 MG/DL (ref 8.7–10.5)
CHLORIDE SERPL-SCNC: 111 MMOL/L (ref 95–110)
CK SERPL-CCNC: 848 U/L (ref 20–200)
CO2 SERPL-SCNC: 17 MMOL/L (ref 23–29)
CODING SYSTEM: NORMAL
CREAT SERPL-MCNC: 1.5 MG/DL (ref 0.5–1.4)
CROSSMATCH INTERPRETATION: NORMAL
DIFFERENTIAL METHOD BLD: ABNORMAL
DISPENSE STATUS: NORMAL
EOSINOPHIL # BLD AUTO: 0 K/UL (ref 0–0.5)
EOSINOPHIL NFR BLD: 0 % (ref 0–8)
EOSINOPHIL NFR BLD: 0.1 % (ref 0–8)
EOSINOPHIL NFR BLD: 0.1 % (ref 0–8)
ERYTHROCYTE [DISTWIDTH] IN BLOOD BY AUTOMATED COUNT: 14.2 % (ref 11.5–14.5)
ERYTHROCYTE [DISTWIDTH] IN BLOOD BY AUTOMATED COUNT: 14.2 % (ref 11.5–14.5)
ERYTHROCYTE [DISTWIDTH] IN BLOOD BY AUTOMATED COUNT: 14.4 % (ref 11.5–14.5)
EST. GFR  (NO RACE VARIABLE): 46.5 ML/MIN/1.73 M^2
GLUCOSE SERPL-MCNC: 97 MG/DL (ref 70–110)
HCT VFR BLD AUTO: 18.3 % (ref 40–54)
HCT VFR BLD AUTO: 20.6 % (ref 40–54)
HCT VFR BLD AUTO: 21.2 % (ref 40–54)
HGB BLD-MCNC: 6.5 G/DL (ref 14–18)
HGB BLD-MCNC: 7.1 G/DL (ref 14–18)
HGB BLD-MCNC: 7.3 G/DL (ref 14–18)
HYPOCHROMIA BLD QL SMEAR: ABNORMAL
HYPOCHROMIA BLD QL SMEAR: ABNORMAL
IMM GRANULOCYTES # BLD AUTO: 0.1 K/UL (ref 0–0.04)
IMM GRANULOCYTES # BLD AUTO: 0.14 K/UL (ref 0–0.04)
IMM GRANULOCYTES # BLD AUTO: 0.21 K/UL (ref 0–0.04)
IMM GRANULOCYTES NFR BLD AUTO: 0.6 % (ref 0–0.5)
IMM GRANULOCYTES NFR BLD AUTO: 0.8 % (ref 0–0.5)
IMM GRANULOCYTES NFR BLD AUTO: 1 % (ref 0–0.5)
LYMPHOCYTES # BLD AUTO: 2.1 K/UL (ref 1–4.8)
LYMPHOCYTES # BLD AUTO: 2.1 K/UL (ref 1–4.8)
LYMPHOCYTES # BLD AUTO: 2.3 K/UL (ref 1–4.8)
LYMPHOCYTES NFR BLD: 11.7 % (ref 18–48)
LYMPHOCYTES NFR BLD: 13.9 % (ref 18–48)
LYMPHOCYTES NFR BLD: 9.8 % (ref 18–48)
MAGNESIUM SERPL-MCNC: 2.1 MG/DL (ref 1.6–2.6)
MCH RBC QN AUTO: 33.5 PG (ref 27–31)
MCH RBC QN AUTO: 33.8 PG (ref 27–31)
MCH RBC QN AUTO: 34.6 PG (ref 27–31)
MCHC RBC AUTO-ENTMCNC: 34.4 G/DL (ref 32–36)
MCHC RBC AUTO-ENTMCNC: 34.5 G/DL (ref 32–36)
MCHC RBC AUTO-ENTMCNC: 35.5 G/DL (ref 32–36)
MCV RBC AUTO: 97 FL (ref 82–98)
MCV RBC AUTO: 97 FL (ref 82–98)
MCV RBC AUTO: 98 FL (ref 82–98)
MONOCYTES # BLD AUTO: 1.9 K/UL (ref 0.3–1)
MONOCYTES # BLD AUTO: 2.1 K/UL (ref 0.3–1)
MONOCYTES # BLD AUTO: 2.8 K/UL (ref 0.3–1)
MONOCYTES NFR BLD: 10.9 % (ref 4–15)
MONOCYTES NFR BLD: 12.8 % (ref 4–15)
MONOCYTES NFR BLD: 13 % (ref 4–15)
NEUTROPHILS # BLD AUTO: 11.9 K/UL (ref 1.8–7.7)
NEUTROPHILS # BLD AUTO: 13.4 K/UL (ref 1.8–7.7)
NEUTROPHILS # BLD AUTO: 16.1 K/UL (ref 1.8–7.7)
NEUTROPHILS NFR BLD: 72.5 % (ref 38–73)
NEUTROPHILS NFR BLD: 76.1 % (ref 38–73)
NEUTROPHILS NFR BLD: 76.4 % (ref 38–73)
NRBC BLD-RTO: 0 /100 WBC
OVALOCYTES BLD QL SMEAR: ABNORMAL
PHOSPHATE SERPL-MCNC: 2.5 MG/DL (ref 2.7–4.5)
PLATELET # BLD AUTO: 146 K/UL (ref 150–450)
PLATELET # BLD AUTO: 147 K/UL (ref 150–450)
PLATELET # BLD AUTO: 149 K/UL (ref 150–450)
PLATELET BLD QL SMEAR: ABNORMAL
PMV BLD AUTO: 9.5 FL (ref 9.2–12.9)
PMV BLD AUTO: 9.6 FL (ref 9.2–12.9)
PMV BLD AUTO: 9.7 FL (ref 9.2–12.9)
POCT GLUCOSE: 142 MG/DL (ref 70–110)
POIKILOCYTOSIS BLD QL SMEAR: SLIGHT
POIKILOCYTOSIS BLD QL SMEAR: SLIGHT
POLYCHROMASIA BLD QL SMEAR: ABNORMAL
POLYCHROMASIA BLD QL SMEAR: ABNORMAL
POTASSIUM SERPL-SCNC: 4.1 MMOL/L (ref 3.5–5.1)
PROCALCITONIN SERPL IA-MCNC: 0.67 NG/ML
PROT SERPL-MCNC: 5.8 G/DL (ref 6–8.4)
RBC # BLD AUTO: 1.88 M/UL (ref 4.6–6.2)
RBC # BLD AUTO: 2.12 M/UL (ref 4.6–6.2)
RBC # BLD AUTO: 2.16 M/UL (ref 4.6–6.2)
SODIUM SERPL-SCNC: 134 MMOL/L (ref 136–145)
SPHEROCYTES BLD QL SMEAR: ABNORMAL
SPHEROCYTES BLD QL SMEAR: ABNORMAL
TRANS ERYTHROCYTES VOL PATIENT: NORMAL ML
WBC # BLD AUTO: 16.45 K/UL (ref 3.9–12.7)
WBC # BLD AUTO: 17.54 K/UL (ref 3.9–12.7)
WBC # BLD AUTO: 21.2 K/UL (ref 3.9–12.7)

## 2024-06-20 PROCEDURE — 80053 COMPREHEN METABOLIC PANEL: CPT | Mod: HCNC

## 2024-06-20 PROCEDURE — 85025 COMPLETE CBC W/AUTO DIFF WBC: CPT | Mod: HCNC

## 2024-06-20 PROCEDURE — 36415 COLL VENOUS BLD VENIPUNCTURE: CPT | Mod: HCNC

## 2024-06-20 PROCEDURE — P9021 RED BLOOD CELLS UNIT: HCPCS | Mod: HCNC

## 2024-06-20 PROCEDURE — 97112 NEUROMUSCULAR REEDUCATION: CPT | Mod: HCNC,CQ

## 2024-06-20 PROCEDURE — 84100 ASSAY OF PHOSPHORUS: CPT | Mod: HCNC

## 2024-06-20 PROCEDURE — 83735 ASSAY OF MAGNESIUM: CPT | Mod: HCNC

## 2024-06-20 PROCEDURE — 97530 THERAPEUTIC ACTIVITIES: CPT | Mod: HCNC

## 2024-06-20 PROCEDURE — 25000003 PHARM REV CODE 250: Mod: HCNC

## 2024-06-20 PROCEDURE — 86920 COMPATIBILITY TEST SPIN: CPT | Mod: HCNC

## 2024-06-20 PROCEDURE — 97112 NEUROMUSCULAR REEDUCATION: CPT | Mod: HCNC

## 2024-06-20 PROCEDURE — 30233N1 TRANSFUSION OF NONAUTOLOGOUS RED BLOOD CELLS INTO PERIPHERAL VEIN, PERCUTANEOUS APPROACH: ICD-10-PCS | Performed by: STUDENT IN AN ORGANIZED HEALTH CARE EDUCATION/TRAINING PROGRAM

## 2024-06-20 PROCEDURE — 36430 TRANSFUSION BLD/BLD COMPNT: CPT | Mod: HCNC

## 2024-06-20 PROCEDURE — 94761 N-INVAS EAR/PLS OXIMETRY MLT: CPT | Mod: HCNC

## 2024-06-20 PROCEDURE — 97110 THERAPEUTIC EXERCISES: CPT | Mod: HCNC,CQ

## 2024-06-20 PROCEDURE — 85025 COMPLETE CBC W/AUTO DIFF WBC: CPT | Mod: 91,HCNC

## 2024-06-20 PROCEDURE — 21400001 HC TELEMETRY ROOM: Mod: HCNC

## 2024-06-20 PROCEDURE — 84145 PROCALCITONIN (PCT): CPT | Mod: HCNC

## 2024-06-20 PROCEDURE — 82550 ASSAY OF CK (CPK): CPT | Mod: HCNC

## 2024-06-20 PROCEDURE — 63600175 PHARM REV CODE 636 W HCPCS: Mod: HCNC

## 2024-06-20 PROCEDURE — C9113 INJ PANTOPRAZOLE SODIUM, VIA: HCPCS | Mod: HCNC

## 2024-06-20 RX ORDER — PANTOPRAZOLE SODIUM 40 MG/10ML
40 INJECTION, POWDER, LYOPHILIZED, FOR SOLUTION INTRAVENOUS 2 TIMES DAILY
Status: DISCONTINUED | OUTPATIENT
Start: 2024-06-21 | End: 2024-06-23

## 2024-06-20 RX ORDER — PANTOPRAZOLE SODIUM 40 MG/10ML
80 INJECTION, POWDER, LYOPHILIZED, FOR SOLUTION INTRAVENOUS ONCE
Status: COMPLETED | OUTPATIENT
Start: 2024-06-20 | End: 2024-06-20

## 2024-06-20 RX ORDER — BALSAM PERU/CASTOR OIL
OINTMENT (GRAM) TOPICAL 2 TIMES DAILY
Status: DISCONTINUED | OUTPATIENT
Start: 2024-06-20 | End: 2024-06-25 | Stop reason: HOSPADM

## 2024-06-20 RX ORDER — HYDROCODONE BITARTRATE AND ACETAMINOPHEN 500; 5 MG/1; MG/1
TABLET ORAL
Status: DISCONTINUED | OUTPATIENT
Start: 2024-06-20 | End: 2024-06-24

## 2024-06-20 RX ADMIN — CLOPIDOGREL BISULFATE 75 MG: 75 TABLET ORAL at 09:06

## 2024-06-20 RX ADMIN — HEPARIN SODIUM 5000 UNITS: 5000 INJECTION INTRAVENOUS; SUBCUTANEOUS at 05:06

## 2024-06-20 RX ADMIN — ASPIRIN 81 MG CHEWABLE TABLET 81 MG: 81 TABLET CHEWABLE at 09:06

## 2024-06-20 RX ADMIN — PANTOPRAZOLE SODIUM 80 MG: 40 INJECTION, POWDER, FOR SOLUTION INTRAVENOUS at 07:06

## 2024-06-20 RX ADMIN — CYANOCOBALAMIN TAB 250 MCG 250 MCG: 250 TAB at 09:06

## 2024-06-20 RX ADMIN — HEPARIN SODIUM 5000 UNITS: 5000 INJECTION INTRAVENOUS; SUBCUTANEOUS at 06:06

## 2024-06-20 RX ADMIN — LACTULOSE 10 G: 20 SOLUTION ORAL at 09:06

## 2024-06-20 NOTE — ASSESSMENT & PLAN NOTE
DDX includes autonomic dysfunction, hypovolemia in setting of BP regimen, arrhythmia such as Afib, seizure, stroke. CT head w/o acute intracranial process to suggest hemorrhagic stroke. Has not missed secondary preventative medications such as Plavix/ASA. Concern for cardiac etiology given LOC while seated. Fluid resuscitated in ED with dramatic improvement in BP compared to BP noted at SNF. Seizure less likely, but unknown if patient post-ictal.     Orthostatic positive    - IVF  - ASA/plavix  - EEG  - Telemetry

## 2024-06-20 NOTE — CONSULTS
[facility] ([place] [id number])  Wound Care    Patient Name:  Dean Hahn   MRN:  [id number]  Date: 6/20/2024  Diagnosis: Syncope and collapse    History:     Past Medical History:   Diagnosis Date    Cataract     HLD (hyperlipidemia)     Hypertension        Social History     Socioeconomic History    Marital status:     Number of children: 5   Occupational History    Occupation: Former electrican    Tobacco Use    Smoking status: Never    Smokeless tobacco: Never   Substance and Sexual Activity    Alcohol use: Yes    Drug use: Never    Sexual activity: Not Currently     Partners: Female     Birth control/protection: None     Comment: wife   Social History Narrative    Orginally from    [facility], [facility] electrical - retired [employer]      - [place] - 5 years ,      Children    3 girls , 2 boys - 1 daughter oldest in [place]     Social Determinants of Health     Financial Resource Strain: Medium Risk (6/20/2024)    Overall Financial Resource Strain (CARDIA)     Difficulty of Paying Living Expenses: Somewhat hard   Food Insecurity: No Food Insecurity (6/20/2024)    Hunger Vital Sign     Worried About Running Out of Food in the Last Year: Never true     Ran Out of Food in the Last Year: Never true   Transportation Needs: No Transportation Needs (6/20/2024)    TRANSPORTATION NEEDS     Transportation : No   Physical Activity: Inactive (6/20/2024)    Exercise Vital Sign     Days of Exercise per Week: 0 days     Minutes of Exercise per Session: 0 min   Stress: Stress Concern Present (6/20/2024)    [employer] [place] [employer] - Occupational Stress Questionnaire     Feeling of Stress : To some extent   Housing Stability: Low Risk  (6/20/2024)    Housing Stability Vital Sign     Unable to Pay for Housing in the Last Year: No     Homeless in the Last Year: No       Precautions:     Allergies as of 06/18/2024    (No Known Allergies)       [facility]  Assessment Details/Treatment     Wound consult received on patient. Seen with primary RN.  Family member at bedside.  Deep tissue pressure injury noted to buttocks, scattered maroon discoloration,  unclear margins. Waffle overlay ordered. Deep tissue pressure injury noted to bilateral heels, and discoloration noted to both 1st toes unknown etiology. Heel boots ordered. Recommend venelex ointment twice a day to buttocks,  heels, and bilateral 1st toes. Skin tear noted to right elbow, recommend weekly mepilex foam dressing changes. Updated Dr. Young and Dr. Carias. Nursing  to continue care.       06/20/24 1301        Wound 06/18/24 1510 Skin Tear Right Elbow   Date First Assessed/Time First Assessed: 06/18/24 1510   Present on Original Admission: Yes  Primary Wound Type: Skin Tear  Side: Right  Location: (c) Elbow   Drainage Amount None   Drainage Characteristics/Odor No odor   Appearance Moist;Red   Tissue loss description Partial thickness   Red (%), Wound Tissue Color 100 %   Periwound Area Ecchymotic   Wound Edges Open   Wound Length (cm) 0.3 cm   Wound Width (cm) 0.3 cm   Wound Depth (cm) 0.1 cm   Wound Volume (cm^3) 0.009 cm^3   Wound Surface Area (cm^2) 0.09 cm^2   Dressing Foam        Wound 06/18/24 1510 Pressure Injury Right Heel   Date First Assessed/Time First Assessed: 06/18/24 1510   Present on Original Admission: Yes  Primary Wound Type: Pressure Injury  Side: Right  Location: Heel   Pressure Injury Stage DTPI   Drainage Amount None   Drainage Characteristics/Odor No odor   Appearance Purple;Maroon   Periwound Area Redness   Wound Edges Undefined   Wound Length (cm) 5 cm   Wound Width (cm) 5 cm   Wound Surface Area (cm^2) 25 cm^2        Wound 06/18/24 2101 Other (comment) Right Toe, first   Date First Assessed/Time First Assessed: 06/18/24 2101   Present on Original Admission: Yes  Primary Wound Type: Other (comment)  Side: Right  Location: Toe, first   Drainage Amount None   Drainage Characteristics/Odor  No odor   Appearance Purple;Maroon   Periwound Area Redness   Wound Edges Undefined   Wound Length (cm) 1 cm   Wound Width (cm) 1 cm   Wound Surface Area (cm^2) 1 cm^2        Wound 06/18/24 2103 Pressure Injury Left Heel   Date First Assessed/Time First Assessed: 06/18/24 2103   Present on Original Admission: Yes  Primary Wound Type: Pressure Injury  Side: Left  Location: Heel   Pressure Injury Stage DTPI   Drainage Amount None   Drainage Characteristics/Odor No odor   Appearance Purple;Maroon;Red   Periwound Area Redness   Wound Edges Undefined   Wound Length (cm) 5 cm   Wound Width (cm) 5 cm   Wound Surface Area (cm^2) 25 cm^2        Wound 06/20/24 1301 Pressure Injury Buttocks   Date First Assessed/Time First Assessed: 06/20/24 1301   Present on Original Admission: Yes  Primary Wound Type: Pressure Injury  Location: Buttocks   Pressure Injury Stage DTPI   Drainage Amount None   Drainage Characteristics/Odor No odor   Appearance   (scattered linear maroon discoloration)   Periwound Area Redness   Wound Edges   (unclear margins)        Wound 06/20/24 1301 Other (comment) Left Toe, first   Date First Assessed/Time First Assessed: 06/20/24 1301   Present on Original Admission: Yes  Primary Wound Type: Other (comment)  Side: Left  Location: Toe, first   Drainage Amount None   Drainage Characteristics/Odor No odor   Appearance Maroon   Periwound Area Intact   Wound Edges Undefined   Wound Length (cm) 0.5 cm   Wound Width (cm) 0.5 cm   Wound Surface Area (cm^2) 0.25 cm^2

## 2024-06-20 NOTE — SUBJECTIVE & OBJECTIVE
Interval History: NAEO, VSS, hgb with acute decrease, no obvious sign of bleeding. Will trend. Infectious workup unrevealing and patient afebrile/HDS for 48 hrs, stopped antibiotics. EEG read pending    Review of Systems   Constitutional:  Negative for chills and fever.   Respiratory:  Negative for shortness of breath.    Cardiovascular:  Negative for chest pain.   Gastrointestinal:  Negative for abdominal pain.   Genitourinary:  Negative for dysuria.   Musculoskeletal:  Negative for arthralgias.   Psychiatric/Behavioral:  Positive for decreased concentration.      Objective:     Vital Signs (Most Recent):  Temp: 99 °F (37.2 °C) (06/20/24 1005)  Pulse: 90 (06/20/24 1107)  Resp: 17 (06/20/24 1005)  BP: (!) 99/51 (06/20/24 1005)  SpO2: 99 % (06/20/24 1005) Vital Signs (24h Range):  Temp:  [98 °F (36.7 °C)-99.7 °F (37.6 °C)] 99 °F (37.2 °C)  Pulse:  [72-92] 90  Resp:  [16-17] 17  SpO2:  [99 %-100 %] 99 %  BP: ()/(51-70) 99/51     Weight: 49.9 kg (110 lb)  Body mass index is 19.49 kg/m².    Intake/Output Summary (Last 24 hours) at 6/20/2024 1353  Last data filed at 6/20/2024 1000  Gross per 24 hour   Intake 240 ml   Output 650 ml   Net -410 ml         Physical Exam  Constitutional:       Comments: Thin appearing, drowsy requiring tactile and verbal stimulation to wake up   HENT:      Head: Normocephalic and atraumatic.      Mouth/Throat:      Mouth: Mucous membranes are dry.      Pharynx: Oropharynx is clear.   Eyes:      Extraocular Movements: Extraocular movements intact.   Cardiovascular:      Rate and Rhythm: Normal rate and regular rhythm.      Pulses: Normal pulses.      Heart sounds: Normal heart sounds.   Pulmonary:      Effort: Pulmonary effort is normal.      Breath sounds: Normal breath sounds.   Abdominal:      General: Abdomen is flat.      Palpations: Abdomen is soft.      Comments: Slight wincing to palpation, but denied pain    Musculoskeletal:         General: Tenderness present. No swelling.       Comments: Bilateral lower extremities highly tender to light palpation. No erythema, no wounds noted.    Skin:     General: Skin is warm and dry.      Findings: No bruising.   Neurological:      Comments: AO to self  Raspy voice, some dysarthria   Psychiatric:      Comments: Decreased concentration. No agitation.             Significant Labs: All pertinent labs within the past 24 hours have been reviewed.    Significant Imaging: I have reviewed all pertinent imaging results/findings within the past 24 hours.

## 2024-06-20 NOTE — PT/OT/SLP PROGRESS
Occupational Therapy   Co-Treatment    Name: Dean Hahn  MRN: [id number]  Admitting Diagnosis:  Syncope and collapse       Recommendations:     Discharge Recommendations: Moderate Intensity Therapy  Discharge Equipment Recommendations:  grab bar, bedside commode  Barriers to discharge:  Other (Comment) (increased skilled assist required)    Assessment:     Dean Hahn is a 83 y.o. male with a medical diagnosis of Syncope and collapse.  He presents with the following performance deficits affecting function: weakness, impaired endurance, impaired self care skills, impaired functional mobility, gait instability, impaired balance, visual deficits, impaired cognition, decreased coordination, decreased upper extremity function, decreased lower extremity function, decreased safety awareness, impaired fine motor, decreased ROM. Pt agreeable to session. Pt demonstrating decreased command following and is limited in optimal participation secondary to cognitive deficits. Pt BP monitored throughout session: 102/54 in supine, 92/51 EOB, and 98/57 after standing. Pt would continue to benefit from skilled OT services in the acute care setting to improve activity tolerance and functional endurance, increase participation in self-care routines, and promote functional independence needed to return to PLOF and least restrictive home environment.     Rehab Prognosis:  Good; patient would benefit from acute skilled OT services to address these deficits and reach maximum level of function.       Plan:     Patient to be seen 4 x/week to address the above listed problems via self-care/home management, therapeutic activities, therapeutic exercises, neuromuscular re-education  Plan of Care Expires: 07/19/24  Plan of Care Reviewed with: patient    Subjective     Chief Complaint: none reported  Patient/Family Comments/goals: Pt thanking therapy staff upon end of session.   Pain/Comfort:  Pain Rating 1:  0/10    Objective:     Communicated with: Nursing prior to session.  Patient found supine with bed alarm, peripheral IV, Condom Catheter, EEG, telemetry upon OT entry to room.    General Precautions: Standard, fall, vision impaired, hearing impaired    Orthopedic Precautions:N/A  Braces: N/A  Respiratory Status: Room air     Occupational Performance:     Bed Mobility:    Patient completed Scooting/Bridging with maximal assistance and 2 persons  Patient completed Supine to Sit with maximal assistance and 2 persons  Patient completed Sit to Supine with maximal assistance and 2 persons     Functional Mobility/Transfers:  Patient completed Sit <> Stand Transfer with maximal assistance and of 2 persons  with  hand-held assist   Functional Mobility: Pt able to tolerate sitting EOB for ~15 minutes with moderate assist provided during BLE TE secondary to posterior lean and CGA provided during PROM of BUE. Pt able to complete static stand for ~1 minute before returning to sitting with max assist x2 and HHA provided.     Activities of Daily Living:  Toileting: dependence condom catheter      AMPAC 6 Click ADL: 9    Treatment & Education:  PROM provided for BUE (shoulder flexion/extension, elbow flexion/extension, wrist flexion/extension, pronation/supination) x10 repetitions for each plane. Pt unable to demonstrate gross  when cued; however, pt demonstrating ability to perform gross grasp with reaching/grasping for writing OT gait belt during EOB sitting.    Co-treatment performed due to patient's multiple deficits requiring two skilled therapists to appropriately and safely assess patient's strength, endurance, functional mobility, and ADL performance while facilitating functional tasks in addition to accommodating for patient's activity tolerance and medical acuity.      Patient left supine with all lines intact and call button in reach    GOALS:   Multidisciplinary Problems       Occupational Therapy Goals           Problem: Occupational Therapy    Goal Priority Disciplines Outcome Interventions   Occupational Therapy Goal     OT, PT/OT Progressing    Description: Goals to be met by: 6/26/2024     Patient will increase functional independence with ADLs by performing:    UE Dressing with Moderate Assistance.  LE Dressing with Maximum Assistance.  Grooming while seated with Moderate Assistance.  Toileting from bedside commode with Moderate Assistance for hygiene and clothing management.   Sitting at edge of bed x10 minutes with Minimal Assistance.  Toilet transfer to bedside commode with Moderate Assistance.                         Time Tracking:     OT Date of Treatment: 06/20/24  OT Start Time: 1033  OT Stop Time: 1056  OT Total Time (min): 23 min    Billable Minutes:Therapeutic Activity 12 minutes  Neuromuscular Re-education 11 minutes    OT/ELLYN: OT          6/20/2024

## 2024-06-20 NOTE — PT/OT/SLP PROGRESS
Physical Therapy Treatment  Co-treatment with OT due to acuity of condition, level of skilled assist needed for assessment of safety with mobility and potential of not tolerating a second treatment session.     Patient Name:  Dean Hahn   MRN:  [id number]    Recommendations:     Discharge Recommendations: Moderate Intensity Therapy  Discharge Equipment Recommendations: lift device, wheelchair, hospital bed  Barriers to discharge:  current level of assistance required     Assessment:     Dean Hahn is a 83 y.o. male admitted with a medical diagnosis of Syncope and collapse.  He presents with the following impairments/functional limitations: weakness, impaired endurance, impaired self care skills, impaired functional mobility, impaired balance, decreased safety awareness, decreased lower extremity function, decreased upper extremity function, impaired cardiopulmonary response to activity Pt tolerated treatment session well today. Pt requiring maximal assistance for bed mobility, and transfers. Pt BP monitored and recorded throughout session, and as follows supine( 102/54), EOB (92/51), after stand (98/57). Patient remains appropriate for continued skilled services within the acute environment and goals remain appropriate.   .    Rehab Prognosis: Good; patient would benefit from acute skilled PT services to address these deficits and reach maximum level of function.    Recent Surgery: * No surgery found *      Plan:     During this hospitalization, patient to be seen 4 x/week to address the identified rehab impairments via gait training, therapeutic activities, therapeutic exercises, neuromuscular re-education and progress toward the following goals:    Plan of Care Expires:  07/19/24    Subjective     Chief Complaint: None stated   Patient/Family Comments/goals: Pt agreeable to PT  Pain/Comfort:  Pain Rating 1: 0/10      Objective:     Communicated with Rn prior to session.  Patient found  supine with Condom Catheter, EEG, telemetry upon PT entry to room.     General Precautions: Standard, fall, hearing impaired  Orthopedic Precautions: N/A  Braces: N/A  Respiratory Status: Room air     Functional Mobility:  Bed Mobility:     Supine to Sit: maximal assistance and of 2 persons  Pt sat EOB ~ 10-15 minutes initially modA due to posterior and R lateral lean, yet progressed to CGA  Pt requiring verbal and tactile cues for upright sitting posture   Sit to Supine: maximal assistance and of 2 persons    Transfers:     Sit to Stand:  maximal assistance and of 2 persons with hand-held assist  B foot and knee block for safety   Pt requiring verbal and tactile cueing for upright standing posture and hip extension     PROM x 5 repetitions of seated B LE exercises consisting of: Marching, LAQ, ABD/ADD, and 30 second hold B HS stretch           AM-PAC 6 CLICK MOBILITY  Turning over in bed (including adjusting bedclothes, sheets and blankets)?: 2  Sitting down on and standing up from a chair with arms (e.g., wheelchair, bedside commode, etc.): 2  Moving from lying on back to sitting on the side of the bed?: 2  Moving to and from a bed to a chair (including a wheelchair)?: 2  Need to walk in hospital room?: 1  Climbing 3-5 steps with a railing?: 1  Basic Mobility Total Score: 10       Treatment & Education:  Therapist provided instruction and educated for safety during bed mobility, and transfers. As well as proper body mechanics, energy conservation, and fall prevention strategies during tasks listed above, and the effects of prolonged immobility and the importance of performing EOB/OOB activity and exercises to promote healing and reduce recovery time.       Patient left supine with all lines intact, call button in reach, and Rn notified..    GOALS:   Multidisciplinary Problems       Physical Therapy Goals          Problem: Physical Therapy    Goal Priority Disciplines Outcome Goal Variances Interventions   Physical  Therapy Goal     PT, PT/OT Progressing     Description: Goals to be met by:      Patient will increase functional independence with mobility by performin. Supine to sit with Moderate Assistance  2. Sit to supine with Moderate Assistance  3. Sit to stand transfer with Maximum Assistance  4. Bed to chair transfer with Maximum Assistance using LRAD  5. Gait  x 10 feet with Moderate Assistance using LRAD.                          Time Tracking:     PT Received On: 24  PT Start Time: 1032     PT Stop Time: 1056  PT Total Time (min): 24 min     Billable Minutes: Therapeutic Exercise 12 and Neuromuscular Re-education 12    Treatment Type: Treatment  PT/PTA: PTA     Number of PTA visits since last PT visit: 2024

## 2024-06-20 NOTE — PLAN OF CARE
Problem: Adult Inpatient Plan of Care  Goal: Plan of Care Review  Outcome: Progressing  Goal: Patient-Specific Goal (Individualized)  Outcome: Progressing  Goal: Absence of Hospital-Acquired Illness or Injury  Outcome: Progressing  Goal: Optimal Comfort and Wellbeing  Outcome: Progressing  Goal: Readiness for Transition of Care  Outcome: Progressing     Problem: Sepsis/Septic Shock  Goal: Optimal Coping  Outcome: Progressing  Goal: Absence of Bleeding  Outcome: Progressing  Goal: Blood Glucose Level Within Targeted Range  Outcome: Progressing  Goal: Absence of Infection Signs and Symptoms  Outcome: Progressing  Goal: Optimal Nutrition Intake  Outcome: Progressing     Problem: Wound  Goal: Optimal Coping  Outcome: Progressing  Goal: Optimal Functional Ability  Outcome: Progressing  Goal: Absence of Infection Signs and Symptoms  Outcome: Progressing  Goal: Improved Oral Intake  Outcome: Progressing  Goal: Optimal Pain Control and Function  Outcome: Progressing  Goal: Skin Health and Integrity  Outcome: Progressing  Goal: Optimal Wound Healing  Outcome: Progressing     Problem: Skin Injury Risk Increased  Goal: Skin Health and Integrity  Outcome: Progressing   Fall precaution maintained this shift call bell in reach. Bed in low position. Instructed pt to call for assistance. No acute distress noted over night. Vs stable. All concerns addressed and answered.

## 2024-06-20 NOTE — ASSESSMENT & PLAN NOTE
Chronic, controlled. Latest blood pressure and vitals reviewed-     Temp:  [98 °F (36.7 °C)-99.7 °F (37.6 °C)]   Pulse:  [72-92]   Resp:  [16-17]   BP: ()/(51-70)   SpO2:  [99 %-100 %] .   Home meds for hypertension were reviewed and noted below.   Hypertension Medications               metoprolol succinate (TOPROL-XL) 25 MG 24 hr tablet TAKE 1 TABLET BY MOUTH EVERY DAY IN THE EVENING    olmesartan (BENICAR) 20 MG tablet Take 1 tablet (20 mg total) by mouth once daily.    spironolactone (ALDACTONE) 25 MG tablet Take 1 tablet (25 mg total) by mouth once daily.            While in the hospital, will manage blood pressure as follows; Adjust home antihypertensive regimen as follows- Hold while ruling out sepsis    Will utilize p.r.n. blood pressure medication only if patient's blood pressure greater than 220/110 and he develops symptoms such as worsening chest pain or shortness of breath.

## 2024-06-20 NOTE — PLAN OF CARE
CHW provided resources to patient:    Home Energy Assistance by: [facility]

## 2024-06-20 NOTE — ASSESSMENT & PLAN NOTE
Creatine stable for now. BMP reviewed- noted Estimated Creatinine Clearance: 27.3 mL/min (A) (based on SCr of 1.5 mg/dL (H)). according to latest data. Based on current GFR, CKD stage is stage 4 - GFR 15-29.  Monitor UOP and serial BMP and adjust therapy as needed. Renally dose meds. Avoid nephrotoxic medications and procedures.

## 2024-06-20 NOTE — ASSESSMENT & PLAN NOTE
Patient's anemia is currently controlled. Has not received any PRBCs to date. Etiology likely d/t chronic disease due to Chronic liver disease and B12 deficiency  Current CBC reviewed-   Lab Results   Component Value Date    HGB 7.3 (L) 06/20/2024    HCT 21.2 (L) 06/20/2024     Monitor serial CBC and transfuse if patient becomes hemodynamically unstable, symptomatic or H/H drops below 7/21.  Hgb dropped to 7.1-->7.3 on recheck  No obvious signs of bleed    -CBC Q daily for now  -cautiously continue asa/plavix  -monitor for bleeding

## 2024-06-20 NOTE — PLAN OF CARE
[facility] - Med Surg ([facility])  Initial Discharge Assessment       Primary Care Provider: Angel Luis Tobias III, MD    Admission Diagnosis: LOC (loss of consciousness) [R40.20]  Chest pain [R07.9]    Admission Date: 6/18/2024  Expected Discharge Date: 6/21/2024    Transition of Care Barriers: (P) Mobility    Payor: HUMANA MANAGED MEDICARE / Plan: HUMANA MEDICARE SELECT PARTNER / Product Type: Medicare Advantage /     Extended Emergency Contact Information  Primary Emergency Contact: RhinecliffPati calvo  Address: [address] [place] [address] [facility]  Home Phone: [phone]  Mobile Phone: [phone]  Relation: Spouse  Secondary Emergency Contact: JoelFide  Address: [address] [place] [address] [place]  Mobile Phone: [phone]  Relation: Friend    Discharge Plan A: (P) Skilled Nursing Facility  Discharge Plan B: (P) Home Health, Home with family      [id number] - [place], [place] - [address] [facility]  [address] [facility]  [place] [address]  Phone: [phone] Fax: [phone]      Initial Assessment (most recent)       Adult Discharge Assessment - 06/20/24 1325          Discharge Assessment    Assessment Type Discharge Planning Assessment (P)      Confirmed/corrected address, phone number and insurance Yes (P)      Confirmed Demographics Correct on Facesheet (P)      Source of Information family (P)      If unable to respond/provide information was family/caregiver contacted? Yes (P)      Contact Name/Number Pati Hahn (Spouse)  [phone] (P)      Communicated LAMINE with patient/caregiver Yes (P)      Reason For Admission became unresponsive during PT session (P)      People in Home spouse (P)      Facility Arrived From: [facility] (P)      Do you have help at home or someone to help you manage your care at home? Yes (P)      Who are your caregiver(s) and their phone number(s)? Ptai Hahn (Spouse)   [phone] (P)      Current cognitive status: Unable to Assess (P)      Walking or Climbing Stairs Difficulty yes (P)      Walking or Climbing Stairs ambulation difficulty, requires equipment;stair climbing difficulty, requires equipment;transferring difficulty, assistance 1 person;transferring difficulty, requires equipment (P)      Dressing/Bathing Difficulty yes (P)      Dressing/Bathing bathing difficulty, requires equipment (P)      Dressing/Bathing Management walker, wc (P)      Home Accessibility wheelchair accessible (P)      Home Layout Able to live on 1st floor (P)      Equipment Currently Used at Home walker, rolling (P)      Readmission within 30 days? Yes (P)      Patient currently being followed by outpatient case management? No (P)      Do you take prescription medications? Yes (P)      Do you have prescription coverage? Yes (P)      Coverage HUMANA MANAGED MEDICARE - HUMANA MEDICARE SELECT PARTNER - CAPITATED (P)      Do you have any problems affording any of your prescribed medications? No (P)      Is the patient taking medications as prescribed? yes (P)      Who is going to help you get home at discharge? Pati Hahn (Spouse)  [phone] (P)      How do you get to doctors appointments? family or friend will provide (P)      Are you on dialysis? No (P)      Do you take coumadin? No (P)      Discharge Plan A Skilled Nursing Facility (P)      Discharge Plan B Home Health;Home with family (P)      DME Needed Upon Discharge  none (P)      Discharge Plan discussed with: Spouse/sig other (P)      Name(s) and Number(s) Pati Hahn (Spouse)  [phone] (P)      Transition of Care Barriers Mobility (P)         Physical Activity    On average, how many days per week do you engage in moderate to strenuous exercise (like a brisk walk)? 0 days (P)      On average, how many minutes do you engage in exercise at this level? 0 min (P)         Financial Resource Strain    How hard is it for you  to pay for the very basics like food, housing, medical care, and heating? Somewhat hard (P)         Housing Stability    In the last 12 months, was there a time when you were not able to pay the mortgage or rent on time? No (P)      At any time in the past 12 months, were you homeless or living in a shelter (including now)? No (P)         Transportation Needs    Has the lack of transportation kept you from medical appointments, meetings, work or from getting things needed for daily living? No (P)         Food Insecurity    Within the past 12 months, you worried that your food would run out before you got the money to buy more. Never true (P)      Within the past 12 months, the food you bought just didn't last and you didn't have money to get more. Never true (P)         Stress    Do you feel stress - tense, restless, nervous, or anxious, or unable to sleep at night because your mind is troubled all the time - these days? To some extent (P)         Social Isolation    How often do you feel lonely or isolated from those around you?  Never (P)         Alcohol Use    Q1: How often do you have a drink containing alcohol? Never (P)      Q2: How many drinks containing alcohol do you have on a typical day when you are drinking? Patient does not drink (P)      Q3: How often do you have six or more drinks on one occasion? Never (P)         Utilities    In the past 12 months has the electric, gas, oil, or water company threatened to shut off services in your home? Yes (P)         Health Literacy    How often do you need to have someone help you when you read instructions, pamphlets, or other written material from your doctor or pharmacy? Sometimes (P)         OTHER    Name(s) of People in Home Pati Hahn (Spouse)  [phone] (P)                      Readmission Assessment (most recent)       Readmission Assessment - 06/20/24 1324          Readmission    Was this a planned readmission? No     Why were you  "hospitalized in the last 30 days? stroke-related     Why were you readmitted? Related to previous admission     When you left the hospital how did you feel? "he wasn't ready to be discharged"     When you left the hospital where did you go? SNF     Did patient/caregiver refused recommended DC plan? No     Tell me about what happened between when you left the hospital and the day you returned. Dc's to [facility], readmitted due to became unresponsive during PT session     Did you have  a follow-up appointment on discharge? Yes     Did you go? Yes                 Discharge Plan A and Plan B have been determined by review of patient's clinical status, future medical and therapeutic needs, and coverage/benefits for post-acute care in coordination with multidisciplinary team members.                     SIDDHARTHA Amin, LMSW  [facility]  Case Management  Ext. [phone]           "

## 2024-06-20 NOTE — CARE UPDATE
Lab Results   Component Value Date    WBC 21.20 (H) 06/20/2024    HGB 6.5 (L) 06/20/2024    HCT 18.3 (LL) 06/20/2024    HCT 34.2 (L) 06/11/2024     (L) 06/20/2024    GRAN 16.1 (H) 06/20/2024    GRAN 76.1 (H) 06/20/2024        Called concerning Hbg 6.5 w/ MCV of 97.  Wife and nursing has not noticed a hematuria, hematemesis, melena, and hematochezia.    Patient with a normocytic anemia now hemoglobin less than 7, consent was taken.    #anemia   will transfuse 1 unit of blood.  - follow up repeat CBC     #LLE pain  Wife noticed that he has been complaining of left lower extremity pain.  Patient was grimacing on palpation.  - follow up lower extremity ultrasound              Son DO Sasha   Internal Medicine PGY-1  [facility]

## 2024-06-20 NOTE — ASSESSMENT & PLAN NOTE
This patient does not have evidence of infective focus  My overall impression is  possible sepsis, tachycardic and leukocytosis .  Source: Urinary Tract and Abdominal  Antibiotics given-   Antibiotics (72h ago, onward)      None          Latest lactate reviewed-  Recent Labs   Lab 06/18/24 1958   LACTATE 1.8       Organ dysfunction indicated by Encephalopathy    Fluid challenge: 2L for approx 30cc/kg    Post- resuscitation assessment No - Post resuscitation assessment not needed       Will Not start Pressors- Levophed for MAP of 65  Source control achieved by: CTX 2g q24h    Procal stable, may be elevated 2.2 cirrhosis  Infectious workup unremarkable  HDS and AF x 48 hrs    -Discontinuing CTX  -continue to monitor

## 2024-06-20 NOTE — PROGRESS NOTES
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 History primarily obtained via spouse and chart review. [facility] who confirmed he became unresponsive while seated during PT session. Nursing attempted sternal rub, he did not respond. He became cyanotic and apneic for approx 10 seconds with a thready pulse. Nursing staff went to place in trendelenburg and almost called a code until he rapidly regained consciousness. Initial SBP in the field was in the 50s. He was hypoxic and placed on 15L non-rebreather. Improvement on arrival to [facility], stable on RA with improvement in MAPs. Pt reports feeling cold otherwise reports feeling "fine." He is conversing minimally which is near his baseline per family at bedside. He denies memory of the event. He denies fevers, chest pain, cough, SOB, abdominal pain, difficulty urinating/dysuria. Per the patient's wife, prior to stroke 6/10, patient ambulatory and seems to have waxing/waning mentation (Aox3 but sometimes confused). Previously noted that patient dependent in ADLs except feeding himself.     In ED, patient normotensive, not tachycardic, afebrile, and on RA. Did require tactile stimulation to participate in interview, minimally conversant upon awakening. CBC with leukocytosis (WBC 16), stable anemia (Hb 11). CMP without electrolyte derangements, Cr " 1.6 (baseline appears 1.4-1.6), AST//112, T bili 3.1 (LFTs stable compared to 6/17). LA 3.5 -> 2.7 following 1L LR. Modest troponin increase to 0.71. CT head with no acute intracranial process. EKG w/o Afib or ST elevation/depression, however noted possible PVC and fusion complexes. Received Vanc/Zosyn in ED. VBG: pH: 7.435, pco2: 22.9, Beecf: -9 HCO3: 15.3    Overview/Hospital Course:  Ad[facility] to [facility] for syncopal episode a/w brief hypoxia, apnea, and hypotension. Patient arrived to ED with slight tachypnea and found to have leukocytosis and slightly elevated LA, so received fluids and CTX per sepsis protocol. LA normalized w/ fluids. Ct head NAICP. CXR NAIPP. Performing EEG to assess for seizure. Telemetry to detect arrhythmia.  Infectious workup unrevealing and patient afebrile/HDS for 48 hrs, stopped antibiotics.    Interval History: NAEO, VSS, hgb with acute decrease, no obvious sign of bleeding. Will trend. Infectious workup unrevealing and patient afebrile/HDS for 48 hrs, stopped antibiotics. EEG read pending    Review of Systems   Constitutional:  Negative for chills and fever.   Respiratory:  Negative for shortness of breath.    Cardiovascular:  Negative for chest pain.   Gastrointestinal:  Negative for abdominal pain.   Genitourinary:  Negative for dysuria.   Musculoskeletal:  Negative for arthralgias.   Psychiatric/Behavioral:  Positive for decreased concentration.      Objective:     Vital Signs (Most Recent):  Temp: 99 °F (37.2 °C) (06/20/24 1005)  Pulse: 90 (06/20/24 1107)  Resp: 17 (06/20/24 1005)  BP: (!) 99/51 (06/20/24 1005)  SpO2: 99 % (06/20/24 1005) Vital Signs (24h Range):  Temp:  [98 °F (36.7 °C)-99.7 °F (37.6 °C)] 99 °F (37.2 °C)  Pulse:  [72-92] 90  Resp:  [16-17] 17  SpO2:  [99 %-100 %] 99 %  BP: ()/(51-70) 99/51     Weight: 49.9 kg (110 lb)  Body mass index is 19.49 kg/m².    Intake/Output Summary (Last 24 hours) at 6/20/2024 1353  Last data filed at 6/20/2024 1000  Gross  per 24 hour   Intake 240 ml   Output 650 ml   Net -410 ml         Physical Exam  Constitutional:       Comments: Thin appearing, drowsy requiring tactile and verbal stimulation to wake up   HENT:      Head: Normocephalic and atraumatic.      Mouth/Throat:      Mouth: Mucous membranes are dry.      Pharynx: Oropharynx is clear.   Eyes:      Extraocular Movements: Extraocular movements intact.   Cardiovascular:      Rate and Rhythm: Normal rate and regular rhythm.      Pulses: Normal pulses.      Heart sounds: Normal heart sounds.   Pulmonary:      Effort: Pulmonary effort is normal.      Breath sounds: Normal breath sounds.   Abdominal:      General: Abdomen is flat.      Palpations: Abdomen is soft.      Comments: Slight wincing to palpation, but denied pain    Musculoskeletal:         General: Tenderness present. No swelling.      Comments: Bilateral lower extremities highly tender to light palpation. No erythema, no wounds noted.    Skin:     General: Skin is warm and dry.      Findings: No bruising.   Neurological:      Comments: AO to self  Raspy voice, some dysarthria   Psychiatric:      Comments: Decreased concentration. No agitation.             Significant Labs: All pertinent labs within the past 24 hours have been reviewed.    Significant Imaging: I have reviewed all pertinent imaging results/findings within the past 24 hours.    Assessment/Plan:      * Syncope and collapse  DDX includes autonomic dysfunction, hypovolemia in setting of BP regimen, arrhythmia such as Afib, seizure, stroke. CT head w/o acute intracranial process to suggest hemorrhagic stroke. Has not missed secondary preventative medications such as Plavix/ASA. Concern for cardiac etiology given LOC while seated. Fluid resuscitated in ED with dramatic improvement in BP compared to BP noted at SNF. Seizure less likely, but unknown if patient post-ictal.     Orthostatic positive    - IVF  - ASA/plavix  - EEG  - Telemetry    Macrocytic  anemia  Patient's anemia is currently controlled. Has not received any PRBCs to date. Etiology likely d/t chronic disease due to Chronic liver disease and B12 deficiency  Current CBC reviewed-   Lab Results   Component Value Date    HGB 7.3 (L) 06/20/2024    HCT 21.2 (L) 06/20/2024     Monitor serial CBC and transfuse if patient becomes hemodynamically unstable, symptomatic or H/H drops below 7/21.  Hgb dropped to 7.1-->7.3 on recheck  No obvious signs of bleed    -CBC Q daily for now  -cautiously continue asa/plavix  -monitor for bleeding    Severe sepsis  This patient does not have evidence of infective focus  My overall impression is  possible sepsis, tachycardic and leukocytosis .  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     Aggressive risk factor modification: HTN     Rehab efforts: The patient has been evaluated by a stroke team provider and the therapy needs have been fully considered based off the presenting complaints and exam findings.  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 Co-morbidities are present and inclusive of malnutrition.  MELD-Na score calculated; MELD 3.0: 19 at 6/12/2024  2:21 AM  MELD-Na: 17 at 6/12/2024  2:21 AM  Calculated from:  Serum Creatinine: 1.8 mg/dL at 6/12/2024  2:21 AM  Serum Sodium: 138 mmol/L (Using max of 137 mmol/L) at 6/12/2024  2:21 AM  Total Bilirubin: 2.2 mg/dL at 6/12/2024  2:21 AM  Serum Albumin: 2.4 g/dL at 6/12/2024  2:21 AM  INR(ratio): 1.2 at 6/10/2024  8:21 AM  Age at listing (hypothetical): 83 years  Sex: Male at 6/12/2024  2:21 AM      Continue chronic meds. 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 Will avoid any hepatotoxic meds, and monitor CBC/CMP/INR for synthetic function.     Stage 3a chronic kidney disease  Creatine stable for now. BMP reviewed- noted Estimated Creatinine Clearance: 27.3 mL/min (A) (based on SCr of 1.5 mg/dL (H)). according to latest data. Based on current GFR, CKD stage is stage 4 - GFR 15-29.  Monitor UOP and serial BMP and adjust therapy as needed. Renally dose meds. Avoid nephrotoxic medications and procedures.    Prediabetes  Will recheck A1c, LDSSI if needed, hold for now to avoid hypoglycemia    Pure hypercholesterolemia  Can continue given recent stroke and stabilization of LFTs during last admission while on statin.    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 Follows with Hepatology as OP.     Essential hypertension  Chronic, controlled. Latest blood pressure and vitals reviewed-     Temp:  [98 °F (36.7 °C)-99.7 °F (37.6 °C)]   Pulse:  [72-92]   Resp:  [16-17]   BP: ()/(51-70)   SpO2:   [99 %-100 %] .   Home meds for hypertension were reviewed and noted below.   Hypertension Medications               metoprolol succinate (TOPROL-XL) 25 MG 24 hr tablet TAKE 1 TABLET BY MOUTH EVERY DAY IN THE EVENING    olmesartan (BENICAR) 20 MG tablet Take 1 tablet (20 mg total) by mouth once daily.    spironolactone (ALDACTONE) 25 MG tablet Take 1 tablet (25 mg total) by mouth once daily.            While in the hospital, will manage blood pressure as follows; Adjust home antihypertensive regimen as follows- Hold while ruling out sepsis    Will utilize p.r.n. blood pressure medication only if patient's blood pressure greater than 220/110 and he develops symptoms such as worsening chest pain or shortness of breath.      VTE Risk Mitigation (From admission, onward)           Ordered     heparin (porcine) injection 5,000 Units  Every 8 hours         06/18/24 1809     IP VTE HIGH RISK PATIENT  Once         06/18/24 1809     Place sequential compression device  Until discontinued         06/18/24 1809                    Discharge Planning   LAMINE: 6/21/2024     Code Status: Full Code   Is the patient medically ready for discharge?:     Reason for patient still in hospital (select all that apply): Patient trending condition  Discharge Plan A: Skilled Nursing Facility            Brian Carias MD  Department of Hospital Medicine   [facility] - Med Surg ([place]

## 2024-06-21 PROBLEM — I73.9 PAD (PERIPHERAL ARTERY DISEASE): Status: ACTIVE | Noted: 2024-06-21

## 2024-06-21 PROBLEM — M79.81 NONTRAUMATIC PSOAS HEMATOMA: Status: ACTIVE | Noted: 2024-06-21

## 2024-06-21 PROBLEM — K68.3 RETROPERITONEAL HEMATOMA: Status: ACTIVE | Noted: 2024-06-21

## 2024-06-21 LAB
ALBUMIN SERPL BCP-MCNC: 1.9 G/DL (ref 3.5–5.2)
ALP SERPL-CCNC: 146 U/L (ref 55–135)
ALT SERPL W/O P-5'-P-CCNC: 96 U/L (ref 10–44)
ANION GAP SERPL CALC-SCNC: 8 MMOL/L (ref 8–16)
ANISOCYTOSIS BLD QL SMEAR: SLIGHT
ANISOCYTOSIS BLD QL SMEAR: SLIGHT
AST SERPL-CCNC: 156 U/L (ref 10–40)
BASOPHILS # BLD AUTO: 0.02 K/UL (ref 0–0.2)
BASOPHILS # BLD AUTO: 0.03 K/UL (ref 0–0.2)
BASOPHILS # BLD AUTO: 0.04 K/UL (ref 0–0.2)
BASOPHILS # BLD AUTO: ABNORMAL K/UL (ref 0–0.2)
BASOPHILS NFR BLD: 0 % (ref 0–1.9)
BASOPHILS NFR BLD: 0.1 % (ref 0–1.9)
BASOPHILS NFR BLD: 0.1 % (ref 0–1.9)
BASOPHILS NFR BLD: 0.2 % (ref 0–1.9)
BILIRUB DIRECT SERPL-MCNC: 1.4 MG/DL (ref 0.1–0.3)
BILIRUB SERPL-MCNC: 3.6 MG/DL (ref 0.1–1)
BLD PROD TYP BPU: NORMAL
BLOOD UNIT EXPIRATION DATE: NORMAL
BLOOD UNIT TYPE CODE: 7300
BLOOD UNIT TYPE: NORMAL
BUN SERPL-MCNC: 50 MG/DL (ref 8–23)
BURR CELLS BLD QL SMEAR: ABNORMAL
BURR CELLS BLD QL SMEAR: ABNORMAL
CALCIUM SERPL-MCNC: 8.7 MG/DL (ref 8.7–10.5)
CHLORIDE SERPL-SCNC: 108 MMOL/L (ref 95–110)
CO2 SERPL-SCNC: 16 MMOL/L (ref 23–29)
CODING SYSTEM: NORMAL
CREAT SERPL-MCNC: 1.5 MG/DL (ref 0.5–1.4)
CROSSMATCH INTERPRETATION: NORMAL
D DIMER PPP IA.FEU-MCNC: 1.07 MG/L FEU
DAT IGG-SP REAG RBC-IMP: NORMAL
DIFFERENTIAL METHOD BLD: ABNORMAL
DISPENSE STATUS: NORMAL
DOHLE BOD BLD QL SMEAR: PRESENT
DOHLE BOD BLD QL SMEAR: PRESENT
EOSINOPHIL # BLD AUTO: 0 K/UL (ref 0–0.5)
EOSINOPHIL # BLD AUTO: ABNORMAL K/UL (ref 0–0.5)
EOSINOPHIL NFR BLD: 0 % (ref 0–8)
ERYTHROCYTE [DISTWIDTH] IN BLOOD BY AUTOMATED COUNT: 15.8 % (ref 11.5–14.5)
ERYTHROCYTE [DISTWIDTH] IN BLOOD BY AUTOMATED COUNT: 16.2 % (ref 11.5–14.5)
ERYTHROCYTE [DISTWIDTH] IN BLOOD BY AUTOMATED COUNT: 16.3 % (ref 11.5–14.5)
ERYTHROCYTE [DISTWIDTH] IN BLOOD BY AUTOMATED COUNT: 17.4 % (ref 11.5–14.5)
EST. GFR  (NO RACE VARIABLE): 46.5 ML/MIN/1.73 M^2
FIBRINOGEN PPP-MCNC: 287 MG/DL (ref 182–400)
GIANT PLATELETS BLD QL SMEAR: PRESENT
GLUCOSE SERPL-MCNC: 146 MG/DL (ref 70–110)
HAPTOGLOB SERPL-MCNC: 92 MG/DL (ref 30–250)
HCT VFR BLD AUTO: 23.8 % (ref 40–54)
HCT VFR BLD AUTO: 24.1 % (ref 40–54)
HCT VFR BLD AUTO: 24.1 % (ref 40–54)
HCT VFR BLD AUTO: 28.4 % (ref 40–54)
HGB BLD-MCNC: 8.1 G/DL (ref 14–18)
HGB BLD-MCNC: 8.2 G/DL (ref 14–18)
HGB BLD-MCNC: 8.2 G/DL (ref 14–18)
HGB BLD-MCNC: 9.7 G/DL (ref 14–18)
HYPOCHROMIA BLD QL SMEAR: ABNORMAL
IMM GRANULOCYTES # BLD AUTO: 0.22 K/UL (ref 0–0.04)
IMM GRANULOCYTES # BLD AUTO: 0.23 K/UL (ref 0–0.04)
IMM GRANULOCYTES # BLD AUTO: 0.26 K/UL (ref 0–0.04)
IMM GRANULOCYTES # BLD AUTO: ABNORMAL K/UL (ref 0–0.04)
IMM GRANULOCYTES NFR BLD AUTO: 1 % (ref 0–0.5)
IMM GRANULOCYTES NFR BLD AUTO: 1 % (ref 0–0.5)
IMM GRANULOCYTES NFR BLD AUTO: 1.1 % (ref 0–0.5)
IMM GRANULOCYTES NFR BLD AUTO: ABNORMAL % (ref 0–0.5)
INR PPP: 1.2 (ref 0.8–1.2)
LDH SERPL L TO P-CCNC: 388 U/L (ref 110–260)
LYMPHOCYTES # BLD AUTO: 1.6 K/UL (ref 1–4.8)
LYMPHOCYTES # BLD AUTO: 1.9 K/UL (ref 1–4.8)
LYMPHOCYTES # BLD AUTO: 2 K/UL (ref 1–4.8)
LYMPHOCYTES # BLD AUTO: ABNORMAL K/UL (ref 1–4.8)
LYMPHOCYTES NFR BLD: 6 % (ref 18–48)
LYMPHOCYTES NFR BLD: 7.7 % (ref 18–48)
LYMPHOCYTES NFR BLD: 8.2 % (ref 18–48)
LYMPHOCYTES NFR BLD: 8.6 % (ref 18–48)
MAGNESIUM SERPL-MCNC: 2.2 MG/DL (ref 1.6–2.6)
MCH RBC QN AUTO: 30.5 PG (ref 27–31)
MCH RBC QN AUTO: 31.7 PG (ref 27–31)
MCH RBC QN AUTO: 31.9 PG (ref 27–31)
MCH RBC QN AUTO: 32 PG (ref 27–31)
MCHC RBC AUTO-ENTMCNC: 33.6 G/DL (ref 32–36)
MCHC RBC AUTO-ENTMCNC: 34 G/DL (ref 32–36)
MCHC RBC AUTO-ENTMCNC: 34.2 G/DL (ref 32–36)
MCHC RBC AUTO-ENTMCNC: 34.5 G/DL (ref 32–36)
MCV RBC AUTO: 89 FL (ref 82–98)
MCV RBC AUTO: 92 FL (ref 82–98)
MCV RBC AUTO: 94 FL (ref 82–98)
MCV RBC AUTO: 95 FL (ref 82–98)
METAMYELOCYTES NFR BLD MANUAL: 1 %
MONOCYTES # BLD AUTO: 3 K/UL (ref 0.3–1)
MONOCYTES # BLD AUTO: 3 K/UL (ref 0.3–1)
MONOCYTES # BLD AUTO: 3.2 K/UL (ref 0.3–1)
MONOCYTES # BLD AUTO: ABNORMAL K/UL (ref 0.3–1)
MONOCYTES NFR BLD: 10 % (ref 4–15)
MONOCYTES NFR BLD: 13 % (ref 4–15)
MONOCYTES NFR BLD: 13.7 % (ref 4–15)
MONOCYTES NFR BLD: 14.3 % (ref 4–15)
MYELOCYTES NFR BLD MANUAL: 2 %
NEUTROPHILS # BLD AUTO: 16.4 K/UL (ref 1.8–7.7)
NEUTROPHILS # BLD AUTO: 17.9 K/UL (ref 1.8–7.7)
NEUTROPHILS # BLD AUTO: 17.9 K/UL (ref 1.8–7.7)
NEUTROPHILS NFR BLD: 76.5 % (ref 38–73)
NEUTROPHILS NFR BLD: 76.9 % (ref 38–73)
NEUTROPHILS NFR BLD: 77.6 % (ref 38–73)
NEUTROPHILS NFR BLD: 78 % (ref 38–73)
NEUTS BAND NFR BLD MANUAL: 2 %
NRBC BLD-RTO: 0 /100 WBC
NUM UNITS TRANS PACKED RBC: NORMAL
OHS QRS DURATION: 76 MS
OHS QTC CALCULATION: 448 MS
OVALOCYTES BLD QL SMEAR: ABNORMAL
PATH REV BLD -IMP: NORMAL
PATH REV BLD -IMP: NORMAL
PHOSPHATE SERPL-MCNC: 2.6 MG/DL (ref 2.7–4.5)
PLATELET # BLD AUTO: 117 K/UL (ref 150–450)
PLATELET # BLD AUTO: 133 K/UL (ref 150–450)
PLATELET # BLD AUTO: 135 K/UL (ref 150–450)
PLATELET # BLD AUTO: 137 K/UL (ref 150–450)
PLATELET BLD QL SMEAR: ABNORMAL
PMV BLD AUTO: 9.4 FL (ref 9.2–12.9)
PMV BLD AUTO: 9.5 FL (ref 9.2–12.9)
PMV BLD AUTO: 9.7 FL (ref 9.2–12.9)
PMV BLD AUTO: 9.8 FL (ref 9.2–12.9)
POCT GLUCOSE: 128 MG/DL (ref 70–110)
POCT GLUCOSE: 143 MG/DL (ref 70–110)
POCT GLUCOSE: 143 MG/DL (ref 70–110)
POIKILOCYTOSIS BLD QL SMEAR: SLIGHT
POLYCHROMASIA BLD QL SMEAR: ABNORMAL
POLYCHROMASIA BLD QL SMEAR: ABNORMAL
POTASSIUM SERPL-SCNC: 4.4 MMOL/L (ref 3.5–5.1)
PROMYELOCYTES NFR BLD MANUAL: 1 %
PROT SERPL-MCNC: 5.7 G/DL (ref 6–8.4)
PROTHROMBIN TIME: 12.5 SEC (ref 9–12.5)
RBC # BLD AUTO: 2.54 M/UL (ref 4.6–6.2)
RBC # BLD AUTO: 2.56 M/UL (ref 4.6–6.2)
RBC # BLD AUTO: 2.59 M/UL (ref 4.6–6.2)
RBC # BLD AUTO: 3.18 M/UL (ref 4.6–6.2)
RETICS/RBC NFR AUTO: 3.3 % (ref 0.4–2)
SODIUM SERPL-SCNC: 132 MMOL/L (ref 136–145)
SPHEROCYTES BLD QL SMEAR: ABNORMAL
SPHEROCYTES BLD QL SMEAR: ABNORMAL
TOXIC GRANULES BLD QL SMEAR: PRESENT
TOXIC GRANULES BLD QL SMEAR: PRESENT
WBC # BLD AUTO: 21.27 K/UL (ref 3.9–12.7)
WBC # BLD AUTO: 23.06 K/UL (ref 3.9–12.7)
WBC # BLD AUTO: 23.44 K/UL (ref 3.9–12.7)
WBC # BLD AUTO: 26.92 K/UL (ref 3.9–12.7)
WBC TOXIC VACUOLES BLD QL SMEAR: PRESENT

## 2024-06-21 PROCEDURE — 85007 BL SMEAR W/DIFF WBC COUNT: CPT | Mod: HCNC | Performed by: STUDENT IN AN ORGANIZED HEALTH CARE EDUCATION/TRAINING PROGRAM

## 2024-06-21 PROCEDURE — 83735 ASSAY OF MAGNESIUM: CPT | Mod: HCNC

## 2024-06-21 PROCEDURE — 80053 COMPREHEN METABOLIC PANEL: CPT | Mod: HCNC

## 2024-06-21 PROCEDURE — 84100 ASSAY OF PHOSPHORUS: CPT | Mod: HCNC

## 2024-06-21 PROCEDURE — 85379 FIBRIN DEGRADATION QUANT: CPT | Mod: HCNC | Performed by: STUDENT IN AN ORGANIZED HEALTH CARE EDUCATION/TRAINING PROGRAM

## 2024-06-21 PROCEDURE — 63600175 PHARM REV CODE 636 W HCPCS: Mod: HCNC

## 2024-06-21 PROCEDURE — 85610 PROTHROMBIN TIME: CPT | Mod: HCNC | Performed by: STUDENT IN AN ORGANIZED HEALTH CARE EDUCATION/TRAINING PROGRAM

## 2024-06-21 PROCEDURE — 99222 1ST HOSP IP/OBS MODERATE 55: CPT | Mod: HCNC,,, | Performed by: STUDENT IN AN ORGANIZED HEALTH CARE EDUCATION/TRAINING PROGRAM

## 2024-06-21 PROCEDURE — 63600175 PHARM REV CODE 636 W HCPCS: Mod: HCNC | Performed by: STUDENT IN AN ORGANIZED HEALTH CARE EDUCATION/TRAINING PROGRAM

## 2024-06-21 PROCEDURE — 25000003 PHARM REV CODE 250: Mod: HCNC | Performed by: STUDENT IN AN ORGANIZED HEALTH CARE EDUCATION/TRAINING PROGRAM

## 2024-06-21 PROCEDURE — 93010 ELECTROCARDIOGRAM REPORT: CPT | Mod: HCNC,,, | Performed by: INTERNAL MEDICINE

## 2024-06-21 PROCEDURE — C9113 INJ PANTOPRAZOLE SODIUM, VIA: HCPCS | Mod: HCNC

## 2024-06-21 PROCEDURE — 85025 COMPLETE CBC W/AUTO DIFF WBC: CPT | Mod: HCNC

## 2024-06-21 PROCEDURE — 85060 BLOOD SMEAR INTERPRETATION: CPT | Mod: HCNC,,, | Performed by: PATHOLOGY

## 2024-06-21 PROCEDURE — 85384 FIBRINOGEN ACTIVITY: CPT | Mod: HCNC | Performed by: STUDENT IN AN ORGANIZED HEALTH CARE EDUCATION/TRAINING PROGRAM

## 2024-06-21 PROCEDURE — 85027 COMPLETE CBC AUTOMATED: CPT | Mod: HCNC | Performed by: STUDENT IN AN ORGANIZED HEALTH CARE EDUCATION/TRAINING PROGRAM

## 2024-06-21 PROCEDURE — 25500020 PHARM REV CODE 255: Mod: HCNC | Performed by: STUDENT IN AN ORGANIZED HEALTH CARE EDUCATION/TRAINING PROGRAM

## 2024-06-21 PROCEDURE — 21400001 HC TELEMETRY ROOM: Mod: HCNC

## 2024-06-21 PROCEDURE — 36430 TRANSFUSION BLD/BLD COMPNT: CPT | Mod: HCNC

## 2024-06-21 PROCEDURE — 86920 COMPATIBILITY TEST SPIN: CPT | Mod: HCNC

## 2024-06-21 PROCEDURE — 36415 COLL VENOUS BLD VENIPUNCTURE: CPT | Mod: HCNC,XB

## 2024-06-21 PROCEDURE — 85025 COMPLETE CBC W/AUTO DIFF WBC: CPT | Mod: 91,HCNC | Performed by: STUDENT IN AN ORGANIZED HEALTH CARE EDUCATION/TRAINING PROGRAM

## 2024-06-21 PROCEDURE — 85025 COMPLETE CBC W/AUTO DIFF WBC: CPT | Mod: 91,HCNC

## 2024-06-21 PROCEDURE — P9016 RBC LEUKOCYTES REDUCED: HCPCS | Mod: HCNC

## 2024-06-21 PROCEDURE — 36415 COLL VENOUS BLD VENIPUNCTURE: CPT | Mod: HCNC

## 2024-06-21 PROCEDURE — 83615 LACTATE (LD) (LDH) ENZYME: CPT | Mod: HCNC

## 2024-06-21 PROCEDURE — 83010 ASSAY OF HAPTOGLOBIN QUANT: CPT | Mod: HCNC

## 2024-06-21 PROCEDURE — 86880 COOMBS TEST DIRECT: CPT | Mod: HCNC | Performed by: STUDENT IN AN ORGANIZED HEALTH CARE EDUCATION/TRAINING PROGRAM

## 2024-06-21 PROCEDURE — 82248 BILIRUBIN DIRECT: CPT | Mod: HCNC | Performed by: STUDENT IN AN ORGANIZED HEALTH CARE EDUCATION/TRAINING PROGRAM

## 2024-06-21 PROCEDURE — 85045 AUTOMATED RETICULOCYTE COUNT: CPT | Mod: HCNC

## 2024-06-21 PROCEDURE — 93005 ELECTROCARDIOGRAM TRACING: CPT | Mod: HCNC

## 2024-06-21 PROCEDURE — 25000003 PHARM REV CODE 250: Mod: HCNC

## 2024-06-21 RX ORDER — HYDROCODONE BITARTRATE AND ACETAMINOPHEN 500; 5 MG/1; MG/1
TABLET ORAL
Status: DISCONTINUED | OUTPATIENT
Start: 2024-06-21 | End: 2024-06-24

## 2024-06-21 RX ADMIN — CLOPIDOGREL BISULFATE 75 MG: 75 TABLET ORAL at 09:06

## 2024-06-21 RX ADMIN — PANTOPRAZOLE SODIUM 40 MG: 40 INJECTION, POWDER, FOR SOLUTION INTRAVENOUS at 09:06

## 2024-06-21 RX ADMIN — CEFTRIAXONE 2 G: 2 INJECTION, POWDER, FOR SOLUTION INTRAMUSCULAR; INTRAVENOUS at 11:06

## 2024-06-21 RX ADMIN — SODIUM CHLORIDE, POTASSIUM CHLORIDE, SODIUM LACTATE AND CALCIUM CHLORIDE 500 ML: 600; 310; 30; 20 INJECTION, SOLUTION INTRAVENOUS at 12:06

## 2024-06-21 RX ADMIN — LACTULOSE 10 G: 20 SOLUTION ORAL at 09:06

## 2024-06-21 RX ADMIN — CYANOCOBALAMIN TAB 250 MCG 250 MCG: 250 TAB at 09:06

## 2024-06-21 RX ADMIN — IOHEXOL 100 ML: 350 INJECTION, SOLUTION INTRAVENOUS at 02:06

## 2024-06-21 RX ADMIN — ASPIRIN 81 MG CHEWABLE TABLET 81 MG: 81 TABLET CHEWABLE at 09:06

## 2024-06-21 RX ADMIN — Medication: at 09:06

## 2024-06-21 NOTE — AI DETERIORATION ALERT
"RAPID RESPONSE NURSE AI ALERT       AI alert received.    Chart Reviewed: 06/21/2024, 12:18 PM    MRN: [id number]  Bed: 47680/03277 A    Dx: Syncope and collapse    Dean Hahn has a past medical history of Cataract, HLD (hyperlipidemia), and Hypertension.    Last VS: BP (!) 80/51 (Patient Position: Lying)   Pulse (!) 121   Temp 98.8 °F (37.1 °C) (Oral)   Resp 17   Ht 5' 3" (1.6 m)   Wt 49.9 kg (110 lb)   SpO2 95%   BMI 19.49 kg/m²     24H Vital Sign Range:  Temp:  [97 °F (36.1 °C)-99.5 °F (37.5 °C)]   Pulse:  []   Resp:  [17-18]   BP: ()/(37-97)   SpO2:  [95 %-100 %]     Level of Consciousness (AVPU): alert    Recent Labs     06/20/24  1615 06/21/24  0024 06/21/24  0236   WBC 21.20* 26.92* 23.06*   HGB 6.5* 8.2* 8.2*   HCT 18.3* 23.8* 24.1*   * 135* 137*       Recent Labs     06/19/24  0301 06/20/24  0518 06/21/24  0236   * 134* 132*   K 4.3 4.1 4.4   * 111* 108   CO2 15* 17* 16*   BUN 46* 45* 50*   CREATININE 1.6* 1.5* 1.5*   GLU 96 97 146*   PHOS 3.0 2.5* 2.6*   MG 2.1 2.1 2.2        Recent Labs     06/18/24  1723   PH 7.435   PCO2 22.9*   PO2 60   HCO3 15.3*   POCSATURATED 92   BE -9*        OXYGEN:  Flow (L/min) (Oxygen Therapy): 15          MEWS score: 5    Contacted patient's nurse, Khushbu; in contact with MD team, fluid bolus ordered, will recheck BP.  No additional concerns verbalized at this time. Instructed to call [phone] for further concerns or assistance.    Luz Jonas RN        "

## 2024-06-21 NOTE — ASSESSMENT & PLAN NOTE
This patient does not have evidence of infective focus  My overall impression is  possible sepsis, tachycardic and leukocytosis .  Source: Urinary Tract and Abdominal  Antibiotics given-   Antibiotics (72h ago, onward)      Start     Stop Route Frequency Ordered    06/21/24 1115  cefTRIAXone (ROCEPHIN) 2 g in D5W 100 mL IVPB (MB+)         -- IV Every 24 hours (non-standard times) 06/21/24 1010          Latest lactate reviewed-  Recent Labs   Lab 06/18/24  1958   LACTATE 1.8       Organ dysfunction indicated by Encephalopathy    Fluid challenge: 2L for approx 30cc/kg    Post- resuscitation assessment No - Post resuscitation assessment not needed       Will Not start Pressors- Levophed for MAP of 65  Source control achieved by: CTX 2g q24h    Procal stable, may be elevated due to RP hematoma  Infectious workup unremarkable  BP lability d/t RP hematoma     -Continue CTX  -Stop Vanc  -continue to monitor

## 2024-06-21 NOTE — PT/OT/SLP PROGRESS
Physical Therapy      Patient Name:  Dean Hahn   MRN:  [id number]    Patient not seen today secondary to at 14:29 PM pt off the floor (x-ray). Second attempt at 15:19 pt off the floor (ultra sound). Writing PTA unable to make third attempt. Will follow-up at next PT POC date.

## 2024-06-21 NOTE — ASSESSMENT & PLAN NOTE
Patient has acute blood loss due to hemorrhage, the hemorrhage is due to  RP hematoma likely related to TNK administration on 6/10 , patient does have a propensity for bleeding due to a medication, the medication is TNK with AP agents for stroke.. Will trend hemoglobin/hematocrit Every 8 hours, as well as monitor and correct for any coagulation defects. CBC and vital signs have been reviewed and last CBC was noted-   Lab Results   Component Value Date    WBC 23.44 (H) 06/21/2024    HGB 8.1 (L) 06/21/2024    HCT 24.1 (L) 06/21/2024    MCV 95 06/21/2024     (L) 06/21/2024         Will order a type and screen and consent patient for blood transfusion. Will transfuse if Hgb is <7g/dl (<8g/dl in cases of active ACS) or if patient has rapid bleeding leading to hemodynamic instability.

## 2024-06-21 NOTE — SUBJECTIVE & OBJECTIVE
Interval History: NAEON. Rising white count, returned CTX. Continued resting leg pain. In afternoon, patient with BP 80/51 w/ tachycardia 121. Improvement to sbp 91 with fluids 500cc. PRBC and CTA abdomen ordered.    Review of Systems   Constitutional:  Negative for chills and fever.   Respiratory:  Negative for shortness of breath.    Cardiovascular:  Negative for chest pain.   Gastrointestinal:  Negative for abdominal pain.   Genitourinary:  Negative for dysuria.   Musculoskeletal:  Negative for arthralgias.   Psychiatric/Behavioral:  Positive for decreased concentration.      Objective:     Vital Signs (Most Recent):  Temp: 98.8 °F (37.1 °C) (06/21/24 1141)  Pulse: (!) 121 (06/21/24 1146)  Resp: 17 (06/21/24 1141)  BP: (!) 80/51 (06/21/24 1141)  SpO2: 95 % (06/21/24 1141) Vital Signs (24h Range):  Temp:  [97 °F (36.1 °C)-99.5 °F (37.5 °C)] 98.8 °F (37.1 °C)  Pulse:  [] 121  Resp:  [17-18] 17  SpO2:  [95 %-100 %] 95 %  BP: ()/(37-97) 80/51     Weight: 49.9 kg (110 lb)  Body mass index is 19.49 kg/m².    Intake/Output Summary (Last 24 hours) at 6/21/2024 1231  Last data filed at 6/21/2024 0544  Gross per 24 hour   Intake 601.25 ml   Output 1100 ml   Net -498.75 ml         Physical Exam  Constitutional:       Comments: Thin appearing, more conversant and alert   HENT:      Head: Normocephalic and atraumatic.      Mouth/Throat:      Mouth: Mucous membranes are dry.      Pharynx: Oropharynx is clear.   Eyes:      Extraocular Movements: Extraocular movements intact.   Cardiovascular:      Rate and Rhythm: Normal rate and regular rhythm.      Pulses: Normal pulses.      Heart sounds: Normal heart sounds.      Comments: PT on Doppler bilaterally  Could not auscultate AT or DP b/l with Doppler  Pulmonary:      Effort: Pulmonary effort is normal.      Breath sounds: Normal breath sounds.   Abdominal:      General: Abdomen is flat.      Palpations: Abdomen is soft.      Comments: Slight wincing to palpation, but  denied pain    Genitourinary:     Rectum: Normal.      Comments: No blood on ANDREA  Musculoskeletal:         General: Tenderness present. No swelling.      Comments: Bilateral lower extremities highly tender to light palpation. No erythema, no wounds noted.    Skin:     General: Skin is warm and dry.      Findings: No bruising.   Neurological:      Comments: AO to self  Raspy voice, some dysarthria   Psychiatric:      Comments: Decreased concentration. No agitation.             Significant Labs: All pertinent labs within the past 24 hours have been reviewed.  CBC:   Recent Labs   Lab 06/20/24  1615 06/21/24  0024 06/21/24  0236   WBC 21.20* 26.92* 23.06*   HGB 6.5* 8.2* 8.2*   HCT 18.3* 23.8* 24.1*   * 135* 137*     CMP:   Recent Labs   Lab 06/20/24  0518 06/21/24  0236   * 132*   K 4.1 4.4   * 108   CO2 17* 16*   GLU 97 146*   BUN 45* 50*   CREATININE 1.5* 1.5*   CALCIUM 8.6* 8.7   PROT 5.8* 5.7*   ALBUMIN 1.9* 1.9*   BILITOT 2.1* 3.6*   ALKPHOS 141* 146*   * 156*   ALT 82* 96*   ANIONGAP 6* 8       Significant Imaging: I have reviewed all pertinent imaging results/findings within the past 24 hours.  CT AP - concern for psoas hematoma  Pending abdominal CTA

## 2024-06-21 NOTE — PLAN OF CARE
Problem: Adult Inpatient Plan of Care  Goal: Plan of Care Review  Outcome: Progressing  Goal: Patient-Specific Goal (Individualized)  Outcome: Progressing  Goal: Absence of Hospital-Acquired Illness or Injury  Outcome: Progressing  Goal: Optimal Comfort and Wellbeing  Outcome: Progressing  Goal: Readiness for Transition of Care  Outcome: Progressing     Problem: Sepsis/Septic Shock  Goal: Optimal Coping  Outcome: Progressing  Goal: Absence of Bleeding  Outcome: Progressing  Goal: Blood Glucose Level Within Targeted Range  Outcome: Progressing  Goal: Absence of Infection Signs and Symptoms  Outcome: Progressing  Goal: Optimal Nutrition Intake  Outcome: Progressing     Problem: Wound  Goal: Optimal Coping  Outcome: Progressing  Goal: Optimal Functional Ability  Outcome: Progressing  Goal: Absence of Infection Signs and Symptoms  Outcome: Progressing  Goal: Improved Oral Intake  Outcome: Progressing  Goal: Optimal Pain Control and Function  Outcome: Progressing  Goal: Skin Health and Integrity  Outcome: Progressing  Goal: Optimal Wound Healing  Outcome: Progressing     Problem: Skin Injury Risk Increased  Goal: Skin Health and Integrity  Outcome: Progressing     Problem: Fall Injury Risk  Goal: Absence of Fall and Fall-Related Injury  Outcome: Progressing     Problem: Infection  Goal: Absence of Infection Signs and Symptoms  Outcome: Progressing

## 2024-06-21 NOTE — CONSULTS
[facility]  Gastroenterology  Consult Note    Patient Name: Dean Hahn  MRN: [id number]  Admission Date: 6/18/2024  Hospital Length of Stay: 3 days  Code Status: Full Code   Attending Provider: Chelsey Young MD   Consulting Provider: Sulaiman Stubbs DO  Primary Care Physician: Angel Luis Tobias III, MD  Principal Problem:Syncope and collapse    Inpatient consult to Gastroenterology  Consult performed by: Sulaiman Stubbs DO  Consult ordered by: Chelsey Young MD        Subjective:     HPI: Dean Hahn is a 83 y.o. male with HTN, HLD, s/p cholecystectomy, CAD, suspected cirrhosis, CKD and history of CVA (on DAPT) that [place] [facility] [place]. GI consulted on 6/21 for evaluation of anemia without overt GI bleeding. The patient recently had a stroke on 6/11. He was discharged to SNF, but had an episode of LOC/syncope prompting him to be brought to the ER for further evaluation on 6/18. Baseline Hgb 9-10. Hgb trend 9 (6/19) -> 7.1 (6/20) (1 unit given) -> 8.2 (6/20). The patient's wife provides collateral at the bedside. No melena, hematochezia, hematemesis or CGE. The patient has never had GI bleeding. Not on Eliquis, Xarelto or Coumadin. Is on ASA and Plavix for pontine stroke.     Prior Endoscopies:   None available per chart review. Patient's wife states that he may have had both a year ago... unclear where as they are not listed in the main chart or care everywhere.     Past Medical History:   Diagnosis Date    Cataract     HLD (hyperlipidemia)     Hypertension        Past Surgical History:   Procedure Laterality Date    CHOLECYSTECTOMY      EYE SURGERY Right     cataract removal surgery       Family History   Problem Relation Name Age of Onset    Hypertension Mother Jania Hahn     Heart attack Father      Coronary artery disease Father      No Known Problems Sister x2     Hypertension Brother x3     No Known Problems Daughter x3     No Known  Problems Son x2     Cirrhosis Neg Hx         Social History     Socioeconomic History    Marital status:     Number of children: 5   Occupational History    Occupation: Former electrican    Tobacco Use    Smoking status: Never    Smokeless tobacco: Never   Substance and Sexual Activity    Alcohol use: Yes    Drug use: Never    Sexual activity: Not Currently     Partners: Female     Birth control/protection: None     Comment: wife   Social History Narrative    Orginally from    [facility], [facility] electrical - retired [employer]      - [employer] - 5 years ,      Children    3 girls , 2 boys - 1 daughter oldest in [place]     Social Determinants of Health     Financial Resource Strain: Medium Risk (6/20/2024)    Overall Financial Resource Strain (CARDIA)     Difficulty of Paying Living Expenses: Somewhat hard   Food Insecurity: No Food Insecurity (6/20/2024)    Hunger Vital Sign     Worried About Running Out of Food in the Last Year: Never true     Ran Out of Food in the Last Year: Never true   Transportation Needs: No Transportation Needs (6/20/2024)    TRANSPORTATION NEEDS     Transportation : No   Physical Activity: Inactive (6/20/2024)    Exercise Vital Sign     Days of Exercise per Week: 0 days     Minutes of Exercise per Session: 0 min   Stress: Stress Concern Present (6/20/2024)    [employer] [place] [employer] - Occupational Stress Questionnaire     Feeling of Stress : To some extent   Housing Stability: Low Risk  (6/20/2024)    Housing Stability Vital Sign     Unable to Pay for Housing in the Last Year: No     Homeless in the Last Year: No       No current facility-administered medications on file prior to encounter.     Current Outpatient Medications on File Prior to Encounter   Medication Sig Dispense Refill    aspirin 81 MG Chew Take 1 tablet (81 mg total) by mouth once daily. 90 tablet 1    atorvastatin (LIPITOR) 20 MG tablet Take 1 tablet (20 mg  total) by mouth once daily. 90 tablet 1    ciclopirox (PENLAC) 8 % Soln Apply topically nightly. 6.6 mL 6    clopidogreL (PLAVIX) 75 mg tablet Take 1 tablet (75 mg total) by mouth once daily. for 14 doses 14 tablet 0    cyanocobalamin (VITAMIN B-12) 100 MCG tablet Take 1 tablet (100 mcg total) by mouth once daily. 90 tablet 1    donepeziL (ARICEPT) 5 MG tablet Take 1 tablet (5 mg total) by mouth every evening. 90 tablet 1    dorzolamide-timolol 2-0.5% (COSOPT) 22.3-6.8 mg/mL ophthalmic solution Place 1 drop into both eyes 2 (two) times daily. 10 mL 1    ergocalciferol (ERGOCALCIFEROL) 50,000 unit Cap Take 1 capsule (50,000 Units total) by mouth every 7 days. 12 capsule 1    lactulose (CHRONULAC) 20 gram/30 mL Soln Take 15 mLs (10 g total) by mouth once daily. 300 mL 1    metoprolol succinate (TOPROL-XL) 25 MG 24 hr tablet TAKE 1 TABLET BY MOUTH EVERY DAY IN THE EVENING 90 tablet 3    multivitamin capsule Take 1 capsule by mouth once daily.      olmesartan (BENICAR) 20 MG tablet Take 1 tablet (20 mg total) by mouth once daily. 90 tablet 3    omega-3 fatty acids/fish oil (FISH OIL-OMEGA-3 FATTY ACIDS) 300-1,000 mg capsule Take 1 capsule by mouth once daily. 90 capsule 1    spironolactone (ALDACTONE) 25 MG tablet Take 1 tablet (25 mg total) by mouth once daily. 90 tablet 1       Review of patient's allergies indicates:  No Known Allergies    Review of Systems   Constitutional:  Positive for malaise/fatigue. Negative for chills and fever.   HENT:  Negative for congestion and sore throat.    Respiratory:  Negative for cough and shortness of breath.    Cardiovascular:  Negative for chest pain and palpitations.   Gastrointestinal:  Negative for abdominal pain, blood in stool, constipation, diarrhea, melena, nausea and vomiting.   Genitourinary:  Negative for dysuria, frequency and urgency.   Musculoskeletal:  Negative for back pain and myalgias.   Skin:  Negative for itching and rash.   Neurological:  Positive for  dizziness. Negative for headaches.      Objective:     Vitals:    06/21/24 0735   BP: 114/77   Pulse: 95   Resp: 18   Temp: 98.3 °F (36.8 °C)     Constitutional:  not in acute distress  HENT: Head: Normal, normocephalic, atraumatic.  Eyes: conjunctiva clear and sclera nonicteric  Cardiovascular: regular rate and rhythm  Respiratory: normal chest expansion & respiratory effort   and no accessory muscle use  GI: soft, non-tender, without masses or organomegaly  Musculoskeletal: no muscular tenderness noted  Skin: normal color    Significant Labs:  Recent Labs   Lab 06/20/24  1615 06/21/24  0024 06/21/24  0236   HGB 6.5* 8.2* 8.2*       Lab Results   Component Value Date    WBC 23.06 (H) 06/21/2024    HGB 8.2 (L) 06/21/2024    HCT 24.1 (L) 06/21/2024    MCV 94 06/21/2024     (L) 06/21/2024       Lab Results   Component Value Date     (L) 06/21/2024    K 4.4 06/21/2024     06/21/2024    CO2 16 (L) 06/21/2024    BUN 50 (H) 06/21/2024    CREATININE 1.5 (H) 06/21/2024    CALCIUM 8.7 06/21/2024    ANIONGAP 8 06/21/2024    ESTGFRAFRICA >60.0 05/24/2022    EGFRNONAA >60.0 05/24/2022       Lab Results   Component Value Date    ALT 96 (H) 06/21/2024     (H) 06/21/2024    ALKPHOS 146 (H) 06/21/2024    BILITOT 3.6 (H) 06/21/2024       Lab Results   Component Value Date    INR 1.2 06/21/2024    INR 1.3 (H) 06/10/2024    INR 1.2 06/10/2024       Significant Imaging:  Reviewed pertinent radiology findings.       Assessment/Plan:     Dean Hahn is a 83 y.o. male with HTN, HLD, s/p cholecystectomy, CAD, suspected cirrhosis, CKD and history of CVA (on DAPT) that presents to [facility] for evaluation of syncope. GI consulted on 6/21 for evaluation of anemia without overt GI bleeding.     Problem List:  Normocytic Anemia without overt GI bleeding  Suspected hepatic cirrhosis- unclear diagnosis   Recent CVA on DAPT   Syncope     Recommendations:  - No plan for endoscopic evaluation today as the patient  was given a diet   - As there are no overt signs of GI bleeding, it would likely not be prudent to subjugate the patient to an unnecessary sedation event to look while the patient is hospitalized for other issues. Globally appears frail. We will follow up over the weekend and monitor for overt bleeding and adjust our plan if necessary as we would absolutely consider endoscopic evaluation if truly indicated   - Consult order from primary with concern for esophageal varices. Should they be present, they are most assuredly not bleeding or the cause of the patient's anemia as this would present with overt signs of blood loss. Do not see a role for octreotide at this time  - Ok for empiric PPI   - Trend Hgb and transfuse for goal Hgb > 7, unless otherwise indicated  - Maintain IV access with 2 large bore IV's  - Intravascular resuscitation/support with IVF's   - Hold all NSAIDs and anticoagulants, unless contraindicated  - Please correct any coagulopathy with platelets and FFP for goal of platelets >50K and INR <2.0  - Please notify GI team if there is significant change in patient's clinical status    Thank you for involving us in the care of Dean Hahn. Please call with any additional questions, concerns or changes in the patient's clinical status. We will continue to follow.     Sulaiman Stubbs DO  Gastroenterology Fellow PGY- IV  [facility]

## 2024-06-21 NOTE — CARE UPDATE
RAPID RESPONSE NURSE PROACTIVE ROUNDING NOTE       Time of Visit: 1430    Admit Date: 2024  LOS: 3  Code Status: Full Code   Date of Visit: 2024  : 1940  Age: 83 y.o.  Sex: male  Race: Black or   Bed: 76498/01380 A:   MRN: [id number]  Was the patient discharged from an ICU this admission? No   Was the patient discharged from a PACU within last 24 hours? No   Did the patient receive conscious sedation/general anesthesia in last 24 hours? No  Was the patient in the ED within the past 24 hours? No  Was the patient on NIPPV within the past 24 hours? No   Attending Physician: Chelsey Young MD  Primary Service: [facility] MED 4   Time spent at the bedside: < 15 min    SITUATION    Notified by [employer] patient alert.  Reason for alert: Hypotension  Called to evaluate the patient for Circulatory    BACKGROUND     Why is the patient in the hospital?: Syncope and collapse    Patient has a past medical history of Cataract, HLD (hyperlipidemia), and Hypertension.    Last Vitals:  Temp: 99.7 °F (37.6 °C) ( 1333)  Pulse: 94 ( 1333)  Resp: 16 ( 1333)  BP: 126/88 ( 1333)  SpO2: 100 % ( 1333)    24 Hours Vitals Range:  Temp:  [97 °F (36.1 °C)-99.7 °F (37.6 °C)]   Pulse:  []   Resp:  [16-18]   BP: ()/(37-97)   SpO2:  [95 %-100 %]     Labs:  Recent Labs     24  0024 24  0236 24  1214   WBC 26.92* 23.06* 23.44*   HGB 8.2* 8.2* 8.1*   HCT 23.8* 24.1* 24.1*   * 137* 133*       Recent Labs     24  0301 24  0518 24  0236   * 134* 132*   K 4.3 4.1 4.4   * 111* 108   CO2 15* 17* 16*   BUN 46* 45* 50*   CREATININE 1.6* 1.5* 1.5*   GLU 96 97 146*   PHOS 3.0 2.5* 2.6*   MG 2.1 2.1 2.2        Recent Labs     24  1723   PH 7.435   PCO2 22.9*   PO2 60   HCO3 15.3*   POCSATURATED 92   BE -9*        ASSESSMENT     Pt being taken to IR by provider team on arrival.      INTERVENTIONS    The patient was seen for Cardiac problem.  Staff concerns included hypotension. The following interventions were performed: Pt was actively receiving a unit of RBC's in transport.    RECOMMENDATIONS    Continue to monitor for bleeding post intervention    PROVIDER ESCALATION    Yes/No  No    Orders received and case discussed with NA.    Disposition:  Pt going to IR for procedure    FOLLOW-UP    Dr. Young  was present upon patient leaving room to go to IR. Instructed to call the Rapid Response Nurse, Leandro Troy RN at [phone] for additional questions or concerns.

## 2024-06-21 NOTE — PT/OT/SLP PROGRESS
Occupational Therapy      Patient Name:  Dean Hahn   MRN:  [id number]    Patient not seen today secondary to pt off the floor for CT scan during 1st afternoon attempt and off the floor for ultrasound during 2nd afternoon attempt.  Will follow-up as scheduled per OT POC.    6/21/2024

## 2024-06-21 NOTE — ASSESSMENT & PLAN NOTE
DDX includes autonomic dysfunction, hypovolemia in setting of BP regimen, arrhythmia such as Afib, seizure, stroke. CT head w/o acute intracranial process to suggest hemorrhagic stroke. Has not missed secondary preventative medications such as Plavix/ASA. Concern for cardiac etiology given LOC while seated. Fluid resuscitated in ED with dramatic improvement in BP compared to BP noted at SNF. Seizure less likely, but unknown if patient post-ictal. EEG w/o seizure.     Orthostatic positive. Possible RP hematoma developing thus predisposing patient to orthostasis due to blood loss.   6/21: Likely RP hematoma overlying psoas. Obtaining CTA - no acute bleed or contrast extravasation, no role for IR intervention.     - IVF  - PRBC as needed, can administer if hypotensive  - STOP ASA/plavix  - Telemetry

## 2024-06-21 NOTE — PLAN OF CARE
Problem: Adult Inpatient Plan of Care  Goal: Plan of Care Review  Outcome: Not Progressing  Goal: Optimal Comfort and Wellbeing  Outcome: Not Progressing     Problem: Infection  Goal: Absence of Infection Signs and Symptoms  Outcome: Not Progressing     Alert and verbal. Hct 18.3, No bleed observed. New order for one unit RBC. No a/r noted. Continues with

## 2024-06-21 NOTE — ASSESSMENT & PLAN NOTE
Noted 1/2023:  L corona radiata, L temporal lobe, R splenium of the corpus callosum  MRI ordered to assess for any development or conversion of old infarct territories    Antithrombotics for secondary stroke prevention: Antiplatelets: Aspirin: 81 mg daily  Clopidogrel: 75 mg daily    Statins for secondary stroke prevention and hyperlipidemia, if present:   Statins: Atorvastatin- 40 mg daily (holding to trend LFT, can restart if remaining stable).     Aggressive risk factor modification: HTN     Rehab efforts: The patient has been evaluated by a stroke team provider and the therapy needs have been fully considered based off the presenting complaints and exam findings. The following therapy evaluations are needed: PT evaluate and treat, OT evaluate and treat    Diagnostics ordered/pending: CT scan of head without contrast to asses brain parenchyma, HgbA1C to assess blood glucose levels    VTE prophylaxis: Heparin 5000 units SQ every 8 hours    BP parameters: Infarct: No intervention, SBP <220

## 2024-06-21 NOTE — ASSESSMENT & PLAN NOTE
Chronic, controlled. Latest blood pressure and vitals reviewed-     Temp:  [97 °F (36.1 °C)-99.7 °F (37.6 °C)]   Pulse:  []   Resp:  [16-18]   BP: ()/(37-97)   SpO2:  [95 %-100 %] .   Home meds for hypertension were reviewed and noted below.   Hypertension Medications               metoprolol succinate (TOPROL-XL) 25 MG 24 hr tablet TAKE 1 TABLET BY MOUTH EVERY DAY IN THE EVENING    olmesartan (BENICAR) 20 MG tablet Take 1 tablet (20 mg total) by mouth once daily.    spironolactone (ALDACTONE) 25 MG tablet Take 1 tablet (25 mg total) by mouth once daily.            While in the hospital, will manage blood pressure as follows; Adjust home antihypertensive regimen as follows- Hold while due to RP hematoma     Will utilize p.r.n. blood pressure medication only if patient's blood pressure greater than 220/110 and he develops symptoms such as worsening chest pain or shortness of breath.

## 2024-06-21 NOTE — CARE UPDATE
"GI Care Update Note:     Given the patient has a "heterogeneous enlargement of the left psoas most consistent with a left psoas hematoma" which would explain his anemia, there truly is no indication for endoscopic evaluation at this time.    Recommend the primary team address this.     GI will sign off.     Sulaiman Stubbs, DO  Gastroenterology PGY-4    "

## 2024-06-21 NOTE — PROGRESS NOTES
VANCOMYCIN DOSING BY PHARMACY DISCONTINUATION NOTE    Dean Zaki Hahn is a 83 y.o. male who had been consulted for vancomycin dosing.    The pharmacy consult for vancomycin dosing has been discontinued.     Vancomycin Dosing by Pharmacy Consult will sign-off. Please reconsult if necessary. Thank you for allowing us to participate in this patient's care.     Thank you for the consult,   Yohannes Kearney, Pharm.D.  Inpatient Pharmacist  EXT [phone]

## 2024-06-21 NOTE — PLAN OF CARE
Problem: Adult Inpatient Plan of Care  Goal: Optimal Comfort and Wellbeing  Outcome: Progressing     Problem: Wound  Goal: Skin Health and Integrity  Outcome: Progressing  Goal: Optimal Wound Healing  Outcome: Progressing     Problem: Skin Injury Risk Increased  Goal: Skin Health and Integrity  Outcome: Progressing     Problem: Fall Injury Risk  Goal: Absence of Fall and Fall-Related Injury  Outcome: Progressing

## 2024-06-21 NOTE — PROGRESS NOTES
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 History primarily obtained via spouse and chart review. ED staff contacted [facility] nursing staff who confirmed he became unresponsive while seated during PT session. Nursing attempted sternal rub, he did not respond. He became cyanotic and apneic for approx 10 seconds with a thready pulse. Nursing staff went to place in trendelenburg and almost called a code until he rapidly regained consciousness. Initial SBP in the field was in the 50s. He was hypoxic and placed on 15L non-rebreather. Improvement on arrival to [facility], stable on RA with improvement in MAPs. Pt reports feeling cold otherwise reports feeling "fine." He is conversing minimally which is near his baseline per family at bedside. He denies memory of the event. He denies fevers, chest pain, cough, SOB, abdominal pain, difficulty urinating/dysuria. Per the patient's wife, prior to stroke 6/10, patient ambulatory and seems to have waxing/waning mentation (Aox3 but sometimes confused). Previously noted that patient dependent in ADLs except feeding himself.     In ED, patient normotensive, not tachycardic, afebrile, and on RA. Did require tactile stimulation to participate in interview, minimally conversant upon awakening. CBC with leukocytosis (WBC 16), stable anemia (Hb 11). CMP without electrolyte derangements, Cr " 1.6 (baseline appears 1.4-1.6), AST//112, T bili 3.1 (LFTs stable compared to 6/17). LA 3.5 -> 2.7 following 1L LR. Modest troponin increase to 0.71. CT head with no acute intracranial process. EKG w/o Afib or ST elevation/depression, however noted possible PVC and fusion complexes. Received Vanc/Zosyn in ED. VBG: pH: 7.435, pco2: 22.9, Beecf: -9 HCO3: 15.3    Overview/Hospital Course:  Admitted to [facility] 6/18 for syncopal episode a/w brief hypoxia, apnea, and hypotension. Patient arrived to ED with slight tachypnea and found to have leukocytosis and slightly elevated LA, so received fluids and CTX per sepsis protocol. LA normalized w/ fluids. Ct head NAICP. CXR NAIPP. Performing EEG to assess for seizure. Telemetry to detect arrhythmia.  Infectious workup unrevealing and patient afebrile/HDS for 48 hrs, stopped antibiotics.    Interval History: NAEON. Rising white count, returned CTX. Continued resting leg pain. In afternoon, patient with BP 80/51 w/ tachycardia 121. Improvement to sbp 91 with fluids 500cc. PRBC and CTA abdomen ordered.    Review of Systems   Constitutional:  Negative for chills and fever.   Respiratory:  Negative for shortness of breath.    Cardiovascular:  Negative for chest pain.   Gastrointestinal:  Negative for abdominal pain.   Genitourinary:  Negative for dysuria.   Musculoskeletal:  Negative for arthralgias.   Psychiatric/Behavioral:  Positive for decreased concentration.      Objective:     Vital Signs (Most Recent):  Temp: 98.8 °F (37.1 °C) (06/21/24 1141)  Pulse: (!) 121 (06/21/24 1146)  Resp: 17 (06/21/24 1141)  BP: (!) 80/51 (06/21/24 1141)  SpO2: 95 % (06/21/24 1141) Vital Signs (24h Range):  Temp:  [97 °F (36.1 °C)-99.5 °F (37.5 °C)] 98.8 °F (37.1 °C)  Pulse:  [] 121  Resp:  [17-18] 17  SpO2:  [95 %-100 %] 95 %  BP: ()/(37-97) 80/51     Weight: 49.9 kg (110 lb)  Body mass index is 19.49 kg/m².    Intake/Output Summary (Last 24 hours) at 6/21/2024 1231  Last data filed  at 6/21/2024 0544  Gross per 24 hour   Intake 601.25 ml   Output 1100 ml   Net -498.75 ml         Physical Exam  Constitutional:       Comments: Thin appearing, more conversant and alert   HENT:      Head: Normocephalic and atraumatic.      Mouth/Throat:      Mouth: Mucous membranes are dry.      Pharynx: Oropharynx is clear.   Eyes:      Extraocular Movements: Extraocular movements intact.   Cardiovascular:      Rate and Rhythm: Normal rate and regular rhythm.      Pulses: Normal pulses.      Heart sounds: Normal heart sounds.      Comments: PT on Doppler bilaterally  Could not auscultate AT or DP b/l with Doppler  Pulmonary:      Effort: Pulmonary effort is normal.      Breath sounds: Normal breath sounds.   Abdominal:      General: Abdomen is flat.      Palpations: Abdomen is soft.      Comments: Slight wincing to palpation, but denied pain    Genitourinary:     Rectum: Normal.      Comments: No blood on ANDREA  Musculoskeletal:         General: Tenderness present. No swelling.      Comments: Bilateral lower extremities highly tender to light palpation. No erythema, no wounds noted.    Skin:     General: Skin is warm and dry.      Findings: No bruising.   Neurological:      Comments: AO to self  Raspy voice, some dysarthria   Psychiatric:      Comments: Decreased concentration. No agitation.             Significant Labs: All pertinent labs within the past 24 hours have been reviewed.  CBC:   Recent Labs   Lab 06/20/24  1615 06/21/24  0024 06/21/24  0236   WBC 21.20* 26.92* 23.06*   HGB 6.5* 8.2* 8.2*   HCT 18.3* 23.8* 24.1*   * 135* 137*     CMP:   Recent Labs   Lab 06/20/24  0518 06/21/24  0236   * 132*   K 4.1 4.4   * 108   CO2 17* 16*   GLU 97 146*   BUN 45* 50*   CREATININE 1.5* 1.5*   CALCIUM 8.6* 8.7   PROT 5.8* 5.7*   ALBUMIN 1.9* 1.9*   BILITOT 2.1* 3.6*   ALKPHOS 141* 146*   * 156*   ALT 82* 96*   ANIONGAP 6* 8       Significant Imaging: I have reviewed all pertinent imaging  results/findings within the past 24 hours.  CT AP - concern for psoas hematoma  Pending abdominal CTA    Assessment/Plan:      * Syncope and collapse  DDX includes autonomic dysfunction, hypovolemia in setting of BP regimen, arrhythmia such as Afib, seizure, stroke. CT head w/o acute intracranial process to suggest hemorrhagic stroke. Has not missed secondary preventative medications such as Plavix/ASA. Concern for cardiac etiology given LOC while seated. Fluid resuscitated in ED with dramatic improvement in BP compared to BP noted at SNF. Seizure less likely, but unknown if patient post-ictal. EEG w/o seizure.     Orthostatic positive. Possible RP hematoma developing thus predisposing patient to orthostasis due to blood loss.   6/21: Likely RP hematoma overlying psoas. Obtaining CTA - no acute bleed or contrast extravasation, no role for IR intervention.     - IVF  - PRBC as needed, can administer if hypotensive  - STOP ASA/plavix  - Telemetry    Retroperitoneal hematoma  Patient has acute blood loss due to hemorrhage, the hemorrhage is due to  RP hematoma likely related to TNK administration on 6/10 , patient does have a propensity for bleeding due to a medication, the medication is TNK with AP agents for stroke.. Will trend hemoglobin/hematocrit Every 8 hours, as well as monitor and correct for any coagulation defects. CBC and vital signs have been reviewed and last CBC was noted-   Lab Results   Component Value Date    WBC 23.44 (H) 06/21/2024    HGB 8.1 (L) 06/21/2024    HCT 24.1 (L) 06/21/2024    MCV 95 06/21/2024     (L) 06/21/2024         Will order a type and screen and consent patient for blood transfusion. Will transfuse if Hgb is <7g/dl (<8g/dl in cases of active ACS) or if patient has rapid bleeding leading to hemodynamic instability.    Macrocytic anemia  Patient's anemia is currently controlled. Has not received any PRBCs to date. Etiology likely d/t chronic disease due to Chronic liver disease  and B12 deficiency  Current CBC reviewed-   Lab Results   Component Value Date    HGB 7.3 (L) 06/20/2024    HCT 21.2 (L) 06/20/2024     Monitor serial CBC and transfuse if patient becomes hemodynamically unstable, symptomatic or H/H drops below 7/21.  Hgb dropped to 7.1-->7.3 on recheck  No obvious signs of bleed    -CBC Q daily for now  -cautiously continue asa/plavix  -monitor for bleeding    Severe sepsis  This patient does not have evidence of infective focus  My overall impression is  possible sepsis, tachycardic and leukocytosis .  Source: Urinary Tract and Abdominal  Antibiotics given-   Antibiotics (72h ago, onward)      Start     Stop Route Frequency Ordered    06/21/24 1115  cefTRIAXone (ROCEPHIN) 2 g in D5W 100 mL IVPB (MB+)         -- IV Every 24 hours (non-standard times) 06/21/24 1010          Latest lactate reviewed-  Recent Labs   Lab 06/18/24 1958   LACTATE 1.8       Organ dysfunction indicated by Encephalopathy    Fluid challenge: 2L for approx 30cc/kg    Post- resuscitation assessment No - Post resuscitation assessment not needed       Will Not start Pressors- Levophed for MAP of 65  Source control achieved by: CTX 2g q24h    Procal stable, may be elevated due to RP hematoma  Infectious workup unremarkable  BP lability d/t RP hematoma     -Continue CTX  -Stop Vanc  -continue to monitor    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     Aggressive risk factor modification: HTN     Rehab efforts: The patient has been evaluated by a  stroke team provider and the therapy needs have been fully considered based off the presenting complaints and exam findings. 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 Co-morbidities are present and inclusive of malnutrition.  MELD-Na score calculated; MELD 3.0: 19 at 6/12/2024  2:21 AM  MELD-Na: 17 at 6/12/2024  2:21 AM  Calculated from:  Serum Creatinine: 1.8 mg/dL at 6/12/2024  2:21 AM  Serum Sodium: 138 mmol/L (Using max of 137 mmol/L) at 6/12/2024  2:21 AM  Total Bilirubin: 2.2 mg/dL at 6/12/2024  2:21 AM  Serum Albumin: 2.4 g/dL at 6/12/2024  2:21 AM  INR(ratio): 1.2 at 6/10/2024  8:21 AM  Age at listing (hypothetical): 83 years  Sex: Male at 6/12/2024  2:21 AM      Continue chronic meds. 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 Will avoid any hepatotoxic meds, and monitor CBC/CMP/INR for synthetic function.     Stage 3a chronic kidney disease  Creatine stable for now. BMP reviewed- noted Estimated Creatinine Clearance: 27.3 mL/min (A) (based on SCr of 1.5 mg/dL (H)). according to latest data. Based on current GFR, CKD stage is stage 4 - GFR 15-29.  Monitor UOP and serial BMP and adjust therapy as needed. Renally dose meds. Avoid nephrotoxic medications and procedures.    Prediabetes  Will recheck A1c, LDSSI if needed, hold for now to avoid hypoglycemia    Pure hypercholesterolemia  Can continue given recent stroke and stabilization of LFTs during last admission while on statin.    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 Follows with Hepatology as OP.     Essential hypertension  Chronic, controlled. Latest blood pressure and vitals  reviewed-     Temp:  [97 °F (36.1 °C)-99.7 °F (37.6 °C)]   Pulse:  []   Resp:  [16-18]   BP: ()/(37-97)   SpO2:  [95 %-100 %] .   Home meds for hypertension were reviewed and noted below.   Hypertension Medications               metoprolol succinate (TOPROL-XL) 25 MG 24 hr tablet TAKE 1 TABLET BY MOUTH EVERY DAY IN THE EVENING    olmesartan (BENICAR) 20 MG tablet Take 1 tablet (20 mg total) by mouth once daily.    spironolactone (ALDACTONE) 25 MG tablet Take 1 tablet (25 mg total) by mouth once daily.            While in the hospital, will manage blood pressure as follows; Adjust home antihypertensive regimen as follows- Hold while due to RP hematoma     Will utilize p.r.n. blood pressure medication only if patient's blood pressure greater than 220/110 and he develops symptoms such as worsening chest pain or shortness of breath.      VTE Risk Mitigation (From admission, onward)           Ordered     IP VTE HIGH RISK PATIENT  Once         06/18/24 1809     Place sequential compression device  Until discontinued         06/18/24 1809                    Discharge Planning   LAMINE: 6/23/2024     Code Status: Full Code   Is the patient medically ready for discharge?:     Reason for patient still in hospital (select all that apply): Patient new problem, Patient trending condition, and Treatment  Discharge Plan A: Skilled Nursing Facility                  Bryanna Teixeira MD  Department of Hospital Medicine   [facility] ([place])

## 2024-06-22 PROBLEM — K68.3 RETROPERITONEAL HEMATOMA: Status: RESOLVED | Noted: 2024-06-21 | Resolved: 2024-06-22

## 2024-06-22 LAB
ABO + RH BLD: NORMAL
ALBUMIN SERPL BCP-MCNC: 1.8 G/DL (ref 3.5–5.2)
ALP SERPL-CCNC: 124 U/L (ref 55–135)
ALT SERPL W/O P-5'-P-CCNC: 96 U/L (ref 10–44)
ANION GAP SERPL CALC-SCNC: 6 MMOL/L (ref 8–16)
AST SERPL-CCNC: 158 U/L (ref 10–40)
BASOPHILS # BLD AUTO: 0.02 K/UL (ref 0–0.2)
BASOPHILS # BLD AUTO: 0.02 K/UL (ref 0–0.2)
BASOPHILS # BLD AUTO: 0.03 K/UL (ref 0–0.2)
BASOPHILS NFR BLD: 0.1 % (ref 0–1.9)
BILIRUB SERPL-MCNC: 4.4 MG/DL (ref 0.1–1)
BLD GP AB SCN CELLS X3 SERPL QL: NORMAL
BUN SERPL-MCNC: 56 MG/DL (ref 8–23)
CALCIUM SERPL-MCNC: 8.9 MG/DL (ref 8.7–10.5)
CHLORIDE SERPL-SCNC: 110 MMOL/L (ref 95–110)
CO2 SERPL-SCNC: 18 MMOL/L (ref 23–29)
CREAT SERPL-MCNC: 1.5 MG/DL (ref 0.5–1.4)
DIFFERENTIAL METHOD BLD: ABNORMAL
EOSINOPHIL # BLD AUTO: 0 K/UL (ref 0–0.5)
EOSINOPHIL # BLD AUTO: 0 K/UL (ref 0–0.5)
EOSINOPHIL # BLD AUTO: 0.1 K/UL (ref 0–0.5)
EOSINOPHIL NFR BLD: 0 % (ref 0–8)
EOSINOPHIL NFR BLD: 0.1 % (ref 0–8)
EOSINOPHIL NFR BLD: 0.5 % (ref 0–8)
ERYTHROCYTE [DISTWIDTH] IN BLOOD BY AUTOMATED COUNT: 16.9 % (ref 11.5–14.5)
ERYTHROCYTE [DISTWIDTH] IN BLOOD BY AUTOMATED COUNT: 17.2 % (ref 11.5–14.5)
ERYTHROCYTE [DISTWIDTH] IN BLOOD BY AUTOMATED COUNT: 17.5 % (ref 11.5–14.5)
EST. GFR  (NO RACE VARIABLE): 46.5 ML/MIN/1.73 M^2
GLUCOSE SERPL-MCNC: 106 MG/DL (ref 70–110)
HCT VFR BLD AUTO: 23.1 % (ref 40–54)
HCT VFR BLD AUTO: 24.2 % (ref 40–54)
HCT VFR BLD AUTO: 27.7 % (ref 40–54)
HGB BLD-MCNC: 8.1 G/DL (ref 14–18)
HGB BLD-MCNC: 8.5 G/DL (ref 14–18)
HGB BLD-MCNC: 9.5 G/DL (ref 14–18)
IMM GRANULOCYTES # BLD AUTO: 0.14 K/UL (ref 0–0.04)
IMM GRANULOCYTES # BLD AUTO: 0.19 K/UL (ref 0–0.04)
IMM GRANULOCYTES # BLD AUTO: 0.21 K/UL (ref 0–0.04)
IMM GRANULOCYTES NFR BLD AUTO: 0.7 % (ref 0–0.5)
IMM GRANULOCYTES NFR BLD AUTO: 1 % (ref 0–0.5)
IMM GRANULOCYTES NFR BLD AUTO: 1 % (ref 0–0.5)
LYMPHOCYTES # BLD AUTO: 1.6 K/UL (ref 1–4.8)
LYMPHOCYTES # BLD AUTO: 1.6 K/UL (ref 1–4.8)
LYMPHOCYTES # BLD AUTO: 2.1 K/UL (ref 1–4.8)
LYMPHOCYTES NFR BLD: 10.9 % (ref 18–48)
LYMPHOCYTES NFR BLD: 7.8 % (ref 18–48)
LYMPHOCYTES NFR BLD: 8.3 % (ref 18–48)
MAGNESIUM SERPL-MCNC: 2.5 MG/DL (ref 1.6–2.6)
MCH RBC QN AUTO: 30.9 PG (ref 27–31)
MCH RBC QN AUTO: 31.8 PG (ref 27–31)
MCH RBC QN AUTO: 32.1 PG (ref 27–31)
MCHC RBC AUTO-ENTMCNC: 34.3 G/DL (ref 32–36)
MCHC RBC AUTO-ENTMCNC: 35.1 G/DL (ref 32–36)
MCHC RBC AUTO-ENTMCNC: 35.1 G/DL (ref 32–36)
MCV RBC AUTO: 90 FL (ref 82–98)
MCV RBC AUTO: 91 FL (ref 82–98)
MCV RBC AUTO: 92 FL (ref 82–98)
MONOCYTES # BLD AUTO: 2.3 K/UL (ref 0.3–1)
MONOCYTES # BLD AUTO: 2.4 K/UL (ref 0.3–1)
MONOCYTES # BLD AUTO: 2.6 K/UL (ref 0.3–1)
MONOCYTES NFR BLD: 12.2 % (ref 4–15)
MONOCYTES NFR BLD: 12.5 % (ref 4–15)
MONOCYTES NFR BLD: 12.7 % (ref 4–15)
NEUTROPHILS # BLD AUTO: 14.3 K/UL (ref 1.8–7.7)
NEUTROPHILS # BLD AUTO: 14.9 K/UL (ref 1.8–7.7)
NEUTROPHILS # BLD AUTO: 16.3 K/UL (ref 1.8–7.7)
NEUTROPHILS NFR BLD: 75.7 % (ref 38–73)
NEUTROPHILS NFR BLD: 77.9 % (ref 38–73)
NEUTROPHILS NFR BLD: 78.4 % (ref 38–73)
NRBC BLD-RTO: 0 /100 WBC
PHOSPHATE SERPL-MCNC: 3 MG/DL (ref 2.7–4.5)
PLATELET # BLD AUTO: 119 K/UL (ref 150–450)
PLATELET # BLD AUTO: 122 K/UL (ref 150–450)
PLATELET # BLD AUTO: 133 K/UL (ref 150–450)
PLATELET BLD QL SMEAR: ABNORMAL
PMV BLD AUTO: 9.3 FL (ref 9.2–12.9)
PMV BLD AUTO: 9.5 FL (ref 9.2–12.9)
PMV BLD AUTO: 9.6 FL (ref 9.2–12.9)
POCT GLUCOSE: 154 MG/DL (ref 70–110)
POTASSIUM SERPL-SCNC: 4.4 MMOL/L (ref 3.5–5.1)
PROT SERPL-MCNC: 5.4 G/DL (ref 6–8.4)
RBC # BLD AUTO: 2.52 M/UL (ref 4.6–6.2)
RBC # BLD AUTO: 2.67 M/UL (ref 4.6–6.2)
RBC # BLD AUTO: 3.07 M/UL (ref 4.6–6.2)
SMUDGE CELLS BLD QL SMEAR: PRESENT
SODIUM SERPL-SCNC: 134 MMOL/L (ref 136–145)
SPECIMEN OUTDATE: NORMAL
WBC # BLD AUTO: 18.94 K/UL (ref 3.9–12.7)
WBC # BLD AUTO: 19.11 K/UL (ref 3.9–12.7)
WBC # BLD AUTO: 20.82 K/UL (ref 3.9–12.7)

## 2024-06-22 PROCEDURE — 80053 COMPREHEN METABOLIC PANEL: CPT | Mod: HCNC

## 2024-06-22 PROCEDURE — 36415 COLL VENOUS BLD VENIPUNCTURE: CPT | Mod: HCNC

## 2024-06-22 PROCEDURE — 84100 ASSAY OF PHOSPHORUS: CPT | Mod: HCNC

## 2024-06-22 PROCEDURE — 97535 SELF CARE MNGMENT TRAINING: CPT | Mod: HCNC

## 2024-06-22 PROCEDURE — 21400001 HC TELEMETRY ROOM: Mod: HCNC

## 2024-06-22 PROCEDURE — 86850 RBC ANTIBODY SCREEN: CPT | Mod: HCNC

## 2024-06-22 PROCEDURE — 97530 THERAPEUTIC ACTIVITIES: CPT | Mod: HCNC

## 2024-06-22 PROCEDURE — C9113 INJ PANTOPRAZOLE SODIUM, VIA: HCPCS | Mod: HCNC

## 2024-06-22 PROCEDURE — 25000003 PHARM REV CODE 250: Mod: HCNC

## 2024-06-22 PROCEDURE — 97530 THERAPEUTIC ACTIVITIES: CPT | Mod: HCNC,CQ

## 2024-06-22 PROCEDURE — 86900 BLOOD TYPING SEROLOGIC ABO: CPT | Mod: HCNC

## 2024-06-22 PROCEDURE — 97110 THERAPEUTIC EXERCISES: CPT | Mod: HCNC,CQ

## 2024-06-22 PROCEDURE — 63600175 PHARM REV CODE 636 W HCPCS: Mod: HCNC

## 2024-06-22 PROCEDURE — 93005 ELECTROCARDIOGRAM TRACING: CPT | Mod: HCNC

## 2024-06-22 PROCEDURE — 93010 ELECTROCARDIOGRAM REPORT: CPT | Mod: HCNC,,, | Performed by: INTERNAL MEDICINE

## 2024-06-22 PROCEDURE — 85025 COMPLETE CBC W/AUTO DIFF WBC: CPT | Mod: 91,HCNC

## 2024-06-22 PROCEDURE — 83735 ASSAY OF MAGNESIUM: CPT | Mod: HCNC

## 2024-06-22 PROCEDURE — 25000003 PHARM REV CODE 250: Mod: HCNC | Performed by: STUDENT IN AN ORGANIZED HEALTH CARE EDUCATION/TRAINING PROGRAM

## 2024-06-22 PROCEDURE — 63600175 PHARM REV CODE 636 W HCPCS: Mod: HCNC | Performed by: STUDENT IN AN ORGANIZED HEALTH CARE EDUCATION/TRAINING PROGRAM

## 2024-06-22 RX ORDER — DICLOFENAC SODIUM 10 MG/G
2 GEL TOPICAL DAILY
Status: DISCONTINUED | OUTPATIENT
Start: 2024-06-22 | End: 2024-06-25 | Stop reason: HOSPADM

## 2024-06-22 RX ORDER — ACETAMINOPHEN 500 MG
1000 TABLET ORAL ONCE
Status: COMPLETED | OUTPATIENT
Start: 2024-06-22 | End: 2024-06-22

## 2024-06-22 RX ADMIN — Medication: at 09:06

## 2024-06-22 RX ADMIN — CYANOCOBALAMIN TAB 250 MCG 250 MCG: 250 TAB at 10:06

## 2024-06-22 RX ADMIN — PANTOPRAZOLE SODIUM 40 MG: 40 INJECTION, POWDER, FOR SOLUTION INTRAVENOUS at 08:06

## 2024-06-22 RX ADMIN — LACTULOSE 10 G: 20 SOLUTION ORAL at 10:06

## 2024-06-22 RX ADMIN — CEFTRIAXONE 2 G: 2 INJECTION, POWDER, FOR SOLUTION INTRAMUSCULAR; INTRAVENOUS at 10:06

## 2024-06-22 RX ADMIN — PANTOPRAZOLE SODIUM 40 MG: 40 INJECTION, POWDER, FOR SOLUTION INTRAVENOUS at 10:06

## 2024-06-22 RX ADMIN — ACETAMINOPHEN 1000 MG: 500 TABLET ORAL at 02:06

## 2024-06-22 NOTE — ASSESSMENT & PLAN NOTE
Patient has acute blood loss due to hemorrhage, the hemorrhage is due to  psoas hematoma likely related to TNK administration on 6/10 , patient does have a propensity for bleeding due to a medication, the medication is TNK with AP agents for stroke.. Will trend hemoglobin/hematocrit Every 8 hours, as well as monitor and correct for any coagulation defects. CBC and vital signs have been reviewed and last CBC was noted-   Lab Results   Component Value Date    WBC 18.94 (H) 06/22/2024    HGB 8.5 (L) 06/22/2024    HCT 24.2 (L) 06/22/2024    MCV 91 06/22/2024     (L) 06/22/2024         Will order a type and screen and consent patient for blood transfusion. Will transfuse if Hgb is <7g/dl (<8g/dl in cases of active ACS) or if patient has rapid bleeding leading to hemodynamic instability.

## 2024-06-22 NOTE — PROGRESS NOTES
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 History primarily obtained via spouse and chart review. [facility] who confirmed he became unresponsive while seated during PT session. Nursing attempted sternal rub, he did not respond. He became cyanotic and apneic for approx 10 seconds with a thready pulse. Nursing staff went to place in trendelenburg and almost called a code until he rapidly regained consciousness. Initial SBP in the field was in the 50s. He was hypoxic and placed on 15L non-rebreather. Improvement on arrival to [facility], stable on RA with improvement in MAPs. Pt reports feeling cold otherwise reports feeling "fine." He is conversing minimally which is near his baseline per family at bedside. He denies memory of the event. He denies fevers, chest pain, cough, SOB, abdominal pain, difficulty urinating/dysuria. Per the patient's wife, prior to stroke 6/10, patient ambulatory and seems to have waxing/waning mentation (Aox3 but sometimes confused). Previously noted that patient dependent in ADLs except feeding himself.     In ED, patient normotensive, not tachycardic, afebrile, and on RA. Did require tactile stimulation to participate in interview, minimally conversant upon awakening. CBC with leukocytosis (WBC 16), stable anemia (Hb 11). CMP without electrolyte derangements, Cr " 1.6 (baseline appears 1.4-1.6), AST//112, T bili 3.1 (LFTs stable compared to 6/17). LA 3.5 -> 2.7 following 1L LR. Modest troponin increase to 0.71. CT head with no acute intracranial process. EKG w/o Afib or ST elevation/depression, however noted possible PVC and fusion complexes. Received Vanc/Zosyn in ED. VBG: pH: 7.435, pco2: 22.9, Beecf: -9 HCO3: 15.3    Overview/Hospital Course:  Admitted to [facility] 6/18 for syncopal episode a/w brief hypoxia, apnea, and hypotension. Patient arrived to ED with slight tachypnea and found to have leukocytosis and slightly elevated LA, so received fluids and CTX per sepsis protocol. LA normalized w/ fluids. Ct head NAICP. CXR NAIPP. Performing EEG to assess for seizure. Telemetry to detect arrhythmia.  Infectious workup unrevealing and patient afebrile/HDS for 48 hrs, stopped antibiotics.    Interval History: Had bloody BM overnight, Hb 8.5 this AM. No acute complaints currently. Arterial doppler findings as described below. No intervention per vascular.     Review of Systems   Constitutional:  Negative for chills and fever.   Respiratory:  Negative for shortness of breath.    Cardiovascular:  Negative for chest pain.   Gastrointestinal:  Negative for abdominal pain.   Genitourinary:  Negative for dysuria.   Musculoskeletal:  Positive for myalgias. Negative for arthralgias.        B/l leg pain   Psychiatric/Behavioral:  Positive for decreased concentration.      Objective:     Vital Signs (Most Recent):  Temp: 98.3 °F (36.8 °C) (06/22/24 0709)  Pulse: 76 (06/22/24 1046)  Resp: 17 (06/22/24 0709)  BP: 114/74 (06/22/24 0706)  SpO2: 99 % (06/22/24 0900) Vital Signs (24h Range):  Temp:  [98.1 °F (36.7 °C)-99.7 °F (37.6 °C)] 98.3 °F (36.8 °C)  Pulse:  [] 76  Resp:  [16-18] 17  SpO2:  [95 %-100 %] 99 %  BP: ()/(51-88) 114/74     Weight: 49.9 kg (110 lb)  Body mass index is 19.49 kg/m².    Intake/Output Summary (Last 24 hours) at 6/22/2024 1112  Last data filed at  6/22/2024 0153  Gross per 24 hour   Intake 0 ml   Output 1050 ml   Net -1050 ml         Physical Exam  Constitutional:       Comments: Thin appearing, more conversant and alert   HENT:      Head: Normocephalic and atraumatic.      Mouth/Throat:      Mouth: Mucous membranes are dry.      Pharynx: Oropharynx is clear.   Eyes:      Extraocular Movements: Extraocular movements intact.   Cardiovascular:      Rate and Rhythm: Normal rate and regular rhythm.      Pulses: Normal pulses.      Heart sounds: Normal heart sounds.   Pulmonary:      Effort: Pulmonary effort is normal.      Breath sounds: Normal breath sounds.   Abdominal:      General: Abdomen is flat.      Palpations: Abdomen is soft.      Comments: Slight wincing to palpation, but denied pain    Musculoskeletal:         General: Tenderness present. No swelling.      Comments: Bilateral lower extremities highly tender to light palpation. No erythema, no wounds noted.    Skin:     General: Skin is warm and dry.      Findings: No bruising.   Neurological:      Comments: AO to self  Raspy voice, some dysarthria   Psychiatric:      Comments: Decreased concentration. No agitation.             Significant Labs: All pertinent labs within the past 24 hours have been reviewed.  CBC:   Recent Labs   Lab 06/21/24  1736 06/21/24  2312 06/22/24  0830   WBC 21.27* 20.82* 18.94*   HGB 9.7* 9.5* 8.5*   HCT 28.4* 27.7* 24.2*   * 119* 122*     CMP:   Recent Labs   Lab 06/21/24  0236 06/22/24  0341   * 134*   K 4.4 4.4    110   CO2 16* 18*   * 106   BUN 50* 56*   CREATININE 1.5* 1.5*   CALCIUM 8.7 8.9   PROT 5.7* 5.4*   ALBUMIN 1.9* 1.8*   BILITOT 3.6* 4.4*   ALKPHOS 146* 124   * 158*   ALT 96* 96*   ANIONGAP 8 6*       Significant Imaging: I have reviewed all pertinent imaging results/findings within the past 24 hours.  Arterial doppler LE:     Ankle-brachial index of 1.2 on the right and 1 on the left.     Occlusion of the left posterior tibialis  artery with no collateral vessels identified, suggestive of an acute process.     Intermittent loss of flow within the bilateral anterior tibialis arteries with multiple collateral vessels, suggestive of chronic occlusions.    Assessment/Plan:      * Syncope and collapse  DDX includes autonomic dysfunction, hypovolemia in setting of BP regimen, arrhythmia such as Afib, seizure, stroke. CT head w/o acute intracranial process to suggest hemorrhagic stroke. Has not missed secondary preventative medications such as Plavix/ASA. Concern for cardiac etiology given LOC while seated. Fluid resuscitated in ED with dramatic improvement in BP compared to BP noted at SNF. Seizure less likely, but unknown if patient post-ictal. EEG w/o seizure.     Orthostatic positive. Possible RP hematoma developing thus predisposing patient to orthostasis due to blood loss.   6/21: Likely RP hematoma overlying psoas. Obtaining CTA - no acute bleed or contrast extravasation, no role for IR intervention.     - IVF  - PRBC as needed, can administer if hypotensive  - STOP ASA/plavix  - Telemetry    PAD (peripheral artery disease)  Noted on vascular arterial US of lower extremities:   -TIMUR 1.2 on the right and TIMUR of 1 on the left.  -Occlusion of the L PT with no collateral vessels identified, suggestive of an acute process.  -Intermittent loss of flow within the bilateral AT with multiple collateral vessels, suggestive of chronic occlusions.    PLAN:  -No AP agents given RP hematoma  -not amenable to vascular procedure  -CTM      Nontraumatic psoas hematoma  Patient has acute blood loss due to hemorrhage, the hemorrhage is due to  psoas hematoma likely related to TNK administration on 6/10 , patient does have a propensity for bleeding due to a medication, the medication is TNK with AP agents for stroke.. Will trend hemoglobin/hematocrit Every 8 hours, as well as monitor and correct for any coagulation defects. CBC and vital signs have been  "reviewed and last CBC was noted-   Lab Results   Component Value Date    WBC 18.94 (H) 06/22/2024    HGB 8.5 (L) 06/22/2024    HCT 24.2 (L) 06/22/2024    MCV 91 06/22/2024     (L) 06/22/2024         Will order a type and screen and consent patient for blood transfusion. Will transfuse if Hgb is <7g/dl (<8g/dl in cases of active ACS) or if patient has rapid bleeding leading to hemodynamic instability.    Macrocytic anemia  Patient's anemia is currently controlled. Has not received any PRBCs to date. Etiology likely d/t chronic disease due to Chronic liver disease and B12 deficiency  Current CBC reviewed-   Lab Results   Component Value Date    HGB 7.3 (L) 06/20/2024    HCT 21.2 (L) 06/20/2024     Monitor serial CBC and transfuse if patient becomes hemodynamically unstable, symptomatic or H/H drops below 7/21.  Hgb dropped to 7.1-->7.3 on recheck  No obvious signs of bleed    -CBC Q daily for now  -cautiously continue asa/plavix  -monitor for bleeding    Severe sepsis  This patient does not have evidence of infective focus  My overall impression is  possible sepsis, tachycardic and leukocytosis .  Source: Urinary Tract and Abdominal  Antibiotics given-   Antibiotics (72h ago, onward)      Start     Stop Route Frequency Ordered    06/21/24 1115  cefTRIAXone (ROCEPHIN) 2 g in D5W 100 mL IVPB (MB+)         -- IV Every 24 hours (non-standard times) 06/21/24 1010          Latest lactate reviewed-  No results for input(s): "LACTATE", "POCLAC" in the last 72 hours.    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     Aggressive risk factor modification: HTN     Rehab efforts: The patient has been evaluated by a stroke team provider and the therapy needs have been fully considered based off the presenting complaints and exam findings. 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 Co-morbidities are present and inclusive of malnutrition.  MELD-Na score calculated; MELD 3.0: 19 at 6/12/2024  2:21 AM  MELD-Na: 17 at 6/12/2024  2:21 AM  Calculated from:  Serum Creatinine: 1.8 mg/dL at 6/12/2024  2:21 AM  Serum Sodium: 138 mmol/L (Using max of 137 mmol/L) at 6/12/2024  2:21 AM  Total Bilirubin: 2.2 mg/dL at 6/12/2024  2:21 AM  Serum Albumin: 2.4 g/dL at 6/12/2024  2:21 AM  INR(ratio): 1.2 at 6/10/2024  8:21 AM  Age at listing (hypothetical): 83 years  Sex: Male at 6/12/2024  2:21 AM      Continue chronic meds. 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 Will avoid any hepatotoxic meds, and monitor CBC/CMP/INR for synthetic function.     Thrombocytopenia  Patient was found to have thrombocytopenia, the likely etiology  is secondary to cirrhosis/portal hypertension, will monitor the platelets Daily. Will transfuse if platelet count is <10k. Hold DVT prophylaxis if platelets are <50k. The patient's platelet results have been reviewed and are listed below.  Recent Labs   Lab 06/22/24  0830   *         Stage 3a chronic kidney disease  Creatine stable for now. BMP reviewed- noted Estimated Creatinine Clearance: 27.3 mL/min (A) (based on SCr of 1.5 mg/dL (H)). according to latest data. Based on current GFR, CKD stage is stage 4 - GFR 15-29.  Monitor UOP and serial BMP and adjust therapy as needed. Renally dose meds. Avoid nephrotoxic medications and procedures.    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 Follows with Hepatology as OP.     Essential hypertension  Chronic, controlled. Latest blood pressure and vitals reviewed-     Temp:  [97.8 °F (36.6 °C)-99.7 °F (37.6 °C)]   Pulse:  []   Resp:  [16-18]   BP: ()/(51-88)   SpO2:  [95 %-100 %] .   Home meds for hypertension were reviewed and noted below.   Hypertension Medications               metoprolol succinate (TOPROL-XL) 25 MG 24 hr tablet TAKE 1 TABLET BY MOUTH EVERY DAY IN THE EVENING    olmesartan (BENICAR) 20 MG tablet Take 1 tablet (20 mg total) by mouth once daily.    spironolactone (ALDACTONE) 25 MG tablet Take 1 tablet (25 mg total) by mouth once daily.            While in the hospital, will manage blood pressure as follows; Adjust home antihypertensive regimen as follows- Hold while due to psoas hematoma     Will utilize p.r.n. blood pressure medication only if patient's blood pressure greater than 220/110 and he develops symptoms such as worsening chest pain or shortness of breath.      VTE Risk Mitigation (From admission, onward)            Ordered     IP VTE HIGH RISK PATIENT  Once         06/18/24 1809     Place sequential compression device  Until discontinued         06/18/24 1809                    Discharge Planning   LAMINE: 6/24/2024     Code Status: Full Code   Is the patient medically ready for discharge?:     Reason for patient still in hospital (select all that apply): Patient trending condition and Treatment  Discharge Plan A: Skilled Nursing Facility                  Bryanna Teixeira MD  Department of Hospital Medicine   [facility] ([place]

## 2024-06-22 NOTE — CARE UPDATE
"RAPID RESPONSE NURSE CHART REVIEW        Chart Reviewed: 06/22/2024, 6:53 AM    MRN: [id number]  Bed: 08349/23926 A    Dx: Syncope and collapse    Dean Hahn has a past medical history of Cataract, HLD (hyperlipidemia), and Hypertension.    Last VS: BP 97/63 (BP Location: Left arm, Patient Position: Lying)   Pulse 93   Temp 98.1 °F (36.7 °C) (Oral)   Resp 18   Ht 5' 3" (1.6 m)   Wt 49.9 kg (110 lb)   SpO2 100%   BMI 19.49 kg/m²     24H Vital Sign Range:  Temp:  [98.1 °F (36.7 °C)-99.7 °F (37.6 °C)]   Pulse:  []   Resp:  [16-18]   BP: ()/(51-88)   SpO2:  [95 %-100 %]     Level of Consciousness (AVPU): alert    Recent Labs     06/21/24  1214 06/21/24  1736 06/21/24  2312   WBC 23.44* 21.27* 20.82*   HGB 8.1* 9.7* 9.5*   HCT 24.1* 28.4* 27.7*   * 117* 119*       Recent Labs     06/20/24  0518 06/21/24  0236 06/22/24  0341   * 132* 134*   K 4.1 4.4 4.4   * 108 110   CO2 17* 16* 18*   BUN 45* 50* 56*   CREATININE 1.5* 1.5* 1.5*   GLU 97 146* 106   PHOS 2.5* 2.6* 3.0   MG 2.1 2.2 2.5        No results for input(s): "PH", "PCO2", "PO2", "HCO3", "POCSATURATED", "BE" in the last 72 hours.     OXYGEN:  Flow (L/min) (Oxygen Therapy): 15          MEWS score: 1    Rounding completed with charge YOLI Johansen reports H/H stable, no signs on bleeding. No additional concerns verbalized at this time. Instructed to call [phone] for further concerns or assistance.    Wilmar Newby RN        "

## 2024-06-22 NOTE — HPI
82 yo M with PMH notable for multiple CVA (most recently left pontine 6/11 s/p TNK 6/10 now on DAPT), hepatic cirrhosis, CKD 3 presented  for episode of LOC/unresponsiveness. Per chart review, patient largely bedbound. Maximal assist in bed mobility, maximal assist sit to stand, oriented to self only, requires max verbal and tactile cues for command following.  Noted with max tone/stiffness throughout trunk and extremities requiring max assist with all functional tasks and mobility.      Hospital course notable for anemia requiring blood transfusion, found to have left psoas hematoma.    Vascular Surgery consulted for findings on an arterial duplex notable for tibial occlusions, duplex obtained for pain when squeezing calfs and elevated leukocytosis workup.

## 2024-06-22 NOTE — ASSESSMENT & PLAN NOTE
Noted 1/2023:  L corona radiata, L temporal lobe, R splenium of the corpus callosum  MRI ordered to assess for any development or conversion of old infarct territories  HOLDING AP AGENTS DUE TO PSOAS HEMATOMA  HOLDING STATIN DUE TO ELEVATED LFTS    Antithrombotics for secondary stroke prevention: Antiplatelets: Aspirin: 81 mg daily  Clopidogrel: 75 mg daily    Statins for secondary stroke prevention and hyperlipidemia, if present:   Statins: Atorvastatin- 40 mg daily (holding to trend LFT, can restart if remaining stable).     Aggressive risk factor modification: HTN     Rehab efforts: The patient has been evaluated by a stroke team provider and the therapy needs have been fully considered based off the presenting complaints and exam findings. The following therapy evaluations are needed: PT evaluate and treat, OT evaluate and treat    Diagnostics ordered/pending: CT scan of head without contrast to asses brain parenchyma, HgbA1C to assess blood glucose levels    VTE prophylaxis: Heparin 5000 units SQ every 8 hours    BP parameters: Infarct: No intervention, SBP <220

## 2024-06-22 NOTE — ASSESSMENT & PLAN NOTE
Patient has acute blood loss due to hemorrhage, the hemorrhage is due to  RP hematoma likely related to TNK administration on 6/10 , patient does have a propensity for bleeding due to a medication, the medication is TNK with AP agents for stroke.. Will trend hemoglobin/hematocrit Every 8 hours, as well as monitor and correct for any coagulation defects. CBC and vital signs have been reviewed and last CBC was noted-   Lab Results   Component Value Date    WBC 18.94 (H) 06/22/2024    HGB 8.5 (L) 06/22/2024    HCT 24.2 (L) 06/22/2024    MCV 91 06/22/2024     (L) 06/22/2024         Will order a type and screen and consent patient for blood transfusion. Will transfuse if Hgb is <7g/dl (<8g/dl in cases of active ACS) or if patient has rapid bleeding leading to hemodynamic instability.

## 2024-06-22 NOTE — PLAN OF CARE
Problem: Adult Inpatient Plan of Care  Goal: Plan of Care Review  Outcome: Progressing  Goal: Patient-Specific Goal (Individualized)  Outcome: Progressing  Goal: Absence of Hospital-Acquired Illness or Injury  Outcome: Progressing  Goal: Optimal Comfort and Wellbeing  Outcome: Progressing  Goal: Readiness for Transition of Care  Outcome: Progressing     Problem: Sepsis/Septic Shock  Goal: Optimal Coping  Outcome: Progressing  Goal: Absence of Bleeding  Outcome: Progressing  Goal: Blood Glucose Level Within Targeted Range  Outcome: Progressing  Goal: Absence of Infection Signs and Symptoms  Outcome: Progressing  Goal: Optimal Nutrition Intake  Outcome: Progressing     Problem: Wound  Goal: Optimal Coping  Outcome: Progressing  Goal: Optimal Functional Ability  Outcome: Progressing  Goal: Absence of Infection Signs and Symptoms  Outcome: Progressing  Goal: Improved Oral Intake  Outcome: Progressing  Goal: Optimal Pain Control and Function  Outcome: Progressing  Goal: Skin Health and Integrity  Outcome: Progressing  Goal: Optimal Wound Healing  Outcome: Progressing     Problem: Skin Injury Risk Increased  Goal: Skin Health and Integrity  Outcome: Progressing     Problem: Fall Injury Risk  Goal: Absence of Fall and Fall-Related Injury  Outcome: Progressing     Problem: Infection  Goal: Absence of Infection Signs and Symptoms  Outcome: Progressing    Pt had an uneventful night. VSS. Pt c/o of pain to lower extremites when repositioning. Pt had a large bm with blood in it (See previous note)  Pt is free of falls and injuries. Bed in lowest position and call light within reach. Safety maintained

## 2024-06-22 NOTE — PLAN OF CARE
Problem: Occupational Therapy  Goal: Occupational Therapy Goal  Description: Goals to be met by: 7/19/24     Patient will increase functional independence with ADLs by performing:    UE Dressing with Moderate Assistance.  LE Dressing with Maximum Assistance.  Grooming while seated with Moderate Assistance.  Toileting from bedside commode with Moderate Assistance for hygiene and clothing management.   Sitting at edge of bed x10 minutes with Minimal Assistance.  Toilet transfer to bedside commode with Moderate Assistance.    Outcome: Progressing     Goals remain appropriate

## 2024-06-22 NOTE — PT/OT/SLP PROGRESS
Occupational Therapy   Treatment    Name: Dean Hahn  MRN: [id number]  Admitting Diagnosis:  Syncope and collapse       Recommendations:     Discharge Recommendations: Moderate Intensity Therapy  Discharge Equipment Recommendations:  bedside commode, grab bar  Barriers to discharge:   (increased (A) required)    Assessment:     Dean Hahn is a 83 y.o. male with a medical diagnosis of Syncope and collapse.  He presents with good participation and motivation. Pt with improved alertness and active participation during session. Pt continues to be at risk for falls. Performance deficits affecting function are weakness, impaired endurance, impaired self care skills, impaired functional mobility, impaired balance, decreased safety awareness, decreased lower extremity function, decreased upper extremity function, decreased coordination, impaired cognition, visual deficits.     Rehab Prognosis:  Fair; patient would benefit from acute skilled OT services to address these deficits and reach maximum level of function.       Plan:     Patient to be seen 4 x/week to address the above listed problems via self-care/home management, therapeutic activities, therapeutic exercises, neuromuscular re-education, cognitive retraining  Plan of Care Expires: 07/19/24  Plan of Care Reviewed with: patient, family    Subjective     Chief Complaint: none stated  Patient/Family Comments/goals: Pt was agreeable to therapy session.  Pain/Comfort:  Pain Rating 1: 0/10  Pain Rating Post-Intervention 1: 0/10    Objective:     Communicated with: Rn prior to session.  Patient found supine with Condom Catheter, telemetry (spouse, daughter, & son-in-law present during session) upon OT entry to room.    General Precautions: Standard, fall, hearing impaired (right visual inattention)    Orthopedic Precautions:N/A  Braces: N/A     Occupational Performance:     Bed Mobility:    Pt completed rolling in (B) directions with moderate  assistance  Patient completed Scooting/Bridging with maximal assistance with scooting forward & to the left along EOB  Patient completed Supine to Sit with maximal assistance  Patient completed Sit to Supine with maximal assistance     Functional Mobility/Transfers:  Patient completed Sit <> Stand Transfer with moderate assistance  with  no assistive device from EOB  Functional Mobility: Moderate (A) with static standing balance    Activities of Daily Living:  Upper Body Dressing: maximal assistance with donning gown around back while seated EOB  Toileting: maximal assistance while supine due to BM incontinence      AMPAC 6 Click ADL: 10    Treatment & Education:  Pt required CGA-Moderate (A) for postural control while seated EOB.  Provided verbal & physical cues to facilitate postural control. Pt participated in visual scanning activities to the right while seated EOB to facilitate increased visual attention to the right.  Pt had no further questions & when asked whether there were any concerns pt reported none.        Patient left supine with all lines intact, call button in reach, bed alarm on, RN notified, and family present    GOALS:   Multidisciplinary Problems       Occupational Therapy Goals          Problem: Occupational Therapy    Goal Priority Disciplines Outcome Interventions   Occupational Therapy Goal     OT, PT/OT Progressing    Description: Goals to be met by: 7/19/24     Patient will increase functional independence with ADLs by performing:    UE Dressing with Moderate Assistance.  LE Dressing with Maximum Assistance.  Grooming while seated with Moderate Assistance.  Toileting from bedside commode with Moderate Assistance for hygiene and clothing management.   Sitting at edge of bed x10 minutes with Minimal Assistance.  Toilet transfer to bedside commode with Moderate Assistance.                         Time Tracking:     OT Date of Treatment: 06/22/24  OT Start Time: 1101  OT Stop Time: 1133  OT  Total Time (min): 32 min    Billable Minutes:Self Care/Home Management 16  Therapeutic Activity 16    OT/ELLYN: OT          6/22/2024

## 2024-06-22 NOTE — ASSESSMENT & PLAN NOTE
83-year-old gentleman with multiple medical comorbidities including cirrhosis, CKD stage 3, multiple strokes on DAPT, bed-bound status, found to have incidental tibial occlusions on arterial duplex obtained for pain when squeezing calf and workup for leukocytosis.    -   No vascular surgery intervention indicated, would not anticoagulate for these incidental findings.

## 2024-06-22 NOTE — ASSESSMENT & PLAN NOTE
Patient was found to have thrombocytopenia, the likely etiology is secondary to cirrhosis/portal hypertension, will monitor the platelets Daily. Will transfuse if platelet count is <10k. Hold DVT prophylaxis if platelets are <50k. The patient's platelet results have been reviewed and are listed below.  Recent Labs   Lab 06/22/24  0830   *

## 2024-06-22 NOTE — SUBJECTIVE & OBJECTIVE
Medications Prior to Admission   Medication Sig Dispense Refill Last Dose    aspirin 81 MG Chew Take 1 tablet (81 mg total) by mouth once daily. 90 tablet 1     atorvastatin (LIPITOR) 20 MG tablet Take 1 tablet (20 mg total) by mouth once daily. 90 tablet 1     ciclopirox (PENLAC) 8 % Soln Apply topically nightly. 6.6 mL 6     clopidogreL (PLAVIX) 75 mg tablet Take 1 tablet (75 mg total) by mouth once daily. for 14 doses 14 tablet 0     cyanocobalamin (VITAMIN B-12) 100 MCG tablet Take 1 tablet (100 mcg total) by mouth once daily. 90 tablet 1     donepeziL (ARICEPT) 5 MG tablet Take 1 tablet (5 mg total) by mouth every evening. 90 tablet 1     dorzolamide-timolol 2-0.5% (COSOPT) 22.3-6.8 mg/mL ophthalmic solution Place 1 drop into both eyes 2 (two) times daily. 10 mL 1     ergocalciferol (ERGOCALCIFEROL) 50,000 unit Cap Take 1 capsule (50,000 Units total) by mouth every 7 days. 12 capsule 1     lactulose (CHRONULAC) 20 gram/30 mL Soln Take 15 mLs (10 g total) by mouth once daily. 300 mL 1     metoprolol succinate (TOPROL-XL) 25 MG 24 hr tablet TAKE 1 TABLET BY MOUTH EVERY DAY IN THE EVENING 90 tablet 3     multivitamin capsule Take 1 capsule by mouth once daily.       olmesartan (BENICAR) 20 MG tablet Take 1 tablet (20 mg total) by mouth once daily. 90 tablet 3     omega-3 fatty acids/fish oil (FISH OIL-OMEGA-3 FATTY ACIDS) 300-1,000 mg capsule Take 1 capsule by mouth once daily. 90 capsule 1     spironolactone (ALDACTONE) 25 MG tablet Take 1 tablet (25 mg total) by mouth once daily. 90 tablet 1        Review of patient's allergies indicates:  No Known Allergies    Past Medical History:   Diagnosis Date    Cataract     HLD (hyperlipidemia)     Hypertension      Past Surgical History:   Procedure Laterality Date    CHOLECYSTECTOMY      EYE SURGERY Right     cataract removal surgery     Family History       Problem Relation (Age of Onset)    Coronary artery disease Father    Heart attack Father    Hypertension Mother,  Brother    No Known Problems Sister, Daughter, Son          Tobacco Use    Smoking status: Never    Smokeless tobacco: Never   Substance and Sexual Activity    Alcohol use: Yes    Drug use: Never    Sexual activity: Not Currently     Partners: Female     Birth control/protection: None     Comment: wife     Review of Systems   Unable to perform ROS: Patient nonverbal     Objective:     Vital Signs (Most Recent):  Temp: 98.4 °F (36.9 °C) (06/21/24 2017)  Pulse: 84 (06/21/24 2017)  Resp: 18 (06/21/24 2017)  BP: 133/64 (06/21/24 2017)  SpO2: 99 % (06/21/24 2017) Vital Signs (24h Range):  Temp:  [98.3 °F (36.8 °C)-99.7 °F (37.6 °C)] 98.4 °F (36.9 °C)  Pulse:  [] 84  Resp:  [16-18] 18  SpO2:  [95 %-100 %] 99 %  BP: ()/(51-97) 133/64     Weight: 49.9 kg (110 lb)  Body mass index is 19.49 kg/m².      Physical Exam  HENT:      Head: Normocephalic.   Cardiovascular:      Rate and Rhythm: Normal rate.      Pulses:           Femoral pulses are 2+ on the right side and 2+ on the left side.     Comments: R biphasic PT signal.   L monophasic PT/DP signal.   Pulmonary:      Effort: Pulmonary effort is normal.   Musculoskeletal:      Comments: Unable to follow commands to lift leg off bed.    Neurological:      Mental Status: He is alert.      Comments: Able to mutter name.           Significant Labs:  All pertinent labs from the last 24 hours have been reviewed.    Significant Diagnostics:  I have reviewed all pertinent imaging results/findings within the past 24 hours.

## 2024-06-22 NOTE — ASSESSMENT & PLAN NOTE
Noted on vascular arterial US of lower extremities:   -TIMUR 1.2 on the right and TIMUR of 1 on the left.  -Occlusion of the L PT with no collateral vessels identified, suggestive of an acute process.  -Intermittent loss of flow within the bilateral AT with multiple collateral vessels, suggestive of chronic occlusions.    PLAN:  -No AP agents given RP hematoma  -not amenable to vascular procedure  -CTM

## 2024-06-22 NOTE — PT/OT/SLP PROGRESS
Physical Therapy Treatment    Patient Name:  Dean Hahn   MRN:  [id number]    Recommendations:     Discharge Recommendations: Moderate Intensity Therapy  Discharge Equipment Recommendations: lift device, wheelchair, hospital bed  Barriers to discharge:  increase level of assistance required    Assessment:     Dean Hahn is a 83 y.o. male admitted with a medical diagnosis of Syncope and collapse.  He presents with the following impairments/functional limitations: weakness, impaired endurance, impaired functional mobility, gait instability, decreased coordination, decreased safety awareness.  Pt was agreeable and tolerated session fairly well. Increase time assisting with pericare cleaning and assisting with drawsheet transfer with transport. Pt continues to required increase assistance with all activities. Vital monitor during session. No symptoms reported. Patient continues to demonstrate the need for moderate intensity therapy on a daily basis post acute exhibited by decreased independence with functional mobility.    Rehab Prognosis: Good and Fair; patient would benefit from acute skilled PT services to address these deficits and reach maximum level of function.    Recent Surgery: * No surgery found *      Plan:     During this hospitalization, patient to be seen 4 x/week to address the identified rehab impairments via gait training, therapeutic activities, therapeutic exercises, neuromuscular re-education and progress toward the following goals:    Plan of Care Expires:  07/19/24    Subjective     Chief Complaint: none stated   Pain/Comfort:  Pain Rating 1:  (none verbalized)      Objective:     Communicated with nurse prior to session.  Patient found HOB elevated with Condom Catheter, telemetry upon PT entry to room.     General Precautions: Standard, fall, hearing impaired  Orthopedic Precautions: N/A  Braces: N/A  Respiratory Status: Room air     Functional Mobility:  Additional staff  present: N/A  Bed Mobility:   Rolling/Turning to Left/Right: maximal assistance  Scooting   to EOB: maximal assistance  Along EOB: total assistance  Supine to Sit: maximal assistance; HOB elevated  Sit to Supine: maximal assistance  Transfers:   Not assessed  Balance:  Static/Dynamic sitting EOB balance: Chandler-modA, brief periods of CGA  Completed therex and anterior weight shifting/core strengthening.    Noted increase posterior leaning. Cues on hand placement on lap or bed  Tolerated ~15 minutes     AM-PAC 6 CLICK MOBILITY  Turning over in bed (including adjusting bedclothes, sheets and blankets)?: 2  Sitting down on and standing up from a chair with arms (e.g., wheelchair, bedside commode, etc.): 2  Moving from lying on back to sitting on the side of the bed?: 2  Moving to and from a bed to a chair (including a wheelchair)?: 2  Need to walk in hospital room?: 1  Climbing 3-5 steps with a railing?: 1  Basic Mobility Total Score: 10       Treatment & Education:  Time included assisting PCT with pericare cleaning as pt was soiled, then assisting transport with drawsheet transfer to stretch.   Seated BLE therex x10 reps: ankle pumps and long arc quads  Increase assistance with LAQ on LLE  Patient educated on role of therapy, goals of session, and benefits of out of bed mobility.   Instructed on use of call button and importance of calling nursing staff for assistance with mobility   Questions/concerns addressed within PTA scope of practice  Pt verbalized understanding.      Patient left  in a stretch with transport  with  nurse notified..    GOALS:   Multidisciplinary Problems       Physical Therapy Goals          Problem: Physical Therapy    Goal Priority Disciplines Outcome Goal Variances Interventions   Physical Therapy Goal     PT, PT/OT Progressing     Description: Goals to be met by:      Patient will increase functional independence with mobility by performin. Supine to sit with Moderate  Assistance  2. Sit to supine with Moderate Assistance  3. Sit to stand transfer with Maximum Assistance  4. Bed to chair transfer with Maximum Assistance using LRAD  5. Gait  x 10 feet with Moderate Assistance using LRAD.                          Time Tracking:     PT Received On: 06/22/24  PT Start Time: 1513     PT Stop Time: 1553  PT Total Time (min): 40 min     Billable Minutes: Therapeutic Activity 30 and Therapeutic Exercise 10    Treatment Type: Treatment        Number of PTA visits since last PT visit: 2     06/22/2024

## 2024-06-22 NOTE — ASSESSMENT & PLAN NOTE
"This patient does not have evidence of infective focus  My overall impression is  possible sepsis, tachycardic and leukocytosis .  Source: Urinary Tract and Abdominal  Antibiotics given-   Antibiotics (72h ago, onward)      Start     Stop Route Frequency Ordered    06/21/24 1115  cefTRIAXone (ROCEPHIN) 2 g in D5W 100 mL IVPB (MB+)         -- IV Every 24 hours (non-standard times) 06/21/24 1010          Latest lactate reviewed-  No results for input(s): "LACTATE", "POCLAC" in the last 72 hours.    Organ dysfunction indicated by Encephalopathy    Fluid challenge: 2L for approx 30cc/kg    Post- resuscitation assessment No - Post resuscitation assessment not needed       Will Not start Pressors- Levophed for MAP of 65  Source control achieved by: CTX 2g q24h    Procal stable, may be elevated due to psoas hematoma  Infectious workup unremarkable  BP lability d/t RP hematoma     -Continue CTX  -Stop Vanc  -continue to monitor  "

## 2024-06-22 NOTE — CONSULTS
[facility] ([place] [id number])  Vascular Surgery  Consult Note    Inpatient consult to Vascular Surgery  Consult performed by: Chris Norton MD  Consult ordered by: Sasha Janes,         Subjective:     Chief Complaint/Reason for Admission: Syncope    History of Present Illness: 84 yo M with PMH notable for multiple CVA (most recently left pontine 6/11 s/p TNK 6/10 now on DAPT), hepatic cirrhosis, CKD 3 presented  for episode of LOC/unresponsiveness. Per chart review, patient largely bedbound. Maximal assist in bed mobility, maximal assist sit to stand, oriented to self only, requires max verbal and tactile cues for command following.  Noted with max tone/stiffness throughout trunk and extremities requiring max assist with all functional tasks and mobility.      Hospital course notable for anemia requiring blood transfusion, found to have left psoas hematoma.    Vascular Surgery consulted for findings on an arterial duplex notable for tibial occlusions, duplex obtained for pain when squeezing calfs and elevated leukocytosis workup.      Medications Prior to Admission   Medication Sig Dispense Refill Last Dose    aspirin 81 MG Chew Take 1 tablet (81 mg total) by mouth once daily. 90 tablet 1     atorvastatin (LIPITOR) 20 MG tablet Take 1 tablet (20 mg total) by mouth once daily. 90 tablet 1     ciclopirox (PENLAC) 8 % Soln Apply topically nightly. 6.6 mL 6     clopidogreL (PLAVIX) 75 mg tablet Take 1 tablet (75 mg total) by mouth once daily. for 14 doses 14 tablet 0     cyanocobalamin (VITAMIN B-12) 100 MCG tablet Take 1 tablet (100 mcg total) by mouth once daily. 90 tablet 1     donepeziL (ARICEPT) 5 MG tablet Take 1 tablet (5 mg total) by mouth every evening. 90 tablet 1     dorzolamide-timolol 2-0.5% (COSOPT) 22.3-6.8 mg/mL ophthalmic solution Place 1 drop into both eyes 2 (two) times daily. 10 mL 1     ergocalciferol (ERGOCALCIFEROL) 50,000 unit Cap Take 1 capsule (50,000 Units total) by mouth every 7 days.  12 capsule 1     lactulose (CHRONULAC) 20 gram/30 mL Soln Take 15 mLs (10 g total) by mouth once daily. 300 mL 1     metoprolol succinate (TOPROL-XL) 25 MG 24 hr tablet TAKE 1 TABLET BY MOUTH EVERY DAY IN THE EVENING 90 tablet 3     multivitamin capsule Take 1 capsule by mouth once daily.       olmesartan (BENICAR) 20 MG tablet Take 1 tablet (20 mg total) by mouth once daily. 90 tablet 3     omega-3 fatty acids/fish oil (FISH OIL-OMEGA-3 FATTY ACIDS) 300-1,000 mg capsule Take 1 capsule by mouth once daily. 90 capsule 1     spironolactone (ALDACTONE) 25 MG tablet Take 1 tablet (25 mg total) by mouth once daily. 90 tablet 1        Review of patient's allergies indicates:  No Known Allergies    Past Medical History:   Diagnosis Date    Cataract     HLD (hyperlipidemia)     Hypertension      Past Surgical History:   Procedure Laterality Date    CHOLECYSTECTOMY      EYE SURGERY Right     cataract removal surgery     Family History       Problem Relation (Age of Onset)    Coronary artery disease Father    Heart attack Father    Hypertension Mother, Brother    No Known Problems Sister, Daughter, Son          Tobacco Use    Smoking status: Never    Smokeless tobacco: Never   Substance and Sexual Activity    Alcohol use: Yes    Drug use: Never    Sexual activity: Not Currently     Partners: Female     Birth control/protection: None     Comment: wife     Review of Systems   Unable to perform ROS: Patient nonverbal     Objective:     Vital Signs (Most Recent):  Temp: 98.4 °F (36.9 °C) (06/21/24 2017)  Pulse: 84 (06/21/24 2017)  Resp: 18 (06/21/24 2017)  BP: 133/64 (06/21/24 2017)  SpO2: 99 % (06/21/24 2017) Vital Signs (24h Range):  Temp:  [98.3 °F (36.8 °C)-99.7 °F (37.6 °C)] 98.4 °F (36.9 °C)  Pulse:  [] 84  Resp:  [16-18] 18  SpO2:  [95 %-100 %] 99 %  BP: ()/(51-97) 133/64     Weight: 49.9 kg (110 lb)  Body mass index is 19.49 kg/m².      Physical Exam  HENT:      Head: Normocephalic.   Cardiovascular:      Rate  and Rhythm: Normal rate.      Pulses:           Femoral pulses are 2+ on the right side and 2+ on the left side.     Comments: R biphasic PT signal.   L monophasic PT/DP signal.   Pulmonary:      Effort: Pulmonary effort is normal.   Musculoskeletal:      Comments: Unable to follow commands to lift leg off bed.    Neurological:      Mental Status: He is alert.      Comments: Able to mutter name.           Significant Labs:  All pertinent labs from the last 24 hours have been reviewed.    Significant Diagnostics:  I have reviewed all pertinent imaging results/findings within the past 24 hours.  Assessment/Plan:     PAD (peripheral artery disease)   83-year-old gentleman with multiple medical comorbidities including cirrhosis, CKD stage 3, multiple strokes on DAPT, bed-bound status, found to have incidental tibial occlusions on arterial duplex obtained for pain when squeezing calf and workup for leukocytosis.    -   No vascular surgery intervention indicated, would not anticoagulate for these incidental findings.       Thank you for your consult.    Chris Norton MD  Vascular Surgery  [facility] ([facility]

## 2024-06-22 NOTE — ASSESSMENT & PLAN NOTE
Chronic, controlled. Latest blood pressure and vitals reviewed-     Temp:  [97.8 °F (36.6 °C)-99.7 °F (37.6 °C)]   Pulse:  []   Resp:  [16-18]   BP: ()/(51-88)   SpO2:  [95 %-100 %] .   Home meds for hypertension were reviewed and noted below.   Hypertension Medications               metoprolol succinate (TOPROL-XL) 25 MG 24 hr tablet TAKE 1 TABLET BY MOUTH EVERY DAY IN THE EVENING    olmesartan (BENICAR) 20 MG tablet Take 1 tablet (20 mg total) by mouth once daily.    spironolactone (ALDACTONE) 25 MG tablet Take 1 tablet (25 mg total) by mouth once daily.            While in the hospital, will manage blood pressure as follows; Adjust home antihypertensive regimen as follows- Hold while due to psoas hematoma     Will utilize p.r.n. blood pressure medication only if patient's blood pressure greater than 220/110 and he develops symptoms such as worsening chest pain or shortness of breath.

## 2024-06-22 NOTE — SUBJECTIVE & OBJECTIVE
Interval History: Had bloody BM overnight, Hb 8.5 this AM. No acute complaints currently. Arterial doppler findings as described below. No intervention per vascular.     Review of Systems   Constitutional:  Negative for chills and fever.   Respiratory:  Negative for shortness of breath.    Cardiovascular:  Negative for chest pain.   Gastrointestinal:  Negative for abdominal pain.   Genitourinary:  Negative for dysuria.   Musculoskeletal:  Positive for myalgias. Negative for arthralgias.        B/l leg pain   Psychiatric/Behavioral:  Positive for decreased concentration.      Objective:     Vital Signs (Most Recent):  Temp: 98.3 °F (36.8 °C) (06/22/24 0709)  Pulse: 76 (06/22/24 1046)  Resp: 17 (06/22/24 0709)  BP: 114/74 (06/22/24 0706)  SpO2: 99 % (06/22/24 0900) Vital Signs (24h Range):  Temp:  [98.1 °F (36.7 °C)-99.7 °F (37.6 °C)] 98.3 °F (36.8 °C)  Pulse:  [] 76  Resp:  [16-18] 17  SpO2:  [95 %-100 %] 99 %  BP: ()/(51-88) 114/74     Weight: 49.9 kg (110 lb)  Body mass index is 19.49 kg/m².    Intake/Output Summary (Last 24 hours) at 6/22/2024 1112  Last data filed at 6/22/2024 0153  Gross per 24 hour   Intake 0 ml   Output 1050 ml   Net -1050 ml         Physical Exam  Constitutional:       Comments: Thin appearing, more conversant and alert   HENT:      Head: Normocephalic and atraumatic.      Mouth/Throat:      Mouth: Mucous membranes are dry.      Pharynx: Oropharynx is clear.   Eyes:      Extraocular Movements: Extraocular movements intact.   Cardiovascular:      Rate and Rhythm: Normal rate and regular rhythm.      Pulses: Normal pulses.      Heart sounds: Normal heart sounds.   Pulmonary:      Effort: Pulmonary effort is normal.      Breath sounds: Normal breath sounds.   Abdominal:      General: Abdomen is flat.      Palpations: Abdomen is soft.      Comments: Slight wincing to palpation, but denied pain    Musculoskeletal:         General: Tenderness present. No swelling.      Comments:  Bilateral lower extremities highly tender to light palpation. No erythema, no wounds noted.    Skin:     General: Skin is warm and dry.      Findings: No bruising.   Neurological:      Comments: AO to self  Raspy voice, some dysarthria   Psychiatric:      Comments: Decreased concentration. No agitation.             Significant Labs: All pertinent labs within the past 24 hours have been reviewed.  CBC:   Recent Labs   Lab 06/21/24  1736 06/21/24  2312 06/22/24  0830   WBC 21.27* 20.82* 18.94*   HGB 9.7* 9.5* 8.5*   HCT 28.4* 27.7* 24.2*   * 119* 122*     CMP:   Recent Labs   Lab 06/21/24  0236 06/22/24  0341   * 134*   K 4.4 4.4    110   CO2 16* 18*   * 106   BUN 50* 56*   CREATININE 1.5* 1.5*   CALCIUM 8.7 8.9   PROT 5.7* 5.4*   ALBUMIN 1.9* 1.8*   BILITOT 3.6* 4.4*   ALKPHOS 146* 124   * 158*   ALT 96* 96*   ANIONGAP 8 6*       Significant Imaging: I have reviewed all pertinent imaging results/findings within the past 24 hours.  Arterial doppler LE:     Ankle-brachial index of 1.2 on the right and 1 on the left.     Occlusion of the left posterior tibialis artery with no collateral vessels identified, suggestive of an acute process.     Intermittent loss of flow within the bilateral anterior tibialis arteries with multiple collateral vessels, suggestive of chronic occlusions.

## 2024-06-22 NOTE — PROGRESS NOTES
06/21/24 2324   Vital Signs   Temp 99.1 °F (37.3 °C)   Temp Source Oral   Pulse 86   Heart Rate Source Monitor   Resp 18   SpO2 99 %   /67   MAP (mmHg) 76   BP Location Left arm   Patient Position Lying     Pt just had large bm with red blood mixed into stool. VSS. Pt in no distress. Team 4 notified made aware

## 2024-06-23 PROBLEM — Z71.89 ACP (ADVANCE CARE PLANNING): Status: ACTIVE | Noted: 2024-06-23

## 2024-06-23 LAB
ALBUMIN SERPL BCP-MCNC: 1.6 G/DL (ref 3.5–5.2)
ALP SERPL-CCNC: 112 U/L (ref 55–135)
ALT SERPL W/O P-5'-P-CCNC: 90 U/L (ref 10–44)
ANION GAP SERPL CALC-SCNC: 4 MMOL/L (ref 8–16)
AST SERPL-CCNC: 140 U/L (ref 10–40)
BACTERIA BLD CULT: NORMAL
BACTERIA BLD CULT: NORMAL
BASOPHILS # BLD AUTO: 0.01 K/UL (ref 0–0.2)
BASOPHILS # BLD AUTO: 0.02 K/UL (ref 0–0.2)
BASOPHILS # BLD AUTO: 0.02 K/UL (ref 0–0.2)
BASOPHILS NFR BLD: 0.1 % (ref 0–1.9)
BILIRUB SERPL-MCNC: 3.5 MG/DL (ref 0.1–1)
BUN SERPL-MCNC: 62 MG/DL (ref 8–23)
CALCIUM SERPL-MCNC: 8.7 MG/DL (ref 8.7–10.5)
CHLORIDE SERPL-SCNC: 106 MMOL/L (ref 95–110)
CO2 SERPL-SCNC: 19 MMOL/L (ref 23–29)
CREAT SERPL-MCNC: 1.6 MG/DL (ref 0.5–1.4)
DIFFERENTIAL METHOD BLD: ABNORMAL
EOSINOPHIL # BLD AUTO: 0.3 K/UL (ref 0–0.5)
EOSINOPHIL # BLD AUTO: 0.4 K/UL (ref 0–0.5)
EOSINOPHIL # BLD AUTO: 0.5 K/UL (ref 0–0.5)
EOSINOPHIL NFR BLD: 1.6 % (ref 0–8)
EOSINOPHIL NFR BLD: 3.1 % (ref 0–8)
EOSINOPHIL NFR BLD: 3.6 % (ref 0–8)
ERYTHROCYTE [DISTWIDTH] IN BLOOD BY AUTOMATED COUNT: 16.8 % (ref 11.5–14.5)
ERYTHROCYTE [DISTWIDTH] IN BLOOD BY AUTOMATED COUNT: 17.1 % (ref 11.5–14.5)
ERYTHROCYTE [DISTWIDTH] IN BLOOD BY AUTOMATED COUNT: 17.4 % (ref 11.5–14.5)
EST. GFR  (NO RACE VARIABLE): 43 ML/MIN/1.73 M^2
GLUCOSE SERPL-MCNC: 91 MG/DL (ref 70–110)
HCT VFR BLD AUTO: 21.2 % (ref 40–54)
HCT VFR BLD AUTO: 21.6 % (ref 40–54)
HCT VFR BLD AUTO: 22.9 % (ref 40–54)
HGB BLD-MCNC: 7.4 G/DL (ref 14–18)
HGB BLD-MCNC: 7.6 G/DL (ref 14–18)
HGB BLD-MCNC: 7.8 G/DL (ref 14–18)
IMM GRANULOCYTES # BLD AUTO: 0.08 K/UL (ref 0–0.04)
IMM GRANULOCYTES # BLD AUTO: 0.12 K/UL (ref 0–0.04)
IMM GRANULOCYTES # BLD AUTO: 0.13 K/UL (ref 0–0.04)
IMM GRANULOCYTES NFR BLD AUTO: 0.6 % (ref 0–0.5)
IMM GRANULOCYTES NFR BLD AUTO: 0.8 % (ref 0–0.5)
IMM GRANULOCYTES NFR BLD AUTO: 0.9 % (ref 0–0.5)
LYMPHOCYTES # BLD AUTO: 1.5 K/UL (ref 1–4.8)
LYMPHOCYTES # BLD AUTO: 1.7 K/UL (ref 1–4.8)
LYMPHOCYTES # BLD AUTO: 1.8 K/UL (ref 1–4.8)
LYMPHOCYTES NFR BLD: 10.8 % (ref 18–48)
LYMPHOCYTES NFR BLD: 11.6 % (ref 18–48)
LYMPHOCYTES NFR BLD: 12.6 % (ref 18–48)
MAGNESIUM SERPL-MCNC: 2.5 MG/DL (ref 1.6–2.6)
MCH RBC QN AUTO: 31.5 PG (ref 27–31)
MCH RBC QN AUTO: 31.6 PG (ref 27–31)
MCH RBC QN AUTO: 31.8 PG (ref 27–31)
MCHC RBC AUTO-ENTMCNC: 34.1 G/DL (ref 32–36)
MCHC RBC AUTO-ENTMCNC: 34.9 G/DL (ref 32–36)
MCHC RBC AUTO-ENTMCNC: 35.2 G/DL (ref 32–36)
MCV RBC AUTO: 90 FL (ref 82–98)
MCV RBC AUTO: 91 FL (ref 82–98)
MCV RBC AUTO: 92 FL (ref 82–98)
MONOCYTES # BLD AUTO: 1.6 K/UL (ref 0.3–1)
MONOCYTES # BLD AUTO: 1.6 K/UL (ref 0.3–1)
MONOCYTES # BLD AUTO: 1.9 K/UL (ref 0.3–1)
MONOCYTES NFR BLD: 11.6 % (ref 4–15)
MONOCYTES NFR BLD: 11.8 % (ref 4–15)
MONOCYTES NFR BLD: 12 % (ref 4–15)
NEUTROPHILS # BLD AUTO: 11.7 K/UL (ref 1.8–7.7)
NEUTROPHILS # BLD AUTO: 9.8 K/UL (ref 1.8–7.7)
NEUTROPHILS # BLD AUTO: 9.9 K/UL (ref 1.8–7.7)
NEUTROPHILS NFR BLD: 71.7 % (ref 38–73)
NEUTROPHILS NFR BLD: 73.1 % (ref 38–73)
NEUTROPHILS NFR BLD: 73.9 % (ref 38–73)
NRBC BLD-RTO: 0 /100 WBC
OHS QRS DURATION: 72 MS
OHS QTC CALCULATION: 438 MS
PHOSPHATE SERPL-MCNC: 2.2 MG/DL (ref 2.7–4.5)
PLATELET # BLD AUTO: 109 K/UL (ref 150–450)
PLATELET # BLD AUTO: 109 K/UL (ref 150–450)
PLATELET # BLD AUTO: 122 K/UL (ref 150–450)
PMV BLD AUTO: 9.2 FL (ref 9.2–12.9)
PMV BLD AUTO: 9.6 FL (ref 9.2–12.9)
PMV BLD AUTO: 9.8 FL (ref 9.2–12.9)
POTASSIUM SERPL-SCNC: 4.3 MMOL/L (ref 3.5–5.1)
PROT SERPL-MCNC: 5 G/DL (ref 6–8.4)
RBC # BLD AUTO: 2.34 M/UL (ref 4.6–6.2)
RBC # BLD AUTO: 2.39 M/UL (ref 4.6–6.2)
RBC # BLD AUTO: 2.48 M/UL (ref 4.6–6.2)
SODIUM SERPL-SCNC: 129 MMOL/L (ref 136–145)
WBC # BLD AUTO: 13.39 K/UL (ref 3.9–12.7)
WBC # BLD AUTO: 13.75 K/UL (ref 3.9–12.7)
WBC # BLD AUTO: 15.79 K/UL (ref 3.9–12.7)

## 2024-06-23 PROCEDURE — 25000003 PHARM REV CODE 250: Mod: HCNC

## 2024-06-23 PROCEDURE — 80053 COMPREHEN METABOLIC PANEL: CPT | Mod: HCNC

## 2024-06-23 PROCEDURE — 36415 COLL VENOUS BLD VENIPUNCTURE: CPT | Mod: HCNC

## 2024-06-23 PROCEDURE — 84100 ASSAY OF PHOSPHORUS: CPT | Mod: HCNC

## 2024-06-23 PROCEDURE — 63600175 PHARM REV CODE 636 W HCPCS: Mod: HCNC

## 2024-06-23 PROCEDURE — 85025 COMPLETE CBC W/AUTO DIFF WBC: CPT | Mod: 91,HCNC

## 2024-06-23 PROCEDURE — C9113 INJ PANTOPRAZOLE SODIUM, VIA: HCPCS | Mod: HCNC

## 2024-06-23 PROCEDURE — 21400001 HC TELEMETRY ROOM: Mod: HCNC

## 2024-06-23 PROCEDURE — 25000003 PHARM REV CODE 250: Mod: HCNC | Performed by: STUDENT IN AN ORGANIZED HEALTH CARE EDUCATION/TRAINING PROGRAM

## 2024-06-23 PROCEDURE — 83735 ASSAY OF MAGNESIUM: CPT | Mod: HCNC

## 2024-06-23 RX ORDER — ACETAMINOPHEN 325 MG/1
650 TABLET ORAL EVERY 8 HOURS PRN
Status: DISCONTINUED | OUTPATIENT
Start: 2024-06-23 | End: 2024-06-25 | Stop reason: HOSPADM

## 2024-06-23 RX ADMIN — LACTULOSE 10 G: 20 SOLUTION ORAL at 08:06

## 2024-06-23 RX ADMIN — ACETAMINOPHEN 650 MG: 325 TABLET ORAL at 05:06

## 2024-06-23 RX ADMIN — DICLOFENAC SODIUM 2 G: 10 GEL TOPICAL at 09:06

## 2024-06-23 RX ADMIN — PANTOPRAZOLE SODIUM 40 MG: 40 INJECTION, POWDER, FOR SOLUTION INTRAVENOUS at 08:06

## 2024-06-23 RX ADMIN — Medication: at 08:06

## 2024-06-23 RX ADMIN — CYANOCOBALAMIN TAB 250 MCG 250 MCG: 250 TAB at 08:06

## 2024-06-23 NOTE — SUBJECTIVE & OBJECTIVE
Interval History: NAEON, VSS. No noted further bloody BM. Leg pain improved. Overall more alert. Trending Hb. MRI brain overnight stable from 6/10. Reaching out to Neurology regarding DAPT need.      Review of Systems   Constitutional:  Negative for chills and fever.   Respiratory:  Negative for shortness of breath.    Cardiovascular:  Negative for chest pain.   Gastrointestinal:  Negative for abdominal pain.   Genitourinary:  Negative for dysuria.   Musculoskeletal:  Negative for arthralgias and myalgias.        B/l leg pain   Psychiatric/Behavioral:  Positive for decreased concentration.      Objective:     Vital Signs (Most Recent):  Temp: 98.1 °F (36.7 °C) (06/23/24 0726)  Pulse: 84 (06/23/24 0726)  Resp: 18 (06/23/24 0726)  BP: 116/64 (06/23/24 0726)  SpO2: 95 % (06/23/24 0726) Vital Signs (24h Range):  Temp:  [97.7 °F (36.5 °C)-98.4 °F (36.9 °C)] 98.1 °F (36.7 °C)  Pulse:  [66-84] 84  Resp:  [17-18] 18  SpO2:  [95 %-100 %] 95 %  BP: ()/(56-74) 116/64     Weight: 49.9 kg (110 lb)  Body mass index is 19.49 kg/m².  No intake or output data in the 24 hours ending 06/23/24 0814      Physical Exam  Constitutional:       Comments: Thin appearing, more conversant and alert   HENT:      Head: Normocephalic and atraumatic.      Mouth/Throat:      Mouth: Mucous membranes are dry.      Pharynx: Oropharynx is clear.   Eyes:      Extraocular Movements: Extraocular movements intact.   Cardiovascular:      Rate and Rhythm: Normal rate and regular rhythm.      Pulses: Normal pulses.      Heart sounds: Normal heart sounds.   Pulmonary:      Effort: Pulmonary effort is normal.      Breath sounds: Normal breath sounds.   Abdominal:      General: Abdomen is flat.      Palpations: Abdomen is soft.      Comments: Slight wincing to palpation, but denied pain    Musculoskeletal:         General: No swelling or tenderness.      Comments:  No erythema, no wounds noted.    Skin:     General: Skin is warm and dry.      Findings: No  bruising.   Neurological:      Comments: AO to self, place  Raspy voice, some dysarthria   Psychiatric:      Comments: Decreased concentration. No agitation.             Significant Labs: All pertinent labs within the past 24 hours have been reviewed.  CBC:   Recent Labs   Lab 06/22/24  0830 06/22/24  1829 06/23/24  0012   WBC 18.94* 19.11* 15.79*   HGB 8.5* 8.1* 7.4*   HCT 24.2* 23.1* 21.2*   * 133* 109*     CMP:   Recent Labs   Lab 06/22/24  0341 06/23/24  0634   * 129*   K 4.4 4.3    106   CO2 18* 19*    91   BUN 56* 62*   CREATININE 1.5* 1.6*   CALCIUM 8.9 8.7   PROT 5.4* 5.0*   ALBUMIN 1.8* 1.6*   BILITOT 4.4* 3.5*   ALKPHOS 124 112   * 140*   ALT 96* 90*   ANIONGAP 6* 4*       Significant Imaging: I have reviewed all pertinent imaging results/findings within the past 24 hours.

## 2024-06-23 NOTE — ASSESSMENT & PLAN NOTE
DDX includes autonomic dysfunction, hypovolemia in setting of BP regimen, arrhythmia such as Afib, seizure, stroke. CT head w/o acute intracranial process to suggest hemorrhagic stroke. Has not missed secondary preventative medications such as Plavix/ASA. Concern for cardiac etiology given LOC while seated. Fluid resuscitated in ED with dramatic improvement in BP compared to BP noted at SNF. Seizure less likely, but unknown if patient post-ictal. EEG w/o seizure.     Orthostatic positive. Possible RP hematoma developing thus predisposing patient to orthostasis due to blood loss.   6/21: Likely RP hematoma overlying psoas. Obtaining CTA - no acute bleed or contrast extravasation, no role for IR intervention.     - IVF  - PRBC as needed, can administer if hypotensive  - STOP ASA/plavix  - Vasc Neuro to weigh in on need for continued DAPT  - Not a candidate for Eliquis currently due to possibility of LGI bleeding and psoas hematoma  - Telemetry

## 2024-06-23 NOTE — CONSULTS
Dean Hahn is a 83 y.o. male with PMHx of HTN, pre DM, HLD, dementia, and stroke (January 2023 and June 2024) who was admitted to  on 6/18/24 for syncope. Patient was admitted to [facility] this month for L michael infarct and was treated with TNK then discharged to SNF. He was working with PT when he became unresponsive, cyanotic, apneic, and hypotensive (SBP in 50s). Patient found to have L psoas hematoma on CT chest/abd/pelvis, antiplatelets held.     Stroke team contacted regarding antiplatelet recommendations. However, informed by primary team that they are currently having GOC discussion with patient and family. Therefore, chart review only was performed until GOC decision is determine.    Patient had EKG on 6/21/24 with atrial fibrillation. Therefore, DAPT no longer indicated. Would recommend anticoagulation for stroke prevention once safe from a psoas hematoma standpoint. Holding statin in setting of elevated LFTs. Will follow peripherally. Discussed patient with staff.            Temitope Magallanes PA-C  Vascular Neurology  [phone]

## 2024-06-23 NOTE — PROGRESS NOTES
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 History primarily obtained via spouse and chart review. [facility] who confirmed he became unresponsive while seated during PT session. Nursing attempted sternal rub, he did not respond. He became cyanotic and apneic for approx 10 seconds with a thready pulse. Nursing staff went to place in trendelenburg and almost called a code until he rapidly regained consciousness. Initial SBP in the field was in the 50s. He was hypoxic and placed on 15L non-rebreather. Improvement on arrival to [facility], stable on RA with improvement in MAPs. Pt reports feeling cold otherwise reports feeling "fine." He is conversing minimally which is near his baseline per family at bedside. He denies memory of the event. He denies fevers, chest pain, cough, SOB, abdominal pain, difficulty urinating/dysuria. Per the patient's wife, prior to stroke 6/10, patient ambulatory and seems to have waxing/waning mentation (Aox3 but sometimes confused). Previously noted that patient dependent in ADLs except feeding himself.     In ED, patient normotensive, not tachycardic, afebrile, and on RA. Did require tactile stimulation to participate in interview, minimally conversant upon awakening. CBC with leukocytosis (WBC 16), stable anemia (Hb 11). CMP without electrolyte derangements, Cr " 1.6 (baseline appears 1.4-1.6), AST//112, T bili 3.1 (LFTs stable compared to 6/17). LA 3.5 -> 2.7 following 1L LR. Modest troponin increase to 0.71. CT head with no acute intracranial process. EKG w/o Afib or ST elevation/depression, however noted possible PVC and fusion complexes. Received Vanc/Zosyn in ED. VBG: pH: 7.435, pco2: 22.9, Beecf: -9 HCO3: 15.3    Overview/Hospital Course:  Admitted to [facility] 6/18 for syncopal episode a/w brief hypoxia, apnea, and hypotension. Patient arrived to ED with slight tachypnea and found to have leukocytosis and slightly elevated LA, so received fluids and CTX per sepsis protocol. LA normalized w/ fluids. Ct head NAICP. CXR NAIPP. Performing EEG to assess for seizure. Telemetry to detect arrhythmia.  Infectious workup unrevealing and patient afebrile/HDS for 48 hrs, stopped antibiotics.    Interval History: NAEON, VSS. No noted further bloody BM. Leg pain improved. Overall more alert. Trending Hb. MRI brain overnight stable from 6/10. Reaching out to Neurology regarding DAPT need.      Review of Systems   Constitutional:  Negative for chills and fever.   Respiratory:  Negative for shortness of breath.    Cardiovascular:  Negative for chest pain.   Gastrointestinal:  Negative for abdominal pain.   Genitourinary:  Negative for dysuria.   Musculoskeletal:  Negative for arthralgias and myalgias.        B/l leg pain   Psychiatric/Behavioral:  Positive for decreased concentration.      Objective:     Vital Signs (Most Recent):  Temp: 98.1 °F (36.7 °C) (06/23/24 0726)  Pulse: 84 (06/23/24 0726)  Resp: 18 (06/23/24 0726)  BP: 116/64 (06/23/24 0726)  SpO2: 95 % (06/23/24 0726) Vital Signs (24h Range):  Temp:  [97.7 °F (36.5 °C)-98.4 °F (36.9 °C)] 98.1 °F (36.7 °C)  Pulse:  [66-84] 84  Resp:  [17-18] 18  SpO2:  [95 %-100 %] 95 %  BP: ()/(56-74) 116/64     Weight: 49.9 kg (110 lb)  Body mass index is 19.49 kg/m².  No intake or output data in the 24 hours ending 06/23/24 0814       Physical Exam  Constitutional:       Comments: Thin appearing, more conversant and alert   HENT:      Head: Normocephalic and atraumatic.      Mouth/Throat:      Mouth: Mucous membranes are dry.      Pharynx: Oropharynx is clear.   Eyes:      Extraocular Movements: Extraocular movements intact.   Cardiovascular:      Rate and Rhythm: Normal rate and regular rhythm.      Pulses: Normal pulses.      Heart sounds: Normal heart sounds.   Pulmonary:      Effort: Pulmonary effort is normal.      Breath sounds: Normal breath sounds.   Abdominal:      General: Abdomen is flat.      Palpations: Abdomen is soft.      Comments: Slight wincing to palpation, but denied pain    Musculoskeletal:         General: No swelling or tenderness.      Comments:  No erythema, no wounds noted.    Skin:     General: Skin is warm and dry.      Findings: No bruising.   Neurological:      Comments: AO to self, place  Raspy voice, some dysarthria   Psychiatric:      Comments: Decreased concentration. No agitation.             Significant Labs: All pertinent labs within the past 24 hours have been reviewed.  CBC:   Recent Labs   Lab 06/22/24  0830 06/22/24  1829 06/23/24  0012   WBC 18.94* 19.11* 15.79*   HGB 8.5* 8.1* 7.4*   HCT 24.2* 23.1* 21.2*   * 133* 109*     CMP:   Recent Labs   Lab 06/22/24  0341 06/23/24  0634   * 129*   K 4.4 4.3    106   CO2 18* 19*    91   BUN 56* 62*   CREATININE 1.5* 1.6*   CALCIUM 8.9 8.7   PROT 5.4* 5.0*   ALBUMIN 1.8* 1.6*   BILITOT 4.4* 3.5*   ALKPHOS 124 112   * 140*   ALT 96* 90*   ANIONGAP 6* 4*       Significant Imaging: I have reviewed all pertinent imaging results/findings within the past 24 hours.    Assessment/Plan:      * Syncope and collapse  DDX includes autonomic dysfunction, hypovolemia in setting of BP regimen, arrhythmia such as Afib, seizure, stroke. CT head w/o acute intracranial process to suggest hemorrhagic stroke. Has not missed secondary preventative  medications such as Plavix/ASA. Concern for cardiac etiology given LOC while seated. Fluid resuscitated in ED with dramatic improvement in BP compared to BP noted at SNF. Seizure less likely, but unknown if patient post-ictal. EEG w/o seizure.     Orthostatic positive. Possible RP hematoma developing thus predisposing patient to orthostasis due to blood loss.   6/21: Likely RP hematoma overlying psoas. Obtaining CTA - no acute bleed or contrast extravasation, no role for IR intervention.     - IVF  - PRBC as needed, can administer if hypotensive  - STOP ASA/plavix  - Vasc Neuro to weigh in on need for continued DAPT  - Not a candidate for Eliquis currently due to possibility of LGI bleeding and psoas hematoma  - Telemetry    PAD (peripheral artery disease)  Noted on vascular arterial US of lower extremities:   -TIMUR 1.2 on the right and TIMUR of 1 on the left.  -Occlusion of the L PT with no collateral vessels identified, suggestive of an acute process.  -Intermittent loss of flow within the bilateral AT with multiple collateral vessels, suggestive of chronic occlusions.    PLAN:  -No AP agents given RP hematoma  -not amenable to vascular procedure  -CTM      Nontraumatic psoas hematoma  Patient has acute blood loss due to hemorrhage, the hemorrhage is due to  psoas hematoma likely related to TNK administration on 6/10 , patient does have a propensity for bleeding due to a medication, the medication is TNK with AP agents for stroke.. Will trend hemoglobin/hematocrit Every 8 hours, as well as monitor and correct for any coagulation defects. CBC and vital signs have been reviewed and last CBC was noted-   Lab Results   Component Value Date    WBC 18.94 (H) 06/22/2024    HGB 8.5 (L) 06/22/2024    HCT 24.2 (L) 06/22/2024    MCV 91 06/22/2024     (L) 06/22/2024         Will order a type and screen and consent patient for blood transfusion. Will transfuse if Hgb is <7g/dl (<8g/dl in cases of active ACS) or if patient  "has rapid bleeding leading to hemodynamic instability.    Macrocytic anemia  Patient's anemia is currently controlled. Has not received any PRBCs to date. Etiology likely d/t chronic disease due to Chronic liver disease and B12 deficiency  Current CBC reviewed-   Lab Results   Component Value Date    HGB 7.3 (L) 06/20/2024    HCT 21.2 (L) 06/20/2024     Monitor serial CBC and transfuse if patient becomes hemodynamically unstable, symptomatic or H/H drops below 7/21.  Hgb dropped to 7.1-->7.3 on recheck  No obvious signs of bleed    -CBC Q daily for now  -cautiously continue asa/plavix  -monitor for bleeding    Severe sepsis  This patient does not have evidence of infective focus  My overall impression is  possible sepsis, tachycardic and leukocytosis .  Source: Urinary Tract and Abdominal  Antibiotics given-   Antibiotics (72h ago, onward)      Start     Stop Route Frequency Ordered    06/21/24 1115  cefTRIAXone (ROCEPHIN) 2 g in D5W 100 mL IVPB (MB+)         -- IV Every 24 hours (non-standard times) 06/21/24 1010          Latest lactate reviewed-  No results for input(s): "LACTATE", "POCLAC" in the last 72 hours.    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     Aggressive risk factor modification: HTN     Rehab efforts: The patient has been evaluated by a stroke team provider and the therapy needs have been fully considered based off the presenting complaints and exam findings. 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 Co-morbidities are present and inclusive of malnutrition.  MELD-Na score calculated; MELD 3.0: 19 at 6/12/2024  2:21 AM  MELD-Na: 17 at 6/12/2024  2:21 AM  Calculated from:  Serum Creatinine: 1.8 mg/dL at 6/12/2024  2:21 AM  Serum Sodium: 138 mmol/L (Using max of 137 mmol/L) at 6/12/2024  2:21 AM  Total Bilirubin: 2.2 mg/dL at 6/12/2024  2:21 AM  Serum Albumin: 2.4 g/dL at 6/12/2024  2:21 AM  INR(ratio): 1.2 at 6/10/2024  8:21 AM  Age at listing (hypothetical): 83 years  Sex: Male at 6/12/2024  2:21 AM      Continue chronic meds. 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 Will avoid any hepatotoxic meds, and monitor CBC/CMP/INR for synthetic function.     Thrombocytopenia  Patient was found to have thrombocytopenia, the likely etiology is secondary to cirrhosis/portal hypertension, will monitor the platelets Daily. Will transfuse if platelet count is <10k. Hold DVT prophylaxis if platelets are <50k. The patient's platelet results have been reviewed and are listed below.  Recent Labs   Lab 06/22/24  0830   *         Stage 3a chronic kidney disease  Creatine stable for now. BMP reviewed-  noted Estimated Creatinine Clearance: 27.3 mL/min (A) (based on SCr of 1.5 mg/dL (H)). according to latest data. Based on current GFR, CKD stage is stage 4 - GFR 15-29.  Monitor UOP and serial BMP and adjust therapy as needed. Renally dose meds. Avoid nephrotoxic medications and procedures.    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 Follows with Hepatology as OP.     Essential hypertension  Chronic, controlled. Latest blood pressure and vitals reviewed-     Temp:  [97.8 °F (36.6 °C)-99.7 °F (37.6 °C)]   Pulse:  []   Resp:  [16-18]   BP: ()/(51-88)   SpO2:  [95 %-100 %] .   Home meds for hypertension were reviewed and noted below.   Hypertension Medications               metoprolol succinate (TOPROL-XL) 25 MG 24 hr tablet TAKE 1 TABLET BY MOUTH EVERY DAY IN THE EVENING    olmesartan (BENICAR) 20 MG tablet Take 1 tablet (20 mg total) by mouth once daily.    spironolactone (ALDACTONE) 25 MG tablet Take 1 tablet (25 mg total) by mouth once daily.            While in the hospital, will manage blood pressure as follows; Adjust home antihypertensive regimen as follows- Hold while due to psoas hematoma     Will utilize p.r.n. blood pressure medication only if patient's blood pressure greater than 220/110 and he develops symptoms such as worsening chest pain or shortness of breath.      VTE Risk Mitigation (From admission, onward)           Ordered     IP VTE HIGH RISK PATIENT  Once         06/18/24 1809     Place sequential compression device  Until discontinued         06/18/24 1809                    Discharge Planning   LAMINE: 6/25/2024     Code Status: Full Code   Is the patient medically ready for discharge?:     Reason for patient still in hospital (select all that apply): Patient  trending condition and Treatment  Discharge Plan A: Skilled Nursing Facility                  Bryanna Teixeira MD  Department of Hospital Medicine   [facility] ([place])

## 2024-06-24 ENCOUNTER — TELEPHONE (OUTPATIENT)
Dept: NEUROLOGY | Facility: CLINIC | Age: 84
End: 2024-06-24
Payer: COMMERCIAL

## 2024-06-24 LAB
ALBUMIN SERPL BCP-MCNC: 1.6 G/DL (ref 3.5–5.2)
ALP SERPL-CCNC: 118 U/L (ref 55–135)
ALT SERPL W/O P-5'-P-CCNC: 94 U/L (ref 10–44)
ANION GAP SERPL CALC-SCNC: 8 MMOL/L (ref 8–16)
AST SERPL-CCNC: 133 U/L (ref 10–40)
BASOPHILS # BLD AUTO: 0.01 K/UL (ref 0–0.2)
BASOPHILS # BLD AUTO: 0.01 K/UL (ref 0–0.2)
BASOPHILS NFR BLD: 0.1 % (ref 0–1.9)
BASOPHILS NFR BLD: 0.1 % (ref 0–1.9)
BILIRUB SERPL-MCNC: 3.4 MG/DL (ref 0.1–1)
BUN SERPL-MCNC: 50 MG/DL (ref 8–23)
CALCIUM SERPL-MCNC: 9 MG/DL (ref 8.7–10.5)
CHLORIDE SERPL-SCNC: 106 MMOL/L (ref 95–110)
CO2 SERPL-SCNC: 19 MMOL/L (ref 23–29)
COPPER SERPL-MCNC: 1011 UG/L (ref 665–1480)
CREAT SERPL-MCNC: 1.3 MG/DL (ref 0.5–1.4)
DIFFERENTIAL METHOD BLD: ABNORMAL
DIFFERENTIAL METHOD BLD: ABNORMAL
EOSINOPHIL # BLD AUTO: 0.4 K/UL (ref 0–0.5)
EOSINOPHIL # BLD AUTO: 0.5 K/UL (ref 0–0.5)
EOSINOPHIL NFR BLD: 3.8 % (ref 0–8)
EOSINOPHIL NFR BLD: 4.4 % (ref 0–8)
ERYTHROCYTE [DISTWIDTH] IN BLOOD BY AUTOMATED COUNT: 16.9 % (ref 11.5–14.5)
ERYTHROCYTE [DISTWIDTH] IN BLOOD BY AUTOMATED COUNT: 17.1 % (ref 11.5–14.5)
EST. GFR  (NO RACE VARIABLE): 55.2 ML/MIN/1.73 M^2
GLUCOSE SERPL-MCNC: 87 MG/DL (ref 70–110)
HCT VFR BLD AUTO: 21.9 % (ref 40–54)
HCT VFR BLD AUTO: 22.8 % (ref 40–54)
HGB BLD-MCNC: 7.5 G/DL (ref 14–18)
HGB BLD-MCNC: 7.8 G/DL (ref 14–18)
IMM GRANULOCYTES # BLD AUTO: 0.12 K/UL (ref 0–0.04)
IMM GRANULOCYTES # BLD AUTO: 0.14 K/UL (ref 0–0.04)
IMM GRANULOCYTES NFR BLD AUTO: 1 % (ref 0–0.5)
IMM GRANULOCYTES NFR BLD AUTO: 1.3 % (ref 0–0.5)
LYMPHOCYTES # BLD AUTO: 1.3 K/UL (ref 1–4.8)
LYMPHOCYTES # BLD AUTO: 1.4 K/UL (ref 1–4.8)
LYMPHOCYTES NFR BLD: 10.9 % (ref 18–48)
LYMPHOCYTES NFR BLD: 12.3 % (ref 18–48)
MAGNESIUM SERPL-MCNC: 2.3 MG/DL (ref 1.6–2.6)
MCH RBC QN AUTO: 32.1 PG (ref 27–31)
MCH RBC QN AUTO: 32.2 PG (ref 27–31)
MCHC RBC AUTO-ENTMCNC: 34.2 G/DL (ref 32–36)
MCHC RBC AUTO-ENTMCNC: 34.2 G/DL (ref 32–36)
MCV RBC AUTO: 94 FL (ref 82–98)
MCV RBC AUTO: 94 FL (ref 82–98)
MONOCYTES # BLD AUTO: 1.2 K/UL (ref 0.3–1)
MONOCYTES # BLD AUTO: 1.4 K/UL (ref 0.3–1)
MONOCYTES NFR BLD: 11.1 % (ref 4–15)
MONOCYTES NFR BLD: 11.4 % (ref 4–15)
NEUTROPHILS # BLD AUTO: 8 K/UL (ref 1.8–7.7)
NEUTROPHILS # BLD AUTO: 8.6 K/UL (ref 1.8–7.7)
NEUTROPHILS NFR BLD: 71.4 % (ref 38–73)
NEUTROPHILS NFR BLD: 72.2 % (ref 38–73)
NRBC BLD-RTO: 0 /100 WBC
NRBC BLD-RTO: 0 /100 WBC
PHOSPHATE SERPL-MCNC: 2 MG/DL (ref 2.7–4.5)
PLATELET # BLD AUTO: 116 K/UL (ref 150–450)
PLATELET # BLD AUTO: 120 K/UL (ref 150–450)
PMV BLD AUTO: 9.3 FL (ref 9.2–12.9)
PMV BLD AUTO: 9.3 FL (ref 9.2–12.9)
POTASSIUM SERPL-SCNC: 4.3 MMOL/L (ref 3.5–5.1)
PROT SERPL-MCNC: 5 G/DL (ref 6–8.4)
RBC # BLD AUTO: 2.33 M/UL (ref 4.6–6.2)
RBC # BLD AUTO: 2.43 M/UL (ref 4.6–6.2)
SODIUM SERPL-SCNC: 133 MMOL/L (ref 136–145)
WBC # BLD AUTO: 11.18 K/UL (ref 3.9–12.7)
WBC # BLD AUTO: 11.88 K/UL (ref 3.9–12.7)

## 2024-06-24 PROCEDURE — 97112 NEUROMUSCULAR REEDUCATION: CPT | Mod: HCNC,CQ

## 2024-06-24 PROCEDURE — 97530 THERAPEUTIC ACTIVITIES: CPT | Mod: HCNC

## 2024-06-24 PROCEDURE — 94761 N-INVAS EAR/PLS OXIMETRY MLT: CPT | Mod: HCNC

## 2024-06-24 PROCEDURE — 84100 ASSAY OF PHOSPHORUS: CPT | Mod: HCNC

## 2024-06-24 PROCEDURE — 25000003 PHARM REV CODE 250: Mod: HCNC

## 2024-06-24 PROCEDURE — 97535 SELF CARE MNGMENT TRAINING: CPT | Mod: HCNC

## 2024-06-24 PROCEDURE — 21400001 HC TELEMETRY ROOM: Mod: HCNC

## 2024-06-24 PROCEDURE — 83735 ASSAY OF MAGNESIUM: CPT | Mod: HCNC

## 2024-06-24 PROCEDURE — 36415 COLL VENOUS BLD VENIPUNCTURE: CPT | Mod: HCNC

## 2024-06-24 PROCEDURE — 97530 THERAPEUTIC ACTIVITIES: CPT | Mod: HCNC,CQ

## 2024-06-24 PROCEDURE — 85025 COMPLETE CBC W/AUTO DIFF WBC: CPT | Mod: HCNC

## 2024-06-24 PROCEDURE — 97110 THERAPEUTIC EXERCISES: CPT | Mod: HCNC,CQ

## 2024-06-24 PROCEDURE — 80053 COMPREHEN METABOLIC PANEL: CPT | Mod: HCNC

## 2024-06-24 RX ORDER — SPIRONOLACTONE 25 MG/1
25 TABLET ORAL DAILY
Qty: 90 TABLET | Refills: 1 | Status: SHIPPED | OUTPATIENT
Start: 2024-06-24 | End: 2024-06-25 | Stop reason: HOSPADM

## 2024-06-24 RX ORDER — ACETAMINOPHEN 500 MG
500 TABLET ORAL DAILY PRN
COMMUNITY

## 2024-06-24 RX ORDER — ATORVASTATIN CALCIUM 20 MG/1
20 TABLET, FILM COATED ORAL DAILY
Start: 2024-06-24

## 2024-06-24 RX ORDER — OLMESARTAN MEDOXOMIL 20 MG/1
20 TABLET ORAL DAILY
Qty: 90 TABLET | Refills: 3 | Status: SHIPPED | OUTPATIENT
Start: 2024-06-24 | End: 2024-06-25 | Stop reason: HOSPADM

## 2024-06-24 RX ORDER — METOPROLOL SUCCINATE 25 MG/1
25 TABLET, EXTENDED RELEASE ORAL NIGHTLY
Qty: 90 TABLET | Refills: 3 | Status: SHIPPED | OUTPATIENT
Start: 2024-06-24 | End: 2024-06-25 | Stop reason: HOSPADM

## 2024-06-24 RX ADMIN — Medication: at 09:06

## 2024-06-24 RX ADMIN — LACTULOSE 10 G: 20 SOLUTION ORAL at 09:06

## 2024-06-24 RX ADMIN — CYANOCOBALAMIN TAB 250 MCG 250 MCG: 250 TAB at 09:06

## 2024-06-24 RX ADMIN — ACETAMINOPHEN 650 MG: 325 TABLET ORAL at 06:06

## 2024-06-24 RX ADMIN — ACETAMINOPHEN 650 MG: 325 TABLET ORAL at 12:06

## 2024-06-24 RX ADMIN — DICLOFENAC SODIUM 2 G: 10 GEL TOPICAL at 09:06

## 2024-06-24 NOTE — DISCHARGE SUMMARY
"[facility] - [facility]urg ([place] [address])  [facility] Medicine  Discharge Summary      Patient Name: Dean Hahn  MRN: [id number]  GRAZYNA: [id number]  Patient Class: IP- Inpatient  Admission Date: 6/18/2024  Hospital Length of Stay: 7 days  Discharge Date and Time: No discharge date for patient encounter.  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 History primarily obtained via spouse and chart review. [facility] who confirmed he became unresponsive while seated during PT session. Nursing attempted sternal rub, he did not respond. He became cyanotic and apneic for approx 10 seconds with a thready pulse. Nursing staff went to place in trendelenburg and almost called a code until he rapidly regained consciousness. Initial SBP in the field was in the 50s. He was hypoxic and placed on 15L non-rebreather. Improvement on arrival to [facility], stable on RA with improvement in MAPs. Pt reports feeling cold otherwise reports feeling "fine." He is conversing minimally which is near his baseline per family at bedside. He denies memory of the event. He denies fevers, chest pain, cough, SOB, abdominal pain, difficulty urinating/dysuria. Per the patient's wife, prior to stroke 6/10, patient ambulatory and seems to have waxing/waning mentation (Aox3 but sometimes confused). Previously noted that patient dependent in ADLs except feeding himself.     In ED, patient normotensive, not tachycardic, afebrile, and on RA. Did require tactile " stimulation to participate in interview, minimally conversant upon awakening. CBC with leukocytosis (WBC 16), stable anemia (Hb 11). CMP without electrolyte derangements, Cr 1.6 (baseline appears 1.4-1.6), AST//112, T bili 3.1 (LFTs stable compared to 6/17). LA 3.5 -> 2.7 following 1L LR. Modest troponin increase to 0.71. CT head with no acute intracranial process. EKG w/o Afib or ST elevation/depression, however noted possible PVC and fusion complexes. Received Vanc/Zosyn in ED. VBG: pH: 7.435, pco2: 22.9, Beecf: -9 HCO3: 15.3    * No surgery found *      Hospital Course:   Admitted to [facility] 6/18 for syncopal episode a/w brief hypoxia, apnea, and hypotension. Patient arrived to ED with slight tachypnea and found to have leukocytosis and slightly elevated LA, so received fluids and CTX per sepsis protocol. LA normalized w/ fluids. Ct head NAICP. CXR NAIPP. Performing EEG to assess for seizure. Telemetry to detect arrhythmia.  Infectious workup unrevealing and patient afebrile/HDS for 48 hrs, stopped antibiotics. Found to have psoas hematoma 6/21. Stopped DAPT. Hb stabilized with continued normotension. GOC with family 6/23 to initiate discussion of hospice care given strokes, cirrhosis, risk of re-bleeding given psoas hematoma and possible GI bleed, as well as overall debility. Family opted to deliberate further outside of hospital regarding decision for hospice, however did decide to change code status to DNR 6/24. Deemed medically stable for discharge 6/24. Holding DAPT. Elected to not start Eliquis at this time due to risk of re-bleeding. Holding BP medicine until seen by PCP, would indefinitely hold if pursuing hospice.       Goals of Care Treatment Preferences:  Code Status: DNR      Consults:   Consults (From admission, onward)          Status Ordering Provider     Inpatient consult to Vascular (Stroke) Neurology  Once        Provider:  (Not yet assigned)    Completed WONG STEVENS     Inpatient consult  to Vascular Surgery  Once        Provider:  (Not yet assigned)    Completed KOKO ROSARIO     Inpatient consult to Gastroenterology  Once        Provider:  (Not yet assigned)    Completed STACIE TAMEZ     Inpatient consult to PICC team (DONOVAN)  Once        Provider:  (Not yet assigned)    Completed STACIE TAMEZ            No new Assessment & Plan notes have been filed under this hospital service since the last note was generated.  Service: Hospital Medicine    Final Active Diagnoses:    Diagnosis Date Noted POA    PRINCIPAL PROBLEM:  Syncope and collapse [R55] 06/18/2024 Yes    ACP (advance care planning) [Z71.89] 06/23/2024 Not Applicable    Nontraumatic psoas hematoma [M79.81] 06/21/2024 Yes    PAD (peripheral artery disease) [I73.9] 06/21/2024 Yes    Severe sepsis [A41.9, R65.20] 06/18/2024 Yes    Macrocytic anemia [D53.9] 06/18/2024 Yes    Chronic ischemic multifocal multiple vascular territories stroke [Z86.73] 05/27/2024 Not Applicable    Other cirrhosis of liver [K74.69] 03/06/2024 Yes    Thrombocytopenia [D69.6] 02/08/2024 Yes    Stage 3a chronic kidney disease [N18.31] 01/11/2024 Yes    Prediabetes [R73.03] 04/28/2016 Yes    Pure hypercholesterolemia [E78.00] 04/26/2016 Yes    Total bilirubin, elevated [R17] 03/03/2015 Yes    Essential hypertension [I10] 09/16/2014 Yes      Problems Resolved During this Admission:       Discharged Condition: stable    Disposition: Skilled Nursing Facility    Follow Up:    Patient Instructions:   No discharge procedures on file.    Significant Diagnostic Studies: Labs: CBC   Recent Labs   Lab 06/23/24  2340 06/24/24  0801 06/25/24  0240   WBC 11.88 11.18 12.28   HGB 7.5* 7.8* 8.3*   HCT 21.9* 22.8* 23.9*   * 120* 129*       Pending Diagnostic Studies:       Procedure Component Value Units Date/Time    EKG 12-lead [[id number]]     Order Status: Sent Lab Status: No result            Medications:  Reconciled Home Medications:      Medication List        CHANGE how you  take these medications      atorvastatin 20 MG tablet  Commonly known as: LIPITOR  Take 1 tablet (20 mg total) by mouth once daily. Hold until follow up with primary care physician due to elevated liver function tests.  What changed: additional instructions     olmesartan 20 MG tablet  Commonly known as: BENICAR  Take 1 tablet (20 mg total) by mouth once daily. HOLD UNTIL SEEN BY PCP  What changed:   additional instructions  Another medication with the same name was removed. Continue taking this medication, and follow the directions you see here.            CONTINUE taking these medications      acetaminophen 500 MG tablet  Commonly known as: TYLENOL  Take 500 mg by mouth daily as needed (Leg pain).     ciclopirox 8 % Soln  Commonly known as: PENLAC  Apply topically nightly.     cyanocobalamin 100 MCG tablet  Commonly known as: VITAMIN B-12  Take 1 tablet (100 mcg total) by mouth once daily.     donepeziL 5 MG tablet  Commonly known as: ARICEPT  Take 1 tablet (5 mg total) by mouth every evening.     dorzolamide-timolol 2-0.5% 22.3-6.8 mg/mL ophthalmic solution  Commonly known as: COSOPT  Place 1 drop into both eyes 2 (two) times daily.     ergocalciferol 50,000 unit Cap  Commonly known as: ERGOCALCIFEROL  Take 1 capsule (50,000 Units total) by mouth every 7 days.     fish oil-omega-3 fatty acids 300-1,000 mg capsule  Take 1 capsule by mouth once daily.     lactulose 20 gram/30 mL Soln  Commonly known as: CHRONULAC  Take 15 mLs (10 g total) by mouth once daily.     multivitamin capsule  Take 1 capsule by mouth once daily.            STOP taking these medications      aspirin 81 MG Chew     clopidogreL 75 mg tablet  Commonly known as: PLAVIX     metoprolol succinate 25 MG 24 hr tablet  Commonly known as: TOPROL-XL     spironolactone 25 MG tablet  Commonly known as: ALDACTONE              Indwelling Lines/Drains at time of discharge:   Lines/Drains/Airways       Drain  Duration             Male External Urinary  Catheter 06/18/24 1506 Small 5 days                    Time spent on the discharge of patient: 60 minutes         Brian Carias MD  Department of Hospital Medicine  [facility] ([place])

## 2024-06-24 NOTE — PLAN OF CARE
NURSING HOME ORDERS    06/25/2024  [facility]  [address] [facility] ([address] [place])  [address] [address]  Dept: [phone]  Loc: [phone]     Admit to Nursing Home:  Skilled Nursing Facility    Diagnoses:  Active Hospital Problems    Diagnosis  POA    *Syncope and collapse [R55]  Yes    ACP (advance care planning) [Z71.89]  Not Applicable    Nontraumatic psoas hematoma [M79.81]  Yes    PAD (peripheral artery disease) [I73.9]  Yes    Severe sepsis [A41.9, R65.20]  Yes    Macrocytic anemia [D53.9]  Yes    Chronic ischemic multifocal multiple vascular territories stroke [Z86.73]  Not Applicable    Other cirrhosis of liver [K74.69]  Yes     ) Labs ordered  2) EGD after next visit  3) Clinic in 3 months.     I don't think he would benefit from steroids at this point given his advanced age and low LFTs. I was also not sure of his functional status since this was a video visit. I will see him in person at his next visit.            Electronically signed by Uziel Andrea MD at 3/6/2024 10:15 AM      Thrombocytopenia [D69.6]  Yes     On review of his investigations. I see taht he has recently developed thrombocytopenia and has an US that suggests cirrhosis. The tests that were initiated in 2021, showed that he had a high IgG and a positive CHRIS with a signifiacnt titer. Although his AST/ALT in 2021 were only mildly elevated, I note that in 2016 his AST/ALT were around 150-200.         Stage 3a chronic kidney disease [N18.31]  Yes       Signed on 09/28/23 at 08:21   Normal total protein.   Increased gamma globulin, polyclonal.   No paraprotein bands identified.   Electronically reviewed and signed by:   Karoline Montero MD   Signed on 09/28/23 at 08:21   No monoclonal peaks identified     Impression:     Borderline small but otherwise normal sonographic appearance of bilateral kidneys.  No hydronephrosis        Electronically signed by:Emir Bah MD  Date:                                             01/17/2024      Prediabetes [R73.03]  Yes     04/28/16 HgA1c 6.3      Pure hypercholesterolemia [E78.00]  Yes    Total bilirubin, elevated [R17]  Yes    Essential hypertension [I10]  Yes      Resolved Hospital Problems   No resolved problems to display.       Patient is homebound due to:  Syncope and collapse    Allergies:Review of patient's allergies indicates:  No Known Allergies    Vitals:  Routine    Diet: regular diet    Activities:   Activity as tolerated    Goals of Care Treatment Preferences:  Code Status: DNR      Labs:  NA    Nursing Precautions:  Fall    Consults:   PT to evaluate and treat- 3 times a week and OT to evaluate and treat- 3 times a week   - Palliative Care/Hospice    Miscellaneous Care: NA                   Diabetes Care:  SN to perform and educate Diabetic management with blood glucose monitoring:      Medications: Discontinue all previous medication orders, if any. See new list below.     Medication List        CHANGE how you take these medications      atorvastatin 20 MG tablet  Commonly known as: LIPITOR  Take 1 tablet (20 mg total) by mouth once daily. Hold until follow up with primary care physician due to elevated liver function tests.  What changed: additional instructions     olmesartan 20 MG tablet  Commonly known as: BENICAR  Take 1 tablet (20 mg total) by mouth once daily. HOLD UNTIL SEEN BY PCP  What changed:   additional instructions  Another medication with the same name was removed. Continue taking this medication, and follow the directions you see here.            CONTINUE taking these medications      acetaminophen 500 MG tablet  Commonly known as: TYLENOL  Take 500 mg by mouth daily as needed (Leg pain).     ciclopirox 8 % Soln  Commonly known as: PENLAC  Apply topically nightly.     cyanocobalamin 100 MCG tablet  Commonly known as: VITAMIN B-12  Take 1 tablet (100 mcg total) by mouth once daily.     donepeziL 5 MG tablet  Commonly  known as: ARICEPT  Take 1 tablet (5 mg total) by mouth every evening.     dorzolamide-timolol 2-0.5% 22.3-6.8 mg/mL ophthalmic solution  Commonly known as: COSOPT  Place 1 drop into both eyes 2 (two) times daily.     ergocalciferol 50,000 unit Cap  Commonly known as: ERGOCALCIFEROL  Take 1 capsule (50,000 Units total) by mouth every 7 days.     fish oil-omega-3 fatty acids 300-1,000 mg capsule  Take 1 capsule by mouth once daily.     lactulose 20 gram/30 mL Soln  Commonly known as: CHRONULAC  Take 15 mLs (10 g total) by mouth once daily.     multivitamin capsule  Take 1 capsule by mouth once daily.            STOP taking these medications      aspirin 81 MG Chew     clopidogreL 75 mg tablet  Commonly known as: PLAVIX     metoprolol succinate 25 MG 24 hr tablet  Commonly known as: TOPROL-XL     spironolactone 25 MG tablet  Commonly known as: ALDACTONE                Immunizations Administered as of 6/25/2024       Name Date Dose VIS Date Route Exp Date    COVID-19, MRNA, LN-S, PF (Moderna) 4/1/2021 0.5 mL 12/1/2020 Intramuscular --    Site: Left arm     : Modern[employer]     Lot: [id number]     Comment: Adminis     COVID-19, MRNA, LN-S, PF (Moderna) 3/4/2021 0.5 mL 2/27/2021 Intramuscular --    Site: Left arm     : Moderna [employer]     Lot: [id number]     Comment: Adminis     COVID-19, MRNA, LN-S, PF (Pfizer) (Purple Cap) 12/8/2021 0.3 mL 10/29/2021 Intramuscular --    Site: Left arm     : Pfizer Inc     Lot: [id number]     Comment: Adminis             This patient has had both covid vaccinations    Some patients may experience side effects after vaccination.  These may include fever, headache, muscle or joint aches.  Most symptoms resolve with 24-48 hours and do not require urgent medical evaluation unless they persist for more than 72 hours or symptoms are concerning for an unrelated medical condition.          _________________________________  Brian Carias MD  06/25/2024

## 2024-06-24 NOTE — PT/OT/SLP PROGRESS
Occupational Therapy   Treatment    Name: Dean Hahn  MRN: [id number]  Admitting Diagnosis:  Syncope and collapse       Recommendations:     Discharge Recommendations: Moderate Intensity Therapy  Discharge Equipment Recommendations:  bedside commode  Barriers to discharge:  None    Assessment:     Dean Hahn is a 83 y.o. male with a medical diagnosis of Syncope and collapse.  He presents with improved seated balance after modified sit ups to compensate and help promote proprioception, reducing posterior lean. Performance deficits affecting function are weakness, impaired self care skills, impaired functional mobility, gait instability, impaired balance, decreased safety awareness, decreased lower extremity function, decreased upper extremity function, visual deficits, decreased coordination.     Rehab Prognosis:  Good; patient would benefit from acute skilled OT services to address these deficits and reach maximum level of function.       Plan:     Patient to be seen 4 x/week to address the above listed problems via self-care/home management, therapeutic activities, therapeutic exercises, neuromuscular re-education  Plan of Care Expires: 07/19/24  Plan of Care Reviewed with: patient, family    Subjective     Chief Complaint: none  Patient/Family Comments/goals: patient's family with good understanding and strategies to attend to R side  Pain/Comfort:  Pain Rating 1: 0/10  Pain Rating Post-Intervention 1: 0/10    Objective:   Additional Staff Present: Therapy tech utilized during session due to patient complexity and required physical assistance to ensure patient safety     Communicated with: nursing prior to session.  Patient found HOB elevated with Condom Catheter, telemetry upon OT entry to room.  Patient's family (brother and CLARA) present in room during session.     General Precautions: Standard, fall, hearing impaired    Orthopedic Precautions:N/A  Braces: N/A  Respiratory Status: Room  air     Occupational Performance:     Bed Mobility:    Patient completed Rolling/Turning to Right with maximal assistance  Patient completed Scooting/Bridging with total assistance  Patient completed Supine to Sit with maximal assistance  Patient completed Sit to Supine with maximal assistance and 2 persons   Patient sat EOB for ~ 15 minutes with Moderate Assistance progressing to contact guard assistance of ~2 minute bouts  Engaged in 3x5 modified sit ups to reduce posterior lean and assist with proprioception    Functional Mobility/Transfers:  Patient completed Sit <> Stand Transfer with maximal assistance and of 2 persons  with  no assistive device  with poor clearance    Activities of Daily Living: NT during session    [facility] 6 Click ADL: 11    Treatment & Education:  Completion of visual scanning activity to the right while seated EOB to reduce inattention. Potential additional visual deficits secondary to difficulty identifying/ distinguishing 4 and 5 fingers. Family at bedside educated on continuing to promote attention to R side throughout day especially with feeding activities and socialization.     Patient educated on:   -purpose of OT and OT POC  -facilitation and education on proper body mechanics, energy conservation, and safety  -importance of early mobility and out of bed activities with staff assist  -overall benefits of therapy     All questions answered within OT scope and to patient's satisfaction      Patient left HOB elevated with all lines intact, call button in reach, and family present    GOALS:   Multidisciplinary Problems       Occupational Therapy Goals          Problem: Occupational Therapy    Goal Priority Disciplines Outcome Interventions   Occupational Therapy Goal     OT, PT/OT Progressing    Description: Goals to be met by: 7/19/24     Patient will increase functional independence with ADLs by performing:    UE Dressing with Moderate Assistance.  LE Dressing with Maximum  Assistance.  Grooming while seated with Moderate Assistance.  Toileting from bedside commode with Moderate Assistance for hygiene and clothing management.   Sitting at edge of bed x10 minutes with Minimal Assistance.  Toilet transfer to bedside commode with Moderate Assistance.                         Time Tracking:     OT Date of Treatment: 06/24/24  OT Start Time: 1445  OT Stop Time: 1508  OT Total Time (min): 23 min    Billable Minutes:Neuromuscular Re-education 23    OT/ELLYN: OT          6/24/2024

## 2024-06-24 NOTE — PLAN OF CARE
Problem: Adult Inpatient Plan of Care  Goal: Absence of Hospital-Acquired Illness or Injury  Outcome: Progressing  Intervention: Identify and Manage Fall Risk  Flowsheets (Taken 6/24/2024 1809)  Safety Promotion/Fall Prevention:   assistive device/personal item within reach   bed alarm set   bedside commode chair   diversional activities provided   commode/urinal/bedpan at bedside   Fall Risk reviewed with patient/family   Fall Risk signage in place   family to remain at bedside   high risk medications identified   in recliner, wheels locked   lighting adjusted   medications reviewed   muscle strengthening facilitated   nonskid shoes/socks when out of bed   pulse ox   room near unit station   supervised activity   instructed to call staff for mobility  Intervention: Prevent Skin Injury  Flowsheets (Taken 6/24/2024 1809)  Body Position:   turned   sitting up in bed   weight shifting  Skin Protection:   hydrocolloids used   incontinence pads utilized   pulse oximeter probe site changed   skin sealant/moisture barrier applied   transparent dressing maintained  Device Skin Pressure Protection:   pressure points protected   skin-to-device areas padded   skin-to-skin areas padded   adhesive use limited   positioning supports utilized  Intervention: Prevent and Manage VTE (Venous Thromboembolism) Risk  Flowsheets (Taken 6/24/2024 1809)  VTE Prevention/Management:   remove, assess skin, and reapply sequential compression device   bleeding precations maintained   bleeding risk assessed   ambulation promoted   bleeding risk factor(s) identified, provider notified   dorsiflexion/plantar flexion performed   fluids promoted   ROM (active) performed  Intervention: Prevent Infection  Flowsheets (Taken 6/24/2024 1809)  Infection Prevention:   cohorting utilized   hand hygiene promoted   single patient room provided   environmental surveillance performed   equipment surfaces disinfected   rest/sleep promoted  Goal: Optimal Comfort  and Wellbeing  Outcome: Progressing  Intervention: Monitor Pain and Promote Comfort  Flowsheets (Taken 6/24/2024 1809)  Pain Management Interventions:   around-the-clock dosing utilized   awakened for pain meds per patient request   biofeedback utilized   breathing exercises utilized   care clustered   cold applied   diversional activity provided   heat applied   medication offered   pain management plan reviewed with patient/caregiver   numbing spray   pillow support provided   position adjusted   premedicated for activity   relaxation techniques promoted   quiet environment facilitated   warm blanket provided  Intervention: Provide Person-Centered Care  Flowsheets (Taken 6/24/2024 1811)  Trust Relationship/Rapport:   care explained   questions answered   choices provided   questions encouraged   emotional support provided   reassurance provided   empathic listening provided   thoughts/feelings acknowledged     Problem: Sepsis/Septic Shock  Goal: Absence of Infection Signs and Symptoms  Outcome: Progressing  Intervention: Initiate Sepsis Management  Flowsheets (Taken 6/24/2024 1811)  Infection Prevention:   cohorting utilized   environmental surveillance performed   equipment surfaces disinfected   rest/sleep promoted   hand hygiene promoted   single patient room provided   personal protective equipment utilized  Infection Management: aseptic technique maintained  Stabilization Measures:   airway opened   legs elevated   provider notified   verbal stimulation provided   tactile stimulation provided  Isolation Precautions: precautions maintained  Intervention: Promote Stabilization  Flowsheets (Taken 6/24/2024 1811)  Fever Reduction/Comfort Measures:   aerosol temperature decreased   fluid intake increased   lightweight clothing   lightweight bedding  Fluid/Electrolyte Management:   fluids provided   oral rehydration therapy initiated   electrolyte supplement initiated  Lung Protection Measures: fluid excess  minimized  Intervention: Promote Recovery  Flowsheets (Taken 6/24/2024 1811)  Sleep/Rest Enhancement:   awakenings minimized   consistent schedule promoted   family presence promoted   reading promoted   noise level reduced   relaxation techniques promoted   therapeutic touch utilized   natural light exposure provided   music provided   regular sleep/rest pattern promoted   room darkened  Airway/Ventilation Support:   comfort measures provided   cough relief provided   dyspnea relief promoted   pulmonary hygiene promoted  Activity Management:   Rolling - L1   Arm raise - L1   Seated marching - L2     Problem: Wound  Goal: Optimal Coping  Outcome: Progressing  Intervention: Support Patient and Family Response  Flowsheets (Taken 6/24/2024 1811)  Supportive Measures:   active listening utilized   decision-making supported   problem-solving facilitated   self-responsibility promoted   positive reinforcement provided   self-care encouraged   relaxation techniques promoted   verbalization of feelings encouraged  Family/Support System Care:   caregiver stress acknowledged   family care conference arranged   involvement promoted   presence promoted   self-care encouraged   support provided  Goal: Absence of Infection Signs and Symptoms  Outcome: Progressing  Intervention: Prevent or Manage Infection  Flowsheets (Taken 6/24/2024 1811)  Fever Reduction/Comfort Measures:   aerosol temperature decreased   fluid intake increased   lightweight clothing   lightweight bedding  Infection Management: aseptic technique maintained  Isolation Precautions: precautions maintained  Goal: Improved Oral Intake  Outcome: Progressing  Intervention: Promote and Optimize Oral Intake  Flowsheets (Taken 6/24/2024 1811)  Oral Nutrition Promotion:   adaptive equipment use encouraged   nutrition counseling provided   social interaction promoted   physical activity promoted   referred to outpatient services   rest periods promoted   calorie-dense  liquids provided  Nutrition Interventions:   diet advanced   meal set-up provided   referred to nutrition assistant/tech   food preferences provided   frequent small meals provided   supplemental foods provided   supplemental drinks provided   meals from home/family encouraged  Goal: Optimal Pain Control and Function  Outcome: Progressing  Intervention: Prevent or Manage Pain  Flowsheets (Taken 6/24/2024 1811)  Sleep/Rest Enhancement:   awakenings minimized   consistent schedule promoted   family presence promoted   reading promoted   noise level reduced   relaxation techniques promoted   therapeutic touch utilized   natural light exposure provided   music provided   regular sleep/rest pattern promoted   room darkened  Pain Management Interventions:   around-the-clock dosing utilized   awakened for pain meds per patient request   diversional activity provided   heat applied   breathing exercises utilized   care clustered   cold applied   medicated cooling pads   medication offered   pain management plan reviewed with patient/caregiver   pillow support provided   position adjusted   premedicated for activity   quiet environment facilitated   relaxation techniques promoted   warm blanket provided  Goal: Skin Health and Integrity  Outcome: Progressing  Intervention: Optimize Skin Protection  Flowsheets (Taken 6/24/2024 1811)  Pressure Reduction Techniques:   frequent weight shift encouraged   positioned off wounds   pressure points protected   rest period provided between sit times   heels elevated off bed   weight shift assistance provided  Pressure Reduction Devices:   foam padding utilized   positioning supports utilized   specialty bed utilized   pressure-redistributing mattress utilized  Skin Protection:   hydrocolloids used   incontinence pads utilized   pulse oximeter probe site changed   skin sealant/moisture barrier applied   transparent dressing maintained  Activity Management:   Rolling - L1   Arm raise -  L1   Seated marching - L2  Head of Bed (HOB) Positioning: HOB at 30-45 degrees  Goal: Optimal Wound Healing  Outcome: Progressing  Intervention: Promote Wound Healing  Flowsheets (Taken 6/24/2024 1811)  Sleep/Rest Enhancement:   awakenings minimized   consistent schedule promoted   family presence promoted   reading promoted   noise level reduced   relaxation techniques promoted   therapeutic touch utilized   natural light exposure provided   music provided   regular sleep/rest pattern promoted   room darkened     Problem: Skin Injury Risk Increased  Goal: Skin Health and Integrity  Outcome: Progressing  Intervention: Optimize Skin Protection  Flowsheets (Taken 6/24/2024 1811)  Pressure Reduction Techniques:   frequent weight shift encouraged   positioned off wounds   pressure points protected   rest period provided between sit times   heels elevated off bed   weight shift assistance provided  Pressure Reduction Devices:   foam padding utilized   positioning supports utilized   specialty bed utilized   pressure-redistributing mattress utilized  Skin Protection:   hydrocolloids used   incontinence pads utilized   pulse oximeter probe site changed   skin sealant/moisture barrier applied   transparent dressing maintained  Activity Management:   Rolling - L1   Arm raise - L1   Seated marching - L2  Head of Bed (HOB) Positioning: HOB at 30-45 degrees  Intervention: Promote and Optimize Oral Intake  Flowsheets (Taken 6/24/2024 1811)  Oral Nutrition Promotion:   adaptive equipment use encouraged   nutrition counseling provided   social interaction promoted   physical activity promoted   referred to outpatient services   rest periods promoted   calorie-dense liquids provided  Nutrition Interventions:   diet advanced   meal set-up provided   referred to nutrition assistant/tech   food preferences provided   frequent small meals provided   supplemental foods provided   supplemental drinks provided   meals from home/family  encouraged     Problem: Fall Injury Risk  Goal: Absence of Fall and Fall-Related Injury  Outcome: Progressing  Intervention: Identify and Manage Contributors  Flowsheets (Taken 6/24/2024 1811)  Self-Care Promotion:   independence encouraged   meal set-up provided   adaptive equipment use encouraged   BADL personal objects within reach   BADL personal routines maintained  Medication Review/Management:   medications reviewed   high-risk medications identified   pharmacy consulted   provider consulted   dosing adjusted  Intervention: Promote Injury-Free Environment  Flowsheets (Taken 6/24/2024 1811)  Safety Promotion/Fall Prevention:   assistive device/personal item within reach   bed alarm set   bedside commode chair   diversional activities provided   commode/urinal/bedpan at bedside   Fall Risk reviewed with patient/family   Fall Risk signage in place   family to remain at bedside   high risk medications identified   in recliner, wheels locked   lighting adjusted   nonskid shoes/socks when out of bed   muscle strengthening facilitated   medications reviewed   pulse ox   side rails raised x 3   supervised activity   room near unit station   instructed to call staff for mobility   toileting scheduled     Problem: Infection  Goal: Absence of Infection Signs and Symptoms  Outcome: Progressing  Intervention: Prevent or Manage Infection  Flowsheets (Taken 6/24/2024 1811)  Fever Reduction/Comfort Measures:   aerosol temperature decreased   fluid intake increased   lightweight clothing   lightweight bedding  Infection Management: aseptic technique maintained  Isolation Precautions: precautions maintained     Problem: Wound  Goal: Optimal Functional Ability  Outcome: Not Progressing  Intervention: Optimize Functional Ability  Flowsheets (Taken 6/24/2024 1811)  Activity Management:   Rolling - L1   Arm raise - L1   Seated marching - L2  Activity Assistance Provided: assistance, 2 people

## 2024-06-24 NOTE — TELEPHONE ENCOUNTER
----- Message from Sommer Fleming MD sent at 6/20/2024  8:02 AM CDT -----  Regarding: RE: HFU  He will need to be scheduled when I have the next available clinic day.   Thank you!  ----- Message -----  From: Irish Reardon MA  Sent: 6/14/2024   9:28 AM CDT  To: Sommer Fleming MD  Subject: FW: HFU                                          Good morning, can you let me know if you would like to try and squeeze him in next week or what you would like to do, Thanks.  ----- Message -----  From: Lorena Lockett  Sent: 6/14/2024   9:14 AM CDT  To: [facility] Staff  Subject: HFU                                              Patient will be discharging from [facility] and a HFU was requested with [facility] by DC provider.  Please schedule and message me back so DC can relay appointment information to patient prior to discharge. Patient is established. Last seen 1/23.    Patient's family scheduled a My Chart appt for issues with Memory.  CM will talk with family to discuss that appt is probably not with appropriate doctor for issues as My Chart will schedule with first available providers in some cases and not always correct provider.  I show that provider as a Comprehensive Headache provider.    Immediate concern is related to stroke.  Patient will be discharging to SNF.    DX: Stroke    Sallie Salcedo  Physician Referral Specialist/Discharge

## 2024-06-24 NOTE — ACP (ADVANCE CARE PLANNING)
Advance Care Planning     Date: 06/24/2024    Code Status  In light of the patients advanced and life limiting illness,I engaged the the patient and family in a voluntary conversation about the patient's preferences for care  at the very end of life. The patient wishes to have a natural, peaceful death.  Along those lines, the patient does not wish to have CPR or other invasive treatments performed when his heart and/or breathing stops. I communicated to the patient, family, and healthcare power of   that a DNR order would be placed in his medical record to reflect this preference.    A total of 60 min was spent on advance care planning, goals of care discussion, emotional support, formulating and communicating prognosis and exploring burden/benefit of various approaches of treatment. This discussion occurred on a fully voluntary basis with the verbal consent of the patient and/or family.     Family planning to discuss further the possibility of hospice / comfort-focused care.

## 2024-06-24 NOTE — SUBJECTIVE & OBJECTIVE
Interval History: NAEON, VSS.     Review of Systems   Constitutional:  Negative for chills and fever.   Respiratory:  Negative for shortness of breath.    Cardiovascular:  Negative for chest pain.   Gastrointestinal:  Negative for abdominal pain.   Genitourinary:  Negative for dysuria.   Musculoskeletal:  Negative for arthralgias and myalgias.        B/l leg pain   Psychiatric/Behavioral:  Positive for decreased concentration. Negative for agitation and behavioral problems.      Objective:     Vital Signs (Most Recent):  Temp: 97.8 °F (36.6 °C) (06/24/24 1111)  Pulse: 76 (06/24/24 1511)  Resp: 18 (06/24/24 1111)  BP: 114/75 (06/24/24 1111)  SpO2: 99 % (06/24/24 1111) Vital Signs (24h Range):  Temp:  [97.4 °F (36.3 °C)-98.2 °F (36.8 °C)] 97.8 °F (36.6 °C)  Pulse:  [66-81] 76  Resp:  [17-18] 18  SpO2:  [98 %-100 %] 99 %  BP: ()/(50-75) 114/75     Weight: 49.9 kg (110 lb)  Body mass index is 19.49 kg/m².    Intake/Output Summary (Last 24 hours) at 6/24/2024 1543  Last data filed at 6/24/2024 0800  Gross per 24 hour   Intake 240 ml   Output 800 ml   Net -560 ml         Physical Exam  Constitutional:       Comments: Thin appearing, conversant and alert   HENT:      Head: Normocephalic and atraumatic.      Mouth/Throat:      Mouth: Mucous membranes are dry.      Pharynx: Oropharynx is clear.   Eyes:      Extraocular Movements: Extraocular movements intact.   Cardiovascular:      Rate and Rhythm: Normal rate and regular rhythm.      Pulses: Normal pulses.      Heart sounds: Normal heart sounds.   Pulmonary:      Effort: Pulmonary effort is normal.      Breath sounds: Normal breath sounds.   Abdominal:      General: Abdomen is flat.      Palpations: Abdomen is soft.      Comments: Slight wincing to palpation, but denied pain    Musculoskeletal:         General: No swelling or tenderness.      Comments:  No erythema, no wounds noted.      Skin:     General: Skin is warm and dry.      Findings: No bruising.    Neurological:      Comments: AO to self, place  Raspy voice, some dysarthria   Psychiatric:         Behavior: Behavior normal.      Comments: No agitation.             Significant Labs: All pertinent labs within the past 24 hours have been reviewed.    Significant Imaging: I have reviewed all pertinent imaging results/findings within the past 24 hours.

## 2024-06-24 NOTE — PHARMACY MED REC
"Admission Medication History     The home medication history was taken by Karlee Da Silva.    You may go to "Admission" then "Reconcile Home Medications" tabs to review and/or act upon these items.     The home medication list has been updated by the Pharmacy department.   Please read ALL comments highlighted in yellow.   Please address this information as you see fit.    Feel free to contact us if you have any questions or require assistance.      Medications listed below were obtained from: Pati -spouse and Aruna, RAFI @ [facility]ations   Medication Sig    acetaminophen (TYLENOL) 500 MG tablet Take 500 mg by mouth daily as needed (Leg pain).    aspirin 81 MG Chew Take 1 tablet (81 mg total) by mouth once daily.    atorvastatin (LIPITOR) 20 MG tablet Take 1 tablet (20 mg total) by mouth once daily.    ciclopirox (PENLAC) 8 % Soln Apply topically nightly.    clopidogreL (PLAVIX) 75 mg tablet Take 1 tablet (75 mg total) by mouth once daily. for 14 doses    cyanocobalamin (VITAMIN B-12) 100 MCG tablet Take 1 tablet (100 mcg total) by mouth once daily.    donepeziL (ARICEPT) 5 MG tablet Take 1 tablet (5 mg total) by mouth every evening.    dorzolamide-timolol 2-0.5% (COSOPT) 22.3-6.8 mg/mL ophthalmic solution Place 1 drop into both eyes 2 (two) times daily.    ergocalciferol (ERGOCALCIFEROL) 50,000 unit Cap Take 1 capsule (50,000 Units total) by mouth every 7 days.    lactulose (CHRONULAC) 20 gram/30 mL Soln Take 15 mLs (10 g total) by mouth daily as needed (Constipation).    metoprolol succinate (TOPROL-XL) 25 MG 24 hr tablet Take 1 tablet by mouth every day in the evening.    multivitamin capsule Take 1 capsule by mouth once daily.    olmesartan (BENICAR) 20 MG tablet Take 1 tablet (20 mg total) by mouth once daily.    omega-3 fatty acids/fish oil (FISH OIL-OMEGA-3 FATTY ACIDS) 300-1,000 mg capsule Take 1 capsule by mouth once daily.    spironolactone (ALDACTONE) 25 MG tablet Take 1 tablet (25 mg total) by " mouth once daily.           Karlee Da Silva  EXT [id number]                  .

## 2024-06-24 NOTE — SUBJECTIVE & OBJECTIVE
Interval History: CORDELL, YANIRAS. Reporting some resting leg pain. No abdominal or flank pain.     Review of Systems   Constitutional:  Negative for chills and fever.   Respiratory:  Negative for shortness of breath.    Cardiovascular:  Negative for chest pain.   Gastrointestinal:  Negative for abdominal pain.   Genitourinary:  Negative for dysuria.   Musculoskeletal:  Negative for arthralgias and myalgias.        B/l leg pain   Psychiatric/Behavioral:  Positive for decreased concentration. Negative for agitation and behavioral problems.      Objective:     Vital Signs (Most Recent):  Temp: 98.2 °F (36.8 °C) (06/24/24 0751)  Pulse: 72 (06/24/24 0751)  Resp: 17 (06/24/24 0751)  BP: (!) 97/50 (06/24/24 0751)  SpO2: 100 % (06/24/24 0751) Vital Signs (24h Range):  Temp:  [97.4 °F (36.3 °C)-98.4 °F (36.9 °C)] 98.2 °F (36.8 °C)  Pulse:  [66-83] 72  Resp:  [16-18] 17  SpO2:  [78 %-100 %] 100 %  BP: ()/(50-85) 97/50     Weight: 49.9 kg (110 lb)  Body mass index is 19.49 kg/m².    Intake/Output Summary (Last 24 hours) at 6/24/2024 0851  Last data filed at 6/24/2024 0533  Gross per 24 hour   Intake --   Output 1050 ml   Net -1050 ml         Physical Exam  Constitutional:       Comments: Thin appearing, conversant and alert   HENT:      Head: Normocephalic and atraumatic.      Mouth/Throat:      Mouth: Mucous membranes are dry.      Pharynx: Oropharynx is clear.   Eyes:      Extraocular Movements: Extraocular movements intact.   Cardiovascular:      Rate and Rhythm: Normal rate and regular rhythm.      Pulses: Normal pulses.      Heart sounds: Normal heart sounds.   Pulmonary:      Effort: Pulmonary effort is normal.      Breath sounds: Normal breath sounds.   Abdominal:      General: Abdomen is flat.      Palpations: Abdomen is soft.      Comments: Slight wincing to palpation, but denied pain    Musculoskeletal:         General: No swelling or tenderness.      Comments:  No erythema, no wounds noted.      Skin:     General:  Skin is warm and dry.      Findings: No bruising.   Neurological:      Comments: AO to self, place  Raspy voice, some dysarthria   Psychiatric:         Behavior: Behavior normal.      Comments: No agitation.             Significant Labs: All pertinent labs within the past 24 hours have been reviewed.  CBC:   Recent Labs   Lab 06/23/24  1558 06/23/24  2340 06/24/24  0801   WBC 13.39* 11.88 11.18   HGB 7.6* 7.5* 7.8*   HCT 21.6* 21.9* 22.8*   * 116* 120*     CMP:   Recent Labs   Lab 06/23/24  0634 06/24/24  0544   * 133*   K 4.3 4.3    106   CO2 19* 19*   GLU 91 87   BUN 62* 50*   CREATININE 1.6* 1.3   CALCIUM 8.7 9.0   PROT 5.0* 5.0*   ALBUMIN 1.6* 1.6*   BILITOT 3.5* 3.4*   ALKPHOS 112 118   * 133*   ALT 90* 94*   ANIONGAP 4* 8       Significant Imaging: I have reviewed all pertinent imaging results/findings within the past 24 hours.

## 2024-06-24 NOTE — PT/OT/SLP PROGRESS
Physical Therapy Treatment    Patient Name:  Dean Hahn   MRN:  [id number]    Recommendations:     Discharge Recommendations: Moderate Intensity Therapy  Discharge Equipment Recommendations: lift device, wheelchair, hospital bed  Barriers to discharge:  current level of assistance required     Assessment:     Dean Hahn is a 83 y.o. male admitted with a medical diagnosis of Syncope and collapse.  He presents with the following impairments/functional limitations: weakness, impaired endurance, impaired self care skills, impaired functional mobility, impaired balance, decreased safety awareness, decreased lower extremity function, decreased upper extremity function, decreased ROM, impaired cardiopulmonary response to activity Pt tolerated treatment session fairly well today. Pt requiring maximal assistance for bed mobility, EOB balancing and transfers. Patient remains appropriate for continued skilled services within the acute environment and goals remain appropriate.   .    Rehab Prognosis: Fair; patient would benefit from acute skilled PT services to address these deficits and reach maximum level of function.    Recent Surgery: * No surgery found *      Plan:     During this hospitalization, patient to be seen 4 x/week to address the identified rehab impairments via gait training, therapeutic activities, therapeutic exercises, neuromuscular re-education and progress toward the following goals:    Plan of Care Expires:  07/19/24    Subjective     Chief Complaint: Tightness during LE exercises and stretches   Patient/Family Comments/goals: Pt agreeable to PT   Pain/Comfort:  Pain Rating 1: 0/10      Objective:     Communicated with Rn prior to session.  Patient found supine with Condom Catheter, telemetry upon PT entry to room.     General Precautions: Standard, fall, hearing impaired  Orthopedic Precautions: N/A  Braces: N/A  Respiratory Status: Room air     Functional Mobility:  Bed  Mobility:     Rolling L & R: MaxA for isrrael care   Supine to Sit: maximal assistance  Pt sat EOB ~ 8 minutes requiring maximal assistance to moderate assistance due to posterior lean   Pt requiring verbal and tactile cueing for upright sitting posture and balance   Sit to Supine: maximal assistance    Transfers:     Sit to Stand:  maximal assistance with no AD  Pt was able to tolerate standing for ~ 5 seconds before having to sit back down     Pt performed 10 repetitions of supine B LE exercises consisting of: Marching, resisted leg kicks, ABD/ADD, AP (AROM).  B HS stretch 30 second hold  Pt requiring AAROM         AM-PAC 6 CLICK MOBILITY  Turning over in bed (including adjusting bedclothes, sheets and blankets)?: 2  Sitting down on and standing up from a chair with arms (e.g., wheelchair, bedside commode, etc.): 2  Moving from lying on back to sitting on the side of the bed?: 2  Moving to and from a bed to a chair (including a wheelchair)?: 2  Need to walk in hospital room?: 1  Climbing 3-5 steps with a railing?: 1  Basic Mobility Total Score: 10       Treatment & Education:  Therapist provided instruction and educated for safety during bed mobility, and transfers. As well as proper body mechanics, energy conservation, and fall prevention strategies during tasks listed above, and the effects of prolonged immobility and the importance of performing EOB/OOB activity and exercises to promote healing and reduce recovery time.       Patient left supine with all lines intact, call button in reach, Rn notified, and wife present..    GOALS:   Multidisciplinary Problems       Physical Therapy Goals          Problem: Physical Therapy    Goal Priority Disciplines Outcome Goal Variances Interventions   Physical Therapy Goal     PT, PT/OT Progressing     Description: Goals to be met by:      Patient will increase functional independence with mobility by performin. Supine to sit with Moderate Assistance  2. Sit to  supine with Moderate Assistance  3. Sit to stand transfer with Maximum Assistance  4. Bed to chair transfer with Maximum Assistance using LRAD  5. Gait  x 10 feet with Moderate Assistance using LRAD.                          Time Tracking:     PT Received On: 06/24/24  PT Start Time: 1007     PT Stop Time: 1045  PT Total Time (min): 38 min     Billable Minutes: Therapeutic Activity 15, Therapeutic Exercise 15, and Neuromuscular Re-education 8    Treatment Type: Treatment  PT/PTA: PTA     Number of PTA visits since last PT visit: 3     06/24/2024

## 2024-06-24 NOTE — ASSESSMENT & PLAN NOTE
Noted on vascular arterial US of lower extremities:   -TIMUR 1.2 on the right and TIMUR of 1 on the left.  -Occlusion of the L PT with no collateral vessels identified, suggestive of an acute process.  -Intermittent loss of flow within the bilateral AT with multiple collateral vessels, suggestive of chronic occlusions.    PLAN:  -No Eliquis d/t bleeding risk   -No AP agents given RP hematoma  -not amenable to vascular procedure  -CTM

## 2024-06-24 NOTE — PLAN OF CARE
Discharge Plan A and Plan B have been determined by review of patient's clinical status, future medical and therapeutic needs, and coverage/benefits for post-acute care in coordination with multidisciplinary team members.    06/24/24 1524   Post-Acute Status   Post-Acute Authorization Placement   Post-Acute Placement Status Pending post-acute provider review/more information requested   Coverage HU[employer] MANAGED MEDICARE - HU[employer]NA MEDICARE SELECT PARTNER - CAPITATED   Discharge Plan   Discharge Plan A Skilled Nursing Facility   Discharge Plan B Home Health;Home with family     SW reviewed dc plan w/ MD. Per MD, patient is hospice appropriate. Dispo pending GOC conversations w/ MD.     12:00pm  SW met w/ patient and spouse at the bedside for dc planning. SW reviewed plan of hospice vs re-admit to [facility] SNF. Spouse confirmed that plan is for patient to re-admit to [facility] SNF. Per spouse, will consider hospice care at a later date if family decides but plan is for patient to dc to SNF following this hospitalization. SW verbalized understanding. SW reviewed w/ MD, SW requested that MD place SNF orders. RADHA phoned [facility] SNF admissions [phone]. RADHA spoke w/ Jenise (admissions). Jenise confirmed that patient is accepted to re-admit to SNF, will need updated Jenise powers requesting clinicals. RADHA sent clinicals via [employer]. Per Jenise, will submit for auth.    3:30pm  RADHA phoned Jenise to f/u on status of auth. Per Jenise, auth is pending. Will need to plan for dc tomorrow am pending approval. RADHA documented AVD.    RADHA will continue to follow.                      Ayad Rider, MSW, LMSW  [facility]  Case Management  Ext. [phone]

## 2024-06-24 NOTE — PROGRESS NOTES
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 History primarily obtained via spouse and chart review. ED staff contacted [facility] nursing staff who confirmed he became unresponsive while seated during PT session. Nursing attempted sternal rub, he did not respond. He became cyanotic and apneic for approx 10 seconds with a thready pulse. Nursing staff went to place in trendelenburg and almost called a code until he rapidly regained consciousness. Initial SBP in the field was in the 50s. He was hypoxic and placed on 15L non-rebreather. Improvement on arrival to [facility], stable on RA with improvement in MAPs. Pt reports feeling cold otherwise reports feeling "fine." He is conversing minimally which is near his baseline per family at bedside. He denies memory of the event. He denies fevers, chest pain, cough, SOB, abdominal pain, difficulty urinating/dysuria. Per the patient's wife, prior to stroke 6/10, patient ambulatory and seems to have waxing/waning mentation (Aox3 but sometimes confused). Previously noted that patient dependent in ADLs except feeding himself.     In ED, patient normotensive, not tachycardic, afebrile, and on RA. Did require tactile stimulation to participate in interview, minimally conversant upon awakening. CBC with leukocytosis (WBC 16), stable anemia (Hb 11). CMP without electrolyte derangements, Cr " 1.6 (baseline appears 1.4-1.6), AST//112, T bili 3.1 (LFTs stable compared to 6/17). LA 3.5 -> 2.7 following 1L LR. Modest troponin increase to 0.71. CT head with no acute intracranial process. EKG w/o Afib or ST elevation/depression, however noted possible PVC and fusion complexes. Received Vanc/Zosyn in ED. VBG: pH: 7.435, pco2: 22.9, Beecf: -9 HCO3: 15.3    Overview/Hospital Course:  [place] [facility] [place] syncopal episode a/w brief hypoxia, apnea, and hypotension. Patient arrived to ED with slight tachypnea and found to have leukocytosis and slightly elevated LA, so received fluids and CTX per sepsis protocol. LA normalized w/ fluids. Ct head NAICP. CXR NAIPP. Performing EEG to assess for seizure. Telemetry to detect arrhythmia.  Infectious workup unrevealing and patient afebrile/HDS for 48 hrs, stopped antibiotics. Found to have psoas hematoma 6/21. Stopped DAPT. Hb stabilized with continued normotension. GOC with family 6/23 to initiate discussion of hospice care given strokes, cirrhosis, risk of re-bleeding given psoas hematoma and possible GI bleed, as well as overall debility. Family opted to deliberate further outside of hospital regarding decision for hospice, however did decide to change code status to DNR 6/24. Deemed medically stable for discharge 6/24. Holding DAPT. Elected to not start Eliquis at this time due to risk of re-bleeding. Holding BP medicine until seen by PCP, would indefinitely hold if pursuing hospice.      Interval History: NAEON, VSS.     Review of Systems   Constitutional:  Negative for chills and fever.   Respiratory:  Negative for shortness of breath.    Cardiovascular:  Negative for chest pain.   Gastrointestinal:  Negative for abdominal pain.   Genitourinary:  Negative for dysuria.   Musculoskeletal:  Negative for arthralgias and myalgias.        B/l leg pain   Psychiatric/Behavioral:  Positive for decreased concentration. Negative for agitation and behavioral problems.       Objective:     Vital Signs (Most Recent):  Temp: 97.8 °F (36.6 °C) (06/24/24 1111)  Pulse: 76 (06/24/24 1511)  Resp: 18 (06/24/24 1111)  BP: 114/75 (06/24/24 1111)  SpO2: 99 % (06/24/24 1111) Vital Signs (24h Range):  Temp:  [97.4 °F (36.3 °C)-98.2 °F (36.8 °C)] 97.8 °F (36.6 °C)  Pulse:  [66-81] 76  Resp:  [17-18] 18  SpO2:  [98 %-100 %] 99 %  BP: ()/(50-75) 114/75     Weight: 49.9 kg (110 lb)  Body mass index is 19.49 kg/m².    Intake/Output Summary (Last 24 hours) at 6/24/2024 1543  Last data filed at 6/24/2024 0800  Gross per 24 hour   Intake 240 ml   Output 800 ml   Net -560 ml         Physical Exam  Constitutional:       Comments: Thin appearing, conversant and alert   HENT:      Head: Normocephalic and atraumatic.      Mouth/Throat:      Mouth: Mucous membranes are dry.      Pharynx: Oropharynx is clear.   Eyes:      Extraocular Movements: Extraocular movements intact.   Cardiovascular:      Rate and Rhythm: Normal rate and regular rhythm.      Pulses: Normal pulses.      Heart sounds: Normal heart sounds.   Pulmonary:      Effort: Pulmonary effort is normal.      Breath sounds: Normal breath sounds.   Abdominal:      General: Abdomen is flat.      Palpations: Abdomen is soft.      Comments: Slight wincing to palpation, but denied pain    Musculoskeletal:         General: No swelling or tenderness.      Comments:  No erythema, no wounds noted.      Skin:     General: Skin is warm and dry.      Findings: No bruising.   Neurological:      Comments: AO to self, place  Raspy voice, some dysarthria   Psychiatric:         Behavior: Behavior normal.      Comments: No agitation.             Significant Labs: All pertinent labs within the past 24 hours have been reviewed.    Significant Imaging: I have reviewed all pertinent imaging results/findings within the past 24 hours.    Assessment/Plan:      * Syncope and collapse  DDX includes autonomic dysfunction, hypovolemia in setting of BP regimen, arrhythmia  such as Afib, seizure, stroke. CT head w/o acute intracranial process to suggest hemorrhagic stroke. Has not missed secondary preventative medications such as Plavix/ASA. Concern for cardiac etiology given LOC while seated. Fluid resuscitated in ED with dramatic improvement in BP compared to BP noted at SNF. Seizure less likely, but unknown if patient post-ictal. EEG w/o seizure.     Orthostatic positive. Possible RP hematoma developing thus predisposing patient to orthostasis due to blood loss.   6/21: Likely RP hematoma overlying psoas. Obtaining CTA - no acute bleed or contrast extravasation, no role for IR intervention.     - IVF  - PRBC as needed, can administer if hypotensive  - STOP ASA/plavix  - Vasc Neuro to weigh in on need for continued DAPT  - Not a candidate for Eliquis currently due to possibility of LGI bleeding and psoas hematoma  - Telemetry    PAD (peripheral artery disease)  Noted on vascular arterial US of lower extremities:   -TIMUR 1.2 on the right and TIMUR of 1 on the left.  -Occlusion of the L PT with no collateral vessels identified, suggestive of an acute process.  -Intermittent loss of flow within the bilateral AT with multiple collateral vessels, suggestive of chronic occlusions.    PLAN:  -No Eliquis d/t bleeding risk   -No AP agents given RP hematoma  -not amenable to vascular procedure  -CTM      Nontraumatic psoas hematoma  Patient has acute blood loss due to hemorrhage, the hemorrhage is due to  psoas hematoma likely related to TNK administration on 6/10 , patient does have a propensity for bleeding due to a medication, the medication is TNK with AP agents for stroke.. Will trend hemoglobin/hematocrit Every 8 hours, as well as monitor and correct for any coagulation defects. CBC and vital signs have been reviewed and last CBC was noted-   Lab Results   Component Value Date    WBC 18.94 (H) 06/22/2024    HGB 8.5 (L) 06/22/2024    HCT 24.2 (L) 06/22/2024    MCV 91 06/22/2024      "(L) 06/22/2024         Will order a type and screen and consent patient for blood transfusion. Will transfuse if Hgb is <7g/dl (<8g/dl in cases of active ACS) or if patient has rapid bleeding leading to hemodynamic instability.    Macrocytic anemia  Patient's anemia is currently controlled. Has not received any PRBCs to date. Etiology likely d/t chronic disease due to Chronic liver disease and B12 deficiency  Current CBC reviewed-   Lab Results   Component Value Date    HGB 7.3 (L) 06/20/2024    HCT 21.2 (L) 06/20/2024     Monitor serial CBC and transfuse if patient becomes hemodynamically unstable, symptomatic or H/H drops below 7/21.  Hgb dropped to 7.1-->7.3 on recheck  No obvious signs of bleed    -CBC Q daily for now  -cautiously continue asa/plavix  -monitor for bleeding    Severe sepsis  This patient does not have evidence of infective focus  My overall impression is  possible sepsis, tachycardic and leukocytosis .  Source: Urinary Tract and Abdominal  Antibiotics given-   Antibiotics (72h ago, onward)      Start     Stop Route Frequency Ordered    06/21/24 1115  cefTRIAXone (ROCEPHIN) 2 g in D5W 100 mL IVPB (MB+)         -- IV Every 24 hours (non-standard times) 06/21/24 1010          Latest lactate reviewed-  No results for input(s): "LACTATE", "POCLAC" in the last 72 hours.    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     Aggressive risk factor modification: HTN     Rehab efforts: The patient has been evaluated by a stroke team provider and the therapy needs have been fully considered based off the presenting complaints and exam findings. 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 Co-morbidities are present and inclusive of malnutrition.  MELD-Na score calculated; MELD 3.0: 19 at 6/12/2024  2:21 AM  MELD-Na: 17 at 6/12/2024  2:21 AM  Calculated from:  Serum Creatinine: 1.8 mg/dL at 6/12/2024  2:21 AM  Serum Sodium: 138 mmol/L (Using max of 137 mmol/L) at 6/12/2024  2:21 AM  Total Bilirubin: 2.2 mg/dL at 6/12/2024  2:21 AM  Serum Albumin: 2.4 g/dL at 6/12/2024  2:21 AM  INR(ratio): 1.2 at 6/10/2024  8:21 AM  Age at listing (hypothetical): 83 years  Sex: Male at 6/12/2024  2:21 AM      Continue chronic meds. 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 Will avoid any hepatotoxic meds, and monitor CBC/CMP/INR for synthetic function.     Thrombocytopenia  Patient was found to have thrombocytopenia, the likely etiology is secondary to cirrhosis/portal hypertension, will monitor the platelets Daily. Will transfuse if platelet count is <10k. Hold DVT prophylaxis if platelets are <50k. The patient's platelet  results have been reviewed and are listed below.  Recent Labs   Lab 06/22/24  0830   *         Stage 3a chronic kidney disease  Creatine stable for now. BMP reviewed- noted Estimated Creatinine Clearance: 27.3 mL/min (A) (based on SCr of 1.5 mg/dL (H)). according to latest data. Based on current GFR, CKD stage is stage 4 - GFR 15-29.  Monitor UOP and serial BMP and adjust therapy as needed. Renally dose meds. Avoid nephrotoxic medications and procedures.    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 Follows with Hepatology as OP.     Essential hypertension  Chronic, controlled. Latest blood pressure and vitals reviewed-     Temp:  [97.8 °F (36.6 °C)-99.7 °F (37.6 °C)]   Pulse:  []   Resp:  [16-18]   BP: ()/(51-88)   SpO2:  [95 %-100 %] .   Home meds for hypertension were reviewed and noted below.   Hypertension Medications               metoprolol succinate (TOPROL-XL) 25 MG 24 hr tablet TAKE 1 TABLET BY MOUTH EVERY DAY IN THE EVENING    olmesartan (BENICAR) 20 MG tablet Take 1 tablet (20 mg total) by mouth once daily.    spironolactone (ALDACTONE) 25 MG tablet Take 1 tablet (25 mg total) by mouth once daily.            While in the hospital, will manage blood pressure as follows; Adjust home antihypertensive regimen as follows- Hold while due to psoas hematoma     Will utilize p.r.n. blood pressure medication only if patient's blood pressure greater than 220/110 and he develops symptoms such as worsening chest pain or shortness of breath.      VTE Risk Mitigation (From admission, onward)           Ordered     IP VTE HIGH RISK PATIENT  Once         06/18/24 1809     Place sequential compression device  Until discontinued         06/18/24 1809                    Discharge  Planning   LAMINE: 6/24/2024     Code Status: Full Code   Is the patient medically ready for discharge?:     Reason for patient still in hospital (select all that apply): Pending disposition  Discharge Plan A: Skilled Nursing Facility                  Bryanna Teixeira MD  Department of Hospital Medicine   [facility] ([place])

## 2024-06-25 VITALS
DIASTOLIC BLOOD PRESSURE: 58 MMHG | BODY MASS INDEX: 19.49 KG/M2 | HEART RATE: 99 BPM | TEMPERATURE: 99 F | SYSTOLIC BLOOD PRESSURE: 106 MMHG | HEIGHT: 63 IN | RESPIRATION RATE: 18 BRPM | OXYGEN SATURATION: 99 % | WEIGHT: 110 LBS

## 2024-06-25 LAB
ALBUMIN SERPL BCP-MCNC: 1.6 G/DL (ref 3.5–5.2)
ALP SERPL-CCNC: 137 U/L (ref 55–135)
ALT SERPL W/O P-5'-P-CCNC: 96 U/L (ref 10–44)
ANION GAP SERPL CALC-SCNC: 6 MMOL/L (ref 8–16)
AST SERPL-CCNC: 124 U/L (ref 10–40)
BASOPHILS # BLD AUTO: 0.02 K/UL (ref 0–0.2)
BASOPHILS NFR BLD: 0.2 % (ref 0–1.9)
BILIRUB SERPL-MCNC: 3.6 MG/DL (ref 0.1–1)
BUN SERPL-MCNC: 46 MG/DL (ref 8–23)
CALCIUM SERPL-MCNC: 8.8 MG/DL (ref 8.7–10.5)
CHLORIDE SERPL-SCNC: 109 MMOL/L (ref 95–110)
CO2 SERPL-SCNC: 18 MMOL/L (ref 23–29)
CREAT SERPL-MCNC: 1.2 MG/DL (ref 0.5–1.4)
DIFFERENTIAL METHOD BLD: ABNORMAL
EOSINOPHIL # BLD AUTO: 0.4 K/UL (ref 0–0.5)
EOSINOPHIL NFR BLD: 3.4 % (ref 0–8)
ERYTHROCYTE [DISTWIDTH] IN BLOOD BY AUTOMATED COUNT: 17.7 % (ref 11.5–14.5)
EST. GFR  (NO RACE VARIABLE): >60 ML/MIN/1.73 M^2
GLUCOSE SERPL-MCNC: 102 MG/DL (ref 70–110)
HCT VFR BLD AUTO: 23.9 % (ref 40–54)
HGB BLD-MCNC: 8.3 G/DL (ref 14–18)
IMM GRANULOCYTES # BLD AUTO: 0.17 K/UL (ref 0–0.04)
IMM GRANULOCYTES NFR BLD AUTO: 1.4 % (ref 0–0.5)
LYMPHOCYTES # BLD AUTO: 1.4 K/UL (ref 1–4.8)
LYMPHOCYTES NFR BLD: 11.6 % (ref 18–48)
MCH RBC QN AUTO: 32.4 PG (ref 27–31)
MCHC RBC AUTO-ENTMCNC: 34.7 G/DL (ref 32–36)
MCV RBC AUTO: 93 FL (ref 82–98)
MONOCYTES # BLD AUTO: 1.5 K/UL (ref 0.3–1)
MONOCYTES NFR BLD: 12.2 % (ref 4–15)
NEUTROPHILS # BLD AUTO: 8.7 K/UL (ref 1.8–7.7)
NEUTROPHILS NFR BLD: 71.2 % (ref 38–73)
NRBC BLD-RTO: 0 /100 WBC
PLATELET # BLD AUTO: 129 K/UL (ref 150–450)
PMV BLD AUTO: 9.3 FL (ref 9.2–12.9)
POTASSIUM SERPL-SCNC: 4.1 MMOL/L (ref 3.5–5.1)
PROT SERPL-MCNC: 5.1 G/DL (ref 6–8.4)
RBC # BLD AUTO: 2.56 M/UL (ref 4.6–6.2)
SODIUM SERPL-SCNC: 133 MMOL/L (ref 136–145)
WBC # BLD AUTO: 12.28 K/UL (ref 3.9–12.7)

## 2024-06-25 PROCEDURE — 85025 COMPLETE CBC W/AUTO DIFF WBC: CPT | Mod: HCNC

## 2024-06-25 PROCEDURE — 25000003 PHARM REV CODE 250: Mod: HCNC

## 2024-06-25 PROCEDURE — 80053 COMPREHEN METABOLIC PANEL: CPT | Mod: HCNC

## 2024-06-25 PROCEDURE — 36415 COLL VENOUS BLD VENIPUNCTURE: CPT | Mod: HCNC

## 2024-06-25 RX ORDER — OLMESARTAN MEDOXOMIL 20 MG/1
20 TABLET ORAL DAILY
Start: 2024-06-25

## 2024-06-25 RX ORDER — OLMESARTAN MEDOXOMIL 20 MG/1
20 TABLET ORAL DAILY
Status: ON HOLD | COMMUNITY
End: 2024-06-25

## 2024-06-25 RX ADMIN — LACTULOSE 10 G: 20 SOLUTION ORAL at 09:06

## 2024-06-25 RX ADMIN — ACETAMINOPHEN 650 MG: 325 TABLET ORAL at 09:06

## 2024-06-25 RX ADMIN — DICLOFENAC SODIUM 2 G: 10 GEL TOPICAL at 09:06

## 2024-06-25 RX ADMIN — CYANOCOBALAMIN TAB 250 MCG 250 MCG: 250 TAB at 09:06

## 2024-06-25 RX ADMIN — Medication: at 09:06

## 2024-06-25 NOTE — NURSING
Pt left via w/c van in NAD.  IV and heart monitor removed. DC instructions, follow-up, and medication changes reviewed with pt and family.  Belongings secured with pt and family.  Diclofenac applied and tylenol given before dc.  Report called to receiving facility.  Report given to YOLI Valdovinos at [facility].  Pt was  assisted into w/c and strapped in before keaving.  Pt, family, and Aruna (RN) verbalized understanding and had no further questions.

## 2024-06-25 NOTE — PLAN OF CARE
[facility] ([facility])  Discharge Reassessment    Primary Care Provider: Angel Luis Tobias III, MD    Expected Discharge Date: 6/25/2024    Reassessment (most recent)       Discharge Reassessment - 06/24/24 1200          Discharge Reassessment    Assessment Type Discharge Planning Reassessment (P)      Did the patient's condition or plan change since previous assessment? No (P)      Discharge Plan discussed with: Spouse/sig other;Patient (P)      Name(s) and Number(s) Pati Hahn (Spouse)  [phone] (P)      Communicated LAMINE with patient/caregiver Yes (P)      Discharge Plan A Skilled Nursing Facility (P)      Discharge Plan B Home Health;Home with family (P)      DME Needed Upon Discharge  none (P)      Transition of Care Barriers Mobility (P)         Post-Acute Status    Post-Acute Authorization Placement (P)      Post-Acute Placement Status Pending post-acute provider review/more information requested (P)      Coverage HU[employer]NA MANAGED MEDICARE - HU[employer]NA MEDICARE SELECT PARTNER - CAPITATED (P)                  Discharge Plan A and Plan B have been determined by review of patient's clinical status, future medical and therapeutic needs, and coverage/benefits for post-acute care in coordination with multidisciplinary team members.                           SIDDHARTHA Amin, LMSW  [facility]  Case Management  Ext. [phone]

## 2024-06-25 NOTE — PLAN OF CARE
Martinez Gudino - Med Surg ([facility])  Discharge Final Note    Primary Care Provider: Angel Luis Tobias III, MD    Expected Discharge Date: 6/25/2024    Final Discharge Note (most recent)       Final Note - 06/25/24 1403          Final Note    Assessment Type Discharge Planning Reassessment (P)      Anticipated Discharge Disposition Skilled Nursing Facility (P)      What phone number can be called within the next 1-3 days to see how you are doing after discharge? [phone] (P)         Post-Acute Status    Post-Acute Authorization Placement (P)      Post-Acute Placement Status Set-up Complete/Auth obtained (P)      Coverage HUMANA MANAGED MEDICARE - HU[employer]NA MEDICARE SELECT PARTNER - CAPITATED (P)                      Important Message from Medicare  Important Message from Medicare regarding Discharge Appeal Rights: Explained to patient/caregiver, Signed/date by patient/caregiver, Given to patient/caregiver, Other (comments) (spouse signed)     Date IMM was signed: 06/24/24  Time IMM was signed: 1317      Patient discharged to [facility].                  Ayad Rider, SIDDHARTHA, LMSW  [facility]  Case Management  Ext. [phone]

## 2024-06-25 NOTE — PLAN OF CARE
11LM PT Note: Orders received and pt attempted for initial PT evaluation this date.  Pt scheduled for cardiac cath today.  RN reports pt has been up walking the halls with staff.  Will hold at this time due to procedure pending shortly and will re-attempt after procedure, today or tomorrow to determine if skilled PT services are warranted.  RN in agreement with POC.  Needs PT eval completed 7/5.   Discharge Plan A and Plan B have been determined by review of patient's clinical status, future medical and therapeutic needs, and coverage/benefits for post-acute care in coordination with multidisciplinary team members.    06/25/24 1016   Post-Acute Status   Post-Acute Authorization Placement   Post-Acute Placement Status Set-up Complete/Auth obtained   Coverage HUMANA MANAGED MEDICARE - HU[employer]NA MEDICARE SELECT PARTNER - CAPITATED   Discharge Plan   Discharge Plan A Skilled Nursing Facility   Discharge Plan B Home Health;Home with family     RADHA received confirmation from Jenise ([facility] admissions) that auth approved/patient accepted to admit to SNF Today. RADHA placed PFC orders for patient transport to [facility]. Spouse Pati notified and agreeable to dc plan. Report to be called to [phone] [address] [place] [address].    RADHA will continue to follow.                        SIDDHARTHA Amin, LMSW  [facility]  Case Management  Ext. [phone]

## 2024-06-25 NOTE — SUBJECTIVE & OBJECTIVE
Interval History: NAEO, VSS, Medically ready for discharge    Review of Systems   Constitutional:  Negative for chills and fever.   Respiratory:  Negative for shortness of breath.    Cardiovascular:  Negative for chest pain.   Gastrointestinal:  Negative for abdominal pain.   Genitourinary:  Negative for dysuria.   Musculoskeletal:  Negative for arthralgias and myalgias.        B/l leg pain   Psychiatric/Behavioral:  Positive for decreased concentration. Negative for agitation and behavioral problems.      Objective:     Vital Signs (Most Recent):  Temp: 98.6 °F (37 °C) (06/25/24 1128)  Pulse: 99 (06/25/24 1128)  Resp: 18 (06/25/24 1128)  BP: (!) 106/58 (06/25/24 1128)  SpO2: 99 % (06/25/24 1128) Vital Signs (24h Range):  Temp:  [97.7 °F (36.5 °C)-98.6 °F (37 °C)] 98.6 °F (37 °C)  Pulse:  [66-99] 99  Resp:  [17-87] 18  SpO2:  [93 %-100 %] 99 %  BP: (106-156)/(58-85) 106/58     Weight: 49.9 kg (110 lb)  Body mass index is 19.49 kg/m².    Intake/Output Summary (Last 24 hours) at 6/25/2024 1204  Last data filed at 6/25/2024 0604  Gross per 24 hour   Intake --   Output 1200 ml   Net -1200 ml         Physical Exam  Constitutional:       Comments: Thin appearing, conversant and alert   HENT:      Head: Normocephalic and atraumatic.      Mouth/Throat:      Mouth: Mucous membranes are dry.      Pharynx: Oropharynx is clear.   Eyes:      Extraocular Movements: Extraocular movements intact.   Cardiovascular:      Rate and Rhythm: Normal rate and regular rhythm.      Pulses: Normal pulses.      Heart sounds: Normal heart sounds.   Pulmonary:      Effort: Pulmonary effort is normal.      Breath sounds: Normal breath sounds.   Abdominal:      General: Abdomen is flat.      Palpations: Abdomen is soft.   Musculoskeletal:         General: No swelling or tenderness.      Comments: No erythema, no wounds noted.      Skin:     General: Skin is warm and dry.      Findings: No bruising.   Neurological:      Comments: AO to self,  place  Raspy voice, some dysarthria   Psychiatric:         Behavior: Behavior normal.      Comments: No agitation.             Significant Labs: All pertinent labs within the past 24 hours have been reviewed.    Significant Imaging: I have reviewed all pertinent imaging results/findings within the past 24 hours.

## 2024-06-26 NOTE — PHYSICIAN QUERY
Please document your best medical opinion regarding the etiology of syncope diagnosis:    Acute hemorrhage 2nd to anticoagulation Medication.  Orthostatic Hypotension - initial syncopal event likely orthostasis given positive orthostatics noted prior to Hb drop and subsequent psoas hematoma related to TNK administration 6/10  Afib with RVR  Other etiology (please specify): ______  Clinically undetermined

## 2024-07-01 ENCOUNTER — HOSPITAL ENCOUNTER (OUTPATIENT)
Facility: HOSPITAL | Age: 84
Discharge: NURSING, REHAB OR LONG-TERM CARE | End: 2024-07-03
Attending: EMERGENCY MEDICINE | Admitting: INTERNAL MEDICINE
Payer: MEDICARE

## 2024-07-01 DIAGNOSIS — K92.2 LOWER GI BLEED: Primary | ICD-10-CM

## 2024-07-01 DIAGNOSIS — R07.9 CHEST PAIN: ICD-10-CM

## 2024-07-01 DIAGNOSIS — R00.0 TACHYCARDIA: ICD-10-CM

## 2024-07-01 DIAGNOSIS — I95.9 HYPOTENSION, UNSPECIFIED HYPOTENSION TYPE: ICD-10-CM

## 2024-07-01 LAB
ABO + RH BLD: NORMAL
ALBUMIN SERPL BCP-MCNC: 1.7 G/DL (ref 3.5–5.2)
ALP SERPL-CCNC: 201 U/L (ref 55–135)
ALT SERPL W/O P-5'-P-CCNC: 69 U/L (ref 10–44)
ANION GAP SERPL CALC-SCNC: 7 MMOL/L (ref 8–16)
ANISOCYTOSIS BLD QL SMEAR: SLIGHT
ANISOCYTOSIS BLD QL SMEAR: SLIGHT
APTT PPP: 29.6 SEC (ref 21–32)
AST SERPL-CCNC: 77 U/L (ref 10–40)
BASOPHILS # BLD AUTO: 0.03 K/UL (ref 0–0.2)
BASOPHILS # BLD AUTO: 0.05 K/UL (ref 0–0.2)
BASOPHILS # BLD AUTO: 0.05 K/UL (ref 0–0.2)
BASOPHILS NFR BLD: 0.2 % (ref 0–1.9)
BASOPHILS NFR BLD: 0.3 % (ref 0–1.9)
BASOPHILS NFR BLD: 0.4 % (ref 0–1.9)
BILIRUB SERPL-MCNC: 3.7 MG/DL (ref 0.1–1)
BILIRUB UR QL STRIP: NEGATIVE
BLD GP AB SCN CELLS X3 SERPL QL: NORMAL
BUN SERPL-MCNC: 28 MG/DL (ref 8–23)
BURR CELLS BLD QL SMEAR: ABNORMAL
BURR CELLS BLD QL SMEAR: ABNORMAL
CALCIUM SERPL-MCNC: 8.6 MG/DL (ref 8.7–10.5)
CHLORIDE SERPL-SCNC: 112 MMOL/L (ref 95–110)
CK SERPL-CCNC: 61 U/L (ref 20–200)
CLARITY UR: CLEAR
CO2 SERPL-SCNC: 15 MMOL/L (ref 23–29)
COLOR UR: YELLOW
CREAT SERPL-MCNC: 1.1 MG/DL (ref 0.5–1.4)
DIFFERENTIAL METHOD BLD: ABNORMAL
EOSINOPHIL # BLD AUTO: 0.2 K/UL (ref 0–0.5)
EOSINOPHIL # BLD AUTO: 0.2 K/UL (ref 0–0.5)
EOSINOPHIL # BLD AUTO: 0.3 K/UL (ref 0–0.5)
EOSINOPHIL NFR BLD: 1.1 % (ref 0–8)
EOSINOPHIL NFR BLD: 1.7 % (ref 0–8)
EOSINOPHIL NFR BLD: 1.9 % (ref 0–8)
ERYTHROCYTE [DISTWIDTH] IN BLOOD BY AUTOMATED COUNT: 18.7 % (ref 11.5–14.5)
ERYTHROCYTE [DISTWIDTH] IN BLOOD BY AUTOMATED COUNT: 22.5 % (ref 11.5–14.5)
ERYTHROCYTE [DISTWIDTH] IN BLOOD BY AUTOMATED COUNT: 23.7 % (ref 11.5–14.5)
EST. GFR  (NO RACE VARIABLE): >60 ML/MIN/1.73 M^2
GLUCOSE SERPL-MCNC: 107 MG/DL (ref 70–110)
GLUCOSE UR QL STRIP: NEGATIVE
HCT VFR BLD AUTO: 25.2 % (ref 40–54)
HCT VFR BLD AUTO: 29.8 % (ref 40–54)
HCT VFR BLD AUTO: 30.1 % (ref 40–54)
HGB BLD-MCNC: 10.2 G/DL (ref 14–18)
HGB BLD-MCNC: 10.6 G/DL (ref 14–18)
HGB BLD-MCNC: 8.6 G/DL (ref 14–18)
HGB UR QL STRIP: NEGATIVE
HYPOCHROMIA BLD QL SMEAR: ABNORMAL
HYPOCHROMIA BLD QL SMEAR: ABNORMAL
IMM GRANULOCYTES # BLD AUTO: 0.08 K/UL (ref 0–0.04)
IMM GRANULOCYTES # BLD AUTO: 0.09 K/UL (ref 0–0.04)
IMM GRANULOCYTES # BLD AUTO: 0.18 K/UL (ref 0–0.04)
IMM GRANULOCYTES NFR BLD AUTO: 0.6 % (ref 0–0.5)
IMM GRANULOCYTES NFR BLD AUTO: 0.7 % (ref 0–0.5)
IMM GRANULOCYTES NFR BLD AUTO: 1 % (ref 0–0.5)
INR PPP: 1.2 (ref 0.8–1.2)
KETONES UR QL STRIP: NEGATIVE
LACTATE SERPL-SCNC: 2.4 MMOL/L (ref 0.5–2.2)
LEUKOCYTE ESTERASE UR QL STRIP: NEGATIVE
LIPASE SERPL-CCNC: 64 U/L (ref 4–60)
LYMPHOCYTES # BLD AUTO: 1.8 K/UL (ref 1–4.8)
LYMPHOCYTES # BLD AUTO: 1.9 K/UL (ref 1–4.8)
LYMPHOCYTES # BLD AUTO: 2.4 K/UL (ref 1–4.8)
LYMPHOCYTES NFR BLD: 12.8 % (ref 18–48)
LYMPHOCYTES NFR BLD: 13.9 % (ref 18–48)
LYMPHOCYTES NFR BLD: 14.9 % (ref 18–48)
MAGNESIUM SERPL-MCNC: 1.9 MG/DL (ref 1.6–2.6)
MCH RBC QN AUTO: 30.4 PG (ref 27–31)
MCH RBC QN AUTO: 31.1 PG (ref 27–31)
MCH RBC QN AUTO: 33.5 PG (ref 27–31)
MCHC RBC AUTO-ENTMCNC: 34.1 G/DL (ref 32–36)
MCHC RBC AUTO-ENTMCNC: 34.2 G/DL (ref 32–36)
MCHC RBC AUTO-ENTMCNC: 35.2 G/DL (ref 32–36)
MCV RBC AUTO: 88 FL (ref 82–98)
MCV RBC AUTO: 89 FL (ref 82–98)
MCV RBC AUTO: 98 FL (ref 82–98)
MONOCYTES # BLD AUTO: 1 K/UL (ref 0.3–1)
MONOCYTES # BLD AUTO: 1.1 K/UL (ref 0.3–1)
MONOCYTES # BLD AUTO: 1.3 K/UL (ref 0.3–1)
MONOCYTES NFR BLD: 7.2 % (ref 4–15)
MONOCYTES NFR BLD: 7.5 % (ref 4–15)
MONOCYTES NFR BLD: 8.7 % (ref 4–15)
NEUTROPHILS # BLD AUTO: 10.8 K/UL (ref 1.8–7.7)
NEUTROPHILS # BLD AUTO: 13.3 K/UL (ref 1.8–7.7)
NEUTROPHILS # BLD AUTO: 9.2 K/UL (ref 1.8–7.7)
NEUTROPHILS NFR BLD: 73.5 % (ref 38–73)
NEUTROPHILS NFR BLD: 75.6 % (ref 38–73)
NEUTROPHILS NFR BLD: 78 % (ref 38–73)
NITRITE UR QL STRIP: NEGATIVE
NRBC BLD-RTO: 0 /100 WBC
OVALOCYTES BLD QL SMEAR: ABNORMAL
PH UR STRIP: 6 [PH] (ref 5–8)
PLATELET # BLD AUTO: 167 K/UL (ref 150–450)
PLATELET # BLD AUTO: 174 K/UL (ref 150–450)
PLATELET # BLD AUTO: 233 K/UL (ref 150–450)
PLATELET BLD QL SMEAR: ABNORMAL
PLATELET BLD QL SMEAR: ABNORMAL
PMV BLD AUTO: 8.5 FL (ref 9.2–12.9)
PMV BLD AUTO: 8.5 FL (ref 9.2–12.9)
PMV BLD AUTO: 8.7 FL (ref 9.2–12.9)
POIKILOCYTOSIS BLD QL SMEAR: SLIGHT
POIKILOCYTOSIS BLD QL SMEAR: SLIGHT
POLYCHROMASIA BLD QL SMEAR: ABNORMAL
POLYCHROMASIA BLD QL SMEAR: ABNORMAL
POTASSIUM SERPL-SCNC: 4.6 MMOL/L (ref 3.5–5.1)
PROT SERPL-MCNC: 5.9 G/DL (ref 6–8.4)
PROT UR QL STRIP: ABNORMAL
PROTHROMBIN TIME: 12.7 SEC (ref 9–12.5)
RBC # BLD AUTO: 2.57 M/UL (ref 4.6–6.2)
RBC # BLD AUTO: 3.35 M/UL (ref 4.6–6.2)
RBC # BLD AUTO: 3.41 M/UL (ref 4.6–6.2)
SODIUM SERPL-SCNC: 134 MMOL/L (ref 136–145)
SP GR UR STRIP: 1.02 (ref 1–1.03)
SPECIMEN OUTDATE: NORMAL
TROPONIN I SERPL DL<=0.01 NG/ML-MCNC: 0.04 NG/ML (ref 0–0.03)
TROPONIN I SERPL DL<=0.01 NG/ML-MCNC: 0.05 NG/ML (ref 0–0.03)
URN SPEC COLLECT METH UR: ABNORMAL
UROBILINOGEN UR STRIP-ACNC: ABNORMAL EU/DL
WBC # BLD AUTO: 12.52 K/UL (ref 3.9–12.7)
WBC # BLD AUTO: 13.84 K/UL (ref 3.9–12.7)
WBC # BLD AUTO: 17.61 K/UL (ref 3.9–12.7)

## 2024-07-01 PROCEDURE — 25000003 PHARM REV CODE 250: Performed by: INTERNAL MEDICINE

## 2024-07-01 PROCEDURE — 99214 OFFICE O/P EST MOD 30 MIN: CPT | Mod: HCNC,GC,, | Performed by: INTERNAL MEDICINE

## 2024-07-01 PROCEDURE — 96374 THER/PROPH/DIAG INJ IV PUSH: CPT | Mod: HCNC

## 2024-07-01 PROCEDURE — 86850 RBC ANTIBODY SCREEN: CPT | Mod: HCNC | Performed by: EMERGENCY MEDICINE

## 2024-07-01 PROCEDURE — 63600175 PHARM REV CODE 636 W HCPCS: Mod: HCNC | Performed by: INTERNAL MEDICINE

## 2024-07-01 PROCEDURE — 85730 THROMBOPLASTIN TIME PARTIAL: CPT | Mod: HCNC | Performed by: EMERGENCY MEDICINE

## 2024-07-01 PROCEDURE — 80053 COMPREHEN METABOLIC PANEL: CPT | Mod: HCNC | Performed by: EMERGENCY MEDICINE

## 2024-07-01 PROCEDURE — 82550 ASSAY OF CK (CPK): CPT | Mod: HCNC | Performed by: EMERGENCY MEDICINE

## 2024-07-01 PROCEDURE — 85610 PROTHROMBIN TIME: CPT | Mod: HCNC | Performed by: EMERGENCY MEDICINE

## 2024-07-01 PROCEDURE — G0378 HOSPITAL OBSERVATION PER HR: HCPCS | Mod: HCNC

## 2024-07-01 PROCEDURE — 93010 ELECTROCARDIOGRAM REPORT: CPT | Mod: HCNC,,, | Performed by: INTERNAL MEDICINE

## 2024-07-01 PROCEDURE — 87040 BLOOD CULTURE FOR BACTERIA: CPT | Mod: HCNC | Performed by: EMERGENCY MEDICINE

## 2024-07-01 PROCEDURE — 81003 URINALYSIS AUTO W/O SCOPE: CPT | Mod: HCNC | Performed by: INTERNAL MEDICINE

## 2024-07-01 PROCEDURE — 63600175 PHARM REV CODE 636 W HCPCS: Performed by: INTERNAL MEDICINE

## 2024-07-01 PROCEDURE — 85025 COMPLETE CBC W/AUTO DIFF WBC: CPT | Mod: HCNC | Performed by: EMERGENCY MEDICINE

## 2024-07-01 PROCEDURE — 96376 TX/PRO/DX INJ SAME DRUG ADON: CPT

## 2024-07-01 PROCEDURE — 96360 HYDRATION IV INFUSION INIT: CPT | Mod: 59,HCNC

## 2024-07-01 PROCEDURE — 99285 EMERGENCY DEPT VISIT HI MDM: CPT | Mod: 25,HCNC

## 2024-07-01 PROCEDURE — 83690 ASSAY OF LIPASE: CPT | Mod: HCNC | Performed by: EMERGENCY MEDICINE

## 2024-07-01 PROCEDURE — 85025 COMPLETE CBC W/AUTO DIFF WBC: CPT | Mod: 91,HCNC | Performed by: INTERNAL MEDICINE

## 2024-07-01 PROCEDURE — 84484 ASSAY OF TROPONIN QUANT: CPT | Mod: HCNC | Performed by: EMERGENCY MEDICINE

## 2024-07-01 PROCEDURE — P9016 RBC LEUKOCYTES REDUCED: HCPCS | Mod: HCNC | Performed by: EMERGENCY MEDICINE

## 2024-07-01 PROCEDURE — 83605 ASSAY OF LACTIC ACID: CPT | Mod: HCNC | Performed by: EMERGENCY MEDICINE

## 2024-07-01 PROCEDURE — 36430 TRANSFUSION BLD/BLD COMPNT: CPT | Mod: HCNC

## 2024-07-01 PROCEDURE — 93005 ELECTROCARDIOGRAM TRACING: CPT

## 2024-07-01 PROCEDURE — 84484 ASSAY OF TROPONIN QUANT: CPT | Mod: 91,HCNC | Performed by: INTERNAL MEDICINE

## 2024-07-01 PROCEDURE — 86900 BLOOD TYPING SEROLOGIC ABO: CPT | Mod: HCNC | Performed by: EMERGENCY MEDICINE

## 2024-07-01 PROCEDURE — 25000003 PHARM REV CODE 250: Mod: HCNC | Performed by: INTERNAL MEDICINE

## 2024-07-01 PROCEDURE — 83735 ASSAY OF MAGNESIUM: CPT | Mod: HCNC | Performed by: EMERGENCY MEDICINE

## 2024-07-01 PROCEDURE — 86901 BLOOD TYPING SEROLOGIC RH(D): CPT | Mod: HCNC | Performed by: EMERGENCY MEDICINE

## 2024-07-01 PROCEDURE — 86920 COMPATIBILITY TEST SPIN: CPT | Mod: HCNC | Performed by: EMERGENCY MEDICINE

## 2024-07-01 PROCEDURE — 25000003 PHARM REV CODE 250: Performed by: EMERGENCY MEDICINE

## 2024-07-01 RX ORDER — ONDANSETRON 8 MG/1
8 TABLET, ORALLY DISINTEGRATING ORAL EVERY 8 HOURS PRN
Status: DISCONTINUED | OUTPATIENT
Start: 2024-07-01 | End: 2024-07-03 | Stop reason: HOSPADM

## 2024-07-01 RX ORDER — HYDROCODONE BITARTRATE AND ACETAMINOPHEN 500; 5 MG/1; MG/1
TABLET ORAL
Status: DISCONTINUED | OUTPATIENT
Start: 2024-07-01 | End: 2024-07-03 | Stop reason: HOSPADM

## 2024-07-01 RX ORDER — CLOPIDOGREL BISULFATE 75 MG/1
75 TABLET ORAL DAILY
Status: ON HOLD | COMMUNITY
Start: 2024-06-25 | End: 2024-07-03 | Stop reason: HOSPADM

## 2024-07-01 RX ORDER — SIMETHICONE 80 MG
1 TABLET,CHEWABLE ORAL 4 TIMES DAILY PRN
Status: DISCONTINUED | OUTPATIENT
Start: 2024-07-01 | End: 2024-07-03 | Stop reason: HOSPADM

## 2024-07-01 RX ORDER — PANTOPRAZOLE SODIUM 40 MG/10ML
40 INJECTION, POWDER, LYOPHILIZED, FOR SOLUTION INTRAVENOUS 2 TIMES DAILY
Status: DISCONTINUED | OUTPATIENT
Start: 2024-07-01 | End: 2024-07-03 | Stop reason: HOSPADM

## 2024-07-01 RX ORDER — BALSAM PERU/CASTOR OIL
OINTMENT (GRAM) TOPICAL 2 TIMES DAILY
Status: DISCONTINUED | OUTPATIENT
Start: 2024-07-01 | End: 2024-07-03 | Stop reason: HOSPADM

## 2024-07-01 RX ORDER — PNV NO.95/FERROUS FUM/FOLIC AC 28MG-0.8MG
200 TABLET ORAL DAILY
Status: DISCONTINUED | OUTPATIENT
Start: 2024-07-01 | End: 2024-07-03 | Stop reason: HOSPADM

## 2024-07-01 RX ORDER — IBUPROFEN 200 MG
16 TABLET ORAL
Status: DISCONTINUED | OUTPATIENT
Start: 2024-07-01 | End: 2024-07-03 | Stop reason: HOSPADM

## 2024-07-01 RX ORDER — LACTULOSE 10 G/15ML
10 SOLUTION ORAL DAILY
Status: DISCONTINUED | OUTPATIENT
Start: 2024-07-01 | End: 2024-07-01

## 2024-07-01 RX ORDER — ALUMINUM HYDROXIDE, MAGNESIUM HYDROXIDE, AND SIMETHICONE 1200; 120; 1200 MG/30ML; MG/30ML; MG/30ML
30 SUSPENSION ORAL 4 TIMES DAILY PRN
Status: DISCONTINUED | OUTPATIENT
Start: 2024-07-01 | End: 2024-07-03 | Stop reason: HOSPADM

## 2024-07-01 RX ORDER — CYANOCOBALAMIN (VITAMIN B-12) 250 MCG
250 TABLET ORAL DAILY
Status: DISCONTINUED | OUTPATIENT
Start: 2024-07-01 | End: 2024-07-01

## 2024-07-01 RX ORDER — NALOXONE HCL 0.4 MG/ML
0.02 VIAL (ML) INJECTION
Status: DISCONTINUED | OUTPATIENT
Start: 2024-07-01 | End: 2024-07-03 | Stop reason: HOSPADM

## 2024-07-01 RX ORDER — IBUPROFEN 200 MG
24 TABLET ORAL
Status: DISCONTINUED | OUTPATIENT
Start: 2024-07-01 | End: 2024-07-03 | Stop reason: HOSPADM

## 2024-07-01 RX ORDER — ATORVASTATIN CALCIUM 20 MG/1
20 TABLET, FILM COATED ORAL DAILY
Status: DISCONTINUED | OUTPATIENT
Start: 2024-07-01 | End: 2024-07-03 | Stop reason: HOSPADM

## 2024-07-01 RX ORDER — ACETAMINOPHEN 325 MG/1
650 TABLET ORAL EVERY 8 HOURS PRN
Status: DISCONTINUED | OUTPATIENT
Start: 2024-07-01 | End: 2024-07-03 | Stop reason: HOSPADM

## 2024-07-01 RX ORDER — ASCORBIC ACID 500 MG
500 TABLET ORAL 2 TIMES DAILY
COMMUNITY

## 2024-07-01 RX ORDER — GLUCAGON 1 MG
1 KIT INJECTION
Status: DISCONTINUED | OUTPATIENT
Start: 2024-07-01 | End: 2024-07-03 | Stop reason: HOSPADM

## 2024-07-01 RX ORDER — NAPROXEN SODIUM 220 MG/1
81 TABLET, FILM COATED ORAL DAILY
Status: ON HOLD | COMMUNITY
End: 2024-07-03 | Stop reason: HOSPADM

## 2024-07-01 RX ORDER — TALC
6 POWDER (GRAM) TOPICAL NIGHTLY PRN
Status: DISCONTINUED | OUTPATIENT
Start: 2024-07-01 | End: 2024-07-03 | Stop reason: HOSPADM

## 2024-07-01 RX ORDER — DONEPEZIL HYDROCHLORIDE 5 MG/1
5 TABLET, FILM COATED ORAL NIGHTLY
Status: DISCONTINUED | OUTPATIENT
Start: 2024-07-01 | End: 2024-07-03 | Stop reason: HOSPADM

## 2024-07-01 RX ORDER — DICLOFENAC SODIUM 10 MG/G
2 GEL TOPICAL DAILY
Status: DISCONTINUED | OUTPATIENT
Start: 2024-07-01 | End: 2024-07-03 | Stop reason: HOSPADM

## 2024-07-01 RX ORDER — SODIUM CHLORIDE 0.9 % (FLUSH) 0.9 %
10 SYRINGE (ML) INJECTION EVERY 12 HOURS PRN
Status: DISCONTINUED | OUTPATIENT
Start: 2024-07-01 | End: 2024-07-03 | Stop reason: HOSPADM

## 2024-07-01 RX ADMIN — ATORVASTATIN CALCIUM 20 MG: 20 TABLET, FILM COATED ORAL at 11:07

## 2024-07-01 RX ADMIN — PANTOPRAZOLE SODIUM 40 MG: 40 INJECTION, POWDER, LYOPHILIZED, FOR SOLUTION INTRAVENOUS at 11:07

## 2024-07-01 RX ADMIN — SODIUM CHLORIDE 1000 ML: 9 INJECTION, SOLUTION INTRAVENOUS at 08:07

## 2024-07-01 RX ADMIN — DONEPEZIL HYDROCHLORIDE 5 MG: 5 TABLET, FILM COATED ORAL at 08:07

## 2024-07-01 RX ADMIN — PANTOPRAZOLE SODIUM 40 MG: 40 INJECTION, POWDER, LYOPHILIZED, FOR SOLUTION INTRAVENOUS at 08:07

## 2024-07-01 RX ADMIN — Medication 200 MCG: at 11:07

## 2024-07-01 NOTE — CONSULTS
[facility]     CC: rectal bleeding    HPI: Dean Hahn is an 83 y.o. male with history of CVA (L pontine, L corona radiata, L temporal lobe, R splenium of the corpus callosum), autoimmune cirrhosis, normocytic anemia, CKD-IIIa, HTN, and HLD who presented from [facility] for a single episode of rectal bleeding this morning for which GI was consulted. Per report, patient's wife denies any previous history of GI bleeding or melena/hematochezia at home. He was anemic to the point of requiring transfusion during recent admission at [facility] but endoscopy was deferred. CTA done at that time did demonstrate a psoas abscess but no source of GI bleeding. Patient denies any abdominal pain, nausea, vomiting, fever, or chills at this time as well as any prior history of hemorrhoids.      Past Medical History  CVA (L pontine, L corona radiata, L temporal lobe, R splenium of the corpus callosum)  Autoimmune Cirrhosis  Chronic Kidney Disease, Stage III-a  Normocytic Anemia  Hypertension  Hyperlipidemia    Physical Examination  /60   Pulse 81   Temp 97.2 °F (36.2 °C) (Oral)   Resp 13   Wt 46.3 kg (102 lb)   SpO2 100%   BMI 18.07 kg/m²   General appearace: Cachetic, chronically ill-appearing in NAD  HEENT: Bilaterl temporal wasting, tacky mucus membranes  Abdomen: soft, non-tender, non-distended abdomen with no rebound or guarding  Rectal: Maroon-colored stool appreciated around rectum, no suspicious masses appreciated on ANDREA, hematochezia appreciated on glove   Skin: jaundice  Neuro: Alert, oriented to self and hospital but not year or city, no asterixis appreciated    Labs:  Recent Labs   Lab 06/25/24  0240 07/01/24  0747   WBC 12.28 17.61*   HGB 8.3* 8.6*   HCT 23.9* 25.2*   * 233   MCV 93 98   RDW 17.7* 18.7*   * 134*   K 4.1 4.6    112*   CO2 18* 15*   BUN 46* 28*   CREATININE 1.2 1.1    107   PROT 5.1* 5.9*   ALBUMIN 1.6* 1.7*   BILITOT 3.6* 3.7*   * 77*   ALKPHOS  137* 201*   ALT 96* 69*     Recent Labs   Lab 07/01/24  0747   TROPONINI 0.037*     Imaging:  CT A/P (06/21/24):   - Small and nodular liver, no splenomegaly. L psoas hematoma appreciated. 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     CTA A/P (06/21/24):  - No evidence of acute GI bleed. L psoas hematoma as above.    External medical records reviewed including external documents       Endoscopic History:  No previous procedures available for review.      Assessment / Recommendations:    Rectal Bleeding in Setting of Cirrhosis (This is a new problem with uncertain prognosis)  Patient experienced episode of rectal bleeding this morning and noted to have hematochezia per ANDREA. Hgb currently stable at 8.1. Receiving 1 U PRBC in ED. Likely etiologies include internal hemorrhoid, diverticulosis, and malignancy. Variceal bleeding is less likely given presentation. Baseline Hgb previously noted to be 9-10.   Plan:  - MELD-Na score: 19  - Continue to monitor Hgb and transfuse as indicated, caution with transfusing to a Hgb > 8.0 in setting of cirrhosis  - Continue to trend CMP  - Will discuss role of endoscopy with patient's family given their recent consideration for hospice  - Continue to monitor for further episodes of rectal bleeding      Case discussed with Dr. Ramos, please appreciate attestation.     Gómez Clifton, DO  [facility], PGY-III  [facility]

## 2024-07-01 NOTE — PHARMACY MED REC
"  [facility]           Pharmacy  Admission Medication History     The home medication history was taken by Daisy Swenson.      Medication history obtained from Medications listed below were obtained from: Nursing home    Based on information gathered for medication list, you may go to "Admission" then "Reconcile Home Medications" tabs to review and/or act upon those items.     The home medication list has been updated by the Pharmacy department.   Please read ALL comments highlighted in yellow.   Please address this information as you see fit.    Feel free to contact us if you have any questions or require assistance.        No current facility-administered medications on file prior to encounter.     Current Outpatient Medications on File Prior to Encounter   Medication Sig Dispense Refill    acetaminophen (TYLENOL) 500 MG tablet Take 500 mg by mouth daily as needed (Leg pain).      ascorbic acid, vitamin C, (VITAMIN C) 500 MG tablet Take 500 mg by mouth 2 (two) times daily.      aspirin 81 MG Chew Take 81 mg by mouth once daily.      ciclopirox (PENLAC) 8 % Soln Apply topically nightly. 6.6 mL 6    clopidogreL (PLAVIX) 75 mg tablet Take 75 mg by mouth once daily.      cyanocobalamin (VITAMIN B-12) 100 MCG tablet Take 1 tablet (100 mcg total) by mouth once daily. 90 tablet 1    donepeziL (ARICEPT) 5 MG tablet Take 1 tablet (5 mg total) by mouth every evening. 90 tablet 1    dorzolamide-timolol 2-0.5% (COSOPT) 22.3-6.8 mg/mL ophthalmic solution Place 1 drop into both eyes 2 (two) times daily. 10 mL 1    ergocalciferol (ERGOCALCIFEROL) 50,000 unit Cap Take 1 capsule (50,000 Units total) by mouth every 7 days. 12 capsule 1    lactulose (CHRONULAC) 20 gram/30 mL Soln Take 15 mLs (10 g total) by mouth once daily. (Patient taking differently: Take 10 g by mouth daily as needed (Constipation).) 300 mL 1    multivitamin capsule Take 1 capsule by mouth once daily.      omega-3 fatty acids/fish oil " (FISH OIL-OMEGA-3 FATTY ACIDS) 300-1,000 mg capsule Take 1 capsule by mouth once daily. 90 capsule 1    atorvastatin (LIPITOR) 20 MG tablet Take 1 tablet (20 mg total) by mouth once daily. Hold until follow up with primary care physician due to elevated liver function tests.      olmesartan (BENICAR) 20 MG tablet Take 1 tablet (20 mg total) by mouth once daily. HOLD UNTIL SEEN BY PCP         Please address this information as you see fit.  Feel free to contact us if you have any questions or require assistance.    Daisy Swenson  [phone]                .

## 2024-07-01 NOTE — ED NOTES
Per Dr Berumen, will hold emergency transfusion for now. Pt's BP is stabilizing with saline bolus.

## 2024-07-01 NOTE — PT/OT/SLP PROGRESS
Occupational Therapy  Visit Attempt     Patient Name:  Dean Hahn   MRN:  [id number]    Patient not seen today secondary to Other (Comment) (no H&P currently in chart at this time; will hold evals at this time). Will follow-up .    7/1/2024

## 2024-07-01 NOTE — H&P
[facility] H&P Note     Attending Physician: Marisol Chung MD    Date of Admit: 7/1/2024    Chief Complaint     Rectal Bleeding (Brought to ED from [facility] for rectal bleeding that was noticed today by staff. Pt is awake and in NAD. )     Assessment/Plan:     Dean Hahn is a 83 y.o. male with:    Hypotension  97.6F 89 70/46  Given a liter of IVF  Transfused 1 unit RBC with improvement, stable for the floor    Hematochezia  Hgb 8.6 in the ED  Transfused 1 unit of blood with appropriate response  Protonix IV BID  Seen by GI, will discuss endoscopy with patient's family given recent consider of hospice and DNR status  Will consult Palliative care    Recent CVA  At baseline   Hold DAPT due to GIB    Cirrhosis  Monitor CMP    DVT PPx: SCDs  Diet: NPO    Admit to observation under my care    Subjective:      History of Present Illness:  Dean Hahn is a 83 y.o.  male who  has a past medical history of Cataract, HLD (hyperlipidemia), and Hypertension.. The patient presented to [facility] on 7/1/2024 with a primary complaint of Rectal Bleeding (Brought to ED from [facility] for rectal bleeding that was noticed today by staff. Pt is awake and in NAD. )    The patient was in their usual state of health until earlier this morning at [facility] when he was noted to have rectal bleeding. Brother is at the bedside and said he was admitted there for therapy and hospice but is not sure what the goals of care were. He said that in the last couple of days the patient had been eating well and at this time he was alert and talking normally with him. He was discharged from [facility] on 6/17 to SNF following a CVA and again 6/24 from [facility] for syncope 2/2 symptomatic anemia, psoas hematoma nad possible GIB, his DAPT was stopped and he was discharged back to SNF.    Patient was seen by GI in the ER, maroon colored stool noted on rectal exam. Give RBC in the ER.      Past Medical  History:   Diagnosis Date    Cataract     HLD (hyperlipidemia)     Hypertension          Past Surgical History:   Procedure Laterality Date    CHOLECYSTECTOMY      EYE SURGERY Right     cataract removal surgery       Allergies:  Review of patient's allergies indicates:  No Known Allergies    Home Medications:  Prior to Admission medications    Medication Sig Start Date End Date Taking? Authorizing Provider   acetaminophen (TYLENOL) 500 MG tablet Take 500 mg by mouth daily as needed (Leg pain).   Yes Provider, Historical   ascorbic acid, vitamin C, (VITAMIN C) 500 MG tablet Take 500 mg by mouth 2 (two) times daily.   Yes Provider, Historical   aspirin 81 MG Chew Take 81 mg by mouth once daily.   Yes Provider, Historical   ciclopirox (PENLAC) 8 % Soln Apply topically nightly. 4/4/24  Yes Scott Lee Jr., DPM   clopidogreL (PLAVIX) 75 mg tablet Take 75 mg by mouth once daily. 6/25/24  Yes Provider, Historical   cyanocobalamin (VITAMIN B-12) 100 MCG tablet Take 1 tablet (100 mcg total) by mouth once daily. 4/5/24  Yes Angel Luis Tobias III, MD   donepeziL (ARICEPT) 5 MG tablet Take 1 tablet (5 mg total) by mouth every evening. 5/27/24 5/27/25 Yes Angel Luis Tobias III, MD   dorzolamide-timolol 2-0.5% (COSOPT) 22.3-6.8 mg/mL ophthalmic solution Place 1 drop into both eyes 2 (two) times daily. 5/14/24  Yes Jeff Bolanos, ALEX   ergocalciferol (ERGOCALCIFEROL) 50,000 unit Cap Take 1 capsule (50,000 Units total) by mouth every 7 days. 5/29/24  Yes Angel Luis Tobias III, MD   lactulose (CHRONULAC) 20 gram/30 mL Soln Take 15 mLs (10 g total) by mouth once daily.  Patient taking differently: Take 10 g by mouth daily as needed (Constipation). 5/27/24  Yes Angel Luis Tobias III, MD   multivitamin capsule Take 1 capsule by mouth once daily.   Yes Provider, Historical   omega-3 fatty acids/fish oil (FISH OIL-OMEGA-3 FATTY ACIDS) 300-1,000 mg capsule Take 1 capsule by mouth once daily. 9/26/23  Yes Angel Luis Tobias III, MD    atorvastatin (LIPITOR) 20 MG tablet Take 1 tablet (20 mg total) by mouth once daily. Hold until follow up with primary care physician due to elevated liver function tests. 24   Bryanna Teixeira MD   olmesartan (BENICAR) 20 MG tablet Take 1 tablet (20 mg total) by mouth once daily. HOLD UNTIL SEEN BY PCP 24   Brian Carias MD       Family History   Problem Relation Name Age of Onset    Hypertension Mother Jania Hahn     Heart attack Father      Coronary artery disease Father      No Known Problems Sister x2     Hypertension Brother x3     No Known Problems Daughter x3     No Known Problems Son x2     Cirrhosis Neg Hx         Social History     Tobacco Use    Smoking status: Never    Smokeless tobacco: Never   Substance Use Topics    Alcohol use: Yes    Drug use: Never       Review of Systems   HENT:  Positive for hearing loss.    Respiratory:  Negative for shortness of breath.    Gastrointestinal:  Positive for blood in stool. Negative for abdominal pain.          Objective:   Last 24 Hour Vital Signs:  BP  Min: 70/46  Max: 142/80  Temp  Av.6 °F (36.4 °C)  Min: 97.2 °F (36.2 °C)  Max: 97.9 °F (36.6 °C)  Pulse  Av.1  Min: 67  Max: 97  Resp  Avg: 15.7  Min: 12  Max: 19  SpO2  Av %  Min: 100 %  Max: 100 %  Weight  Av.3 kg (102 lb)  Min: 46.3 kg (102 lb)  Max: 46.3 kg (102 lb)  Body mass index is 18.07 kg/m².  No intake/output data recorded.    Physical Exam  Constitutional:       General: He is not in acute distress.     Appearance: Normal appearance. He is not toxic-appearing.   HENT:      Head: Normocephalic and atraumatic.   Eyes:      Extraocular Movements: Extraocular movements intact.      Pupils: Pupils are equal, round, and reactive to light.   Cardiovascular:      Rate and Rhythm: Normal rate and regular rhythm.   Pulmonary:      Effort: Pulmonary effort is normal. No respiratory distress.      Breath sounds: Normal breath sounds.   Abdominal:      General: Abdomen is flat.  Bowel sounds are normal. There is no distension.      Palpations: Abdomen is soft.   Musculoskeletal:      Right lower leg: No edema.      Left lower leg: No edema.   Skin:     Coloration: Skin is not pale.   Neurological:      Mental Status: He is alert. Mental status is at baseline.         Laboratory:  Most Recent Data:  CBC:   Lab Results   Component Value Date    WBC 13.84 (H) 07/01/2024    HGB 10.2 (L) 07/01/2024    HCT 29.8 (L) 07/01/2024     07/01/2024    MCV 89 07/01/2024    RDW 22.5 (H) 07/01/2024       BMP:   Lab Results   Component Value Date     (L) 07/01/2024    K 4.6 07/01/2024     (H) 07/01/2024    CO2 15 (L) 07/01/2024    BUN 28 (H) 07/01/2024    CREATININE 1.1 07/01/2024     07/01/2024    CALCIUM 8.6 (L) 07/01/2024    MG 1.9 07/01/2024    PHOS 2.0 (L) 06/24/2024     LFTs:   Lab Results   Component Value Date    PROT 5.9 (L) 07/01/2024    ALBUMIN 1.7 (L) 07/01/2024    BILITOT 3.7 (H) 07/01/2024    AST 77 (H) 07/01/2024    ALKPHOS 201 (H) 07/01/2024    ALT 69 (H) 07/01/2024     Coags:   Lab Results   Component Value Date    INR 1.2 07/01/2024     FLP:   Lab Results   Component Value Date    CHOL 136 06/10/2024    HDL 41 06/10/2024    LDLCALC 77.4 06/10/2024    TRIG 88 06/10/2024    CHOLHDL 30.1 06/10/2024     DM:   Lab Results   Component Value Date    HGBA1C 4.5 06/18/2024    HGBA1C 4.7 06/19/2023    HGBA1C 5.4 11/18/2021    LDLCALC 77.4 06/10/2024    CREATININE 1.1 07/01/2024     Thyroid:   Lab Results   Component Value Date    TSH 5.279 (H) 06/10/2024    FREET4 1.10 06/10/2024     Anemia:   Lab Results   Component Value Date    IRON 57 06/18/2024    TIBC 255 06/18/2024    FERRITIN 96 06/18/2024    KZZYZMIF32 >2000 (H) 06/18/2024    FOLATE 10.1 06/18/2024     Cardiac:   Lab Results   Component Value Date    TROPONINI 0.037 (H) 07/01/2024    BNP 34 06/18/2024     Urinalysis:   Lab Results   Component Value Date    COLORU Yellow 07/01/2024    SPECGRAV 1.020 07/01/2024     NITRITE Negative 07/01/2024    KETONESU Negative 07/01/2024    UROBILINOGEN 4.0-6.0 (A) 07/01/2024    WBCUA 6 (H) 06/18/2024       Trended Lab Data:  Recent Labs   Lab 06/25/24  0240 07/01/24  0747 07/01/24  1438   WBC 12.28 17.61* 13.84*   HGB 8.3* 8.6* 10.2*   HCT 23.9* 25.2* 29.8*   * 233 174   MCV 93 98 89   RDW 17.7* 18.7* 22.5*   * 134*  --    K 4.1 4.6  --     112*  --    CO2 18* 15*  --    BUN 46* 28*  --    CREATININE 1.2 1.1  --     107  --    PROT 5.1* 5.9*  --    ALBUMIN 1.6* 1.7*  --    BILITOT 3.6* 3.7*  --    * 77*  --    ALKPHOS 137* 201*  --    ALT 96* 69*  --        Trended Cardiac Data:  Recent Labs   Lab 07/01/24  0747   TROPONINI 0.037*       Microbiology Data:      Other Results:  EKG (my interpretation): normal EKG, normal sinus rhythm, unchanged from previous tracings, atrial fibrillation, rate 86, poor quality.    Radiology:  Imaging Results              X-Ray Chest 1 View (Final result)  Result time 07/01/24 09:50:53      Final result by Janna Gomez MD (07/01/24 09:50:53)                   Impression:      No acute abnormality.      Electronically signed by: Janna Gomez MD  Date:    07/01/2024  Time:    09:50               Narrative:    EXAMINATION:  XR CHEST 1 VIEW    CLINICAL HISTORY:  Hypotension;    TECHNIQUE:  Single frontal view of the chest was performed.    COMPARISON:  06/18/2024    FINDINGS:  The lungs are clear, with normal appearance of pulmonary vasculature and no pleural effusion or pneumothorax.    The cardiac silhouette is normal in size. The hilar and mediastinal contours are unremarkable.    Bones are intact.                                        Code Status:     DNR    Marisol Chung MD  [facility]

## 2024-07-01 NOTE — ED PROVIDER NOTES
Encounter Date: 7/1/2024       History     Chief Complaint   Patient presents with    Rectal Bleeding     Brought to ED from [facility] for rectal bleeding that was noticed today by staff. Pt is awake and in NAD.      Patient is an 83-year-old male brought in by EMS from his nursing home where he was noted with a bloody bowel movement this morning.  According to the patient's wife he has no prior history of this.  Presenting blood pressure in the ED is 70/46.  No blood thinner use.  Patient is awake and conversant, voicing no complaints.      Review of patient's allergies indicates:  No Known Allergies  Past Medical History:   Diagnosis Date    Cataract     HLD (hyperlipidemia)     Hypertension      Past Surgical History:   Procedure Laterality Date    CHOLECYSTECTOMY      EYE SURGERY Right     cataract removal surgery     Family History   Problem Relation Name Age of Onset    Hypertension Mother Jania Hahn     Heart attack Father      Coronary artery disease Father      No Known Problems Sister x2     Hypertension Brother x3     No Known Problems Daughter x3     No Known Problems Son x2     Cirrhosis Neg Hx       Social History     Tobacco Use    Smoking status: Never    Smokeless tobacco: Never   Substance Use Topics    Alcohol use: Yes    Drug use: Never     Review of Systems   Gastrointestinal:  Positive for blood in stool. Negative for abdominal pain and vomiting.   All other systems reviewed and are negative.      Physical Exam     Initial Vitals   BP Pulse Resp Temp SpO2   07/01/24 0749 07/01/24 0734 07/01/24 0754 07/01/24 0739 07/01/24 0734   (!) 70/46 89 18 97.6 °F (36.4 °C) 100 %      MAP       --                Physical Exam    Nursing note and vitals reviewed.  Constitutional: No distress.   HENT:   Head: Atraumatic.   Eyes:   Pale conjunctiva.   Neck: Neck supple.   Cardiovascular:  Normal rate and regular rhythm.           Pulmonary/Chest: Breath sounds normal.   Abdominal: Abdomen is soft. There is  no abdominal tenderness.   Musculoskeletal:         General: No edema.      Cervical back: Neck supple.     Neurological: He is alert.   Skin: Skin is warm and dry.   Psychiatric: His behavior is normal.         ED Course   Procedures  Labs Reviewed   CBC W/ AUTO DIFFERENTIAL - Abnormal; Notable for the following components:       Result Value    WBC 17.61 (*)     RBC 2.57 (*)     Hemoglobin 8.6 (*)     Hematocrit 25.2 (*)     MCH 33.5 (*)     RDW 18.7 (*)     MPV 8.5 (*)     Immature Granulocytes 1.0 (*)     Gran # (ANC) 13.3 (*)     Immature Grans (Abs) 0.18 (*)     Mono # 1.3 (*)     Gran % 75.6 (*)     Lymph % 13.9 (*)     All other components within normal limits   COMPREHENSIVE METABOLIC PANEL - Abnormal; Notable for the following components:    Sodium 134 (*)     Chloride 112 (*)     CO2 15 (*)     BUN 28 (*)     Calcium 8.6 (*)     Total Protein 5.9 (*)     Albumin 1.7 (*)     Total Bilirubin 3.7 (*)     Alkaline Phosphatase 201 (*)     AST 77 (*)     ALT 69 (*)     Anion Gap 7 (*)     All other components within normal limits   TROPONIN I - Abnormal; Notable for the following components:    Troponin I 0.037 (*)     All other components within normal limits   LIPASE - Abnormal; Notable for the following components:    Lipase 64 (*)     All other components within normal limits   PROTIME-INR - Abnormal; Notable for the following components:    Prothrombin Time 12.7 (*)     All other components within normal limits   LACTIC ACID, PLASMA - Abnormal; Notable for the following components:    Lactate (Lactic Acid) 2.4 (*)     All other components within normal limits   CULTURE, BLOOD   CULTURE, BLOOD   APTT   CK   LIPASE   MAGNESIUM   PROTIME-INR   TROPONIN I   MAGNESIUM   CK   APTT   URINALYSIS   CBC W/ AUTO DIFFERENTIAL   TYPE & SCREEN   PREPARE RBC SOFT        ECG Results              EKG 12-lead (In process)        Collection Time Result Time QRS Duration OHS QTC Calculation    07/01/24 08:04:29 07/01/24  12:37:16 72 440                     In process by Interface, Lab In [facility] (07/01/24 12:37:26)                   Narrative:    Test Reason : R00.0,    Vent. Rate : 086 BPM     Atrial Rate : 085 BPM     P-R Int : 000 ms          QRS Dur : 072 ms      QT Int : 368 ms       P-R-T Axes : 000 068 053 degrees     QTc Int : 440 ms    Accelerated Junctional rhythm  Abnormal ECG  When compared with ECG of 22-JUN-2024 10:22,  Junctional rhythm has replaced Sinus rhythm  Nonspecific T wave abnormality now evident in Inferior leads    Referred By: [email or web address]   SELF           Confirmed By:                                   Imaging Results              X-Ray Chest 1 View (Final result)  Result time 07/01/24 09:50:53      Final result by Janna Gomez MD (07/01/24 09:50:53)                   Impression:      No acute abnormality.      Electronically signed by: Janna Gomez MD  Date:    07/01/2024  Time:    09:50               Narrative:    EXAMINATION:  XR CHEST 1 VIEW    CLINICAL HISTORY:  Hypotension;    TECHNIQUE:  Single frontal view of the chest was performed.    COMPARISON:  06/18/2024    FINDINGS:  The lungs are clear, with normal appearance of pulmonary vasculature and no pleural effusion or pneumothorax.    The cardiac silhouette is normal in size. The hilar and mediastinal contours are unremarkable.    Bones are intact.                                       Medications   0.9%  NaCl infusion (for blood administration) (has no administration in time range)   sodium chloride 0.9% flush 10 mL (has no administration in time range)   naloxone 0.4 mg/mL injection 0.02 mg (has no administration in time range)   glucose chewable tablet 16 g (has no administration in time range)   glucose chewable tablet 24 g (has no administration in time range)   glucagon (human recombinant) injection 1 mg (has no administration in time range)   ondansetron disintegrating tablet 8 mg (has no administration in time range)    melatonin tablet 6 mg (has no administration in time range)   simethicone chewable tablet 80 mg (has no administration in time range)   aluminum-magnesium hydroxide-simethicone 200-200-20 mg/5 mL suspension 30 mL (has no administration in time range)   dextrose 10% bolus 125 mL 125 mL (has no administration in time range)   dextrose 10% bolus 250 mL 250 mL (has no administration in time range)   balsam peru-castor oiL Oint (has no administration in time range)   diclofenac sodium 1 % gel 2 g (has no administration in time range)   acetaminophen tablet 650 mg (has no administration in time range)   donepeziL tablet 5 mg (has no administration in time range)   atorvastatin tablet 20 mg (20 mg Oral Given 7/1/24 1136)   pantoprazole injection 40 mg (40 mg Intravenous Given 7/1/24 1136)   cyanocobalamin tablet 200 mcg (200 mcg Oral Given 7/1/24 1136)   sodium chloride 0.9% bolus 1,000 mL 1,000 mL (0 mLs Intravenous Stopped 7/1/24 0930)     Medical Decision Making  Emergent evaluation of an 83-year-old male from his nursing home where he was noted with a bloody stool this morning.  He presents to the ED hypotensive with a blood pressure of 70/46.  Blood pressure responded well to fluids.  Hemoglobin is stable at 8.6.  I feel the patient will require admission for observation and further evaluation and treatment.  He will be admitted by the [facility] hospitalist.    Amount and/or Complexity of Data Reviewed  Labs: ordered.     Details: CBC with a white blood cell count of 17.6, hemoglobin 8.6 and hematocrit of 25.2.  CMP with a BUN of 28, AST of 77 ALT of 69.  Troponin is 0.037.  Radiology: ordered.     Details: Chest x-ray without acute cardiopulmonary abnormality.  Discussion of management or test interpretation with external provider(s): Patient's symptoms and pertinent lab values discussed with the [facility] hospitalist.                                      Clinical Impression:  Final diagnoses:  [K92.2] Lower GI bleed  (Primary)  [I95.9] Hypotension, unspecified hypotension type                 Jarocho Berumen MD  07/01/24 3374

## 2024-07-02 PROBLEM — T07.XXXA WOUNDS, MULTIPLE: Status: ACTIVE | Noted: 2024-07-02

## 2024-07-02 PROBLEM — K92.2 GI BLEED: Status: ACTIVE | Noted: 2024-07-02

## 2024-07-02 LAB
ALBUMIN SERPL BCP-MCNC: 1.6 G/DL (ref 3.5–5.2)
ALP SERPL-CCNC: 146 U/L (ref 55–135)
ALT SERPL W/O P-5'-P-CCNC: 53 U/L (ref 10–44)
ANION GAP SERPL CALC-SCNC: 6 MMOL/L (ref 8–16)
AST SERPL-CCNC: 59 U/L (ref 10–40)
BASOPHILS # BLD AUTO: 0.04 K/UL (ref 0–0.2)
BASOPHILS # BLD AUTO: 0.04 K/UL (ref 0–0.2)
BASOPHILS NFR BLD: 0.4 % (ref 0–1.9)
BASOPHILS NFR BLD: 0.4 % (ref 0–1.9)
BILIRUB SERPL-MCNC: 4.4 MG/DL (ref 0.1–1)
BUN SERPL-MCNC: 36 MG/DL (ref 8–23)
CALCIUM SERPL-MCNC: 8.1 MG/DL (ref 8.7–10.5)
CHLORIDE SERPL-SCNC: 117 MMOL/L (ref 95–110)
CO2 SERPL-SCNC: 15 MMOL/L (ref 23–29)
CREAT SERPL-MCNC: 1.1 MG/DL (ref 0.5–1.4)
DIFFERENTIAL METHOD BLD: ABNORMAL
DIFFERENTIAL METHOD BLD: ABNORMAL
EOSINOPHIL # BLD AUTO: 0.3 K/UL (ref 0–0.5)
EOSINOPHIL # BLD AUTO: 0.3 K/UL (ref 0–0.5)
EOSINOPHIL NFR BLD: 2.5 % (ref 0–8)
EOSINOPHIL NFR BLD: 3.4 % (ref 0–8)
ERYTHROCYTE [DISTWIDTH] IN BLOOD BY AUTOMATED COUNT: 24.8 % (ref 11.5–14.5)
ERYTHROCYTE [DISTWIDTH] IN BLOOD BY AUTOMATED COUNT: 25 % (ref 11.5–14.5)
EST. GFR  (NO RACE VARIABLE): >60 ML/MIN/1.73 M^2
GLUCOSE SERPL-MCNC: 76 MG/DL (ref 70–110)
HCT VFR BLD AUTO: 27.6 % (ref 40–54)
HCT VFR BLD AUTO: 28.1 % (ref 40–54)
HGB BLD-MCNC: 9.2 G/DL (ref 14–18)
HGB BLD-MCNC: 9.7 G/DL (ref 14–18)
IMM GRANULOCYTES # BLD AUTO: 0.06 K/UL (ref 0–0.04)
IMM GRANULOCYTES # BLD AUTO: 0.07 K/UL (ref 0–0.04)
IMM GRANULOCYTES NFR BLD AUTO: 0.6 % (ref 0–0.5)
IMM GRANULOCYTES NFR BLD AUTO: 0.7 % (ref 0–0.5)
LYMPHOCYTES # BLD AUTO: 1.5 K/UL (ref 1–4.8)
LYMPHOCYTES # BLD AUTO: 1.7 K/UL (ref 1–4.8)
LYMPHOCYTES NFR BLD: 15.6 % (ref 18–48)
LYMPHOCYTES NFR BLD: 17.4 % (ref 18–48)
MCH RBC QN AUTO: 30.4 PG (ref 27–31)
MCH RBC QN AUTO: 30.5 PG (ref 27–31)
MCHC RBC AUTO-ENTMCNC: 33.3 G/DL (ref 32–36)
MCHC RBC AUTO-ENTMCNC: 34.5 G/DL (ref 32–36)
MCV RBC AUTO: 88 FL (ref 82–98)
MCV RBC AUTO: 91 FL (ref 82–98)
MONOCYTES # BLD AUTO: 1 K/UL (ref 0.3–1)
MONOCYTES # BLD AUTO: 1.1 K/UL (ref 0.3–1)
MONOCYTES NFR BLD: 10.3 % (ref 4–15)
MONOCYTES NFR BLD: 10.7 % (ref 4–15)
NEUTROPHILS # BLD AUTO: 6.8 K/UL (ref 1.8–7.7)
NEUTROPHILS # BLD AUTO: 6.8 K/UL (ref 1.8–7.7)
NEUTROPHILS NFR BLD: 68.7 % (ref 38–73)
NEUTROPHILS NFR BLD: 69.3 % (ref 38–73)
NRBC BLD-RTO: 0 /100 WBC
NRBC BLD-RTO: 0 /100 WBC
OHS QRS DURATION: 72 MS
OHS QTC CALCULATION: 440 MS
PLATELET # BLD AUTO: 170 K/UL (ref 150–450)
PLATELET # BLD AUTO: 182 K/UL (ref 150–450)
PMV BLD AUTO: 8.6 FL (ref 9.2–12.9)
PMV BLD AUTO: 8.8 FL (ref 9.2–12.9)
POTASSIUM SERPL-SCNC: 3.7 MMOL/L (ref 3.5–5.1)
PROT SERPL-MCNC: 4.9 G/DL (ref 6–8.4)
RBC # BLD AUTO: 3.02 M/UL (ref 4.6–6.2)
RBC # BLD AUTO: 3.19 M/UL (ref 4.6–6.2)
SODIUM SERPL-SCNC: 138 MMOL/L (ref 136–145)
WBC # BLD AUTO: 9.8 K/UL (ref 3.9–12.7)
WBC # BLD AUTO: 9.85 K/UL (ref 3.9–12.7)

## 2024-07-02 PROCEDURE — 36415 COLL VENOUS BLD VENIPUNCTURE: CPT | Mod: HCNC | Performed by: INTERNAL MEDICINE

## 2024-07-02 PROCEDURE — 1152F DOC ADVNCD DIS COMFORT 1ST: CPT | Mod: HCNC,CPTII,, | Performed by: STUDENT IN AN ORGANIZED HEALTH CARE EDUCATION/TRAINING PROGRAM

## 2024-07-02 PROCEDURE — 85025 COMPLETE CBC W/AUTO DIFF WBC: CPT | Mod: HCNC | Performed by: INTERNAL MEDICINE

## 2024-07-02 PROCEDURE — 97110 THERAPEUTIC EXERCISES: CPT | Mod: HCNC

## 2024-07-02 PROCEDURE — 1158F ADVNC CARE PLAN TLK DOCD: CPT | Mod: HCNC,CPTII,, | Performed by: STUDENT IN AN ORGANIZED HEALTH CARE EDUCATION/TRAINING PROGRAM

## 2024-07-02 PROCEDURE — 99498 ADVNCD CARE PLAN ADDL 30 MIN: CPT | Mod: HCNC,,, | Performed by: STUDENT IN AN ORGANIZED HEALTH CARE EDUCATION/TRAINING PROGRAM

## 2024-07-02 PROCEDURE — 97162 PT EVAL MOD COMPLEX 30 MIN: CPT | Mod: HCNC

## 2024-07-02 PROCEDURE — 97530 THERAPEUTIC ACTIVITIES: CPT | Mod: HCNC

## 2024-07-02 PROCEDURE — G0378 HOSPITAL OBSERVATION PER HR: HCPCS | Mod: HCNC

## 2024-07-02 PROCEDURE — 99214 OFFICE O/P EST MOD 30 MIN: CPT | Mod: HCNC,GC,, | Performed by: INTERNAL MEDICINE

## 2024-07-02 PROCEDURE — 99223 1ST HOSP IP/OBS HIGH 75: CPT | Mod: HCNC,,, | Performed by: STUDENT IN AN ORGANIZED HEALTH CARE EDUCATION/TRAINING PROGRAM

## 2024-07-02 PROCEDURE — 80053 COMPREHEN METABOLIC PANEL: CPT | Mod: HCNC | Performed by: INTERNAL MEDICINE

## 2024-07-02 PROCEDURE — 25000003 PHARM REV CODE 250: Mod: HCNC | Performed by: INTERNAL MEDICINE

## 2024-07-02 PROCEDURE — 63600175 PHARM REV CODE 636 W HCPCS: Mod: HCNC | Performed by: INTERNAL MEDICINE

## 2024-07-02 PROCEDURE — 96376 TX/PRO/DX INJ SAME DRUG ADON: CPT

## 2024-07-02 PROCEDURE — 99497 ADVNCD CARE PLAN 30 MIN: CPT | Mod: HCNC,25,, | Performed by: STUDENT IN AN ORGANIZED HEALTH CARE EDUCATION/TRAINING PROGRAM

## 2024-07-02 PROCEDURE — 97166 OT EVAL MOD COMPLEX 45 MIN: CPT | Mod: HCNC

## 2024-07-02 RX ADMIN — ATORVASTATIN CALCIUM 20 MG: 20 TABLET, FILM COATED ORAL at 09:07

## 2024-07-02 RX ADMIN — Medication 200 MCG: at 09:07

## 2024-07-02 RX ADMIN — PANTOPRAZOLE SODIUM 40 MG: 40 INJECTION, POWDER, LYOPHILIZED, FOR SOLUTION INTRAVENOUS at 09:07

## 2024-07-02 RX ADMIN — Medication: at 10:07

## 2024-07-02 RX ADMIN — PANTOPRAZOLE SODIUM 40 MG: 40 INJECTION, POWDER, LYOPHILIZED, FOR SOLUTION INTRAVENOUS at 10:07

## 2024-07-02 RX ADMIN — DONEPEZIL HYDROCHLORIDE 5 MG: 5 TABLET, FILM COATED ORAL at 09:07

## 2024-07-02 NOTE — PROGRESS NOTES
[facility] Medicine  Progress Note    Patient Name: Dean Hahn  MRN: [id number]  Patient Class: OP- Observation   Admission Date: 7/1/2024  Length of Stay: 0 days  Attending Physician: Bg Amador MD  Primary Care Provider: Angel Luis Tobias III, MD        Subjective:     Principal Problem:GI bleed        HPI:  Dean Hahn is a 83 y.o.  male who  has a past medical history of Cataract, HLD (hyperlipidemia), and Hypertension.. The patient presented to [facility] Medicine on 7/1/2024 with a primary complaint of Rectal Bleeding (Brought to ED from [facility] for rectal bleeding that was noticed today by staff. Pt is awake and in NAD. )     The patient was in their usual state of health until earlier this morning at [facility] when he was noted to have rectal bleeding. Brother is at the bedside and said he was admitted there for therapy and hospice but is not sure what the goals of care were. He said that in the last couple of days the patient had been eating well and at this time he was alert and talking normally with him. He was discharged from [facility] on 6/17 to SNF following a CVA and again 6/24 from [facility] for syncope 2/2 symptomatic anemia, psoas hematoma nad possible GIB, his DAPT was stopped and he was discharged back to SNF.     Patient was seen by GI in the ER, maroon colored stool noted on rectal exam. Give RBC in the ER.    Overview/Hospital Course:  No notes on file    Last Subjective & Objective Note Written: Bg Amador MD 7/2/2024 12:26 PM    Interval History:  Patient was examined in his bed with his wife Pati by his side.  He was calm and not in distress.  He was alert and oriented x1.  He denies chest pain, palpitation, shortness for breath, GI/ symptoms.  The patient's wife Pati confirmed that she would not like any invasive procedures done on patient was this time.  She states she was considering hospice but has not decided.  Bruise on patient's  left side was discussed with patient's spouse, she was unable to tell with a bruise came from.  She states patient has lives in the nursing home.     Review of Systems   Unable to perform ROS: Dementia      Objective:      Vital Signs (Most Recent):  Temp: 98 °F (36.7 °C) (07/02/24 0713)  Pulse: 89 (07/02/24 1208)  Resp: 20 (07/02/24 0713)  BP: 111/63 (07/02/24 0713)  SpO2: 100 % (07/02/24 0713) Vital Signs (24h Range):  Temp:  [97.6 °F (36.4 °C)-98.6 °F (37 °C)] 98 °F (36.7 °C)  Pulse:  [62-89] 89  Resp:  [13-20] 20  SpO2:  [100 %] 100 %  BP: ()/(56-94) 111/63      Weight: 46.3 kg (102 lb)  Body mass index is 18.07 kg/m².     Intake/Output Summary (Last 24 hours) at 7/2/2024 1220  Last data filed at 7/2/2024 0437      Gross per 24 hour   Intake --   Output 500 ml   Net -500 ml      Physical Exam  Vitals and nursing note reviewed.   Constitutional:       General: He is not in acute distress.  HENT:      Head: Normocephalic.      Nose: No congestion.      Mouth/Throat:      Pharynx: No oropharyngeal exudate.   Eyes:      Conjunctiva/sclera: Conjunctivae normal.   Cardiovascular:      Rate and Rhythm: Normal rate.      Pulses: Normal pulses.   Pulmonary:      Effort: Pulmonary effort is normal. No respiratory distress.      Breath sounds: Normal breath sounds.   Abdominal:      General: Bowel sounds are normal.      Palpations: Abdomen is soft.      Tenderness: There is no abdominal tenderness.   Musculoskeletal:         General: No swelling.      Cervical back: Normal range of motion. No rigidity.      Comments: Bilateral lower extremities contracted   Skin:     Coloration: Skin is jaundiced. Hematoma on left flank area  Neurological:      Mental Status: He is alert. Mental status is at baseline.   Psychiatric:         Mood and Affect: Mood normal.                Significant Labs: All pertinent labs within the past 24 hours have been reviewed.     Significant Imaging: I have reviewed all pertinent imaging  results/findings within the past 24 hours.        No new subjective & objective note has been filed under this hospital service since the last note was generated.      Assessment/Plan:      * GI bleed  Patient has acute blood loss due to hemorrhage, the hemorrhage is due to gastrointestinal bleed, patient does have a propensity for bleeding due to a medication, the medication is aspirin and Plavix.. Will trend hemoglobin/hematocrit Daily, as well as monitor and correct for any coagulation defects. CBC and vital signs have been reviewed and last CBC was noted-   Lab Results   Component Value Date    WBC 9.85 07/02/2024    HGB 9.7 (L) 07/02/2024    HCT 28.1 (L) 07/02/2024    MCV 88 07/02/2024     07/02/2024         Will order a type and screen and consent patient for blood transfusion. Will transfuse if Hgb is <7g/dl (<8g/dl in cases of active ACS) or if patient has rapid bleeding leading to hemodynamic instability.    -gastroenterologist consulted.  We will not be performing endoscopy at this time as per spouse request.  -patient received 1 unit PRBC  -continue to monitor H and H  -no signs of bleeding at this time  -patient was on DAPT for history of cardiac disease, medications on hold due to GI bleed  -palliative care consult pending    Wounds, multiple  -patient has wounds in sacral region, bilateral heels, toes all present on admission  -wound care following  -Recommendations discussed with pt wife at bedside  - Pressure injury prevention interventions - waffle overlay and heel boots  - Betadine to bilateral heels and great toe BID  - Triad ointment to sacrum BID      Sensorineural hearing loss (SNHL) of both ears  -continue to monitor      Other cirrhosis of liver  Patient with known Cirrhosis with Child's class Calculator for Child's score link- https://www.mdcalc.com/calc/340/child-farr-score-cirrhosis-mortality#creator-insights. Co-morbidities are  unknown .  MELD-Na score calculated; MELD 3.0: 19 at  7/2/2024  2:25 AM  MELD-Na: 15 at 7/2/2024  2:25 AM  Calculated from:  Serum Creatinine: 1.1 mg/dL at 7/2/2024  2:25 AM  Serum Sodium: 138 mmol/L (Using max of 137 mmol/L) at 7/2/2024  2:25 AM  Total Bilirubin: 4.4 mg/dL at 7/2/2024  2:25 AM  Serum Albumin: 1.6 g/dL at 7/2/2024  2:25 AM  INR(ratio): 1.2 at 7/1/2024  7:47 AM  Age at listing (hypothetical): 83 years  Sex: Male at 7/2/2024  2:25 AM      Continue chronic meds. Etiology likely  unknown . Will avoid any hepatotoxic meds, and monitor CBC/CMP/INR for synthetic function.     LFT elevation  -patient currently jaundiced  -gastroenterologist following  -spouse does not want invasive procedures at this time.  -CT abdomen and pelvis shows Liver is small and nodular in contour suggestive of cirrhosis       Essential hypertension  Chronic, controlled. Latest blood pressure and vitals reviewed-     Temp:  [97.6 °F (36.4 °C)-98.6 °F (37 °C)]   Pulse:  [62-95]   Resp:  [13-20]   BP: ()/(56-94)   SpO2:  [100 %] .   Home meds for hypertension were reviewed and noted below.   Hypertension Medications               olmesartan (BENICAR) 20 MG tablet Take 1 tablet (20 mg total) by mouth once daily. HOLD UNTIL SEEN BY PCP            While in the hospital, will manage blood pressure as follows; Continue home antihypertensive regimen    Will utilize p.r.n. blood pressure medication only if patient's blood pressure greater than 140/90 and he develops symptoms such as worsening chest pain or shortness of breath.      VTE Risk Mitigation (From admission, onward)           Ordered     IP VTE HIGH RISK PATIENT  Once         07/01/24 0942     Place sequential compression device  Until discontinued         07/01/24 0942     Place sequential compression device  Until discontinued         07/01/24 0942                    Discharge Planning   LAMINE:      Code Status: DNR   Is the patient medically ready for discharge?:     Reason for patient still in hospital (select all that  apply): Patient trending condition, Laboratory test, and Consult recommendations  Discharge Plan A: Skilled Nursing Facility                  Bg Amador MD  Department of [facility] Medicine   [facility]

## 2024-07-02 NOTE — SUBJECTIVE & OBJECTIVE
Interval History:  Patient was examined in his bed with his wife Pati by his side.  He was calm and not in distress.  He was alert and oriented x1.  He denies chest pain, palpitation, shortness for breath, GI/ symptoms.  The patient's wife Pati confirmed that she would not like any invasive procedures done on patient was this time.  She states she was considering hospice but has not decided.  Bruise on patient's left side was discussed with patient's spouse, she was unable to tell with a bruise came from.  She states patient has lives in the nursing home.    Review of Systems   Unable to perform ROS: Dementia     Objective:     Vital Signs (Most Recent):  Temp: 98 °F (36.7 °C) (07/02/24 0713)  Pulse: 89 (07/02/24 1208)  Resp: 20 (07/02/24 0713)  BP: 111/63 (07/02/24 0713)  SpO2: 100 % (07/02/24 0713) Vital Signs (24h Range):  Temp:  [97.6 °F (36.4 °C)-98.6 °F (37 °C)] 98 °F (36.7 °C)  Pulse:  [62-89] 89  Resp:  [13-20] 20  SpO2:  [100 %] 100 %  BP: ()/(56-94) 111/63     Weight: 46.3 kg (102 lb)  Body mass index is 18.07 kg/m².    Intake/Output Summary (Last 24 hours) at 7/2/2024 1220  Last data filed at 7/2/2024 0437  Gross per 24 hour   Intake --   Output 500 ml   Net -500 ml         Physical Exam  Vitals and nursing note reviewed.   Constitutional:       General: He is not in acute distress.  HENT:      Head: Normocephalic.      Nose: No congestion.      Mouth/Throat:      Pharynx: No oropharyngeal exudate.   Eyes:      Conjunctiva/sclera: Conjunctivae normal.   Cardiovascular:      Rate and Rhythm: Normal rate.      Pulses: Normal pulses.   Pulmonary:      Effort: Pulmonary effort is normal. No respiratory distress.      Breath sounds: Normal breath sounds.   Abdominal:      General: Bowel sounds are normal.      Palpations: Abdomen is soft.      Tenderness: There is no abdominal tenderness.   Musculoskeletal:         General: No swelling.      Cervical back: Normal range of motion. No rigidity.       Comments: Bilateral lower extremities contracted   Skin:     Coloration: Skin is jaundiced.   Neurological:      Mental Status: He is alert. Mental status is at baseline.   Psychiatric:         Mood and Affect: Mood normal.             Significant Labs: All pertinent labs within the past 24 hours have been reviewed.    Significant Imaging: I have reviewed all pertinent imaging results/findings within the past 24 hours.

## 2024-07-02 NOTE — CONSULTS
[facility] - Telemetry  Wound Care    Patient Name:  Dean Hahn   MRN:  [id number]  Date: 7/2/2024  Diagnosis: GI bleed    History:     Past Medical History:   Diagnosis Date    Cataract     HLD (hyperlipidemia)     Hypertension        Social History     Socioeconomic History    Marital status:     Number of children: 5   Occupational History    Occupation: Former electrican    Tobacco Use    Smoking status: Never    Smokeless tobacco: Never   Substance and Sexual Activity    Alcohol use: Yes    Drug use: Never    Sexual activity: Not Currently     Partners: Female     Birth control/protection: None     Comment: wife   Social History Narrative    Orginally from    [facility] electrical - retired [employer]      - [employer] - 5 years ,      Children    3 girls , 2 boys - 1 daughter oldest in [place]     Social Determinants of Health     Financial Resource Strain: Patient Unable To Answer (7/2/2024)    Overall Financial Resource Strain (CARDIA)     Difficulty of Paying Living Expenses: Patient unable to answer   Recent Concern: Financial Resource Strain - Medium Risk (6/20/2024)    Overall Financial Resource Strain (CARDIA)     Difficulty of Paying Living Expenses: Somewhat hard   Food Insecurity: Patient Unable To Answer (7/2/2024)    Hunger Vital Sign     Worried About Running Out of Food in the Last Year: Patient unable to answer     Ran Out of Food in the Last Year: Patient unable to answer   Transportation Needs: Patient Unable To Answer (7/2/2024)    TRANSPORTATION NEEDS     Transportation : Patient unable to answer   Physical Activity: Patient Unable To Answer (7/2/2024)    Exercise Vital Sign     Days of Exercise per Week: Patient unable to answer     Minutes of Exercise per Session: Patient unable to answer   Recent Concern: Physical Activity - Inactive (6/20/2024)    Exercise Vital Sign     Days of Exercise per Week: 0 days     Minutes of Exercise  per Session: 0 min   Stress: Patient Unable To Answer (7/2/2024)    [employer] - Occupational Stress Questionnaire     Feeling of Stress : Patient unable to answer   Recent Concern: Stress - Stress Concern Present (6/20/2024)    [employer] [place] [employer] - Occupational Stress Questionnaire     Feeling of Stress : To some extent   Housing Stability: Patient Unable To Answer (7/2/2024)    Housing Stability Vital Sign     Unable to Pay for Housing in the Last Year: Patient unable to answer     Homeless in the Last Year: Patient unable to answer       Precautions:     Allergies as of 07/01/2024    (No Known Allergies)       [facility] Assessment Details/Treatment     Sacrum- Shallow Stage 3 present on admit- 7x6x0.1cm with darkened edges- scant serosanguinous drainage- no odor        R heel- unstageable pressure injury- present on admit- 100% stable eschar- 8x5cm        R great toe- unstageable pressure injury- present on admit- 100% stable eschar- 2x2cm        L great toe- unstageable pressure injury- present on admit- appears as a DTPI that is developing eschar 2.5xxcm      L heel- unstageable pressure injury- present on admit- 100% stable eschar- 6x5.5cm      L back- ecchymosis      Recommendations discussed with pt wife at bedside, nurse and Dr. Amador:  - Pressure injury prevention interventions - waffle overlay and heel boots  - Betadine to bilateral heels and great toe BID  - Triad ointment to sacrum BID    07/02/2024

## 2024-07-02 NOTE — PLAN OF CARE
Virtual Nurse note: Patient chart, labs, and vitals reviewed. Care plan and goals updated as needed.   VN to continue to be available as needed.     Problem: Gastrointestinal Bleeding  Goal: Optimal Coping with Acute Illness  Outcome: Progressing

## 2024-07-02 NOTE — ASSESSMENT & PLAN NOTE
Patient has acute blood loss due to hemorrhage, the hemorrhage is due to gastrointestinal bleed, patient does have a propensity for bleeding due to a medication, the medication is aspirin and Plavix.. Will trend hemoglobin/hematocrit Daily, as well as monitor and correct for any coagulation defects. CBC and vital signs have been reviewed and last CBC was noted-   Lab Results   Component Value Date    WBC 9.85 07/02/2024    HGB 9.7 (L) 07/02/2024    HCT 28.1 (L) 07/02/2024    MCV 88 07/02/2024     07/02/2024         Will order a type and screen and consent patient for blood transfusion. Will transfuse if Hgb is <7g/dl (<8g/dl in cases of active ACS) or if patient has rapid bleeding leading to hemodynamic instability.    -gastroenterologist consulted.  We will not be performing endoscopy at this time as per spouse request.  -patient received 1 unit PRBC  -continue to monitor H and H  -no signs of bleeding at this time  -patient was on DAPT for history of cardiac disease, medications on hold due to GI bleed  -palliative care consult pending

## 2024-07-02 NOTE — PT/OT/SLP EVAL
Occupational Therapy   Evaluation    Name: Dean Hahn  MRN: [id number]  Admitting Diagnosis: The primary encounter diagnosis was Lower GI bleed. Diagnoses of Hypotension, unspecified hypotension type, Chest pain, and Tachycardia were also pertinent to this visit.    Recent Surgery: * No surgery found *      Recommendations:     Discharge Recommendations: Moderate Intensity Therapy  Discharge Equipment Recommendations:  to be determined by next level of care  Barriers to discharge:  None    Assessment:   Pt presents w/ decreased overall endurance/conditioning, balance/mobility & coordination/cognition w/ subsequent decline in (I)/safety w/ BADLs, fxnl mobility & fxnl t/f's.  OT 3x/wk to increase phys/fxnl status & maximize potential to achieve established goals for d/c-->mod intensity tx w/ DME deferred.      Dean Hahn is a 83 y.o. male with a medical diagnosis of The primary encounter diagnosis was Lower GI bleed. Diagnoses of Hypotension, unspecified hypotension type, Chest pain, and Tachycardia were also pertinent to this visit.  .  He presents with performance deficits affecting function: weakness, impaired endurance, impaired sensation, impaired self care skills, impaired functional mobility, gait instability, impaired balance, impaired cognition, decreased coordination, decreased upper extremity function, decreased lower extremity function, decreased safety awareness, decreased ROM, impaired coordination, impaired skin.      Rehab Prognosis: Fair and Poor; patient would benefit from acute skilled OT services to address these deficits and reach maximum level of function.       Plan:     Patient to be seen 3 x/week to address the above listed problems via self-care/home management, therapeutic activities, therapeutic exercises  Plan of Care Expires: 08/02/24  Plan of Care Reviewed with: patient, spouse    Subjective     Chief Complaint: HoTN  Patient/Family Comments/goals: return to  SNF    Occupational Profile:  Living Environment: [facility]  Previous level of function: required varying degrees of Asst w/ fxnl mobility/tasks  Roles and Routines: mostly bed bound  Equipment Used at Home: hospital bed, wheelchair, walker, rolling  Assistance upon Discharge: NH staff    Pain/Comfort:  Pain Rating 1: 0/10  Pain Rating Post-Intervention 1: 0/10    Patients cultural, spiritual, Jew conflicts given the current situation:      Objective:     Communicated with: nsg prior to session.  Patient found HOB elevated with bed alarm, telemetry, peripheral IV, pressure relief boots, BRAD drain upon OT entry to room.    General Precautions: Standard, fall, hearing impaired, NPO  Orthopedic Precautions: N/A  Braces: N/A  Respiratory Status: Room air    Occupational Performance:    Bed Mobility:    Patient completed Rolling/Turning to Left with  maximal assistance  Patient completed Rolling/Turning to Right with maximal assistance  Patient completed Supine to Sit with maximal assistance  Patient completed Sit to Supine with maximal assistance    Functional Mobility/Transfers:  Patient completed Sit <> Stand Transfer with maximal assistance and of 2 persons  with  rolling walker   Functional Mobility: scooting laterally to R at EOB w/ Max A    Activities of Daily Living:  Lower Body Dressing: maximal assistance don socks    Cognitive/Visual Perceptual:  AO to self only  Impaired STM  [facility]    Physical Exam:  B shldrs AAROM to ~60*; elb-->Ds WFL  Diminishded sensation/coordination LUE    Sit balance: P+ to F-  Stand balance: Poor    AMPAC 6 Click ADL:  AMPAC Total Score: 14    Treatment & Education:  Pt found in supine & agreeable to OT/PT co-eval this date.  Pt AO only to self & perf the following:  -logrolling L via bed rail w/ Max A-->EOB w/ Max A & tolerated w/ SB-Min  A2/2 post lean  -standing x 3 attempts via RW w/ allowance for B hands on  w/ Max A x 2, but only able to achieve full stance at 1st  attempt 2/2 pt exhibiting signif post lean at trunk  -scooting laterally L at EOB w/ Max A  -returned to R sidelying w/ Max A  Edu/tx re: general safety techs & HEP. Pt verbalized understanding, but questionable comprehension/retention.      Patient left right sidelying with all lines intact, call button in reach, bed alarm on, and nsg notified    GOALS:   Multidisciplinary Problems       Occupational Therapy Goals          Problem: Occupational Therapy    Goal Priority Disciplines Outcome Interventions   Occupational Therapy Goal     OT, PT/OT Progressing    Description: Goals to be met by: 08/02     Patient will increase functional independence with ADLs by performing:    Grooming while EOB with Stand-by Assistance.  Sitting at edge of bed x10 minutes with Stand-by Assistance.  Rolling to Bilateral with Contact Guard Assistance and use of bedrail as needed.   Supine to sit with Contact Guard Assistance and use of bedrail as needed.  Stand pivot transfers with Moderate Assistance.                         History:     Past Medical History:   Diagnosis Date    Cataract     HLD (hyperlipidemia)     Hypertension          Past Surgical History:   Procedure Laterality Date    CHOLECYSTECTOMY      EYE SURGERY Right     cataract removal surgery       Time Tracking:     OT Date of Treatment: 07/02/24  OT Start Time: 0904  OT Stop Time: 0925  OT Total Time (min): 21 min    Billable Minutes:Evaluation 21  Total Time 21--co-eval w/ PT    7/2/2024

## 2024-07-02 NOTE — ASSESSMENT & PLAN NOTE
-patient has wounds in sacral region, bilateral heels, toes all present on admission  -wound care following  -Recommendations discussed with pt wife at bedside  - Pressure injury prevention interventions - waffle overlay and heel boots  - Betadine to bilateral heels and great toe BID  - Triad ointment to sacrum BID

## 2024-07-02 NOTE — ASSESSMENT & PLAN NOTE
Chronic, controlled. Latest blood pressure and vitals reviewed-     Temp:  [97.6 °F (36.4 °C)-98.6 °F (37 °C)]   Pulse:  [62-95]   Resp:  [13-20]   BP: ()/(56-94)   SpO2:  [100 %] .   Home meds for hypertension were reviewed and noted below.   Hypertension Medications               olmesartan (BENICAR) 20 MG tablet Take 1 tablet (20 mg total) by mouth once daily. HOLD UNTIL SEEN BY PCP            While in the hospital, will manage blood pressure as follows; Continue home antihypertensive regimen    Will utilize p.r.n. blood pressure medication only if patient's blood pressure greater than 140/90 and he develops symptoms such as worsening chest pain or shortness of breath.

## 2024-07-02 NOTE — PLAN OF CARE
went to meet with patient. Patient's spouse at bedside. Patient was admitted from [facility]. Prior to SNF, patient was living with his wife Pati. Pati reports patient does get in wheelchair at facility. I asked her if she knew how many SNF days patient has left, but she did not know. Updated clinicals sent to [facility]. I did ask in [facility] how many SNF days left, awaiting response.  updated spouse Palliative Care consulted regarding goals of care, etc. Will follow-up on consult. Wife plans for patient to go to SNF for now, undecided on long-term plans. Patient and Spouse encouraged to call with any questions or concerns.  will continue to follow patient through transitions of care and assist with any discharge needs.     Per chart review discharged to [facility] 6/17.    Patient readmitted to [facility] on 6/18-6/25    Discharged to [facility] SNF again 6/25. Per hospital notes at [facility] spouse had refused hospice at time).     [id number]--Jenise with [facility] updated patient medically ready for discharge if SNF auth obtained. Jenise reports since patient has been out of hospital for 24 hrs, auth required. Updated clinicals with therapy notes already sent. Jenise will submit for auth but likely will not hear back today. She will follow-up with me tomorrow. I updated spouse and patient.     Patient Contacts    Name Relation Home Work Mobile   Pati Hahn Spouse [phone]   Lindsay Carter Daughter   [phone]   Fide Maldonado Friend   [phone]     Future Appointments   Date Time Provider Department Center   7/9/2024  4:00 PM Angel Luis Tobias III, MD [facility] [place]   7/23/2024  8:00 AM Angel Luis Tobias III, MD [facility]  Nursing Home Agency SNF Agency [phone] [address] [facility] [place] [id number]      Next Steps: Follow up  Instructions:  Skilled Nursing Facility        07/02/24 1003   Discharge Assessment   Assessment Type Discharge Planning Assessment   Confirmed/corrected address, phone number and insurance Yes   Confirmed Demographics Correct on Facesheet   Source of Information family;health record   People in Home facility resident  (Currently SNF at [facility])   Facility Arrived From: [facility] SNF   Do you expect to return to your current living situation? Yes   Do you have help at home or someone to help you manage your care at home? Yes   Who are your caregiver(s) and their phone number(s)? Pati Hahn Spouse [phone]   Prior to hospitilization cognitive status: Unable to Assess   Current cognitive status: Unable to Assess   Walking or Climbing Stairs Difficulty yes   Walking or Climbing Stairs ambulation difficulty, dependent;stair climbing difficulty, dependent;transferring difficulty, dependent   Dressing/Bathing Difficulty yes   Dressing/Bathing bathing difficulty, dependent;dressing difficulty, dependent   Equipment Currently Used at Home wheelchair   Do you take prescription medications? Yes   Do you have prescription coverage? Yes   Do you have any problems affording any of your prescribed medications? No   Is the patient taking medications as prescribed? yes   Who is going to help you get home at discharge? Facility   How do you get to doctors appointments? agency   Are you on dialysis? No   Do you take coumadin? No   Discharge Plan A Skilled Nursing Facility   Discharge Plan B   (TBD)   DME Needed Upon Discharge  none   Discharge Plan discussed with: Patient;Spouse/sig other   Transition of Care Barriers None   Physical Activity   On average, how many days per week do you engage in moderate to strenuous exercise (like a brisk walk)? Pt Unable   On average, how many minutes do you engage in exercise at this level? Pt Unable   Financial Resource Strain   How hard is it for you to pay  for the very basics like food, housing, medical care, and heating? Pt Unable   Housing Stability   In the last 12 months, was there a time when you were not able to pay the mortgage or rent on time? Pt Unable   At any time in the past 12 months, were you homeless or living in a shelter (including now)? Pt Unable   Transportation Needs   Has the lack of transportation kept you from medical appointments, meetings, work or from getting things needed for daily living? Patient unable to answer   Food Insecurity   Within the past 12 months, you worried that your food would run out before you got the money to buy more. Pt Unable   Within the past 12 months, the food you bought just didn't last and you didn't have money to get more. Pt Unable   Stress   Do you feel stress - tense, restless, nervous, or anxious, or unable to sleep at night because your mind is troubled all the time - these days? Pt Unable   Social Isolation   How often do you feel lonely or isolated from those around you?  Patient unable to answer   Alcohol Use   Q1: How often do you have a drink containing alcohol? Pt Unable   Q2: How many drinks containing alcohol do you have on a typical day when you are drinking? Pt Unable   Q3: How often do you have six or more drinks on one occasion? Pt Unable   Utilities   In the past 12 months has the electric, gas, oil, or water company threatened to shut off services in your home? Pt Unable   Health Literacy   How often do you need to have someone help you when you read instructions, pamphlets, or other written material from your doctor or pharmacy? Patient unable to respond     Toña Urrutia RN    [phone]

## 2024-07-02 NOTE — ASSESSMENT & PLAN NOTE
-patient currently jaundiced  -gastroenterologist following  -spouse does not want invasive procedures at this time.  -CT abdomen and pelvis shows Liver is small and nodular in contour suggestive of cirrhosis

## 2024-07-02 NOTE — PROGRESS NOTES
[facility]     CC: rectal bleeding    HPI: Dean Hahn is an 83 y.o. male with history of CVA (L pontine, L corona radiata, L temporal lobe, R splenium of the corpus callosum), autoimmune cirrhosis, normocytic anemia, CKD-IIIa, HTN, and HLD who presented from [facility] for a single episode of rectal bleeding this morning for which GI was consulted. Per report, patient's wife denies any previous history of GI bleeding or melena/hematochezia at home. He was anemic to the point of requiring transfusion during recent admission at [facility] but endoscopy was deferred. CTA done at that time did demonstrate a psoas abscess but no source of GI bleeding. Patient denies any abdominal pain, nausea, vomiting, fever, or chills at this time as well as any prior history of hemorrhoids.    Interval: Patient seen this morning at bedside resting comfortably. Hgb remains stable at 9.7 this morning. Wife is at bedside this morning and wishes to continue to monitor patient's labs and forego any endoscopy at this time. He has no new complaints this morning.      Past Medical History  CVA (L pontine, L corona radiata, L temporal lobe, R splenium of the corpus callosum)  Autoimmune Cirrhosis  Chronic Kidney Disease, Stage III-a  Normocytic Anemia  Hypertension  Hyperlipidemia    Physical Examination  /63   Pulse 77   Temp 98 °F (36.7 °C)   Resp 20   Wt 46.3 kg (102 lb)   SpO2 100%   BMI 18.07 kg/m²   General appearace: Cachetic, chronically ill-appearing in NAD  Abdomen: soft, non-tender, non-distended abdomen with no rebound or guarding  Skin: Jaundiced  Neuro: Alert, oriented to self and hospital but not year or city, no asterixis appreciated    Labs:  Recent Labs   Lab 07/01/24  0747 07/01/24  1438 07/01/24  1804 07/02/24  0225 07/02/24  0747   WBC 17.61*   < > 12.52 9.80 9.85   HGB 8.6*   < > 10.6* 9.2* 9.7*   HCT 25.2*   < > 30.1* 27.6* 28.1*      < > 167 170 182   MCV 98   < > 88 91 88   RDW 18.7*    < > 23.7* 25.0* 24.8*   *  --   --  138  --    K 4.6  --   --  3.7  --    *  --   --  117*  --    CO2 15*  --   --  15*  --    BUN 28*  --   --  36*  --    CREATININE 1.1  --   --  1.1  --      --   --  76  --    PROT 5.9*  --   --  4.9*  --    ALBUMIN 1.7*  --   --  1.6*  --    BILITOT 3.7*  --   --  4.4*  --    AST 77*  --   --  59*  --    ALKPHOS 201*  --   --  146*  --    ALT 69*  --   --  53*  --     < > = values in this interval not displayed.     Recent Labs   Lab 07/01/24  0747 07/01/24  1604   TROPONINI 0.037* 0.050*     Imaging:  CT A/P (06/21/24):   - Small and nodular liver, no splenomegaly. L psoas hematoma appreciated. 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     CTA A/P (06/21/24):  - No evidence of acute GI bleed. L psoas hematoma as above.    External medical records reviewed including external documents       Endoscopic History:  No previous procedures available for review.      Assessment / Recommendations:    Rectal Bleeding in Setting of Cirrhosis (This is a new problem with uncertain prognosis)  Patient experienced episode of rectal bleeding this morning and noted to have hematochezia per ANDREA. Hgb currently stable at 8.1. Receiving 1 U PRBC in ED. Likely etiologies include internal hemorrhoid, diverticulosis, and malignancy. Variceal bleeding is less likely given presentation. Baseline Hgb previously noted to be 9-10.   Plan:  - MELD-Na score: 19  - Continue to monitor Hgb and transfuse as indicated, caution with transfusing to a Hgb > 8.0 in setting of cirrhosis  - Continue to trend CMP  - No endoscopy as patient's family wishes to treat conservatively and continue to monitor labs  - Continue to monitor for further episodes of rectal bleeding      Case discussed with Dr. Ramos, please appreciate attestation.     Gómez Clifton, DO  [facility] Internal Medicine, PGY-III  [facility]

## 2024-07-02 NOTE — CONSULTS
Palliative Medicine  Consult Note       Patient Name: Dean Hahn   MRN: [id number]   Admission Date: 7/1/2024   Hospital Length of Stay: 0   Attending Provider: Bg Amador MD   Consulting Provider: Olayinka Wayne Jr, MD  Primary Care Physician: Angel Luis Tobias III, MD   Principal Problem: GI bleed     Patient information was obtained from patient, relative(s), past medical records, and primary team.        Inpatient consult to Palliative Care  Consult performed by: Olayinka Wayne Jr., MD  Consult ordered by: Marisol Chung MD             Assessment/Plan:      Palliative Care Encounter:  Impression:    83M with PMH of HTN, HLD, prior multifocal cryptogenic CVA not worked up as outpt, and a ~9 year hx of autoimmune hepatitis recently (within the past ~6 months) progressed to overt cirrhosis with persistent bicytopenia and significant weight loss. Pt recently suffered a debilitating L pontine CVA and is right hand dominant; he has not ambulated since the CVA, is requiring total assist with feeds and ADLs and has developed severe pressure injuries of bilateral heels. Pt was started on DAPT after CVA however within one week course was complicated by orthostatic syncope and a psoas hematoma; DAPT was stopped along with BP meds. Since readmitted for an apparent LGIB of undetermined etiology however after discussion of pt's poor prognosis with GI pt's spouse (Pati) does not wish for endoscopy and is agreeable to transition to hospice care at [facility]. She knows pt will not ambulate again.    Rehab rec is for skilled OT and after pt is discharged from rehab, Pati and her aunt Fide are agreeable to transition pt to hospice care at [facility]. CM notified of family preference; please ensure that [facility] has a contingency plan to enroll pt with hospice after SNF course is complete.    Palliative care consulted for goals of care, family support and disease process education.       Advance Care  Planning   Advance Directives:   Living Will: No    LaPOST: No    Do Not Resuscitate Status: Yes    Medical Power of : No    Agent's Name:  Pati Hahn (spouse)   Agent's Contact Number:  [phone]    Decision Making:  Patient answered questions and Family answered questions  Goals of Care: The family endorses that what is most important right now is to focus on avoiding the hospital, remaining as independent as possible, symptom/pain control, and quality of life, even if it means sacrificing a little time    Accordingly, we have decided that the best plan to meet the patient's goals includes enrolling in hospice care       - Prior experience with serious illness: no  -The patient has not previously engaged in advance care planning or GOC discussions  - Insight/Understanding of illness: poor        Life Limiting Diagnosis:  Multi-infarct dementia and hepatic cirrhosis  -Prognosis-Time and potential for recovery: 1-3 months; negligible rehab potential  -Functional status: poor.  Pt was not able to manage ADLs  -Dementia diagnosis yes      Symptom Management:  -Pain: no      -Dyspnea no      -Anxiety/Depression no      -Constipation: no      -Anorexia:yes      Summary of recommendations and follow up plan:  -Most important goals at this time: comfort, quality of life, maximizing time with loved ones in a consistent caregiving environment  -Code status: DNR/DNI  -Disposition: return to [facility] SNF for OT and transition to half-way care under hospice once discharged from OT    The above recommendations communicated directly to primary team on 7/2/24.    Thank you for your consult. I will sign off. Please contact us if you have any additional questions.       Subjective:     Chief Complaint:   Chief Complaint   Patient presents with    Rectal Bleeding     Brought to ED from [facility] for rectal bleeding that was noticed today by staff. Pt is awake and in NAD.          HPI:   83M with PMH of HTN, HLD and  repeated multifocal CVAs (1/2023 to L corona radiata, L temporal lobe, R splenium of the corpus callosum; 6/2024 to L michael) suspected cardio-embolic in etiology but did not undergo recommended outpt workup for such; CKD3; autoimmune hepatitis first noted in 2015 and with progression to cirrhosis in the past 6 months. Pt is a former  and retired [employer],  with 5 adult children.    Pt has now had 3 hospital admissions in as many weeks:    6/10/24-6/17/14 for acute ischemic L pontine stroke, treated with tPA without hemorrhagic conversion but requiring max assist from PT. Discharged to [facility] for SNF/PT on DAPT with statins held due to ^LFTs.     6/18/24: Pt was readmitted in under 24h after a 10 second syncopal episode during PT attempt, found hypotensive and hypoxic. Found to have acute drop in Hb+PLT with no apparent bleeding. CTA abdomen was negative for GIB but revealed a new psoas hematoma of non-traumatic etiology. Family informed that pt's prognosis is rapidly worsening and opted to hold DAPT, declined AC (Eliquis) and wished to hold BP meds until discussed with PCP. Agreed to new DNR status but declined transition to hospice care. Discharged back to [facility] on 6/25 with muddled goals ('therapy and hospice').    7/1/24 (current admit)  Chief Complaint leading to admission: BRBPR    Pt was BIBEMS from [facility] after staff noted maroon colored stool. Confirmed he was not administered anticoagulants or antiplatelets by NH.     Found with Hb 10.2, BP 70s/40s and hematochezia on exam. Bolused 1L IVF and became normotensive; not transfused.    GI was consulted and discussed with family that pt could undergo upper/lower endoscopy to control bleeding however pt is not tolerating critical meds, is requiring total assist with ADLs and has now developed multiple advanced pressure injuries so little prospect of regaining function with invasive mgmt and family agreed to forego endoscopy in favor  of pivoting to comfort focused care.    Meld-NA score is high at 19. Per GI pt's presentation is unlikely due to variceal hemorrhage, favored hemorrhoidal vs diverticular, unable to fully r/o malignancy without endoscopy.    Hospital Course:  Hb stabilized and tolerating regular diet.    Palliative has been consulted for goals of care, family support and disease process education.    Pt has minimal insight, knows he is hospitalized but does not know why. In brief, discussed pt's underlying interacting comorbidities with pt's spouse, aunt and niece. All are well educated as to the severe and irreversible stroke deficits pt has suffered which will leave him persistently bedbound and dependent on assistance with all ADLs. They accept that pt is not tolerating life prolonging therapies for either his CVAs (bleeding due to antiPLTs) or his cirrhosis (not tolerating antiHTN meds). He is severely cachectic and without physiologic reserve to reverse his neurogenic weakness and secondarily pressure injuries. He is likely to suffer further bleeding even in absence of provocative medications and Pati agrees that further transfusions will only prolong pt's terminal illness and that repeated hospitalizations for these intractable issues are low yield and best avoided.    Pati would like to resume OT in the hopes that pt can feed himself to some extent with his left non-dominant hand but accepts that pt will soon plateau and convert from skilled to penitentiary status. She is aware that home hospice is an option at that point but at this time favors penitentiary status at [facility] under hospice care for the rest of pt's days.    All questions answered to the satisfaction of Pati, her aunt Fide, and her niece Isabel during multiple consecutive phone calls. Good family dynamic. Pt is comfortable and cooperative with Pati's caregiving. No symptoms to address and family goals are concrete and reasonable.    Review of  Symptoms      Symptom Assessment (ESAS 0-10 Scale)  Unable to complete assessment due to Dementia     CAM / Delirium:  Negative  Constipation:  Negative  Diarrhea:  Negative      Pain Assessment in Advanced Demential Scale (PAINAD)   Breathing - Independent of vocalization:  0  Negative vocalization:  0  Facial expression:  0  Body language:  0  Consolability:  0  Total:  0    ECOG Performance Status thGthrthathdtheth:th th5th Living Arrangements:  Lives in nursing home    Psychosocial/Cultural:   See Palliative Psychosocial Note: No  Social Issues Identified: Coping deficit pt/family and New Diagnosis/Trauma  Bereavement Risk: Yes: Close or dependent relationship to the  person  Caregiver Needs Discussed. Caregiver Distress: Yes: Intensity of family caregiving and Caregiver Burnout Risk  Cultural: No specific concerns. Warm, supportive family.  **Primary  to Follow**  Palliative Care  Consult: No    Spiritual:  F - Cinthya and Belief:  Restoration Zoroastrianism  I - Importance:  Low  C - Community:  Home Restorationism  A - Address in Care:  Pastoral care PRN     Time-Based Charting:  Yes  Chart Review: 28 minutes  Face to Face: 12 minutes  Symptom Assessment: 9 minutes  Coordination of Care: 13 minutes  Discharge Plannin minutes  Advance Care Plannin minutes  Goals of Care: 35 minutes    Total Time Spent: 127 minutes          ROS:  Review of Systems   Constitutional:  Positive for activity change, appetite change and unexpected weight change.   HENT:  Positive for hearing loss and voice change. Negative for trouble swallowing.    Gastrointestinal:  Positive for anal bleeding and blood in stool.   Genitourinary:  Positive for bladder incontinence (chronic).   Musculoskeletal:  Positive for gait problem.   Psychiatric/Behavioral:  Positive for decreased concentration.          Past Medical History:   Diagnosis Date    Cataract     HLD (hyperlipidemia)     Hypertension      Past Surgical  History:   Procedure Laterality Date    CHOLECYSTECTOMY      EYE SURGERY Right     cataract removal surgery     Family History   Problem Relation Name Age of Onset    Hypertension Mother Jania Hahn     Heart attack Father      Coronary artery disease Father      No Known Problems Sister x2     Hypertension Brother x3     No Known Problems Daughter x3     No Known Problems Son x2     Cirrhosis Neg Hx           Review of patient's allergies indicates:  No Known Allergies    Medications:    Current Facility-Administered Medications:     0.9%  NaCl infusion (for blood administration), , Intravenous, Q24H PRN, Marisol Chung MD    acetaminophen tablet 650 mg, 650 mg, Oral, Q8H PRN, Marisol Chung MD    aluminum-magnesium hydroxide-simethicone 200-200-20 mg/5 mL suspension 30 mL, 30 mL, Oral, QID PRN, Marisol Chung MD    atorvastatin tablet 20 mg, 20 mg, Oral, Daily, Marisol Chung MD, 20 mg at 07/02/24 0926    balsam peru-castor oiL Oint, , Topical (Top), BID, Marisol Chung MD    cyanocobalamin tablet 200 mcg, 200 mcg, Oral, Daily, Marisol Chung MD, 200 mcg at 07/02/24 0926    dextrose 10% bolus 125 mL 125 mL, 12.5 g, Intravenous, PRN, Marisol Chung MD    dextrose 10% bolus 250 mL 250 mL, 25 g, Intravenous, PRN, Marisol Chung MD    diclofenac sodium 1 % gel 2 g, 2 g, Topical (Top), Daily, Marisol Chung MD    donepeziL tablet 5 mg, 5 mg, Oral, QHS, Marisol Chnug MD, 5 mg at 07/01/24 2028    glucagon (human recombinant) injection 1 mg, 1 mg, Intramuscular, PRN, Marisol Chung MD    glucose chewable tablet 16 g, 16 g, Oral, PRN, Marisol Chung MD    glucose chewable tablet 24 g, 24 g, Oral, PRN, Marisol Chung MD    melatonin tablet 6 mg, 6 mg, Oral, Nightly PRN, Marisol Chung MD    naloxone 0.4 mg/mL injection 0.02 mg, 0.02 mg, Intravenous, PRN, Marisol Chung MD    ondansetron disintegrating tablet 8 mg, 8 mg, Oral, Q8H PRN,  Marisol Chung MD    pantoprazole injection 40 mg, 40 mg, Intravenous, BID, Marisol Chung MD, 40 mg at 07/02/24 0926    simethicone chewable tablet 80 mg, 1 tablet, Oral, QID PRN, Marisol Chung MD    sodium chloride 0.9% flush 10 mL, 10 mL, Intravenous, Q12H PRN, Marisol Chung MD         Objective:      Physical Exam:  Vitals: Temp: 98.3 °F (36.8 °C) (07/02/24 1220)  Pulse: 95 (07/02/24 1220)  Resp: 20 (07/02/24 1220)  BP: (!) 133/94 (07/02/24 1220)  SpO2: 100 % (07/02/24 1220)    Physical Exam  Constitutional:       General: He is not in acute distress.     Appearance: He is ill-appearing. He is not toxic-appearing.   Eyes:      General: No scleral icterus.  Cardiovascular:      Rate and Rhythm: Normal rate.      Pulses: Normal pulses.      Heart sounds: Normal heart sounds. No murmur heard.  Pulmonary:      Effort: Pulmonary effort is normal.      Breath sounds: Normal breath sounds. No wheezing or rales.   Abdominal:      General: Abdomen is flat. Bowel sounds are increased. There is no distension.      Palpations: Abdomen is soft.      Tenderness: There is no abdominal tenderness.   Musculoskeletal:         General: Swelling (RUE) present.   Skin:     Coloration: Skin is jaundiced.      Findings: Bruising present.   Neurological:      Mental Status: He is alert. He is disoriented.      Motor: Weakness, atrophy and abnormal muscle tone present.      Comments: Flaccid right-sided paresis                 Labs:   Creatinine   Date Value Ref Range Status   07/02/2024 1.1 0.5 - 1.4 mg/dL Final     POC Creatinine   Date Value Ref Range Status   06/10/2024 1.6 (H) 0.5 - 1.4 mg/dL Final      Hemoglobin   Date Value Ref Range Status   07/02/2024 9.7 (L) 14.0 - 18.0 g/dL Final      Albumin   Date Value Ref Range Status   07/02/2024 1.6 (L) 3.5 - 5.2 g/dL Final   07/01/2024 1.7 (L) 3.5 - 5.2 g/dL Final   06/25/2024 1.6 (L) 3.5 - 5.2 g/dL Final          Imaging:   Imaging Results              X-Ray  Chest 1 View (Final result)  Result time 07/01/24 09:50:53      Final result by Janna Gomez MD (07/01/24 09:50:53)                   Impression:      No acute abnormality.      Electronically signed by: Janna Gomez MD  Date:    07/01/2024  Time:    09:50               Narrative:    EXAMINATION:  XR CHEST 1 VIEW    CLINICAL HISTORY:  Hypotension;    TECHNIQUE:  Single frontal view of the chest was performed.    COMPARISON:  06/18/2024    FINDINGS:  The lungs are clear, with normal appearance of pulmonary vasculature and no pleural effusion or pneumothorax.    The cardiac silhouette is normal in size. The hilar and mediastinal contours are unremarkable.    Bones are intact.                                            I spent a total of 78 minutes on the day of the visit. This includes face to face time in discussion of goals of care, symptom assessment, coordination of care and emotional support.  This also includes non-face to face time preparing to see the patient (eg, review of tests/imaging), obtaining and/or reviewing separately obtained history, documenting clinical information in the electronic or other health record, independently interpreting results and communicating results to the patient/family/caregiver, or care coordinator.     Additional 49 min time spent on a voluntary advance care planning and /or goals of care discussion, providing emotional support, formulating, and communicating prognosis and exploring burden/benefit of various approaches of treatment.       Thank you for the opportunity to care for this patient and family.       Olayinka Wayne Jr, MD

## 2024-07-02 NOTE — PLAN OF CARE
Eval completed. Note follow. During this hospital stay pt will be seen 3x/week to address to identified rehab impairments via therapeutic exercises, Therapeutic activity, Neuromuscular re- education, gait training and progress toward the following goals.

## 2024-07-02 NOTE — PLAN OF CARE
Problem: Physical Therapy  Goal: Physical Therapy Goal  Description: Goals to be met by: 24     Patient will increase functional independence with mobility by performin. Supine to sit with [place]  2. Sit to supine with [place]  3. Rolling to Left and Right with [place]  4. Sit to stand transfer with Modified [place]  5: Gait training with rolling walker~ 200 feet with supervision.    Outcome: Progressing     Eval completed. Note follow. During this hospital stay pt will be seen 3x/week to address to identified rehab impairments via therapeutic exercises, Therapeutic activity, Neuromuscular re- education, gait training and progress toward the following goals.       Recommending moderate intensity therapy and DME rec recommend by the next level of care

## 2024-07-02 NOTE — PT/OT/SLP EVAL
Physical Therapy Evaluation    Patient Name:  Dean Hahn   MRN:  [id number]    Recommendations:     Discharge Recommendations: Moderate Intensity Therapy.   Discharge Equipment Recommendations:  other (see comments) (DME recs will decide by the next level of care)  Barriers to discharge: Decreased caregiver support    Assessment:     Dean Hahn is a 83 y.o. male admitted with a medical diagnosis of GI bleed.  He presents with the following impairments/functional limitations: weakness, impaired endurance, gait instability, impaired balance, impaired cognition, decreased coordination, decreased lower extremity function, decreased safety awareness, abnormal tone, impaired muscle length.Pt requires max A with rolling to both sides, lying to sitting at the side of the bed, poor static and  dynamic sitting balance and requires Max A x2 with sit to stand while sitting at the side of the bed. Pt did not perform ambulation today due to LE weakness.  .    Rehab Prognosis: Good patient would benefit from acute skilled PT services to address these deficits and reach maximum level of function.    Recent Surgery: * No surgery found *      Plan:     During this hospitalization, patient to be seen 3 x/week to address the identified rehab impairments via gait training, therapeutic activities, therapeutic exercises, neuromuscular re-education and progress toward the following goals:    Plan of Care Expires:  08/02/24    Subjective     Chief Complaint: Hypotension  Patient/Family Comments/goals: To improve bed mobility, sitting, standing balance , transfers activities and gait   Pain/Comfort:  Pain Rating 1: 0/10  Patients cultural, spiritual, Faith conflicts given the current situation: NO     Living Environment:  Before going to the nursing home. Pt used to live with spouse.  Prior to admission, patients level of function was MI.  Equipment used at home:  DME rec decided by the next level of care,  Tonyator walker. Pt D/C is expected to the nursing home.    Objective:     Communicated with nsg and OT prior to session.  Patient found supine with PureWick, bed alarm, central line, Other (comments) (head of the bed evelvated , wearing Z-flex both feet.)  upon PT entry to room.    General Precautions: Standard, fall, hearing impaired, other (see comments) (DNR)  Orthopedic Precautions:    Braces:    Respiratory Status: Room air    Exams:  Cognitive Exam:  Patient is oriented to Place  Gross Motor Coordination:  was impaired    Postural Exam:  Patient presented with the following abnormalities:    -       Rounded shoulders  -       Forward head  -       Kyphosis  RLE ROM: WNL  RLE Strength: Deficits: -3/5 over all  LLE ROM: WFL  LLE Strength: Deficits: -2/5 over all     Functional Mobility:  Bed Mobility:     Rolling Left:  maximal assistance  Rolling Right: maximal assistance  Transfers:     Sit to Stand:  total assistance with rolling walker  Gait: o ft with rolling walker due to LE weakness, impaired balance and low endurance.    Balance: poor static and dynamic sitting and standing balance.      AM-PAC 6 CLICK MOBILITY  Total Score:10       Treatment & Education:  Pt and spouse educated on role of PT/POC. Pt agreed to participate. In today's PT session performed bed mobility, lying to sitting to sitting at the side of the bed with max A, sitting balance balance at the side, ankle pumps, knee ROM and LAQ exs at the side of the of the bed and sit to stand performed from side of the bed with Max Ax 2. Educated pt and spouse on Krzysztof ankle pumps, bed mobility, SLR in bed to improve functional mobility and prevent from further declining.    Patient left right sidelying, HOB elevated with all lines intact and bed alarm on.    GOALS:   Multidisciplinary Problems       Physical Therapy Goals          Problem: Physical Therapy    Goal Priority Disciplines Outcome Goal Variances Interventions   Physical Therapy Goal     PT,  PT/OT Progressing     Description: Goals to be met by: 24     Patient will increase functional independence with mobility by performin. Supine to sit with [employer]  2. Sit to supine with [employer]  3. Rolling to Left and Right with [place]  4. Sit to stand transfer with Modified [employer]                         History:     Past Medical History:   Diagnosis Date    Cataract     HLD (hyperlipidemia)     Hypertension        Past Surgical History:   Procedure Laterality Date    CHOLECYSTECTOMY      EYE SURGERY Right     cataract removal surgery       Time Tracking:     PT Received On: 24  PT Start Time: 903     PT Stop Time: 928  PT Total Time (min): 25 min     Billable Minutes: Evaluation 10, Therapeutic Activity 15  2024

## 2024-07-02 NOTE — PLAN OF CARE
VIRTUAL NURSE: Pt arrived to unit. Pt hallie lopez. Currently at [facility]. Permission received to turn camera to view patient. VIP model explained to family; family  informed this VN will be working with bedside nurse and the rest of the care team.     Wife not at bedside. Call placed to Wife via telephone.       Admission questions completed.  Plan of care reviewed with patient.  Educated patient on VTE and fall risk. Safety precautions in place. Call light within reach, side rails up x2.     Family  instucted to ask staff for assistance. Verbalized complete understanding.  Will continue to be available and intervene as needed.    Labs, notes, orders, and careplan reviewed.     07/01/24 1934   Admission   Initial VN Admission Questions Complete   Shift   Virtual Nurse - Rounding Complete   Virtual Nurse - Patient Verbalized Approval Of Camera Use;VN Rounding   Type of Frequent Check   Type Patient Rounds   Safety/Activity   Patient Rounds bed in low position;bed wheels locked;call light in patient/parent reach;clutter free environment maintained;ID band on;visualized patient;toileting offered;placement of personal items at bedside   Safety Promotion/Fall Prevention assistive device/personal item within reach   Safety Precautions emergency equipment at bedside   Activity Assistance Provided assistance, 2 people   Positioning   Body Position neutral body alignment

## 2024-07-02 NOTE — PLAN OF CARE
Problem: Occupational Therapy  Goal: Occupational Therapy Goal  Description: Goals to be met by: 08/02     Patient will increase functional independence with ADLs by performing:    Grooming while EOB with Stand-by Assistance.  Sitting at edge of bed x10 minutes with Stand-by Assistance.  Rolling to Bilateral with Contact Guard Assistance and use of bedrail as needed.   Supine to sit with Contact Guard Assistance and use of bedrail as needed.  Stand pivot transfers with Moderate Assistance.    Outcome: Progressing   Pt found in supine & agreeable to OT/PT co-eval this date.  Pt AO only to self & perf the following:  -logrolling L via bed rail w/ Max A-->EOB w/ Max A & tolerated w/ SB-Min  A2/2 post lean  -standing x 3 attempts via RW w/ allowance for B hands on  w/ Max A x 2, but only able to achieve full stance at 1st attempt 2/2 pt exhibiting signif post lean at trunk  -scooting laterally L at EOB w/ Max A  -returned to R sidelying w/ Max A  Edu/tx re: general safety techs & HEP. Pt verbalized understanding, but questionable comprehension/retention.    Pt presents w/ decreased overall endurance/conditioning, balance/mobility & coordination/cognition w/ subsequent decline in (I)/safety w/ BADLs, fxnl mobility & fxnl t/f's.  OT 3x/wk to increase phys/fxnl status & maximize potential to achieve established goals for d/c-->mod intensity tx w/ DME deferred.

## 2024-07-03 VITALS
HEIGHT: 63 IN | SYSTOLIC BLOOD PRESSURE: 128 MMHG | TEMPERATURE: 98 F | BODY MASS INDEX: 18.82 KG/M2 | HEART RATE: 85 BPM | RESPIRATION RATE: 18 BRPM | WEIGHT: 106.25 LBS | OXYGEN SATURATION: 99 % | DIASTOLIC BLOOD PRESSURE: 66 MMHG

## 2024-07-03 LAB
ALBUMIN SERPL BCP-MCNC: 1.5 G/DL (ref 3.5–5.2)
ALP SERPL-CCNC: 163 U/L (ref 55–135)
ALT SERPL W/O P-5'-P-CCNC: 61 U/L (ref 10–44)
ANION GAP SERPL CALC-SCNC: 3 MMOL/L (ref 8–16)
AST SERPL-CCNC: 79 U/L (ref 10–40)
BASOPHILS # BLD AUTO: 0.04 K/UL (ref 0–0.2)
BASOPHILS NFR BLD: 0.4 % (ref 0–1.9)
BILIRUB SERPL-MCNC: 3.8 MG/DL (ref 0.1–1)
BUN SERPL-MCNC: 35 MG/DL (ref 8–23)
CALCIUM SERPL-MCNC: 8.2 MG/DL (ref 8.7–10.5)
CHLORIDE SERPL-SCNC: 115 MMOL/L (ref 95–110)
CO2 SERPL-SCNC: 17 MMOL/L (ref 23–29)
CREAT SERPL-MCNC: 1.3 MG/DL (ref 0.5–1.4)
DIFFERENTIAL METHOD BLD: ABNORMAL
EOSINOPHIL # BLD AUTO: 0.4 K/UL (ref 0–0.5)
EOSINOPHIL NFR BLD: 4.1 % (ref 0–8)
ERYTHROCYTE [DISTWIDTH] IN BLOOD BY AUTOMATED COUNT: 24.4 % (ref 11.5–14.5)
EST. GFR  (NO RACE VARIABLE): 55 ML/MIN/1.73 M^2
GLUCOSE SERPL-MCNC: 85 MG/DL (ref 70–110)
HCT VFR BLD AUTO: 27.6 % (ref 40–54)
HGB BLD-MCNC: 9.3 G/DL (ref 14–18)
IMM GRANULOCYTES # BLD AUTO: 0.06 K/UL (ref 0–0.04)
IMM GRANULOCYTES NFR BLD AUTO: 0.7 % (ref 0–0.5)
LYMPHOCYTES # BLD AUTO: 1.3 K/UL (ref 1–4.8)
LYMPHOCYTES NFR BLD: 14.5 % (ref 18–48)
MCH RBC QN AUTO: 30.3 PG (ref 27–31)
MCHC RBC AUTO-ENTMCNC: 33.7 G/DL (ref 32–36)
MCV RBC AUTO: 90 FL (ref 82–98)
MONOCYTES # BLD AUTO: 1 K/UL (ref 0.3–1)
MONOCYTES NFR BLD: 10.7 % (ref 4–15)
NEUTROPHILS # BLD AUTO: 6.4 K/UL (ref 1.8–7.7)
NEUTROPHILS NFR BLD: 69.6 % (ref 38–73)
NRBC BLD-RTO: 0 /100 WBC
PLATELET # BLD AUTO: 156 K/UL (ref 150–450)
PMV BLD AUTO: 8.5 FL (ref 9.2–12.9)
POTASSIUM SERPL-SCNC: 3.8 MMOL/L (ref 3.5–5.1)
PROT SERPL-MCNC: 5.1 G/DL (ref 6–8.4)
RBC # BLD AUTO: 3.07 M/UL (ref 4.6–6.2)
SODIUM SERPL-SCNC: 135 MMOL/L (ref 136–145)
WBC # BLD AUTO: 9.17 K/UL (ref 3.9–12.7)

## 2024-07-03 PROCEDURE — 80053 COMPREHEN METABOLIC PANEL: CPT | Mod: HCNC | Performed by: INTERNAL MEDICINE

## 2024-07-03 PROCEDURE — 63600175 PHARM REV CODE 636 W HCPCS: Mod: HCNC | Performed by: INTERNAL MEDICINE

## 2024-07-03 PROCEDURE — 97530 THERAPEUTIC ACTIVITIES: CPT | Mod: HCNC

## 2024-07-03 PROCEDURE — 97110 THERAPEUTIC EXERCISES: CPT | Mod: HCNC

## 2024-07-03 PROCEDURE — 85025 COMPLETE CBC W/AUTO DIFF WBC: CPT | Mod: HCNC | Performed by: INTERNAL MEDICINE

## 2024-07-03 PROCEDURE — 25000003 PHARM REV CODE 250: Mod: HCNC | Performed by: INTERNAL MEDICINE

## 2024-07-03 PROCEDURE — G0378 HOSPITAL OBSERVATION PER HR: HCPCS | Mod: HCNC

## 2024-07-03 PROCEDURE — 96376 TX/PRO/DX INJ SAME DRUG ADON: CPT

## 2024-07-03 RX ORDER — PANTOPRAZOLE SODIUM 40 MG/1
40 TABLET, DELAYED RELEASE ORAL DAILY
Qty: 14 TABLET | Refills: 0 | Status: SHIPPED | OUTPATIENT
Start: 2024-07-03 | End: 2024-07-17

## 2024-07-03 RX ADMIN — Medication 200 MCG: at 09:07

## 2024-07-03 RX ADMIN — ATORVASTATIN CALCIUM 20 MG: 20 TABLET, FILM COATED ORAL at 09:07

## 2024-07-03 RX ADMIN — PANTOPRAZOLE SODIUM 40 MG: 40 INJECTION, POWDER, LYOPHILIZED, FOR SOLUTION INTRAVENOUS at 09:07

## 2024-07-03 RX ADMIN — Medication: at 09:07

## 2024-07-03 NOTE — PROGRESS NOTES
"[facility]           Pharmacy  Admission Medication History     The home medication history was taken by Chance Baez PharmD.      Medication history obtained from Medications listed below were obtained from: Patient/family    Based on information gathered for medication list, you may go to "Admission" then "Reconcile Home Medications" tabs to review and/or act upon those items.     The home medication list has been updated by the Pharmacy department.   Please read ALL comments highlighted in yellow.   Please address this information as you see fit.    Feel free to contact us if you have any questions or require assistance.        Potential issues to be addressed PRIOR TO DISCHARGE      No current facility-administered medications on file prior to encounter.     Current Outpatient Medications on File Prior to Encounter   Medication Sig Dispense Refill    acetaminophen (TYLENOL) 500 MG tablet Take 500 mg by mouth daily as needed (Leg pain).      ascorbic acid, vitamin C, (VITAMIN C) 500 MG tablet Take 500 mg by mouth 2 (two) times daily.      aspirin 81 MG Chew Take 81 mg by mouth once daily.      ciclopirox (PENLAC) 8 % Soln Apply topically nightly. 6.6 mL 6    clopidogreL (PLAVIX) 75 mg tablet Take 75 mg by mouth once daily.      cyanocobalamin (VITAMIN B-12) 100 MCG tablet Take 1 tablet (100 mcg total) by mouth once daily. 90 tablet 1    donepeziL (ARICEPT) 5 MG tablet Take 1 tablet (5 mg total) by mouth every evening. 90 tablet 1    dorzolamide-timolol 2-0.5% (COSOPT) 22.3-6.8 mg/mL ophthalmic solution Place 1 drop into both eyes 2 (two) times daily. 10 mL 1    ergocalciferol (ERGOCALCIFEROL) 50,000 unit Cap Take 1 capsule (50,000 Units total) by mouth every 7 days. 12 capsule 1    lactulose (CHRONULAC) 20 gram/30 mL Soln Take 15 mLs (10 g total) by mouth once daily. (Patient taking differently: Take 10 g by mouth daily as needed (Constipation).) 300 mL 1    multivitamin capsule Take 1 " capsule by mouth once daily.      omega-3 fatty acids/fish oil (FISH OIL-OMEGA-3 FATTY ACIDS) 300-1,000 mg capsule Take 1 capsule by mouth once daily. 90 capsule 1    atorvastatin (LIPITOR) 20 MG tablet Take 1 tablet (20 mg total) by mouth once daily. Hold until follow up with primary care physician due to elevated liver function tests.      olmesartan (BENICAR) 20 MG tablet Take 1 tablet (20 mg total) by mouth once daily. HOLD UNTIL SEEN BY PCP         Please address this information as you see fit.  Feel free to contact us if you have any questions or require assistance.    Chance Baez, PharmD  [phone]

## 2024-07-03 NOTE — PROGRESS NOTES
[facility] [place]  [address] [place]  [address]  [phone]           FACILITY TRANSFER ORDERS           Admit to:     Skilled Bed(NH)       Diagnoses:   Active Hospital Problems    Diagnosis  POA    *GI bleed [K92.2]  Yes    Wounds, multiple [T07.XXXA]  Yes    Sensorineural hearing loss (SNHL) of both ears [H90.3]  Yes    Other cirrhosis of liver [K74.69]  Yes     ) Labs ordered  2) EGD after next visit  3) Clinic in 3 months.     I don't think he would benefit from steroids at this point given his advanced age and low LFTs. I was also not sure of his functional status since this was a video visit. I will see him in person at his next visit.            Electronically signed by Uziel Andrea MD at 3/6/2024 10:15 AM      LFT elevation [R79.89]  Yes    Essential hypertension [I10]  Yes      Resolved Hospital Problems   No resolved problems to display.        Goals of Care Treatment Preferences:  Code Status: DNR    Health care agent: Pati Hahn (spouse)  Health care agent number: [phone]          What is most important right now is to focus on avoiding the hospital, remaining as independent as possible, symptom/pain control, quality of life, even if it means sacrificing a little time.  Accordingly, we have decided that the best plan to meet the patient's goals includes enrolling in hospice care.         Allergies:Review of patient's allergies indicates:  No Known Allergies    Vitals:       Every shift (Skilled Nursing patients)     Diet:  cardiac    Activities:    -  bed rest.  Turn patient every 2 hours     Oxygen:                   O2 2L PRN to maintain oxygen saturations >92%      LABS:  Per facility protocol              CMP, CBC each  week for 2 weeks       Nursing Precautions:     - Aspiration precautions:               - Total assistance with meals              -  Upright 90 degrees before during and after meals               -  Suction at bedside          - Fall  precautions per nursing home protocol   - Decubitus precautions:              -  for positioning              - Pressure reducing foam mattress              - Turn patient every two hours. Use wedge pillows to anchor patient   -  Patient was hard of hearing     CONSULTS:                Physical Therapy to evaluate and treat                 Occupational Therapy to evaluate and treat                 MISCELLANEOUS CARE:                 Routine Skin for Bedridden Patients:  Apply moisture barrier cream to all                          skin folds and wet areas in perineal area daily and after baths and                           all bowel movements.     WOUND CARE:  Wound spray or saline for wound cleaning with all dressing changes.    All wounds to be measured with first dressing changes and every week.    Recommendations:  - Pressure injury prevention interventions - waffle overlay and heel boots  - Betadine to bilateral heels and great toe BID  - Triad ointment to sacrum BID    Pressure Ulcer(s) Stage III   Location: Sacrum- Shallow Stage 3 present on admit- 7x6x0.1cm with darkened edges- scant serosanguinous drainage- no odor       Foot Ulcer   Location:   L heel- unstageable pressure injury- present on admit- 100% stable eschar- 6x5.5cm   L great toe- unstageable pressure injury- present on admit- appears as a DTPI that is developing eschar 2.5xxcm   R great toe- unstageable pressure injury- present on admit- 100% stable eschar- 2x2cm   R heel- unstageable pressure injury- present on admit- 100% stable eschar- 8x5cm             Dry or minimal exudate wound:               Apply wound gel daily (supplied by [facility]), cover with telfa dressing         Dry Eschar:              Pain with betadine twice daily.         Partial Eschar, dissolving tissue or with pink tissue:               Collagenase (Santyl) daily, cover with telfa, wrap with with kerlix dressing              Consult ET nurse    Other Wounds:       Ecchymosis: left flank/side        Medications: Discontinue all previous medication orders, if any. See new list below.     Medication List        START taking these medications      pantoprazole 40 MG tablet  Commonly known as: PROTONIX  Take 1 tablet (40 mg total) by mouth once daily. for 14 days            CONTINUE taking these medications      acetaminophen 500 MG tablet  Commonly known as: TYLENOL  Take 500 mg by mouth daily as needed (Leg pain).     atorvastatin 20 MG tablet  Commonly known as: LIPITOR  Take 1 tablet (20 mg total) by mouth once daily. Hold until follow up with primary care physician due to elevated liver function tests.     ciclopirox 8 % Soln  Commonly known as: PENLAC  Apply topically nightly.     cyanocobalamin 100 MCG tablet  Commonly known as: VITAMIN B-12  Take 1 tablet (100 mcg total) by mouth once daily.     donepeziL 5 MG tablet  Commonly known as: ARICEPT  Take 1 tablet (5 mg total) by mouth every evening.     dorzolamide-timolol 2-0.5% 22.3-6.8 mg/mL ophthalmic solution  Commonly known as: COSOPT  Place 1 drop into both eyes 2 (two) times daily.     ergocalciferol 50,000 unit Cap  Commonly known as: ERGOCALCIFEROL  Take 1 capsule (50,000 Units total) by mouth every 7 days.     fish oil-omega-3 fatty acids 300-1,000 mg capsule  Take 1 capsule by mouth once daily.     lactulose 20 gram/30 mL Soln  Commonly known as: CHRONULAC  Take 15 mLs (10 g total) by mouth once daily.     multivitamin capsule  Take 1 capsule by mouth once daily.     olmesartan 20 MG tablet  Commonly known as: BENICAR  Take 1 tablet (20 mg total) by mouth once daily. HOLD UNTIL SEEN BY PCP     VITAMIN C 500 MG tablet  Generic drug: ascorbic acid (vitamin C)  Take 500 mg by mouth 2 (two) times daily.            STOP taking these medications      aspirin 81 MG Chew     clopidogreL 75 mg tablet  Commonly known as: PLAVIX

## 2024-07-03 NOTE — HOSPITAL COURSE
84yo male with a past medical history of Cataract, HLD (hyperlipidemia), and Hypertension.. The patient presented to [facility] on 7/1/2024 with a primary complaint of Rectal Bleeding (Brought to ED from [facility] for rectal bleeding that was noticed today by staff. Pt is awake and in NAD. [facility] was [facility] SNF following a CVA and again 6/24 from [facility] for syncope 2/2 symptomatic anemia, psoas hematoma nad possible GIB, his DAPT was stopped and he was discharged back to SNF. Patient was seen by GI in the ER, maroon colored stool noted on rectal exam.  He was transfused 1 unit PRBC and hemoglobin monitored.  The patient's hemoglobin has been stable since admission and there has been no signs of GI bleed.     The patient was in their usual state of health until earlier this morning at [facility] when he was noted to have rectal bleeding. Brother is at the bedside and said he was admitted there for therapy and hospice but is not sure what the goals of care were. He said that in the last couple of days the patient had been eating well and at this time he was alert and talking normally with him. Give RBC in the ER.  Patient was seen by gastroenterologist, there are no plans of endoscopy at this time unless GI bleeding continues or if family changes their wishes and goals of care.  At this time family has stated they did not want any invasive procedures.  Palliative Care were consulted and was told by family that they would like patient discharged to skilled nursing facility for rehab.  The patient was family have also agreed to consider transition to hospice in the near future.  The patient's aspirin and Plavix on hold due to GI bleed.  PCP can consider restarting after evaluation.  Patient was also discharged on low-dose statin due to elevated liver enzymes.  Patient was examined in his bed this morning.  He was calm and not in distress.  He denies pain. He was alert and oriented x1.   [facility] to be followed by a physician within 3-5 days.

## 2024-07-03 NOTE — HPI
Dean Hahn is a 83 y.o.  male who  has a past medical history of Cataract, HLD (hyperlipidemia), and Hypertension.. The patient presented to [facility] on 7/1/2024 with a primary complaint of Rectal Bleeding (Brought to ED from [facility] for rectal bleeding that was noticed today by staff. Pt is awake and in NAD. )     The patient was in their usual state of health until earlier this morning at [facility] when he was noted to have rectal bleeding. Brother is at the bedside and said he was admitted there for therapy and hospice but is not sure what the goals of care were. He said that in the last couple of days the patient had been eating well and at this time he was alert and talking normally with him. He was discharged from [facility] on 6/17 to SNF following a CVA and again 6/24 from [facility] for syncope 2/2 symptomatic anemia, psoas hematoma nad possible GIB, his DAPT was stopped and he was discharged back to SNF.     Patient was seen by GI in the ER, maroon colored stool noted on rectal exam. Give RBC in the ER.

## 2024-07-03 NOTE — PLAN OF CARE
SNF Auth request submitted yesterday by [facility]. Awaiting approval.    1030--Jenise with [facility] stated she received SNF auth for patient.  requested facility orders.      met with patient and spouse. I informed them SNF auth obtained. Plan to transfer to [facility] SNF today. Awaiting orders to send.     --I did confirm with wife they would want hospice services at Nursing Home after SNF days completed.    1353--Facility orders sent to [facility] SNF. Awaiting orders to be reviewed.    Per Jenise at [facility]:Call report to [address] [place] [address] Nurse Room 901A  Phone#[phone]. I updated nursing staff. Transfer packet placed at nurse's station. [facility] set up for 4 pm. I spoke with patient's wife Pati and updated her as well.    [facility]  Address: [address] [place] [address], [place] [address]  Phone: [phone]    Patient Contacts    Name Relation Home Work Mobile   Pati Hahn Spouse [phone]   Lindsay Carter Daughter   [phone]   Fide Maldonado Friend   [phone]        Future Appointments   Date Time Provider Department Center   7/23/2024  8:00 AM Angel Luis Tobias III, MD [facility]                  Name Relationship Specialty Phone Fax Address Order                [facility]  Nursing Home Agency SNF Agency [phone] [address] [place] [address] [place] [id number]     Next Steps: Follow up  Instructions: Skilled Nursing Facility             07/03/24 1103   Final Note   Assessment Type Final Discharge Note   Anticipated Discharge Disposition SNF   Hospital Resources/Appts/Education Provided Appointments scheduled and added to AVS   Post-Acute Status   Post-Acute Authorization Placement   Post-Acute Placement Status Pending post-acute provider review/more information requested   Discharge Delays (!) Ambulance Transport/Facility Transport     Toña Urrutia RN    [phone]  [phone]

## 2024-07-03 NOTE — PROGRESS NOTES
AVS prepared by VN. AVS to be placed in nursing home packet by bedside nurse. VN available for any further needs.   07/03/24 1516   Nurse Notification   Charge Nurse Notified? Yes   Name of Charge Nurse June   Bedside Nurse Notified? Yes   Name of Bedside Nurse Jalyn Montes   Nurse Notfication Method Secure Chat   Nurse Notified Of Other  (d/c plan)    Notification    Notified? Yes   Name of  Toña Urrutia    Notification Method Secure Chat    Notified Of Discharge Status

## 2024-07-03 NOTE — PLAN OF CARE
Problem: Adult Inpatient Plan of Care  Goal: Plan of Care Review  Outcome: Progressing  Goal: Patient-Specific Goal (Individualized)  Outcome: Progressing  Goal: Absence of Hospital-Acquired Illness or Injury  Outcome: Progressing  Goal: Optimal Comfort and Wellbeing  Outcome: Progressing  Goal: Readiness for Transition of Care  Outcome: Progressing     Problem: Sepsis/Septic Shock  Goal: Absence of Infection Signs and Symptoms  Outcome: Progressing  Goal: Optimal Nutrition Intake  Outcome: Progressing     Problem: Wound  Goal: Optimal Pain Control and Function  Outcome: Progressing  Goal: Skin Health and Integrity  Outcome: Progressing  Goal: Optimal Wound Healing  Outcome: Progressing     Problem: Fall Injury Risk  Goal: Absence of Fall and Fall-Related Injury  Outcome: Progressing

## 2024-07-03 NOTE — PT/OT/SLP PROGRESS
Occupational Therapy   Co Treatment with PT    Name: Dean Hahn  MRN: [id number]  Admitting Diagnosis:  GI bleed     The primary encounter diagnosis was Lower GI bleed. Diagnoses of Hypotension, unspecified hypotension type, Chest pain, and Tachycardia were also pertinent to this visit.    Recommendations:     Discharge Recommendations: Moderate Intensity Therapy  Discharge Equipment Recommendations:  to be determined by next level of care  Barriers to discharge:   (increased caregiver burden of care if pt goes home from acute)    Assessment:     Dean Hahn is a 83 y.o. male with a medical diagnosis of GI bleed.  He presents with debility, poor command following. Performance deficits affecting function are weakness, impaired endurance, impaired sensation, impaired self care skills, impaired functional mobility, gait instability, impaired balance, impaired cognition, decreased coordination, decreased upper extremity function, decreased lower extremity function, decreased safety awareness, decreased ROM, impaired coordination, impaired skin.     Pt was able to state his name and birthday. Spouse was present for introduction then she left the room. Pt transitioned to EOB with TA x 2, he was not following any commands as given to help move in bed. He initially sat with Max A for sitting balance on EOB and quickly progressed to Min A after moderate facilitation 2/2 fearful of falling/gravitational insecurity, rigidity and leaning posteriorly. Pt poor following simple commands. Pt sat 10 min EOB. Pt stood with Max A x 2 with HHA. In standing, MAx A x 2 to maintain standing balance with trunk heavily flexed forward. He was not responding to facilitation tech to stand more erect and returned to sitting position on EOB. Pt scooted laterally toward HOB with Total A x 2 with facilitation to un-weight, shift hips and advance LE. Pt returned to supine TA x 2. HOB elevated for optimal position for wife  to feed him. Continue with OT POC.      Rehab Prognosis:  Fair; patient would benefit from acute skilled OT services to address these deficits and reach maximum level of function.       Plan:     Patient to be seen 3 x/week to address the above listed problems via self-care/home management, therapeutic activities, therapeutic exercises  Plan of Care Expires: 08/02/24  Plan of Care Reviewed with: patient, spouse    Subjective     Chief Complaint: none  Patient/Family Comments/goals: pt pleasant and agreeable.   Pain/Comfort:  Pain Rating 1: 0/10  Pain Rating Post-Intervention 1: 0/10    Objective:     Communicated with: nurse prior to session.  Patient found HOB elevated with bed alarm, telemetry, PureWick, pressure relief boots upon OT entry to room.    General Precautions: Standard, fall, hearing impaired    Orthopedic Precautions:N/A  Braces: N/A  Respiratory Status: Room air     Occupational Performance:     Bed Mobility:    Patient completed Rolling/Turning to Left with  total assistance, with side rail, and [place] guidance, max vc-not follow through. Pt rigid, looked perplexed to what was going on.  Patient completed Scooting/Bridging with total assistance and 2 persons  Patient completed Supine to Sit with total assistance and 2 persons  Patient completed Sit to Supine with total assistance and 2 persons     Functional Mobility/Transfers:  Patient completed Sit <> Stand Transfer with maximal assistance and of 2 persons  with  hand-held assist   Functional Mobility: seated lateral scooting TA x 2 toward HOB prior to lying down.     Activities of Daily Living:  Grooming: total assistance and [place] to wipe face seated EOB. PT facilitating sit balance and OT facilitating initiation and follow through with task.       [facility] 6 Click ADL: 12    Treatment & Education:  Purpose of OT and PCO, wife verbalized understanding. Pt however no evidence of learning.  Pt seen for facilitation of midline sitting balance: Initially pt  sat EOB with Max A 2/2 posterior leaning with feet lifted off floor and increased fear, anxiety, rigidity and was grabbing/reaching out to hold onto any thing he could to support him 2/2 fear of falling AKA gravitational insecurity with transitional movements. Pt was slowly rocked anteriorly and guided to R sideline position on forearm for few minutes and maintain with SBA-CGA. Pt was assist to upright position with  min A. He sat midline with slight lean to left, vc  and [place] assist to hold onto L bed rail  to make him feel more secure and safe with OT facilitating in front of pt and  PT in back facilitating trunk extension and anterior WS created a safe space for pt. Manual contact on knees by OT added extra proprioceptive input to LE to keep feet planted on floor and with vc to make sure she was providing as much assist as possible.   Pt performed BUE AAROM seated EOB and HOB elevated max vc and max -Mod A needed.   Pt seen for fx mobility  retraining as stated above.     Patient left HOB elevated with all lines intact, call button in reach, bed alarm on, and wife arrived  present    GOALS:   Multidisciplinary Problems       Occupational Therapy Goals          Problem: Occupational Therapy    Goal Priority Disciplines Outcome Interventions   Occupational Therapy Goal     OT, PT/OT Progressing    Description: Goals to be met by: 08/02     Patient will increase functional independence with ADLs by performing:    Grooming while EOB with Stand-by Assistance.  Sitting at edge of bed x10 minutes with Stand-by Assistance.  Rolling to Bilateral with Contact Guard Assistance and use of bedrail as needed.   Supine to sit with Contact Guard Assistance and use of bedrail as needed.  Stand pivot transfers with Moderate Assistance.                         Time Tracking:     OT Date of Treatment: 07/03/24  OT Start Time: 1142  OT Stop Time: 1202  OT Total Time (min): 20 min cotx with PT to facilitate maximum optimal response and  outcome.     Billable Minutes:Therapeutic Activity 12  Therapeutic Exercise 8  Total Time 20    OT/ELLYN: OT          7/3/2024

## 2024-07-03 NOTE — PLAN OF CARE
Problem: Occupational Therapy  Goal: Occupational Therapy Goal  Description: Goals to be met by: 08/02     Patient will increase functional independence with ADLs by performing:    Grooming while EOB with Stand-by Assistance.  Sitting at edge of bed x10 minutes with Stand-by Assistance.  Rolling to Bilateral with Contact Guard Assistance and use of bedrail as needed.   Supine to sit with Contact Guard Assistance and use of bedrail as needed.  Stand pivot transfers with Moderate Assistance.    Outcome: Progressing   Pt initially sat with Max A for sitting balance on EOB and quickly progressed to Min A after moderate facilitation 2/2 fearful of falling/gravitational insecurity, rigidity and leaning posteriorly. Pt poor following simple commands. Pt sat 10 min EOB. Pt stood with Max A x 2 with HHA. In standing, MAx A x 2 to maintain standing balance with trunk heavily flexed forward. He was not responding to facilitation tech to stand more erect and returned to sitting position on EOB. Pt scooted laterally toward HOB with Total A x 2 with facilitation to un-weight, shift hips and advance LE. Pt returned to supine TA x 2. HOB elevated for optimal position for wife to feed him. Continue with OT POC.

## 2024-07-03 NOTE — PLAN OF CARE
VIRTUAL NURSE:  Labs, notes, orders, and careplan reviewed. VN to be available as needed.    Problem: Gastrointestinal Bleeding  Goal: Optimal Coping with Acute Illness  Outcome: Progressing

## 2024-07-03 NOTE — PT/OT/SLP PROGRESS
Physical Therapy Treatment    Patient Name:  Dean Hahn   MRN:  [id number]    Recommendations:     Discharge Recommendations: Moderate Intensity Therapy  Discharge Equipment Recommendations: to be determined by next level of care, lift device  Barriers to discharge:  increased caregiver burden, significant physical assist required    Assessment:     Dean Hahn is a 83 y.o. male admitted with a medical diagnosis of GI bleed.  He presents with the following impairments/functional limitations: weakness, gait instability, impaired balance, impaired endurance, impaired self care skills, impaired functional mobility, decreased lower extremity function, decreased upper extremity function, impaired coordination, decreased coordination, impaired skin, impaired cognition, decreased safety awareness, impaired fine motor, impaired joint extensibility, decreased ROM . Pt was able to tolerate EOB sitting ~10 mins this date with MaxA progressed to Phillip.     Rehab Prognosis: Fair and Poor; patient would benefit from acute skilled PT services to address these deficits and reach maximum level of function.    Recent Surgery: * No surgery found *      Plan:     During this hospitalization, patient to be seen 3 x/week to address the identified rehab impairments via gait training, therapeutic activities, therapeutic exercises, neuromuscular re-education and progress toward the following goals:    Plan of Care Expires:  08/02/24    Subjective     Chief Complaint: none  Patient/Family Comments/goals: pt awake/alert but minimal verbalizations  Pain/Comfort:  Pain Rating 1: 0/10  Pain Rating Post-Intervention 1: 0/10      Objective:     Communicated with nsg prior to session.  Patient found HOB elevated with bed alarm, telemetry, PureWick, pressure relief boots upon PT entry to room.     General Precautions: Standard, fall, hearing impaired  Orthopedic Precautions: N/A  Braces: N/A  Respiratory Status: Room air      Functional Mobility:  Bed Mobility:     Rolling Left:  total assistance  Scooting: total assistance and of 2 persons  Supine to Sit: total assistance and of 2 persons  Sit to Supine: total assistance and of 2 persons  Sit <> stand: MaxA x 2 with B HHA; blocking at B knees, pt with forward flexed posture - tactile cueing provided to improve B hip extension for upright posture      AM-PAC 6 CLICK MOBILITY  Turning over in bed (including adjusting bedclothes, sheets and blankets)?: 1  Sitting down on and standing up from a chair with arms (e.g., wheelchair, bedside commode, etc.): 1  Moving from lying on back to sitting on the side of the bed?: 1  Moving to and from a bed to a chair (including a wheelchair)?: 1  Need to walk in hospital room?: 1  Climbing 3-5 steps with a railing?: 1  Basic Mobility Total Score: 6       Treatment & Education:  Facilitated L rolling and supine > sit transfer as reported above  Significant L lateral leaning and difficulty maintaining B feet on floor requiring MaxA for sitting balance initially  Facilitated weight-bearing on R lateral forearm and assist for B foot placement to promote improved sitting balance  Max verbal and tactile cueing for pt to be positioned in neutral and upright sitting position   Pt progressed to Phillip with postural awareness and realignment training  Attempted BLE exercises (LAQs, hip flexion / marches) with AAROM/PROM but mostly PROM due to lack of command following  Pt sat EOB ~10 mins  Performed 1 sit <> stand as reported above, pt unable to maintain safe static stance <10-15 seconds  Pt returned supine and pressure relief boots re-donned and spouse educated on HOB elevated for meals    Patient left HOB elevated with all lines intact, call button in reach, nsg notified, spouse present, and bed alarm unable to be placed on due to too little weight - rails up and nurse notified ..    GOALS:   Multidisciplinary Problems       Physical Therapy Goals           Problem: Physical Therapy    Goal Priority Disciplines Outcome Goal Variances Interventions   Physical Therapy Goal     PT, PT/OT Progressing     Description: Goals to be met by: 24     Patient will increase functional independence with mobility by performin. Supine to sit with Merrick  2. Sit to supine with Merrick  3. Rolling to Left and Right with Merrick  4. Sit to stand transfer with Modified Merrick                         Time Tracking:     PT Received On: 24  PT Start Time: 1141     PT Stop Time: 1204  PT Total Time (min): 23 min     Billable Minutes: Therapeutic Activity 13 and Therapeutic Exercise 10    Treatment Type: Treatment  PT/PTA: PT     Number of PTA visits since last PT visit: 0     2024

## 2024-07-03 NOTE — DISCHARGE SUMMARY
[facility] Medicine  Discharge Summary      Patient Name: Dean Hahn  MRN: [id number]  GRAZYNA: [id number]  Patient Class: OP- Observation  Admission Date: 7/1/2024  Hospital Length of Stay: 0 days  Discharge Date and Time:  07/03/2024 5:21 PM  Attending Physician: Bg Amador MD   Discharging Provider: Bg Amador MD  Primary Care Provider: Angel Luis Tobias III, MD    Primary Care Team: Networked reference to record PCT     HPI:   Dean Hahn is a 83 y.o.  male who  has a past medical history of Cataract, HLD (hyperlipidemia), and Hypertension.. The patient presented to [facility] on 7/1/2024 with a primary complaint of Rectal Bleeding (Brought to ED from [facility] for rectal bleeding that was noticed today by staff. Pt is awake and in NAD. )     The patient was in their usual state of health until earlier this morning at [facility] when he was noted to have rectal bleeding. Brother is at the bedside and said he was admitted there for therapy and hospice but is not sure what the goals of care were. He said that in the last couple of days the patient had been eating well and at this time he was alert and talking normally with him. He was discharged from [facility] on 6/17 to SNF following a CVA and again 6/24 from [facility] for syncope 2/2 symptomatic anemia, psoas hematoma nad possible GIB, his DAPT was stopped and he was discharged back to SNF.     Patient was seen by GI in the ER, maroon colored stool noted on rectal exam. Give RBC in the ER.    * No surgery found *      Hospital Course:   82yo male with a past medical history of Cataract, HLD (hyperlipidemia), and Hypertension.. The patient presented to [facility] on 7/1/2024 with a primary complaint of Rectal Bleeding (Brought to ED from [facility] for rectal bleeding that was noticed today by staff. Pt is awake and in NAD. He was recentlydischarged from [facility] on 6/17 to SNF following a CVA and again 6/24 from  [facility] for syncope 2/2 symptomatic anemia, psoas hematoma nad possible GIB, his DAPT was stopped and he was discharged back to SNF. Patient was seen by GI in the ER, maroon colored stool noted on rectal exam.  He was transfused 1 unit PRBC and hemoglobin monitored.  The patient's hemoglobin has been stable since admission and there has been no signs of GI bleed.     The patient was in their usual state of health until earlier this morning at [facility] when he was noted to have rectal bleeding. Brother is at the bedside and said he was admitted there for therapy and hospice but is not sure what the goals of care were. He said that in the last couple of days the patient had been eating well and at this time he was alert and talking normally with him. Give RBC in the ER.  Patient was seen by gastroenterologist, there are no plans of endoscopy at this time unless GI bleeding continues or if family changes their wishes and goals of care.  At this time family has stated they did not want any invasive procedures.  Palliative Care were consulted and was told by family that they would like patient discharged to skilled nursing facility for rehab.  The patient was family have also agreed to consider transition to hospice in the near future.  The patient's aspirin and Plavix on hold due to GI bleed.  PCP can consider restarting after evaluation.  Patient was also discharged on low-dose statin due to elevated liver enzymes.  Patient was examined in his bed this morning.  He was calm and not in distress.  He denies pain. He was alert and oriented x1.  He will be discharged to sniff today with strict instructions to be followed by a physician within 3-5 days.     Goals of Care Treatment Preferences:  Code Status: DNR    Health care agent: Pati Hahn (spouse)  Health care agent number: [phone]          What is most important right now is to focus on avoiding the hospital, remaining as independent as possible,  symptom/pain control, quality of life, even if it means sacrificing a little time.  Accordingly, we have decided that the best plan to meet the patient's goals includes enrolling in hospice care.      Consults:   Consults (From admission, onward)          Status Ordering Provider     Inpatient consult to Palliative Care  Once        Provider:  (Not yet assigned)    JOSEE Ibarra     Inpatient consult to Gastroenterology  Once        Provider:  (Not yet assigned)    Completed JOSEE LACY            Orthopedic  Wounds, multiple  -patient has wounds in sacral region, bilateral heels, toes all present on admission  -wound care following  -Recommendations discussed with pt wife at bedside  - Pressure injury prevention interventions - waffle overlay and heel boots  - Betadine to bilateral heels and great toe BID  - Triad ointment to sacrum BID        Final Active Diagnoses:    Diagnosis Date Noted POA    PRINCIPAL PROBLEM:  GI bleed [K92.2] 07/02/2024 Yes    Wounds, multiple [T07.XXXA] 07/02/2024 Yes    Sensorineural hearing loss (SNHL) of both ears [H90.3] 06/13/2024 Yes    Other cirrhosis of liver [K74.69] 03/06/2024 Yes    LFT elevation [R79.89] 12/20/2017 Yes    Essential hypertension [I10] 09/16/2014 Yes      Problems Resolved During this Admission:       Discharged Condition: stable    Disposition: Skilled Nursing Facility    Follow Up:   Follow-up Information       [facility] Follow up.    Specialties: Nursing Home Agency, SNF Agency  Why: Skilled Nursing Facility  Contact information:  [address] [place] [address]  [place] BL[place] [address]  [phone]                           Patient Instructions:   No discharge procedures on file.    Significant Diagnostic Studies: Labs: BMP:   Recent Labs   Lab 07/02/24 0225 07/03/24  0343   GLU 76 85    135*   K 3.7 3.8   * 115*   CO2 15* 17*   BUN 36* 35*   CREATININE 1.1 1.3   CALCIUM 8.1* 8.2*   , CMP   Recent Labs   Lab 07/02/24 0225  07/03/24  0343    135*   K 3.7 3.8   * 115*   CO2 15* 17*   GLU 76 85   BUN 36* 35*   CREATININE 1.1 1.3   CALCIUM 8.1* 8.2*   PROT 4.9* 5.1*   ALBUMIN 1.6* 1.5*   BILITOT 4.4* 3.8*   ALKPHOS 146* 163*   AST 59* 79*   ALT 53* 61*   ANIONGAP 6* 3*   , CBC   Recent Labs   Lab 07/02/24  0225 07/02/24  0747 07/03/24  0343   WBC 9.80 9.85 9.17   HGB 9.2* 9.7* 9.3*   HCT 27.6* 28.1* 27.6*    182 156   , and All labs within the past 24 hours have been reviewed  Radiology: X-Ray: CXR: X-Ray Chest 1 View (CXR):   Results for orders placed or performed during the hospital encounter of 07/01/24   X-Ray Chest 1 View    Narrative    EXAMINATION:  XR CHEST 1 VIEW    CLINICAL HISTORY:  Hypotension;    TECHNIQUE:  Single frontal view of the chest was performed.    COMPARISON:  06/18/2024    FINDINGS:  The lungs are clear, with normal appearance of pulmonary vasculature and no pleural effusion or pneumothorax.    The cardiac silhouette is normal in size. The hilar and mediastinal contours are unremarkable.    Bones are intact.      Impression    No acute abnormality.      Electronically signed by: Janna Gomez MD  Date:    07/01/2024  Time:    09:50       Pending Diagnostic Studies:       None           Medications:  Reconciled Home Medications:      Medication List        START taking these medications      pantoprazole 40 MG tablet  Commonly known as: PROTONIX  Take 1 tablet (40 mg total) by mouth once daily. for 14 days            CONTINUE taking these medications      acetaminophen 500 MG tablet  Commonly known as: TYLENOL  Take 500 mg by mouth daily as needed (Leg pain).     atorvastatin 20 MG tablet  Commonly known as: LIPITOR  Take 1 tablet (20 mg total) by mouth once daily. Hold until follow up with primary care physician due to elevated liver function tests.     ciclopirox 8 % Soln  Commonly known as: PENLAC  Apply topically nightly.     cyanocobalamin 100 MCG tablet  Commonly known as: VITAMIN  B-12  Take 1 tablet (100 mcg total) by mouth once daily.     donepeziL 5 MG tablet  Commonly known as: ARICEPT  Take 1 tablet (5 mg total) by mouth every evening.     dorzolamide-timolol 2-0.5% 22.3-6.8 mg/mL ophthalmic solution  Commonly known as: COSOPT  Place 1 drop into both eyes 2 (two) times daily.     ergocalciferol 50,000 unit Cap  Commonly known as: ERGOCALCIFEROL  Take 1 capsule (50,000 Units total) by mouth every 7 days.     fish oil-omega-3 fatty acids 300-1,000 mg capsule  Take 1 capsule by mouth once daily.     lactulose 20 gram/30 mL Soln  Commonly known as: CHRONULAC  Take 15 mLs (10 g total) by mouth once daily.     multivitamin capsule  Take 1 capsule by mouth once daily.     olmesartan 20 MG tablet  Commonly known as: BENICAR  Take 1 tablet (20 mg total) by mouth once daily. HOLD UNTIL SEEN BY PCP     VITAMIN C 500 MG tablet  Generic drug: ascorbic acid (vitamin C)  Take 500 mg by mouth 2 (two) times daily.            STOP taking these medications      aspirin 81 MG Chew     clopidogreL 75 mg tablet  Commonly known as: PLAVIX              Indwelling Lines/Drains at time of discharge:   Lines/Drains/Airways       Drain  Duration             Female External Urinary Catheter w/ Suction 07/01/24 1715 2 days                    Time spent on the discharge of patient: 35 minutes         Bg Amador MD  Department of Hospital Medicine  [place]

## 2024-07-05 LAB
BLD PROD TYP BPU: NORMAL
BLD PROD TYP BPU: NORMAL
BLOOD UNIT EXPIRATION DATE: NORMAL
BLOOD UNIT EXPIRATION DATE: NORMAL
BLOOD UNIT TYPE CODE: 7300
BLOOD UNIT TYPE CODE: 7300
BLOOD UNIT TYPE: NORMAL
BLOOD UNIT TYPE: NORMAL
CODING SYSTEM: NORMAL
CODING SYSTEM: NORMAL
CROSSMATCH INTERPRETATION: NORMAL
CROSSMATCH INTERPRETATION: NORMAL
DISPENSE STATUS: NORMAL
DISPENSE STATUS: NORMAL
NUM UNITS TRANS PACKED RBC: NORMAL
NUM UNITS TRANS PACKED RBC: NORMAL

## 2024-07-06 LAB
BACTERIA BLD CULT: NORMAL
BACTERIA BLD CULT: NORMAL